# Patient Record
Sex: FEMALE | Race: WHITE | NOT HISPANIC OR LATINO | Employment: OTHER | URBAN - METROPOLITAN AREA
[De-identification: names, ages, dates, MRNs, and addresses within clinical notes are randomized per-mention and may not be internally consistent; named-entity substitution may affect disease eponyms.]

---

## 2017-03-06 ENCOUNTER — HOSPITAL ENCOUNTER (OUTPATIENT)
Dept: RADIOLOGY | Age: 82
Discharge: HOME | End: 2017-03-06
Payer: MEDICARE

## 2017-03-06 DIAGNOSIS — Z12.31 ENCOUNTER FOR SCREENING MAMMOGRAM FOR MALIGNANT NEOPLASM OF BREAST: ICD-10-CM

## 2017-03-06 PROCEDURE — G0202 SCR MAMMO BI INCL CAD: HCPCS

## 2017-03-27 ENCOUNTER — TRANSCRIBE ORDERS (OUTPATIENT)
Dept: ADMINISTRATIVE | Age: 82
End: 2017-03-27

## 2017-03-27 ENCOUNTER — APPOINTMENT (OUTPATIENT)
Dept: LAB | Age: 82
End: 2017-03-27
Payer: MEDICARE

## 2017-03-27 DIAGNOSIS — I10 ESSENTIAL (PRIMARY) HYPERTENSION: ICD-10-CM

## 2017-03-27 DIAGNOSIS — E78.5 HYPERLIPIDEMIA: ICD-10-CM

## 2017-03-27 DIAGNOSIS — R73.9 HYPERGLYCEMIA: ICD-10-CM

## 2017-03-27 LAB
ALBUMIN SERPL BCP-MCNC: 3.7 G/DL (ref 3.5–5)
ALP SERPL-CCNC: 55 U/L (ref 46–116)
ALT SERPL W P-5'-P-CCNC: 26 U/L (ref 12–78)
ANION GAP SERPL CALCULATED.3IONS-SCNC: 6 MMOL/L (ref 4–13)
AST SERPL W P-5'-P-CCNC: 15 U/L (ref 5–45)
BILIRUB SERPL-MCNC: 0.65 MG/DL (ref 0.2–1)
BUN SERPL-MCNC: 30 MG/DL (ref 5–25)
CALCIUM SERPL-MCNC: 9.2 MG/DL (ref 8.3–10.1)
CHLORIDE SERPL-SCNC: 102 MMOL/L (ref 100–108)
CHOLEST SERPL-MCNC: 189 MG/DL (ref 50–200)
CO2 SERPL-SCNC: 31 MMOL/L (ref 21–32)
CREAT SERPL-MCNC: 1.06 MG/DL (ref 0.6–1.3)
EST. AVERAGE GLUCOSE BLD GHB EST-MCNC: 105 MG/DL
GFR SERPL CREATININE-BSD FRML MDRD: 49.9 ML/MIN/1.73SQ M
GLUCOSE P FAST SERPL-MCNC: 94 MG/DL (ref 65–99)
HBA1C MFR BLD: 5.3 % (ref 4.2–6.3)
HDLC SERPL-MCNC: 57 MG/DL (ref 40–60)
LDLC SERPL DIRECT ASSAY-MCNC: 108 MG/DL (ref 0–100)
POTASSIUM SERPL-SCNC: 3.9 MMOL/L (ref 3.5–5.3)
PROT SERPL-MCNC: 6.8 G/DL (ref 6.4–8.2)
SODIUM SERPL-SCNC: 139 MMOL/L (ref 136–145)
TRIGL SERPL-MCNC: 130 MG/DL

## 2017-03-27 PROCEDURE — 80061 LIPID PANEL: CPT

## 2017-03-27 PROCEDURE — 36415 COLL VENOUS BLD VENIPUNCTURE: CPT

## 2017-03-27 PROCEDURE — 80053 COMPREHEN METABOLIC PANEL: CPT

## 2017-03-27 PROCEDURE — 83721 ASSAY OF BLOOD LIPOPROTEIN: CPT

## 2017-03-27 PROCEDURE — 83036 HEMOGLOBIN GLYCOSYLATED A1C: CPT

## 2017-04-03 ENCOUNTER — ALLSCRIPTS OFFICE VISIT (OUTPATIENT)
Dept: OTHER | Facility: OTHER | Age: 82
End: 2017-04-03

## 2017-04-04 ENCOUNTER — ALLSCRIPTS OFFICE VISIT (OUTPATIENT)
Dept: OTHER | Facility: OTHER | Age: 82
End: 2017-04-04

## 2017-04-04 ENCOUNTER — TRANSCRIBE ORDERS (OUTPATIENT)
Dept: ADMINISTRATIVE | Facility: HOSPITAL | Age: 82
End: 2017-04-04

## 2017-04-04 DIAGNOSIS — Z12.31 VISIT FOR SCREENING MAMMOGRAM: Primary | ICD-10-CM

## 2017-04-20 ENCOUNTER — ALLSCRIPTS OFFICE VISIT (OUTPATIENT)
Dept: OTHER | Facility: OTHER | Age: 82
End: 2017-04-20

## 2017-04-20 ENCOUNTER — HOSPITAL ENCOUNTER (OUTPATIENT)
Dept: RADIOLOGY | Facility: OTHER | Age: 82
Discharge: HOME | End: 2017-04-20
Payer: MEDICARE

## 2017-04-20 DIAGNOSIS — M75.41 IMPINGEMENT SYNDROME OF RIGHT SHOULDER: ICD-10-CM

## 2017-04-20 DIAGNOSIS — M25.511 PAIN IN RIGHT SHOULDER: ICD-10-CM

## 2017-04-20 PROCEDURE — 73030 X-RAY EXAM OF SHOULDER: CPT

## 2017-04-26 ENCOUNTER — APPOINTMENT (OUTPATIENT)
Dept: PHYSICAL THERAPY | Age: 82
End: 2017-04-26
Payer: MEDICARE

## 2017-04-26 ENCOUNTER — GENERIC CONVERSION - ENCOUNTER (OUTPATIENT)
Dept: OTHER | Facility: OTHER | Age: 82
End: 2017-04-26

## 2017-04-26 DIAGNOSIS — M75.41 IMPINGEMENT SYNDROME OF RIGHT SHOULDER: ICD-10-CM

## 2017-04-26 PROCEDURE — 97162 PT EVAL MOD COMPLEX 30 MIN: CPT

## 2017-04-26 PROCEDURE — G8985 CARRY GOAL STATUS: HCPCS

## 2017-04-26 PROCEDURE — G8984 CARRY CURRENT STATUS: HCPCS

## 2017-05-02 ENCOUNTER — APPOINTMENT (OUTPATIENT)
Dept: PHYSICAL THERAPY | Age: 82
End: 2017-05-02
Payer: MEDICARE

## 2017-05-02 PROCEDURE — 97110 THERAPEUTIC EXERCISES: CPT

## 2017-05-02 PROCEDURE — 97140 MANUAL THERAPY 1/> REGIONS: CPT

## 2017-05-04 ENCOUNTER — APPOINTMENT (OUTPATIENT)
Dept: PHYSICAL THERAPY | Age: 82
End: 2017-05-04
Payer: MEDICARE

## 2017-05-04 PROCEDURE — 97140 MANUAL THERAPY 1/> REGIONS: CPT

## 2017-05-04 PROCEDURE — 97110 THERAPEUTIC EXERCISES: CPT

## 2017-05-08 ENCOUNTER — APPOINTMENT (OUTPATIENT)
Dept: PHYSICAL THERAPY | Age: 82
End: 2017-05-08
Payer: MEDICARE

## 2017-05-08 PROCEDURE — 97110 THERAPEUTIC EXERCISES: CPT

## 2017-05-08 PROCEDURE — 97014 ELECTRIC STIMULATION THERAPY: CPT

## 2017-05-10 ENCOUNTER — APPOINTMENT (OUTPATIENT)
Dept: PHYSICAL THERAPY | Age: 82
End: 2017-05-10
Payer: MEDICARE

## 2017-05-10 PROCEDURE — 97110 THERAPEUTIC EXERCISES: CPT

## 2017-05-10 PROCEDURE — 97014 ELECTRIC STIMULATION THERAPY: CPT

## 2017-05-15 ENCOUNTER — APPOINTMENT (OUTPATIENT)
Dept: PHYSICAL THERAPY | Age: 82
End: 2017-05-15
Payer: MEDICARE

## 2017-05-15 PROCEDURE — 97110 THERAPEUTIC EXERCISES: CPT

## 2017-05-17 ENCOUNTER — APPOINTMENT (OUTPATIENT)
Dept: PHYSICAL THERAPY | Age: 82
End: 2017-05-17
Payer: MEDICARE

## 2017-05-17 PROCEDURE — 97110 THERAPEUTIC EXERCISES: CPT

## 2017-05-22 ENCOUNTER — APPOINTMENT (OUTPATIENT)
Dept: PHYSICAL THERAPY | Age: 82
End: 2017-05-22
Payer: MEDICARE

## 2017-05-22 ENCOUNTER — GENERIC CONVERSION - ENCOUNTER (OUTPATIENT)
Dept: OTHER | Facility: OTHER | Age: 82
End: 2017-05-22

## 2017-05-22 PROCEDURE — 97140 MANUAL THERAPY 1/> REGIONS: CPT

## 2017-05-22 PROCEDURE — 97110 THERAPEUTIC EXERCISES: CPT

## 2017-05-24 ENCOUNTER — APPOINTMENT (OUTPATIENT)
Dept: PHYSICAL THERAPY | Age: 82
End: 2017-05-24
Payer: MEDICARE

## 2017-05-24 PROCEDURE — G8991 OTHER PT/OT GOAL STATUS: HCPCS

## 2017-05-24 PROCEDURE — G8990 OTHER PT/OT CURRENT STATUS: HCPCS

## 2017-05-24 PROCEDURE — 97110 THERAPEUTIC EXERCISES: CPT

## 2017-05-24 PROCEDURE — 97140 MANUAL THERAPY 1/> REGIONS: CPT

## 2017-05-30 ENCOUNTER — GENERIC CONVERSION - ENCOUNTER (OUTPATIENT)
Dept: OTHER | Facility: OTHER | Age: 82
End: 2017-05-30

## 2017-05-30 ENCOUNTER — APPOINTMENT (OUTPATIENT)
Dept: PHYSICAL THERAPY | Age: 82
End: 2017-05-30
Payer: MEDICARE

## 2017-05-30 PROCEDURE — G8992 OTHER PT/OT  D/C STATUS: HCPCS | Performed by: PHYSICAL THERAPIST

## 2017-05-30 PROCEDURE — 97110 THERAPEUTIC EXERCISES: CPT

## 2017-05-30 PROCEDURE — G8991 OTHER PT/OT GOAL STATUS: HCPCS | Performed by: PHYSICAL THERAPIST

## 2017-05-31 DIAGNOSIS — M75.41 IMPINGEMENT SYNDROME OF RIGHT SHOULDER: ICD-10-CM

## 2017-05-31 DIAGNOSIS — I48.91 ATRIAL FIBRILLATION (HCC): ICD-10-CM

## 2017-05-31 DIAGNOSIS — M12.811 OTHER SPECIFIC ARTHROPATHIES, NOT ELSEWHERE CLASSIFIED, RIGHT SHOULDER: ICD-10-CM

## 2017-06-01 ENCOUNTER — APPOINTMENT (OUTPATIENT)
Dept: PHYSICAL THERAPY | Age: 82
End: 2017-06-01
Payer: MEDICARE

## 2017-06-02 ENCOUNTER — TRANSCRIBE ORDERS (OUTPATIENT)
Dept: ADMINISTRATIVE | Facility: HOSPITAL | Age: 82
End: 2017-06-02

## 2017-06-02 DIAGNOSIS — M75.41 IMPINGEMENT SYNDROME OF RIGHT SHOULDER: ICD-10-CM

## 2017-06-02 DIAGNOSIS — M75.101 ROTATOR CUFF TEAR ARTHROPATHY OF RIGHT SHOULDER: Primary | ICD-10-CM

## 2017-06-02 DIAGNOSIS — M12.811 ROTATOR CUFF TEAR ARTHROPATHY OF RIGHT SHOULDER: Primary | ICD-10-CM

## 2017-06-05 ENCOUNTER — HOSPITAL ENCOUNTER (OUTPATIENT)
Dept: RADIOLOGY | Age: 82
Discharge: HOME | End: 2017-06-05
Payer: MEDICARE

## 2017-06-05 DIAGNOSIS — M75.41 IMPINGEMENT SYNDROME OF RIGHT SHOULDER: ICD-10-CM

## 2017-06-05 PROCEDURE — 73221 MRI JOINT UPR EXTREM W/O DYE: CPT

## 2017-06-12 ENCOUNTER — ALLSCRIPTS OFFICE VISIT (OUTPATIENT)
Dept: OTHER | Facility: OTHER | Age: 82
End: 2017-06-12

## 2017-06-12 ENCOUNTER — LAB (OUTPATIENT)
Dept: LAB | Facility: HOSPITAL | Age: 82
End: 2017-06-12
Attending: ORTHOPAEDIC SURGERY
Payer: MEDICARE

## 2017-06-12 DIAGNOSIS — M12.811 ROTATOR CUFF TEAR ARTHROPATHY OF RIGHT SHOULDER: ICD-10-CM

## 2017-06-12 DIAGNOSIS — I48.91 ATRIAL FIBRILLATION (HCC): ICD-10-CM

## 2017-06-12 DIAGNOSIS — M75.101 ROTATOR CUFF TEAR ARTHROPATHY OF RIGHT SHOULDER: ICD-10-CM

## 2017-06-12 DIAGNOSIS — M12.811 OTHER SPECIFIC ARTHROPATHIES, NOT ELSEWHERE CLASSIFIED, RIGHT SHOULDER: ICD-10-CM

## 2017-06-12 LAB
ABO GROUP BLD: NORMAL
ALBUMIN SERPL BCP-MCNC: 3.9 G/DL (ref 3.5–5)
ALP SERPL-CCNC: 72 U/L (ref 46–116)
ALT SERPL W P-5'-P-CCNC: 29 U/L (ref 12–78)
ANION GAP SERPL CALCULATED.3IONS-SCNC: 8 MMOL/L (ref 4–13)
AST SERPL W P-5'-P-CCNC: 20 U/L (ref 5–45)
BACTERIA UR QL AUTO: ABNORMAL /HPF
BASOPHILS # BLD AUTO: 0.03 THOUSANDS/ΜL (ref 0–0.1)
BASOPHILS NFR BLD AUTO: 0 % (ref 0–1)
BILIRUB SERPL-MCNC: 0.57 MG/DL (ref 0.2–1)
BILIRUB UR QL STRIP: NEGATIVE
BLD GP AB SCN SERPL QL: NEGATIVE
BUN SERPL-MCNC: 26 MG/DL (ref 5–25)
CALCIUM SERPL-MCNC: 9.8 MG/DL (ref 8.3–10.1)
CHLORIDE SERPL-SCNC: 100 MMOL/L (ref 100–108)
CLARITY UR: CLEAR
CO2 SERPL-SCNC: 30 MMOL/L (ref 21–32)
COLOR UR: YELLOW
CREAT SERPL-MCNC: 0.94 MG/DL (ref 0.6–1.3)
EOSINOPHIL # BLD AUTO: 0.26 THOUSAND/ΜL (ref 0–0.61)
EOSINOPHIL NFR BLD AUTO: 2 % (ref 0–6)
ERYTHROCYTE [DISTWIDTH] IN BLOOD BY AUTOMATED COUNT: 13 % (ref 11.6–15.1)
EST. AVERAGE GLUCOSE BLD GHB EST-MCNC: 103 MG/DL
GFR SERPL CREATININE-BSD FRML MDRD: 57.3 ML/MIN/1.73SQ M
GLUCOSE SERPL-MCNC: 82 MG/DL (ref 65–140)
GLUCOSE UR STRIP-MCNC: NEGATIVE MG/DL
HBA1C MFR BLD: 5.2 % (ref 4.2–6.3)
HCT VFR BLD AUTO: 39 % (ref 34.8–46.1)
HGB BLD-MCNC: 13.1 G/DL (ref 11.5–15.4)
HGB UR QL STRIP.AUTO: NEGATIVE
HYALINE CASTS #/AREA URNS LPF: ABNORMAL /LPF
KETONES UR STRIP-MCNC: NEGATIVE MG/DL
LEUKOCYTE ESTERASE UR QL STRIP: ABNORMAL
LYMPHOCYTES # BLD AUTO: 2.33 THOUSANDS/ΜL (ref 0.6–4.47)
LYMPHOCYTES NFR BLD AUTO: 21 % (ref 14–44)
MCH RBC QN AUTO: 31.4 PG (ref 26.8–34.3)
MCHC RBC AUTO-ENTMCNC: 33.6 G/DL (ref 31.4–37.4)
MCV RBC AUTO: 94 FL (ref 82–98)
MONOCYTES # BLD AUTO: 1.26 THOUSAND/ΜL (ref 0.17–1.22)
MONOCYTES NFR BLD AUTO: 11 % (ref 4–12)
NEUTROPHILS # BLD AUTO: 7.21 THOUSANDS/ΜL (ref 1.85–7.62)
NEUTS SEG NFR BLD AUTO: 66 % (ref 43–75)
NITRITE UR QL STRIP: NEGATIVE
NON-SQ EPI CELLS URNS QL MICRO: ABNORMAL /HPF
NRBC BLD AUTO-RTO: 0 /100 WBCS
PH UR STRIP.AUTO: 7.5 [PH] (ref 4.5–8)
PLATELET # BLD AUTO: 258 THOUSANDS/UL (ref 149–390)
PMV BLD AUTO: 10 FL (ref 8.9–12.7)
POTASSIUM SERPL-SCNC: 3.8 MMOL/L (ref 3.5–5.3)
PROT SERPL-MCNC: 7.8 G/DL (ref 6.4–8.2)
PROT UR STRIP-MCNC: NEGATIVE MG/DL
RBC # BLD AUTO: 4.17 MILLION/UL (ref 3.81–5.12)
RBC #/AREA URNS AUTO: ABNORMAL /HPF
RH BLD: POSITIVE
SODIUM SERPL-SCNC: 138 MMOL/L (ref 136–145)
SP GR UR STRIP.AUTO: 1.02 (ref 1–1.03)
SPECIMEN EXPIRATION DATE: NORMAL
UROBILINOGEN UR QL STRIP.AUTO: 1 E.U./DL
WBC # BLD AUTO: 11.13 THOUSAND/UL (ref 4.31–10.16)
WBC #/AREA URNS AUTO: ABNORMAL /HPF

## 2017-06-12 PROCEDURE — 81001 URINALYSIS AUTO W/SCOPE: CPT | Performed by: ORTHOPAEDIC SURGERY

## 2017-06-12 PROCEDURE — 86901 BLOOD TYPING SEROLOGIC RH(D): CPT

## 2017-06-12 PROCEDURE — 85025 COMPLETE CBC W/AUTO DIFF WBC: CPT

## 2017-06-12 PROCEDURE — 36415 COLL VENOUS BLD VENIPUNCTURE: CPT

## 2017-06-12 PROCEDURE — 86900 BLOOD TYPING SEROLOGIC ABO: CPT

## 2017-06-12 PROCEDURE — 83036 HEMOGLOBIN GLYCOSYLATED A1C: CPT

## 2017-06-12 PROCEDURE — 80053 COMPREHEN METABOLIC PANEL: CPT

## 2017-06-12 PROCEDURE — 86850 RBC ANTIBODY SCREEN: CPT

## 2017-06-12 PROCEDURE — 93005 ELECTROCARDIOGRAM TRACING: CPT

## 2017-06-13 LAB
ATRIAL RATE: 71 BPM
P AXIS: 43 DEGREES
PR INTERVAL: 178 MS
QRS AXIS: 11 DEGREES
QRSD INTERVAL: 68 MS
QT INTERVAL: 374 MS
QTC INTERVAL: 406 MS
T WAVE AXIS: 25 DEGREES
VENTRICULAR RATE: 71 BPM

## 2017-06-20 ENCOUNTER — TRANSCRIBE ORDERS (OUTPATIENT)
Dept: ADMINISTRATIVE | Age: 82
End: 2017-06-20

## 2017-06-20 ENCOUNTER — APPOINTMENT (OUTPATIENT)
Dept: RADIOLOGY | Age: 82
End: 2017-06-20
Payer: MEDICARE

## 2017-06-20 DIAGNOSIS — M75.101 ROTATOR CUFF TEAR ARTHROPATHY OF BOTH SHOULDERS: Primary | ICD-10-CM

## 2017-06-20 DIAGNOSIS — M75.101 ROTATOR CUFF TEAR ARTHROPATHY OF BOTH SHOULDERS: ICD-10-CM

## 2017-06-20 DIAGNOSIS — M12.812 ROTATOR CUFF TEAR ARTHROPATHY OF BOTH SHOULDERS: ICD-10-CM

## 2017-06-20 DIAGNOSIS — M75.102 ROTATOR CUFF TEAR ARTHROPATHY OF BOTH SHOULDERS: Primary | ICD-10-CM

## 2017-06-20 DIAGNOSIS — M12.811 ROTATOR CUFF TEAR ARTHROPATHY OF BOTH SHOULDERS: ICD-10-CM

## 2017-06-20 DIAGNOSIS — M12.812 ROTATOR CUFF TEAR ARTHROPATHY OF BOTH SHOULDERS: Primary | ICD-10-CM

## 2017-06-20 DIAGNOSIS — M12.811 ROTATOR CUFF TEAR ARTHROPATHY OF BOTH SHOULDERS: Primary | ICD-10-CM

## 2017-06-20 DIAGNOSIS — M75.102 ROTATOR CUFF TEAR ARTHROPATHY OF BOTH SHOULDERS: ICD-10-CM

## 2017-06-20 PROCEDURE — 71020 HB CHEST X-RAY 2VW FRONTAL&LATL: CPT

## 2017-06-21 ENCOUNTER — GENERIC CONVERSION - ENCOUNTER (OUTPATIENT)
Dept: OTHER | Facility: OTHER | Age: 82
End: 2017-06-21

## 2017-06-23 ENCOUNTER — ALLSCRIPTS OFFICE VISIT (OUTPATIENT)
Dept: OTHER | Facility: OTHER | Age: 82
End: 2017-06-23

## 2017-07-07 ENCOUNTER — ANESTHESIA EVENT (OUTPATIENT)
Dept: PERIOP | Facility: HOSPITAL | Age: 82
DRG: 483 | End: 2017-07-07
Payer: MEDICARE

## 2017-07-18 ENCOUNTER — APPOINTMENT (INPATIENT)
Dept: RADIOLOGY | Facility: HOSPITAL | Age: 82
DRG: 483 | End: 2017-07-18
Attending: ORTHOPAEDIC SURGERY
Payer: MEDICARE

## 2017-07-18 ENCOUNTER — ANESTHESIA (OUTPATIENT)
Dept: PERIOP | Facility: HOSPITAL | Age: 82
DRG: 483 | End: 2017-07-18
Payer: MEDICARE

## 2017-07-18 ENCOUNTER — HOSPITAL ENCOUNTER (INPATIENT)
Facility: HOSPITAL | Age: 82
LOS: 1 days | Discharge: HOME HEALTH | DRG: 483 | End: 2017-07-19
Attending: ORTHOPAEDIC SURGERY | Admitting: ORTHOPAEDIC SURGERY
Payer: MEDICARE

## 2017-07-18 DIAGNOSIS — M75.101 ROTATOR CUFF TEAR ARTHROPATHY OF RIGHT SHOULDER: Primary | ICD-10-CM

## 2017-07-18 DIAGNOSIS — M12.811 ROTATOR CUFF TEAR ARTHROPATHY OF RIGHT SHOULDER: Primary | ICD-10-CM

## 2017-07-18 PROBLEM — M19.211: Status: ACTIVE | Noted: 2017-07-18

## 2017-07-18 PROBLEM — I10 HYPERTENSION: Status: ACTIVE | Noted: 2017-07-18

## 2017-07-18 PROBLEM — F41.9 ANXIETY: Status: ACTIVE | Noted: 2017-07-18

## 2017-07-18 PROBLEM — G62.9 PERIPHERAL NEUROPATHY: Status: ACTIVE | Noted: 2017-07-18

## 2017-07-18 PROBLEM — E78.5 HYPERLIPIDEMIA: Status: ACTIVE | Noted: 2017-07-18

## 2017-07-18 LAB
ABO GROUP BLD: NORMAL
BLD GP AB SCN SERPL QL: NEGATIVE
RH BLD: POSITIVE
SPECIMEN EXPIRATION DATE: NORMAL

## 2017-07-18 PROCEDURE — 73020 X-RAY EXAM OF SHOULDER: CPT

## 2017-07-18 PROCEDURE — C1776 JOINT DEVICE (IMPLANTABLE): HCPCS | Performed by: ORTHOPAEDIC SURGERY

## 2017-07-18 PROCEDURE — 0LQ10ZZ REPAIR RIGHT SHOULDER TENDON, OPEN APPROACH: ICD-10-PCS | Performed by: ORTHOPAEDIC SURGERY

## 2017-07-18 PROCEDURE — 86850 RBC ANTIBODY SCREEN: CPT | Performed by: ORTHOPAEDIC SURGERY

## 2017-07-18 PROCEDURE — 86901 BLOOD TYPING SEROLOGIC RH(D): CPT | Performed by: ORTHOPAEDIC SURGERY

## 2017-07-18 PROCEDURE — C1713 ANCHOR/SCREW BN/BN,TIS/BN: HCPCS | Performed by: ORTHOPAEDIC SURGERY

## 2017-07-18 PROCEDURE — 0LS30ZZ REPOSITION RIGHT UPPER ARM TENDON, OPEN APPROACH: ICD-10-PCS | Performed by: ORTHOPAEDIC SURGERY

## 2017-07-18 PROCEDURE — 86900 BLOOD TYPING SEROLOGIC ABO: CPT | Performed by: ORTHOPAEDIC SURGERY

## 2017-07-18 PROCEDURE — 0RRJ00Z REPLACEMENT OF RIGHT SHOULDER JOINT WITH REVERSE BALL AND SOCKET SYNTHETIC SUBSTITUTE, OPEN APPROACH: ICD-10-PCS | Performed by: ORTHOPAEDIC SURGERY

## 2017-07-18 DEVICE — SCREW CENTRAL 6.5 X 25MM: Type: IMPLANTABLE DEVICE | Site: SHOULDER | Status: FUNCTIONAL

## 2017-07-18 DEVICE — GLENOSPHERE STD 36MM: Type: IMPLANTABLE DEVICE | Site: SHOULDER | Status: FUNCTIONAL

## 2017-07-18 DEVICE — BASEPLATE STD 25MM: Type: IMPLANTABLE DEVICE | Site: SHOULDER | Status: FUNCTIONAL

## 2017-07-18 DEVICE — IMPLANTABLE DEVICE
Type: IMPLANTABLE DEVICE | Site: SHOULDER | Status: FUNCTIONAL
Brand: FLEX SHOULDER SYSTEM

## 2017-07-18 DEVICE — SCREW PERIPHERAL 5 X 14MM: Type: IMPLANTABLE DEVICE | Site: SHOULDER | Status: FUNCTIONAL

## 2017-07-18 DEVICE — IMPLANTABLE DEVICE
Type: IMPLANTABLE DEVICE | Site: SHOULDER | Status: FUNCTIONAL
Brand: AEQUALIS™ ASCEND™ FLEX

## 2017-07-18 DEVICE — SCREW PERIPHERAL 5 X 34MM: Type: IMPLANTABLE DEVICE | Site: SHOULDER | Status: FUNCTIONAL

## 2017-07-18 RX ORDER — PRAVASTATIN SODIUM 40 MG
40 TABLET ORAL
Status: DISCONTINUED | OUTPATIENT
Start: 2017-07-20 | End: 2017-07-19 | Stop reason: HOSPADM

## 2017-07-18 RX ORDER — OXYCODONE HYDROCHLORIDE 10 MG/1
10 TABLET ORAL EVERY 4 HOURS PRN
Status: DISCONTINUED | OUTPATIENT
Start: 2017-07-18 | End: 2017-07-19 | Stop reason: HOSPADM

## 2017-07-18 RX ORDER — SUCCINYLCHOLINE CHLORIDE 20 MG/ML
INJECTION INTRAMUSCULAR; INTRAVENOUS AS NEEDED
Status: DISCONTINUED | OUTPATIENT
Start: 2017-07-18 | End: 2017-07-18 | Stop reason: SURG

## 2017-07-18 RX ORDER — ONDANSETRON 2 MG/ML
4 INJECTION INTRAMUSCULAR; INTRAVENOUS EVERY 6 HOURS PRN
Status: DISCONTINUED | OUTPATIENT
Start: 2017-07-18 | End: 2017-07-19 | Stop reason: HOSPADM

## 2017-07-18 RX ORDER — SCOLOPAMINE TRANSDERMAL SYSTEM 1 MG/1
1 PATCH, EXTENDED RELEASE TRANSDERMAL
Status: DISCONTINUED | OUTPATIENT
Start: 2017-07-18 | End: 2017-07-19 | Stop reason: HOSPADM

## 2017-07-18 RX ORDER — CHLORAL HYDRATE 500 MG
1000 CAPSULE ORAL DAILY
Status: DISCONTINUED | OUTPATIENT
Start: 2017-07-18 | End: 2017-07-19 | Stop reason: HOSPADM

## 2017-07-18 RX ORDER — LISINOPRIL 10 MG/1
10 TABLET ORAL EVERY 12 HOURS SCHEDULED
Status: DISCONTINUED | OUTPATIENT
Start: 2017-07-18 | End: 2017-07-18

## 2017-07-18 RX ORDER — SODIUM CHLORIDE, SODIUM LACTATE, POTASSIUM CHLORIDE, CALCIUM CHLORIDE 600; 310; 30; 20 MG/100ML; MG/100ML; MG/100ML; MG/100ML
125 INJECTION, SOLUTION INTRAVENOUS CONTINUOUS
Status: DISCONTINUED | OUTPATIENT
Start: 2017-07-18 | End: 2017-07-19 | Stop reason: HOSPADM

## 2017-07-18 RX ORDER — PROPOFOL 10 MG/ML
INJECTION, EMULSION INTRAVENOUS CONTINUOUS PRN
Status: DISCONTINUED | OUTPATIENT
Start: 2017-07-18 | End: 2017-07-18 | Stop reason: SURG

## 2017-07-18 RX ORDER — CALCIUM CARBONATE 200(500)MG
1000 TABLET,CHEWABLE ORAL DAILY PRN
Status: DISCONTINUED | OUTPATIENT
Start: 2017-07-18 | End: 2017-07-19 | Stop reason: HOSPADM

## 2017-07-18 RX ORDER — PROPOFOL 10 MG/ML
INJECTION, EMULSION INTRAVENOUS AS NEEDED
Status: DISCONTINUED | OUTPATIENT
Start: 2017-07-18 | End: 2017-07-18 | Stop reason: SURG

## 2017-07-18 RX ORDER — ALBUTEROL SULFATE 2.5 MG/3ML
2.5 SOLUTION RESPIRATORY (INHALATION) ONCE AS NEEDED
Status: DISCONTINUED | OUTPATIENT
Start: 2017-07-18 | End: 2017-07-18 | Stop reason: HOSPADM

## 2017-07-18 RX ORDER — ONDANSETRON 2 MG/ML
INJECTION INTRAMUSCULAR; INTRAVENOUS AS NEEDED
Status: DISCONTINUED | OUTPATIENT
Start: 2017-07-18 | End: 2017-07-18 | Stop reason: SURG

## 2017-07-18 RX ORDER — MORPHINE SULFATE 2 MG/ML
2 INJECTION, SOLUTION INTRAMUSCULAR; INTRAVENOUS
Status: DISCONTINUED | OUTPATIENT
Start: 2017-07-18 | End: 2017-07-19 | Stop reason: HOSPADM

## 2017-07-18 RX ORDER — DIPHENOXYLATE HYDROCHLORIDE AND ATROPINE SULFATE 2.5; .025 MG/1; MG/1
1 TABLET ORAL
COMMUNITY
End: 2019-11-15

## 2017-07-18 RX ORDER — EPHEDRINE SULFATE 50 MG/ML
INJECTION, SOLUTION INTRAVENOUS AS NEEDED
Status: DISCONTINUED | OUTPATIENT
Start: 2017-07-18 | End: 2017-07-18 | Stop reason: SURG

## 2017-07-18 RX ORDER — FENTANYL CITRATE 50 UG/ML
INJECTION, SOLUTION INTRAMUSCULAR; INTRAVENOUS AS NEEDED
Status: DISCONTINUED | OUTPATIENT
Start: 2017-07-18 | End: 2017-07-18 | Stop reason: SURG

## 2017-07-18 RX ORDER — OXYCODONE HYDROCHLORIDE 5 MG/1
TABLET ORAL
Qty: 30 TABLET | Refills: 0 | Status: SHIPPED | OUTPATIENT
Start: 2017-07-18 | End: 2018-07-10

## 2017-07-18 RX ORDER — MEPERIDINE HYDROCHLORIDE 25 MG/ML
12.5 INJECTION INTRAMUSCULAR; INTRAVENOUS; SUBCUTANEOUS AS NEEDED
Status: DISCONTINUED | OUTPATIENT
Start: 2017-07-18 | End: 2017-07-18 | Stop reason: HOSPADM

## 2017-07-18 RX ORDER — FENTANYL CITRATE/PF 50 MCG/ML
25 SYRINGE (ML) INJECTION
Status: DISCONTINUED | OUTPATIENT
Start: 2017-07-18 | End: 2017-07-18 | Stop reason: HOSPADM

## 2017-07-18 RX ORDER — PROPRANOLOL HYDROCHLORIDE 10 MG/1
20 TABLET ORAL 4 TIMES DAILY
Status: DISCONTINUED | OUTPATIENT
Start: 2017-07-18 | End: 2017-07-19 | Stop reason: HOSPADM

## 2017-07-18 RX ORDER — GLYCOPYRROLATE 0.2 MG/ML
INJECTION INTRAMUSCULAR; INTRAVENOUS AS NEEDED
Status: DISCONTINUED | OUTPATIENT
Start: 2017-07-18 | End: 2017-07-18 | Stop reason: SURG

## 2017-07-18 RX ORDER — LIDOCAINE HYDROCHLORIDE 10 MG/ML
INJECTION, SOLUTION INFILTRATION; PERINEURAL AS NEEDED
Status: DISCONTINUED | OUTPATIENT
Start: 2017-07-18 | End: 2017-07-18 | Stop reason: SURG

## 2017-07-18 RX ORDER — ACETAMINOPHEN 325 MG/1
650 TABLET ORAL EVERY 6 HOURS PRN
Status: DISCONTINUED | OUTPATIENT
Start: 2017-07-18 | End: 2017-07-19 | Stop reason: HOSPADM

## 2017-07-18 RX ORDER — MAGNESIUM HYDROXIDE 1200 MG/15ML
LIQUID ORAL AS NEEDED
Status: DISCONTINUED | OUTPATIENT
Start: 2017-07-18 | End: 2017-07-18 | Stop reason: HOSPADM

## 2017-07-18 RX ORDER — DEXAMETHASONE SODIUM PHOSPHATE 4 MG/ML
INJECTION, SOLUTION INTRA-ARTICULAR; INTRALESIONAL; INTRAMUSCULAR; INTRAVENOUS; SOFT TISSUE AS NEEDED
Status: DISCONTINUED | OUTPATIENT
Start: 2017-07-18 | End: 2017-07-18 | Stop reason: SURG

## 2017-07-18 RX ORDER — TRIAMTERENE AND HYDROCHLOROTHIAZIDE 37.5; 25 MG/1; MG/1
1 TABLET ORAL DAILY
Status: DISCONTINUED | OUTPATIENT
Start: 2017-07-18 | End: 2017-07-18

## 2017-07-18 RX ORDER — DOCUSATE SODIUM 100 MG/1
100 CAPSULE, LIQUID FILLED ORAL 2 TIMES DAILY
Status: DISCONTINUED | OUTPATIENT
Start: 2017-07-18 | End: 2017-07-19 | Stop reason: HOSPADM

## 2017-07-18 RX ORDER — SODIUM CHLORIDE, SODIUM LACTATE, POTASSIUM CHLORIDE, CALCIUM CHLORIDE 600; 310; 30; 20 MG/100ML; MG/100ML; MG/100ML; MG/100ML
125 INJECTION, SOLUTION INTRAVENOUS CONTINUOUS
Status: DISPENSED | OUTPATIENT
Start: 2017-07-18 | End: 2017-07-19

## 2017-07-18 RX ORDER — LABETALOL HYDROCHLORIDE 5 MG/ML
5 INJECTION, SOLUTION INTRAVENOUS
Status: DISCONTINUED | OUTPATIENT
Start: 2017-07-18 | End: 2017-07-18 | Stop reason: HOSPADM

## 2017-07-18 RX ORDER — ONDANSETRON 2 MG/ML
4 INJECTION INTRAMUSCULAR; INTRAVENOUS ONCE AS NEEDED
Status: DISCONTINUED | OUTPATIENT
Start: 2017-07-18 | End: 2017-07-18 | Stop reason: HOSPADM

## 2017-07-18 RX ORDER — OXYCODONE HYDROCHLORIDE 5 MG/1
5 TABLET ORAL EVERY 4 HOURS PRN
Status: DISCONTINUED | OUTPATIENT
Start: 2017-07-18 | End: 2017-07-19 | Stop reason: HOSPADM

## 2017-07-18 RX ADMIN — CEFAZOLIN SODIUM 2000 MG: 2 SOLUTION INTRAVENOUS at 08:05

## 2017-07-18 RX ADMIN — FENTANYL CITRATE 25 MCG: 50 INJECTION INTRAMUSCULAR; INTRAVENOUS at 10:39

## 2017-07-18 RX ADMIN — CEFAZOLIN SODIUM 2000 MG: 2 SOLUTION INTRAVENOUS at 16:48

## 2017-07-18 RX ADMIN — GLYCOPYRROLATE 0.2 MG: 0.2 INJECTION, SOLUTION INTRAMUSCULAR; INTRAVENOUS at 08:51

## 2017-07-18 RX ADMIN — FENTANYL CITRATE 100 MCG: 50 INJECTION, SOLUTION INTRAMUSCULAR; INTRAVENOUS at 08:10

## 2017-07-18 RX ADMIN — SCOPALAMINE 1 PATCH: 1 PATCH, EXTENDED RELEASE TRANSDERMAL at 11:02

## 2017-07-18 RX ADMIN — EPHEDRINE SULFATE 10 MG: 50 INJECTION, SOLUTION INTRAMUSCULAR; INTRAVENOUS; SUBCUTANEOUS at 08:47

## 2017-07-18 RX ADMIN — ROPIVACAINE HYDROCHLORIDE: 2 INJECTION, SOLUTION EPIDURAL; INFILTRATION at 20:08

## 2017-07-18 RX ADMIN — SODIUM CHLORIDE, SODIUM LACTATE, POTASSIUM CHLORIDE, AND CALCIUM CHLORIDE: .6; .31; .03; .02 INJECTION, SOLUTION INTRAVENOUS at 07:50

## 2017-07-18 RX ADMIN — EPHEDRINE SULFATE 5 MG: 50 INJECTION, SOLUTION INTRAMUSCULAR; INTRAVENOUS; SUBCUTANEOUS at 09:03

## 2017-07-18 RX ADMIN — SODIUM CHLORIDE, SODIUM LACTATE, POTASSIUM CHLORIDE, AND CALCIUM CHLORIDE 125 ML/HR: .6; .31; .03; .02 INJECTION, SOLUTION INTRAVENOUS at 09:57

## 2017-07-18 RX ADMIN — EPHEDRINE SULFATE 10 MG: 50 INJECTION, SOLUTION INTRAMUSCULAR; INTRAVENOUS; SUBCUTANEOUS at 08:31

## 2017-07-18 RX ADMIN — ROPIVACAINE HYDROCHLORIDE: 2 INJECTION, SOLUTION EPIDURAL; INFILTRATION at 09:58

## 2017-07-18 RX ADMIN — EPHEDRINE SULFATE 5 MG: 50 INJECTION, SOLUTION INTRAMUSCULAR; INTRAVENOUS; SUBCUTANEOUS at 08:27

## 2017-07-18 RX ADMIN — PROPRANOLOL HYDROCHLORIDE 20 MG: 10 TABLET ORAL at 17:42

## 2017-07-18 RX ADMIN — PROPOFOL 80 MCG/KG/MIN: 10 INJECTION, EMULSION INTRAVENOUS at 08:02

## 2017-07-18 RX ADMIN — EPHEDRINE SULFATE 10 MG: 50 INJECTION, SOLUTION INTRAMUSCULAR; INTRAVENOUS; SUBCUTANEOUS at 08:29

## 2017-07-18 RX ADMIN — PROPOFOL 10 MG: 10 INJECTION, EMULSION INTRAVENOUS at 08:16

## 2017-07-18 RX ADMIN — EPHEDRINE SULFATE 10 MG: 50 INJECTION, SOLUTION INTRAMUSCULAR; INTRAVENOUS; SUBCUTANEOUS at 08:00

## 2017-07-18 RX ADMIN — DEXAMETHASONE SODIUM PHOSPHATE 8 MG: 4 INJECTION, SOLUTION INTRAMUSCULAR; INTRAVENOUS at 08:13

## 2017-07-18 RX ADMIN — ONDANSETRON 4 MG: 2 INJECTION INTRAMUSCULAR; INTRAVENOUS at 09:24

## 2017-07-18 RX ADMIN — PROPRANOLOL HYDROCHLORIDE 20 MG: 10 TABLET ORAL at 21:54

## 2017-07-18 RX ADMIN — EPHEDRINE SULFATE 5 MG: 50 INJECTION, SOLUTION INTRAMUSCULAR; INTRAVENOUS; SUBCUTANEOUS at 09:22

## 2017-07-18 RX ADMIN — LIDOCAINE HYDROCHLORIDE 50 MG: 10 INJECTION, SOLUTION INFILTRATION; PERINEURAL at 07:56

## 2017-07-18 RX ADMIN — DOCUSATE SODIUM 100 MG: 100 CAPSULE, LIQUID FILLED ORAL at 17:42

## 2017-07-18 RX ADMIN — FENTANYL CITRATE 25 MCG: 50 INJECTION INTRAMUSCULAR; INTRAVENOUS at 10:30

## 2017-07-18 RX ADMIN — SUCCINYLCHOLINE CHLORIDE 120 MG: 20 INJECTION, SOLUTION INTRAMUSCULAR; INTRAVENOUS at 07:56

## 2017-07-18 RX ADMIN — CEFAZOLIN SODIUM 2000 MG: 2 SOLUTION INTRAVENOUS at 23:17

## 2017-07-18 RX ADMIN — PROPOFOL 150 MG: 10 INJECTION, EMULSION INTRAVENOUS at 07:56

## 2017-07-19 VITALS
BODY MASS INDEX: 39.34 KG/M2 | DIASTOLIC BLOOD PRESSURE: 70 MMHG | HEIGHT: 55 IN | HEART RATE: 73 BPM | WEIGHT: 170 LBS | OXYGEN SATURATION: 95 % | RESPIRATION RATE: 18 BRPM | TEMPERATURE: 98 F | SYSTOLIC BLOOD PRESSURE: 138 MMHG

## 2017-07-19 LAB
ANION GAP SERPL CALCULATED.3IONS-SCNC: 6 MMOL/L (ref 4–13)
BASOPHILS # BLD AUTO: 0 THOUSANDS/ΜL (ref 0–0.1)
BASOPHILS NFR BLD AUTO: 0 % (ref 0–1)
BUN SERPL-MCNC: 17 MG/DL (ref 5–25)
CALCIUM SERPL-MCNC: 9 MG/DL (ref 8.3–10.1)
CHLORIDE SERPL-SCNC: 103 MMOL/L (ref 100–108)
CO2 SERPL-SCNC: 27 MMOL/L (ref 21–32)
CREAT SERPL-MCNC: 0.88 MG/DL (ref 0.6–1.3)
EOSINOPHIL # BLD AUTO: 0 THOUSAND/ΜL (ref 0–0.61)
EOSINOPHIL NFR BLD AUTO: 0 % (ref 0–6)
ERYTHROCYTE [DISTWIDTH] IN BLOOD BY AUTOMATED COUNT: 12.5 % (ref 11.6–15.1)
ERYTHROCYTE [DISTWIDTH] IN BLOOD BY AUTOMATED COUNT: 12.5 % (ref 11.6–15.1)
GFR SERPL CREATININE-BSD FRML MDRD: >60 ML/MIN/1.73SQ M
GLUCOSE SERPL-MCNC: 139 MG/DL (ref 65–140)
HCT VFR BLD AUTO: 34.2 % (ref 34.8–46.1)
HCT VFR BLD AUTO: 34.2 % (ref 34.8–46.1)
HGB BLD-MCNC: 11.6 G/DL (ref 11.5–15.4)
HGB BLD-MCNC: 11.6 G/DL (ref 11.5–15.4)
LYMPHOCYTES # BLD AUTO: 1.16 THOUSANDS/ΜL (ref 0.6–4.47)
LYMPHOCYTES NFR BLD AUTO: 8 % (ref 14–44)
MCH RBC QN AUTO: 31.2 PG (ref 26.8–34.3)
MCH RBC QN AUTO: 31.2 PG (ref 26.8–34.3)
MCHC RBC AUTO-ENTMCNC: 33.9 G/DL (ref 31.4–37.4)
MCHC RBC AUTO-ENTMCNC: 33.9 G/DL (ref 31.4–37.4)
MCV RBC AUTO: 92 FL (ref 82–98)
MCV RBC AUTO: 92 FL (ref 82–98)
MONOCYTES # BLD AUTO: 0.58 THOUSAND/ΜL (ref 0.17–1.22)
MONOCYTES NFR BLD AUTO: 4 % (ref 4–12)
NEUTROPHILS # BLD AUTO: 11.98 THOUSANDS/ΜL (ref 1.85–7.62)
NEUTS SEG NFR BLD AUTO: 88 % (ref 43–75)
NRBC BLD AUTO-RTO: 0 /100 WBCS
PLATELET # BLD AUTO: 216 THOUSANDS/UL (ref 149–390)
PLATELET # BLD AUTO: 216 THOUSANDS/UL (ref 149–390)
PMV BLD AUTO: 9.5 FL (ref 8.9–12.7)
PMV BLD AUTO: 9.5 FL (ref 8.9–12.7)
POTASSIUM SERPL-SCNC: 4 MMOL/L (ref 3.5–5.3)
RBC # BLD AUTO: 3.72 MILLION/UL (ref 3.81–5.12)
RBC # BLD AUTO: 3.72 MILLION/UL (ref 3.81–5.12)
SODIUM SERPL-SCNC: 136 MMOL/L (ref 136–145)
WBC # BLD AUTO: 13.76 THOUSAND/UL (ref 4.31–10.16)
WBC # BLD AUTO: 13.76 THOUSAND/UL (ref 4.31–10.16)

## 2017-07-19 PROCEDURE — 85027 COMPLETE CBC AUTOMATED: CPT | Performed by: PHYSICIAN ASSISTANT

## 2017-07-19 PROCEDURE — 97166 OT EVAL MOD COMPLEX 45 MIN: CPT

## 2017-07-19 PROCEDURE — 97163 PT EVAL HIGH COMPLEX 45 MIN: CPT

## 2017-07-19 PROCEDURE — G8988 SELF CARE GOAL STATUS: HCPCS

## 2017-07-19 PROCEDURE — G8987 SELF CARE CURRENT STATUS: HCPCS

## 2017-07-19 PROCEDURE — 85025 COMPLETE CBC W/AUTO DIFF WBC: CPT | Performed by: NURSE PRACTITIONER

## 2017-07-19 PROCEDURE — G8978 MOBILITY CURRENT STATUS: HCPCS

## 2017-07-19 PROCEDURE — G8979 MOBILITY GOAL STATUS: HCPCS

## 2017-07-19 PROCEDURE — 80048 BASIC METABOLIC PNL TOTAL CA: CPT | Performed by: PHYSICIAN ASSISTANT

## 2017-07-19 PROCEDURE — 97535 SELF CARE MNGMENT TRAINING: CPT

## 2017-07-19 RX ADMIN — PROPRANOLOL HYDROCHLORIDE 20 MG: 10 TABLET ORAL at 13:00

## 2017-07-19 RX ADMIN — ACETAMINOPHEN 650 MG: 325 TABLET, FILM COATED ORAL at 11:48

## 2017-07-19 RX ADMIN — Medication 1000 MG: at 09:34

## 2017-07-19 RX ADMIN — PROPRANOLOL HYDROCHLORIDE 20 MG: 10 TABLET ORAL at 09:34

## 2017-07-20 ENCOUNTER — GENERIC CONVERSION - ENCOUNTER (OUTPATIENT)
Dept: OTHER | Facility: OTHER | Age: 82
End: 2017-07-20

## 2017-07-25 ENCOUNTER — APPOINTMENT (OUTPATIENT)
Dept: PHYSICAL THERAPY | Age: 82
End: 2017-07-25
Payer: MEDICARE

## 2017-07-25 ENCOUNTER — GENERIC CONVERSION - ENCOUNTER (OUTPATIENT)
Dept: OTHER | Facility: OTHER | Age: 82
End: 2017-07-25

## 2017-07-25 DIAGNOSIS — M12.811 OTHER SPECIFIC ARTHROPATHIES, NOT ELSEWHERE CLASSIFIED, RIGHT SHOULDER: ICD-10-CM

## 2017-07-25 PROCEDURE — G8991 OTHER PT/OT GOAL STATUS: HCPCS

## 2017-07-25 PROCEDURE — G8990 OTHER PT/OT CURRENT STATUS: HCPCS

## 2017-07-25 PROCEDURE — 97162 PT EVAL MOD COMPLEX 30 MIN: CPT

## 2017-07-27 ENCOUNTER — APPOINTMENT (OUTPATIENT)
Dept: PHYSICAL THERAPY | Age: 82
End: 2017-07-27
Payer: MEDICARE

## 2017-07-31 ENCOUNTER — HOSPITAL ENCOUNTER (OUTPATIENT)
Dept: RADIOLOGY | Facility: HOSPITAL | Age: 82
Discharge: HOME | End: 2017-07-31
Attending: ORTHOPAEDIC SURGERY
Payer: MEDICARE

## 2017-07-31 ENCOUNTER — APPOINTMENT (OUTPATIENT)
Dept: PHYSICAL THERAPY | Age: 82
End: 2017-07-31
Payer: MEDICARE

## 2017-07-31 ENCOUNTER — ALLSCRIPTS OFFICE VISIT (OUTPATIENT)
Dept: OTHER | Facility: OTHER | Age: 82
End: 2017-07-31

## 2017-07-31 DIAGNOSIS — Z47.1 AFTERCARE FOLLOWING JOINT REPLACEMENT SURGERY: ICD-10-CM

## 2017-07-31 PROCEDURE — 97110 THERAPEUTIC EXERCISES: CPT

## 2017-07-31 PROCEDURE — 97140 MANUAL THERAPY 1/> REGIONS: CPT

## 2017-07-31 PROCEDURE — 73030 X-RAY EXAM OF SHOULDER: CPT

## 2017-08-01 DIAGNOSIS — E78.5 HYPERLIPIDEMIA: ICD-10-CM

## 2017-08-01 DIAGNOSIS — Z47.1 AFTERCARE FOLLOWING JOINT REPLACEMENT SURGERY: ICD-10-CM

## 2017-08-01 DIAGNOSIS — I10 ESSENTIAL (PRIMARY) HYPERTENSION: ICD-10-CM

## 2017-08-01 DIAGNOSIS — R53.83 OTHER FATIGUE: ICD-10-CM

## 2017-08-03 ENCOUNTER — APPOINTMENT (OUTPATIENT)
Dept: PHYSICAL THERAPY | Age: 82
End: 2017-08-03
Payer: MEDICARE

## 2017-08-03 PROCEDURE — 97140 MANUAL THERAPY 1/> REGIONS: CPT

## 2017-08-03 PROCEDURE — 97110 THERAPEUTIC EXERCISES: CPT

## 2017-08-07 ENCOUNTER — APPOINTMENT (OUTPATIENT)
Dept: PHYSICAL THERAPY | Age: 82
End: 2017-08-07
Payer: MEDICARE

## 2017-08-07 PROCEDURE — 97110 THERAPEUTIC EXERCISES: CPT

## 2017-08-07 PROCEDURE — 97140 MANUAL THERAPY 1/> REGIONS: CPT

## 2017-08-09 ENCOUNTER — APPOINTMENT (OUTPATIENT)
Dept: PHYSICAL THERAPY | Age: 82
End: 2017-08-09
Payer: MEDICARE

## 2017-08-09 PROCEDURE — 97110 THERAPEUTIC EXERCISES: CPT

## 2017-08-09 PROCEDURE — 97140 MANUAL THERAPY 1/> REGIONS: CPT

## 2017-08-14 ENCOUNTER — APPOINTMENT (OUTPATIENT)
Dept: PHYSICAL THERAPY | Age: 82
End: 2017-08-14
Payer: MEDICARE

## 2017-08-14 PROCEDURE — 97140 MANUAL THERAPY 1/> REGIONS: CPT

## 2017-08-14 PROCEDURE — 97110 THERAPEUTIC EXERCISES: CPT

## 2017-08-16 ENCOUNTER — APPOINTMENT (OUTPATIENT)
Dept: PHYSICAL THERAPY | Age: 82
End: 2017-08-16
Payer: MEDICARE

## 2017-08-16 PROCEDURE — 97140 MANUAL THERAPY 1/> REGIONS: CPT

## 2017-08-16 PROCEDURE — 97110 THERAPEUTIC EXERCISES: CPT

## 2017-08-21 ENCOUNTER — GENERIC CONVERSION - ENCOUNTER (OUTPATIENT)
Dept: OTHER | Facility: OTHER | Age: 82
End: 2017-08-21

## 2017-08-21 ENCOUNTER — APPOINTMENT (OUTPATIENT)
Dept: PHYSICAL THERAPY | Age: 82
End: 2017-08-21
Payer: MEDICARE

## 2017-08-21 PROCEDURE — G8990 OTHER PT/OT CURRENT STATUS: HCPCS

## 2017-08-21 PROCEDURE — G8991 OTHER PT/OT GOAL STATUS: HCPCS

## 2017-08-21 PROCEDURE — 97110 THERAPEUTIC EXERCISES: CPT

## 2017-08-21 PROCEDURE — 97140 MANUAL THERAPY 1/> REGIONS: CPT

## 2017-08-23 ENCOUNTER — APPOINTMENT (OUTPATIENT)
Dept: PHYSICAL THERAPY | Age: 82
End: 2017-08-23
Payer: MEDICARE

## 2017-08-23 PROCEDURE — 97110 THERAPEUTIC EXERCISES: CPT

## 2017-08-28 ENCOUNTER — APPOINTMENT (OUTPATIENT)
Dept: PHYSICAL THERAPY | Age: 82
End: 2017-08-28
Payer: MEDICARE

## 2017-08-28 PROCEDURE — 97110 THERAPEUTIC EXERCISES: CPT

## 2017-08-30 ENCOUNTER — APPOINTMENT (OUTPATIENT)
Dept: PHYSICAL THERAPY | Age: 82
End: 2017-08-30
Payer: MEDICARE

## 2017-08-30 PROCEDURE — 97110 THERAPEUTIC EXERCISES: CPT

## 2017-09-01 ENCOUNTER — TRANSCRIBE ORDERS (OUTPATIENT)
Dept: ADMINISTRATIVE | Age: 82
End: 2017-09-01

## 2017-09-01 ENCOUNTER — APPOINTMENT (OUTPATIENT)
Dept: LAB | Age: 82
End: 2017-09-01
Payer: MEDICARE

## 2017-09-01 DIAGNOSIS — I10 ESSENTIAL (PRIMARY) HYPERTENSION: ICD-10-CM

## 2017-09-01 DIAGNOSIS — R53.83 OTHER FATIGUE: ICD-10-CM

## 2017-09-01 DIAGNOSIS — E78.5 HYPERLIPIDEMIA: ICD-10-CM

## 2017-09-01 LAB
ALBUMIN SERPL BCP-MCNC: 3.5 G/DL (ref 3.5–5)
ALP SERPL-CCNC: 78 U/L (ref 46–116)
ALT SERPL W P-5'-P-CCNC: 27 U/L (ref 12–78)
ANION GAP SERPL CALCULATED.3IONS-SCNC: 9 MMOL/L (ref 4–13)
AST SERPL W P-5'-P-CCNC: 25 U/L (ref 5–45)
BILIRUB SERPL-MCNC: 0.46 MG/DL (ref 0.2–1)
BUN SERPL-MCNC: 26 MG/DL (ref 5–25)
CALCIUM SERPL-MCNC: 9.1 MG/DL (ref 8.3–10.1)
CHLORIDE SERPL-SCNC: 104 MMOL/L (ref 100–108)
CHOLEST SERPL-MCNC: 160 MG/DL (ref 50–200)
CO2 SERPL-SCNC: 26 MMOL/L (ref 21–32)
CREAT SERPL-MCNC: 1.05 MG/DL (ref 0.6–1.3)
GFR SERPL CREATININE-BSD FRML MDRD: 50 ML/MIN/1.73SQ M
GLUCOSE P FAST SERPL-MCNC: 102 MG/DL (ref 65–99)
HDLC SERPL-MCNC: 56 MG/DL (ref 40–60)
LDLC SERPL DIRECT ASSAY-MCNC: 94 MG/DL (ref 0–100)
POTASSIUM SERPL-SCNC: 4 MMOL/L (ref 3.5–5.3)
PROT SERPL-MCNC: 7 G/DL (ref 6.4–8.2)
SODIUM SERPL-SCNC: 139 MMOL/L (ref 136–145)
TRIGL SERPL-MCNC: 131 MG/DL
TSH SERPL DL<=0.05 MIU/L-ACNC: 1.74 UIU/ML (ref 0.36–3.74)

## 2017-09-01 PROCEDURE — 83721 ASSAY OF BLOOD LIPOPROTEIN: CPT

## 2017-09-01 PROCEDURE — 80061 LIPID PANEL: CPT

## 2017-09-01 PROCEDURE — 36415 COLL VENOUS BLD VENIPUNCTURE: CPT

## 2017-09-01 PROCEDURE — 80053 COMPREHEN METABOLIC PANEL: CPT

## 2017-09-01 PROCEDURE — 84443 ASSAY THYROID STIM HORMONE: CPT

## 2017-09-05 ENCOUNTER — ALLSCRIPTS OFFICE VISIT (OUTPATIENT)
Dept: OTHER | Facility: OTHER | Age: 82
End: 2017-09-05

## 2017-09-06 ENCOUNTER — APPOINTMENT (OUTPATIENT)
Dept: PHYSICAL THERAPY | Age: 82
End: 2017-09-06
Payer: MEDICARE

## 2017-09-06 PROCEDURE — 97110 THERAPEUTIC EXERCISES: CPT

## 2017-09-11 ENCOUNTER — APPOINTMENT (OUTPATIENT)
Dept: PHYSICAL THERAPY | Age: 82
End: 2017-09-11
Payer: MEDICARE

## 2017-09-11 PROCEDURE — 97110 THERAPEUTIC EXERCISES: CPT

## 2017-09-13 ENCOUNTER — APPOINTMENT (OUTPATIENT)
Dept: PHYSICAL THERAPY | Age: 82
End: 2017-09-13
Payer: MEDICARE

## 2017-09-13 PROCEDURE — 97110 THERAPEUTIC EXERCISES: CPT

## 2017-09-18 ENCOUNTER — GENERIC CONVERSION - ENCOUNTER (OUTPATIENT)
Dept: OTHER | Facility: OTHER | Age: 82
End: 2017-09-18

## 2017-09-18 ENCOUNTER — APPOINTMENT (OUTPATIENT)
Dept: PHYSICAL THERAPY | Age: 82
End: 2017-09-18
Payer: MEDICARE

## 2017-09-18 PROCEDURE — G8991 OTHER PT/OT GOAL STATUS: HCPCS

## 2017-09-18 PROCEDURE — G8990 OTHER PT/OT CURRENT STATUS: HCPCS

## 2017-09-18 PROCEDURE — 97110 THERAPEUTIC EXERCISES: CPT

## 2017-09-20 ENCOUNTER — APPOINTMENT (OUTPATIENT)
Dept: PHYSICAL THERAPY | Age: 82
End: 2017-09-20
Payer: MEDICARE

## 2017-09-20 ENCOUNTER — GENERIC CONVERSION - ENCOUNTER (OUTPATIENT)
Dept: INTERNAL MEDICINE CLINIC | Facility: CLINIC | Age: 82
End: 2017-09-20

## 2017-09-20 PROCEDURE — 97010 HOT OR COLD PACKS THERAPY: CPT

## 2017-09-20 PROCEDURE — 97110 THERAPEUTIC EXERCISES: CPT

## 2017-10-02 ENCOUNTER — GENERIC CONVERSION - ENCOUNTER (OUTPATIENT)
Dept: OTHER | Facility: OTHER | Age: 82
End: 2017-10-02

## 2017-10-02 ENCOUNTER — ALLSCRIPTS OFFICE VISIT (OUTPATIENT)
Dept: OTHER | Facility: OTHER | Age: 82
End: 2017-10-02

## 2017-10-03 NOTE — PROGRESS NOTES
Assessment  1  Aftercare following right shoulder joint replacement surgery (V54 81,V43 61)   (Z47 1,Z96 611)    Plan  Aftercare following right shoulder joint replacement surgery    · Follow-up PRN Evaluation and Treatment  Follow-up  Status: Complete  Done:  67RQI7144 09:55AM    Discussion/Summary    The patient looks great and is functioning near normally with her right shoulder at this time  Preoperative pain has resolved  We will see her back on an as-needed basis, she does mention that she has a hard time writing with her right hand at this time I do not feel that is something that I could conceivably blame upon the shoulder replacement and may be a neurologic issue with fine motor skills, if it is related to recovery from the shoulder replacement I expect will improve otherwise she will follow-up with her primary physician to evaluate for a neurologic source for that complaint  Post-Op  Post-Op Shoulder:   Emma Shown is status post reverse total shoulder of the right shoulder  The surgery was performed on 7/18/2017  HPI: The patient reports no excessive pain,-no swelling-and-no excessive bleeding  PE: The surgical incision site was healed  The shoulder demonstrates no warmth,-no induration,-no erythema-and-no ecchymosis  ROM as expected given post-op status  The patient is progressing well with ROM  Strength as expected given post-op status  The patient is progressing well with strength  Peripheral neurovascular exam reveals intact sensation to light touch-and-intact gross motor function  Assessment: Post-op, the patient is doing well,-has excellent pain control-and-is showing no signs of infection  Plan: Activity Restrictions: Advance as tolerated-and-per protocol  Active Problems  1  Aftercare following right shoulder joint replacement surgery (V54 81,V43 61)   (Z47 1,Z96 611)   2  Anxiety (300 00) (F41 9)   3  Arthritis (716 90) (M19 90)   4   Atrial fibrillation (427 31) (I48 91)   5  History of Breast Surgery Lumpectomy   6  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)   7  Difficulty breathing (786 09) (R06 89)   8  Diverticulosis (562 10) (K57 90)   9  Dizziness (780 4) (R42)   10  Fatigue (780 79) (R53 83)   11  Hyperglycemia (790 29) (R73 9)   12  Hyperlipidemia (272 4) (E78 5)   13  Hypertension (401 9) (I10)   14  Limb pain (729 5) (M79 609)   15  Osteopenia (733 90) (M85 80)   16  Pain of right hand (729 5) (M79 641)   17  Peripheral neuropathy (356 9) (G62 9)   18  Right shoulder pain (719 41) (M25 511)   19  Rotator cuff tear arthropathy of right shoulder (716 81) (M12 811)   20  Shortness of breath (786 05) (R06 02)   21  Shoulder joint pain, unspecified laterality   22  Shoulder pain, right (719 41) (M25 511)   23  Skin lesion (709 9) (L98 9)   24  Skin rash (782 1) (R21)   25  Status post reverse arthroplasty of right shoulder (V43 61) (Z96 611)   26  Subacromial impingement of right shoulder (726 19) (M75 41)   27  Supraventricular tachycardia (427 89) (I47 1)   28  Upper extremity pain (729 5) (M79 603)   29  Vitamin D deficiency (268 9) (E55 9)    Social History   · Being A Social Drinker   · Coffee   · Daily Coffee Consumption (1  Cups/Day)   · Daily Cola Consumption (___ Cans/Day)   · Denied: History of Drug Use   · Exercise: Walking   ·    · Never A Smoker   · Retired From Work    Current Meds   1  Betamethasone Dipropionate 0 05 % External Cream; APPLY SPARINGLY TO   AFFECTED AREA(S) ONCE DAILY; Therapy: 45RZX0351 to (Last Rosita Dk)  Requested for: 21Sep2017 Ordered   2  Clorazepate Dipotassium 3 75 MG Oral Tablet; Take 1 tablet daily; Therapy: 35Jdw7687 to (Last Rx:03Jan2017) Ordered   3  Crestor 10 MG Oral Tablet (Rosuvastatin Calcium); Therapy: (Abiola Rink) to Recorded   4  [employer] Fish Oil CAPS; Therapy: (Thomas Rink) to Recorded   5  Lisinopril 10 MG Oral Tablet; TAKE 1 TABLET TWICE DAILY;    Therapy: 33MJM9268 to (Evaluate:39Nng5534)  Requested for: 01Aug2017; Last   Rx:01Aug2017 Ordered   6  Multi-Vitamin Oral Tablet Recorded   7  Propranolol HCl - 20 MG Oral Tablet; Take 1 tablet by mouth 4  times daily; Therapy: 66UBR5070 to (Joan Fat)  Requested for: 01Aug2017; Last   Rx:01Aug2017 Ordered   8  Rosuvastatin Calcium 10 MG Oral Tablet (Crestor); 1/2 tablet twice weekly; Therapy: 28Iiv9440 to (Evaluate:62Zvw7812) Recorded   9  Triamterene-HCTZ 37 5-25 MG Oral Capsule (Dyazide); TAKE 1 CAPSULE Daily; Therapy: 89GJI3701 to (Evaluate:01Feb2018)  Requested for: 10IOU9065; Last   Rx:06Feb2017 Ordered    Allergies  1  Augmentin TABS   2  Zithromax CAPS   3  Anesthesia IV Set MISC   4  Cortisone Acetate TABS   5  Doxycycline Hyclate CAPS   6  Keflex CAPS   7  Penicillins    Vitals   Recorded: 16BWT0264 09:30AM   Heart Rate 69   Systolic 406, Sitting   Diastolic 81, Sitting   Weight 172 lb 2 oz   BMI Calculated 40 01   BSA Calculated 1 64     Future Appointments    Date/Time Provider Specialty Site   02/06/2018 11:30 AM GREGG García   Internal Medicine Violet Oviedo MD   04/11/2018 10:00 AM Hansa Gifford Surgical Oncology [facility]     Signatures   Electronically signed by : GREGG Snow ; Oct  2 2017  9:56AM EST                       (Author)

## 2017-10-24 ENCOUNTER — GENERIC CONVERSION - ENCOUNTER (OUTPATIENT)
Dept: OTHER | Facility: OTHER | Age: 82
End: 2017-10-24

## 2017-10-25 NOTE — PROGRESS NOTES
Assessment  1  Hypertension (401 9) (I10)   2  Hyperlipidemia (272 4) (E78 5)   3  Hyperglycemia (790 29) (R73 9)   4  Anxiety (300 00) (F41 9)   5  Shortness of breath (786 05) (R06 02)   6  Shoulder pain, right (719 41) (M25 511)   7  Supraventricular tachycardia (427 89) (I47 1)    #1 health maintenance-we will be obtaining influenza vaccine  #2 severe rotator cuff disease of the right shoulder-unresponsive to conservative therapy and eventually underwent right total shoulder replacement with excellent response to surgery  She is completing physical therapy  She is not driving  Further management as per orthopedic physician  #3 right fourth finger tendinitis-previously injected  #4 vitamin D deficiency-continue supplemental  #5 statin associated myalgias-improved a lower dose of statin  #6 hypertension-adequate dose of beta blocker and ACE inhibitor  #7 hyperlipidemia with mixed dyslipidemia-had myalgias with simvastatin  Some issues on higher dose of Crestor  Continue low dose of Crestor  #8 diffuse DJD-sedimentation rate normal   C-reactive protein mildly elevated  #9 grade 1 diastolic dysfunction-noted on echo-felt related to age and her hypertension  #10 proteinuria-noted previously-most recent urine for protein creatinine ratio normal  #11 status post total knee replacement-has some ongoing issues and at this point is just living with it  #12 peripheral neuropathy-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  Immunofixation negative    Serum for heavy metals, vitamin B 12, methylmalonic acid, Lyme titer, rheumatoid factor and antinuclear antibody all normal   No significant symptoms at present    All other problems as per note of July 2013    MEDICAL REGIMEN: Crestor 5 mg twice per week, propanolol 20 mg 3 times a day, lisinopril 10 mg twice a day, Dyazide 37 5/25 daily, clonazepam 3 75 mg daily using 3 75 mg tablet, multivitamin, fish oil, vitamin D 3/    Appointment in several months  prior chemistry profile, cholesterol profile,     Plan  Hyperlipidemia, Hypertension, Vitamin D deficiency    · (1) CBC/PLT/DIFF; Status:Active; Requested alf:[id number];    · (1) CHOLESTEROL, TOTAL; Status:Active; Requested MDU:05ACR1727;    · (1) COMPREHENSIVE METABOLIC PANEL; Status:Active; Requested VNK:14BHT7983;    · (1) HDL,DIRECT; Status:Active; Requested XVE:19MVV2650;    · (1) LDL,DIRECT; Status:Active; Requested IJI:13WHL6771;    · (1) TRIGLYCERIDE; Status:Active; Requested SIJ:81ONY3417;    · (1) VITAMIN D 25-HYDROXY; Status:Active; Requested IBT:30SCT3960;     Continue current medical regimen  Go for blood work prior to next visit  Obtain influenza vaccine in October  Call office if noting any chest pain or pressure with activity  Call office if noting any weakness of one arm or leg compared to the other  Call office if noting any numbness of one arm or leg compared to the other  Call office if noting any blurred or double vision     History of Present Illness  HPI: She had reversal I told her shoulder surgery had an excellent result  She is now driving 2 months after surgery  She has minimal pain  She completed physical therapy  She said she had blocked on physical history of intolerance of anesthesia  She is thrilled with how well she has done?she denied of any major anesthesia complications  has known hypertension  BP stable on current therapy  Avoiding solids decongestants  Denies simultaneous pounding of her heart sweats and headache  Did not have any major hypertension exacerbation postoperatively  testing done prior to this visit shows creatinine 1 05, bun of 26, sugar 102, cholesterol 160, triglycerides are 131, HDL 56, LDL 94  Her TSH is normal 1 74  has not had any recurrent supraventricular tachycardia and had none of this postoperatively  She remains on her beta blocker  She has known hyperlipidemia remains in her statin   She understands the difference between statin associated myalgias and arthralgias from degenerative arthritis  says her neuropathy symptoms are about the same  This has not been a major issue  She denied of any exacerbation of neuropathy postoperatively  She denies any weakness of one arm or leg compared to the other  Denies any new numbness of one arm or leg compared to the other  Denies blurred or double vision or difficulty with her speech      Active Problems  1  Aftercare following right shoulder joint replacement surgery (V54 81,V43 61) (Z47 1,Z96 611)   2  Anxiety (300 00) (F41 9)   3  Arthritis (716 90) (M19 90)   4  Atrial fibrillation (427 31) (I48 91)   5  History of Breast Surgery Lumpectomy   6  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)   7  Difficulty breathing (786 09) (R06 89)   8  Diverticulosis (562 10) (K57 90)   9  Dizziness (780 4) (R42)   10  Fatigue (780 79) (R53 83)   11  Hyperglycemia (790 29) (R73 9)   12  Hyperlipidemia (272 4) (E78 5)   13  Hypertension (401 9) (I10)   14  Limb pain (729 5) (M79 609)   15  Osteopenia (733 90) (M85 80)   16  Pain of right hand (729 5) (M79 641)   17  Peripheral neuropathy (356 9) (G62 9)   18  Right shoulder pain (719 41) (M25 511)   19  Rotator cuff tear arthropathy of right shoulder (716 81) (M12 811)   20  Shortness of breath (786 05) (R06 02)   21  Shoulder joint pain, unspecified laterality   22  Shoulder pain, right (719 41) (M25 511)   23  Skin lesion (709 9) (L98 9)   24  Status post reverse arthroplasty of right shoulder (V43 61) (Z96 611)   25  Subacromial impingement of right shoulder (726 19) (M75 41)   26  Supraventricular tachycardia (427 89) (I47 1)   27  Upper extremity pain (729 5) (M79 603)   28  Vitamin D deficiency (268 9) (E55 9)    Past Medical History  1  History of Adnexal mass (625 8) (N94 9)   2  History of Age At First Period 16 Years Old (Menarche)   3  History of Age At First Pregnancy 25 Years Old   4   History of Arthritis (V13 4)   5  History of Atherosclerosis (440 9) (I70 90)   6  History of Breast cancer screening, high risk patient (V76 11) (Z12 31)   7  History of Broken femur (821 00) (S72 90XA)   8  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)   9  History of Endometrial polyp (621 0) (N84 0)   10  History of arthritis (V13 4) (Z87 39)   11  History of Bell's palsy (V12 49) (Z86 69)   12  History of cardiac disorder (V12 50) (Z86 79)   13  History of fever (V13 89) (Z87 898)   14  History of hordeolum (V13 3) (Z87 2)   15  History of hyperlipidemia (V12 29) (Z86 39)   16  History of hyperlipidemia (V12 29) (Z86 39)   17  History of hypertension (V12 59) (Z86 79)   18  History of influenza (V12 09) (Z87 09)   19  History of urinary tract infection (V13 02) (Z87 440)   20  History of Hypercholesterolemia (272 0) (E78 00)   21  History of Joint pain, knee (719 46) (M25 569)   22  History of Long term use of drug (V58 69) (Z79 899)   23  History of Malignant neoplasm without specification of site (199 1) (C80 1)   24  History of Myalgia And Myositis (729 1)   25  History of Pap smear, as part of routine gynecological examination (V76 2) (Z01 419)   26  History of Previously Pregnant With 5  Term Deliveries   32  History of Uterine fibroid (218 9) (D25 9)   28  History of Visit for pre-operative examination (V72 84) (T64 059)  Active Problems And Past Medical History Reviewed: The active problems and past medical history were reviewed and updated today  Surgical History  1  History of Biopsy Breast Open   2  History of Biopsy Endometrial, Without Cervical Dilation   3  History of Breast Surgery Lumpectomy   4  History of Cataract Surgery   5  History of Dilation And Curettage   6  History of Hemorrhoidectomy   7  History of Knee Surgery   8  History of Neuroplasty Decompression Median Nerve At Carpal Tunnel   9  History of Ovarian Cystectomy Left   10  History of Tonsillectomy   11   History of Tubal Ligation  Surgical History Reviewed: The surgical history was reviewed and updated today  Family History  Mother    1  Family history of Atherosclerosis (V17 49)  Father    2  Family history of Atherosclerosis (V17 49)  Family History    3  Family history of Arthritis (V17 7)   4  Family history of Atherosclerosis (V17 49)   5  Family history of Breast Cancer (V16 3)   6  Family history of Heart disease   7  Family history of Osteoarthritis (V17 7)   8  Family history of Supraventricular Tachycardia    Social History   · Being A Social Drinker   · Coffee   · Daily Coffee Consumption (1  Cups/Day)   · Daily Cola Consumption (___ Cans/Day)   · Denied: History of Drug Use   · Exercise: Walking   ·    · Never A Smoker   · Retired From Work  Social History Reviewed: The social history was reviewed and updated today  The social history was reviewed and is unchanged  Current Meds   1  Clorazepate Dipotassium 3 75 MG Oral Tablet; Take 1 tablet daily; Therapy: 48Ner0108 to (Last Rx:03Jan2017) Ordered   2  Crestor 10 MG Oral Tablet (Rosuvastatin Calcium); Therapy: (Andreia Ordaz) to Recorded   3  [employer] Fish Oil CAPS; Therapy: (Andreia Ordaz) to Recorded   4  Lisinopril 10 MG Oral Tablet; TAKE 1 TABLET TWICE DAILY; Therapy: 69PKU1044 to ()  Requested for: 01Aug2017; Last Rx:01Aug2017   Ordered   5  Multi-Vitamin Oral Tablet Recorded   6  Propranolol HCl - 20 MG Oral Tablet; Take 1 tablet by mouth 4  times daily; Therapy: 34RFN5508 to (0660 303 88 06)  Requested for: 01Aug2017; Last Rx:01Aug2017   Ordered   7  Rosuvastatin Calcium 10 MG Oral Tablet (Crestor); 1/2 tablet twice weekly; Therapy: 59Adm2885 to (Evaluate:46Wmn9129) Recorded   8  Triamterene-HCTZ 37 5-25 MG Oral Capsule (Dyazide); TAKE 1 CAPSULE Daily; Therapy: 43JWK5030 to (Evaluate:01Feb2018)  Requested for: 21PDI4219; Last Rx:06Feb2017   Ordered  Medication List Reviewed:    The medication list was reviewed and updated today  Allergies  1  Augmentin TABS   2  Zithromax CAPS   3  Anesthesia IV Set MISC   4  Cortisone Acetate TABS   5  Doxycycline Hyclate CAPS   6  Keflex CAPS   7  Penicillins    Vitals  Vital Signs    Recorded: 05Sep2017 08:57PM Recorded: 05Sep2017 11:37AM   Heart Rate 72    Respiration 14    Systolic 413, RUE, Sitting    Diastolic 78, RUE, Sitting    Height  4 ft 7 in   Weight  170 lb 8 oz   BMI Calculated  39 63   BSA Calculated  1 64     Physical Exam    Constitutional   General appearance: No acute distress, well appearing and well nourished  Head and Face   Head and face: Abnormal  -- Diffuse alopecia  Palpation of the face and sinuses: No sinus tenderness  Eyes   Conjunctiva and lids: Abnormal  -- Purulent discharge from the left eye with evidence suggestive of portal limb  Pupils and irises: Equal, round, reactive to light  Ears, Nose, Mouth, and Throat   External inspection of ears and nose: Normal     Otoscopic examination: Tympanic membranes translucent with normal light reflex  Canals patent without erythema  Hearing: Normal     Nasal mucosa, septum, and turbinates: Normal without edema or erythema  Lips, teeth, and gums: Normal, good dentition  Oropharynx: Normal with no erythema, edema, exudate or lesions  Neck   Neck: Supple, symmetric, trachea midline, no masses  Thyroid: Normal, no thyromegaly  Pulmonary   Respiratory effort: No increased work of breathing or signs of respiratory distress  Percussion of chest: Normal     Palpation of chest: Normal     Auscultation of lungs: Abnormal     Cardiovascular   Palpation of heart: Normal PMI, no thrills  Auscultation of heart: Abnormal  -- S4 gallop  Carotid pulses: 2+ bilaterally  Abdominal aorta: Normal     Femoral pulses: 2+ bilaterally  Pedal pulses: 2+ bilaterally      Peripheral vascular exam: Normal     Examination of extremities for edema and/or varicosities: Normal     Chest Breasts: Abnormal     Chest: Normal     Abdomen   Abdomen: Non-tender, no masses  Liver and spleen: No hepatomegaly or splenomegaly  Examination for hernias: No hernia appreciated  Anus, perineum, and rectum: Normal sphincter tone, no masses, no prolapse  Lymphatic   Palpation of lymph nodes in neck: No lymphadenopathy  Palpation of lymph nodes in axillae: No lymphadenopathy  Palpation of lymph nodes in groin: No lymphadenopathy  Palpation of lymph nodes in other areas: No lymphadenopathy  Musculoskeletal   Gait and station: Normal     Digits and nails: Normal without clubbing or cyanosis  Joints, bones, and muscles: Abnormal  -- Scar from recent right shoulder surgery Pain on range of motion of right shoulder?tenderness of the fourth finger flexor tendon Crepitance on range of motion of both knees  Evidence of prior knee surgery bilaterally  Range of motion: Normal     Stability: Normal     Muscle strength/tone: Normal     Skin   Skin and subcutaneous tissue: Normal without rashes or lesions  Palpation of skin and subcutaneous tissue: Normal turgor  Neurologic   Cranial nerves: Cranial nerves II-XII intact  Cortical function: Normal mental status  Reflexes: 2+ and symmetric  Sensation: No sensory loss  Coordination: Normal finger to nose and heel to shin  Psychiatric   Judgment and insight: Normal     Orientation to person, place, and time: Normal     Recent and remote memory: Intact  Mood and affect: Normal        Health Management  Health Maintenance   Medicare Annual Wellness Visit; every 1 year; Last [id number]; Next Due: [id number]; Overdue    Future Appointments    Date/Time Provider Specialty Site   10/02/2017 09:20 GREGG Cedeno  Orthopedic Surgery [facility]   02/06/2018 11:30 AM GREGG Hendricks   Internal Medicine Shira Russ MD   04/11/2018 10:00 AM Lamar Giffodr CRNP Surgical Oncology [facility]     Signatures   Electronically signed by : GREGG Doss ; Sep  5 2017  9:04PM EST                       (Author)

## 2018-01-11 NOTE — MISCELLANEOUS
History of Present Illness  TCM Communication [facility]: The patient is being contacted for follow-up after hospitalization  Hospital records were reviewed  She was hospitalized at [facility]  The date of admission: 7/18/2017, date of discharge: 7/19/2017  Diagnosis: Rotator cuff tear arthropathy of right shoulder  She was discharged to home  She did not schedule a follow up appointment  Follow-up appointments with other specialists: No marcin needed at this time due to scheduled surgery  Communication performed and completed by Allen Mcneill      Active Problems    1  Anxiety (300 00) (F41 9)   2  Arthritis (716 90) (M19 90)   3  Atrial fibrillation (427 31) (I48 91)   4  History of Breast Surgery Lumpectomy   5  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)   6  Difficulty breathing (786 09) (R06 89)   7  Diverticulosis (562 10) (K57 90)   8  Dizziness (780 4) (R42)   9  Fatigue (780 79) (R53 83)   10  Hyperglycemia (790 29) (R73 9)   11  Hyperlipidemia (272 4) (E78 5)   12  Hypertension (401 9) (I10)   13  Limb pain (729 5) (M79 609)   14  Osteopenia (733 90) (M85 80)   15  Pain of right hand (729 5) (M79 641)   16  Peripheral neuropathy (356 9) (G62 9)   17  Right shoulder pain (719 41) (M25 511)   18  Rotator cuff tear arthropathy of right shoulder (716 81) (M12 811)   19  Shortness of breath (786 05) (R06 02)   20  Shoulder joint pain, unspecified laterality   21  Shoulder pain, right (719 41) (M25 511)   22  Skin lesion (709 9) (L98 9)   23  Subacromial impingement of right shoulder (726 19) (M75 41)   24  Supraventricular tachycardia (427 89) (I47 1)   25  Upper extremity pain (729 5) (M79 603)   26  Visit for pre-operative examination (V72 84) (Z01 818)   27  Visit for screening mammogram (V76 12) (Z12 31)   28  Vitamin D deficiency (268 9) (E55 9)    Past Medical History    1  History of Adnexal mass (625 8) (N94 9)   2   History of Age At First Period 16 Years Old (Menarche)   3  History of Age At First Pregnancy 25 Years Old   4  History of Arthritis (V13 4)   5  History of Atherosclerosis (440 9) (I70 90)   6  History of Breast cancer screening, high risk patient (V76 11) (Z12 39)   7  History of Broken femur (821 00) (S72 90XA)   8  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)   9  History of Endometrial polyp (621 0) (N84 0)   10  History of arthritis (V13 4) (Z87 39)   11  History of Bell's palsy (V12 49) (Z86 69)   12  History of cardiac disorder (V12 50) (Z86 79)   13  History of fever (V13 89) (Z87 898)   14  History of hordeolum (V13 3) (Z87 2)   15  History of hyperlipidemia (V12 29) (Z86 39)   16  History of hyperlipidemia (V12 29) (Z86 39)   17  History of hypertension (V12 59) (Z86 79)   18  History of influenza (V12 09) (Z87 09)   19  History of urinary tract infection (V13 02) (Z87 440)   20  History of Hypercholesterolemia (272 0) (E78 00)   21  History of Joint pain, knee (719 46) (M25 569)   22  History of Long term use of drug (V58 69) (Z79 899)   23  History of Malignant neoplasm without specification of site (199 1) (C80 1)   24  History of Myalgia And Myositis (729 1)   25  History of Pap smear, as part of routine gynecological examination (V76 2) (Z01 419)   26  History of Previously Pregnant With 5  Term Deliveries   32  History of Uterine fibroid (218 9) (D25 9)   28  History of Visit for pre-operative examination (V72 84) (G34 023)    Surgical History    1  History of Biopsy Breast Open   2  History of Biopsy Endometrial, Without Cervical Dilation   3  History of Breast Surgery Lumpectomy   4  History of Cataract Surgery   5  History of Dilation And Curettage   6  History of Hemorrhoidectomy   7  History of Knee Surgery   8  History of Neuroplasty Decompression Median Nerve At Carpal Tunnel   9  History of Ovarian Cystectomy Left   10  History of Tonsillectomy   11  History of Tubal Ligation    Family History  Mother    1  Family history of Atherosclerosis (V17 49)  Father    2  Family history of Atherosclerosis (V17 49)  Family History    3  Family history of Arthritis (V17 7)   4  Family history of Atherosclerosis (V17 49)   5  Family history of Breast Cancer (V16 3)   6  Family history of Heart disease   7  Family history of Osteoarthritis (V17 7)   8  Family history of Supraventricular Tachycardia    Social History    · Being A Social Drinker   · Coffee   · Daily Coffee Consumption (1  Cups/Day)   · Daily Cola Consumption (___ Cans/Day)   · Denied: History of Drug Use   · Exercise: Walking   ·    · Never A Smoker   · Retired From Work    Current Meds   1  Clorazepate Dipotassium 3 75 MG Oral Tablet; Take 1 tablet daily; Therapy: 76Rce7537 to (Last Rx:03Jan2017) Ordered   2  Crestor 10 MG Oral Tablet; Therapy: (Kuldeep Ribera) to Recorded   3  [employer] Fish Oil CAPS; Therapy: (Kuldeep Ribera) to Recorded   4  Lisinopril 10 MG Oral Tablet; TAKE 1 TABLET TWICE DAILY; Therapy: 68XQY1810 to (Jaelyn Decree)  Requested for: 87OQD5474; Last   Rx:14Nov2016 Ordered   5  Multi-Vitamin Oral Tablet Recorded   6  Nystatin 143167 UNIT/GM External Cream; APPLY  AND RUB  IN A THIN FILM TO   AFFECTED AREAS TWICE DAILY  (AM AND PM); Therapy: 54IJB8789 to (Last Rx:05Sep2014)  Requested for: 05Sep2014 Ordered   7  Propranolol HCl - 20 MG Oral Tablet; Take 1 tablet by mouth 4  times daily; Therapy: 39QEP6518 to (María Deter)  Requested for: 32OXL9216; Last   LK:54JKW3852 Ordered   8  Rosuvastatin Calcium 10 MG Oral Tablet; 1/2 tablet twice weekly; Therapy: 13Cgt2636 to (Evaluate:25Psc1864); Last Rx:38Gzx5382 Ordered   9  Triamterene-HCTZ 37 5-25 MG Oral Capsule; TAKE 1 CAPSULE Daily; Therapy: 75RAJ8398 to (Evaluate:07Xkn5635)  Requested for: 04ROF5409; Last   Rx:34Efi1559 Ordered   10  Voltaren 1 % Transdermal Gel; APPLY TO LOWER EXTREMITIES, 4 GM OF GEL TO    AFFECTED AREA 4 TIMES DAILY    DO NOT APPLY MORE THAN 16 GM DAILY TO ANY    ONE AFFECTED JOINT; Therapy: 74Akl2117 to (Last Rx:49Ydb7525)  Requested for: 85Ggz1678 Ordered    Allergies    1  Augmentin TABS   2  Zithromax CAPS   3  Anesthesia IV Set MISC   4  Cortisone Acetate TABS   5  Doxycycline Hyclate CAPS   6  Keflex CAPS   7  Penicillins    Health Management  Health Maintenance   Medicare Annual Wellness Visit; every 1 year; Last 86XPI9969; Next Due: [id number]; Overdue    Future Appointments    Date/Time Provider Specialty Site   07/31/2017 09:20 GREGG Adler  Orthopedic Surgery [facility] [place]   09/05/2017 11:30 AM GREGG Macedo   Internal Medicine Eliezer Jarvis MD   04/11/2018 10:00 AM Luna Gifford [facility]     Signatures   Electronically signed by : GREGG Kaur ; Jul 21 2017 11:01AM EST                       (Author)

## 2018-01-13 VITALS
WEIGHT: 171.5 LBS | RESPIRATION RATE: 18 BRPM | DIASTOLIC BLOOD PRESSURE: 68 MMHG | HEART RATE: 89 BPM | SYSTOLIC BLOOD PRESSURE: 118 MMHG | BODY MASS INDEX: 39.86 KG/M2

## 2018-01-13 VITALS
SYSTOLIC BLOOD PRESSURE: 144 MMHG | DIASTOLIC BLOOD PRESSURE: 81 MMHG | BODY MASS INDEX: 40.01 KG/M2 | HEART RATE: 69 BPM | WEIGHT: 172.13 LBS

## 2018-01-13 VITALS — HEART RATE: 72 BPM | SYSTOLIC BLOOD PRESSURE: 126 MMHG | DIASTOLIC BLOOD PRESSURE: 80 MMHG | RESPIRATION RATE: 14 BRPM

## 2018-01-14 VITALS
BODY MASS INDEX: 39.51 KG/M2 | HEART RATE: 72 BPM | DIASTOLIC BLOOD PRESSURE: 71 MMHG | WEIGHT: 170 LBS | SYSTOLIC BLOOD PRESSURE: 110 MMHG

## 2018-01-14 VITALS
HEIGHT: 55 IN | SYSTOLIC BLOOD PRESSURE: 116 MMHG | WEIGHT: 171 LBS | BODY MASS INDEX: 39.57 KG/M2 | RESPIRATION RATE: 14 BRPM | DIASTOLIC BLOOD PRESSURE: 66 MMHG | HEART RATE: 68 BPM

## 2018-01-14 VITALS
WEIGHT: 170.38 LBS | HEART RATE: 71 BPM | SYSTOLIC BLOOD PRESSURE: 160 MMHG | DIASTOLIC BLOOD PRESSURE: 85 MMHG | BODY MASS INDEX: 39.6 KG/M2

## 2018-01-15 VITALS
WEIGHT: 170.5 LBS | SYSTOLIC BLOOD PRESSURE: 120 MMHG | HEART RATE: 72 BPM | BODY MASS INDEX: 39.46 KG/M2 | DIASTOLIC BLOOD PRESSURE: 78 MMHG | HEIGHT: 55 IN | RESPIRATION RATE: 14 BRPM

## 2018-01-15 VITALS
BODY MASS INDEX: 40.27 KG/M2 | HEIGHT: 55 IN | DIASTOLIC BLOOD PRESSURE: 82 MMHG | WEIGHT: 174 LBS | TEMPERATURE: 97.3 F | OXYGEN SATURATION: 94 % | SYSTOLIC BLOOD PRESSURE: 140 MMHG | HEART RATE: 82 BPM | RESPIRATION RATE: 14 BRPM

## 2018-01-22 VITALS — SYSTOLIC BLOOD PRESSURE: 130 MMHG | RESPIRATION RATE: 14 BRPM | HEART RATE: 72 BPM | DIASTOLIC BLOOD PRESSURE: 80 MMHG

## 2018-01-29 DIAGNOSIS — I10 ESSENTIAL (PRIMARY) HYPERTENSION: ICD-10-CM

## 2018-01-29 DIAGNOSIS — E78.5 HYPERLIPIDEMIA: ICD-10-CM

## 2018-01-29 DIAGNOSIS — E55.9 VITAMIN D DEFICIENCY: ICD-10-CM

## 2018-01-31 ENCOUNTER — APPOINTMENT (OUTPATIENT)
Dept: LAB | Age: 83
End: 2018-01-31
Payer: MEDICARE

## 2018-01-31 DIAGNOSIS — E55.9 VITAMIN D DEFICIENCY: ICD-10-CM

## 2018-01-31 DIAGNOSIS — E78.5 HYPERLIPIDEMIA: ICD-10-CM

## 2018-01-31 DIAGNOSIS — I10 ESSENTIAL (PRIMARY) HYPERTENSION: ICD-10-CM

## 2018-01-31 LAB
25(OH)D3 SERPL-MCNC: 21.4 NG/ML (ref 30–100)
ALBUMIN SERPL BCP-MCNC: 3.9 G/DL (ref 3.5–5)
ALP SERPL-CCNC: 66 U/L (ref 46–116)
ALT SERPL W P-5'-P-CCNC: 24 U/L (ref 12–78)
ANION GAP SERPL CALCULATED.3IONS-SCNC: 6 MMOL/L (ref 4–13)
AST SERPL W P-5'-P-CCNC: 17 U/L (ref 5–45)
BASOPHILS # BLD AUTO: 0.04 THOUSANDS/ΜL (ref 0–0.1)
BASOPHILS NFR BLD AUTO: 1 % (ref 0–1)
BILIRUB SERPL-MCNC: 0.68 MG/DL (ref 0.2–1)
BUN SERPL-MCNC: 24 MG/DL (ref 5–25)
CALCIUM SERPL-MCNC: 9.5 MG/DL (ref 8.3–10.1)
CHLORIDE SERPL-SCNC: 105 MMOL/L (ref 100–108)
CHOLEST SERPL-MCNC: 190 MG/DL (ref 50–200)
CO2 SERPL-SCNC: 28 MMOL/L (ref 21–32)
CREAT SERPL-MCNC: 0.92 MG/DL (ref 0.6–1.3)
EOSINOPHIL # BLD AUTO: 0.29 THOUSAND/ΜL (ref 0–0.61)
EOSINOPHIL NFR BLD AUTO: 6 % (ref 0–6)
ERYTHROCYTE [DISTWIDTH] IN BLOOD BY AUTOMATED COUNT: 13.2 % (ref 11.6–15.1)
GFR SERPL CREATININE-BSD FRML MDRD: 59 ML/MIN/1.73SQ M
GLUCOSE P FAST SERPL-MCNC: 91 MG/DL (ref 65–99)
HCT VFR BLD AUTO: 41.2 % (ref 34.8–46.1)
HDLC SERPL-MCNC: 57 MG/DL (ref 40–60)
HGB BLD-MCNC: 14.2 G/DL (ref 11.5–15.4)
LDLC SERPL DIRECT ASSAY-MCNC: 107 MG/DL (ref 0–100)
LYMPHOCYTES # BLD AUTO: 1.59 THOUSANDS/ΜL (ref 0.6–4.47)
LYMPHOCYTES NFR BLD AUTO: 30 % (ref 14–44)
MCH RBC QN AUTO: 31.8 PG (ref 26.8–34.3)
MCHC RBC AUTO-ENTMCNC: 34.5 G/DL (ref 31.4–37.4)
MCV RBC AUTO: 92 FL (ref 82–98)
MONOCYTES # BLD AUTO: 0.47 THOUSAND/ΜL (ref 0.17–1.22)
MONOCYTES NFR BLD AUTO: 9 % (ref 4–12)
NEUTROPHILS # BLD AUTO: 2.88 THOUSANDS/ΜL (ref 1.85–7.62)
NEUTS SEG NFR BLD AUTO: 54 % (ref 43–75)
NRBC BLD AUTO-RTO: 0 /100 WBCS
PLATELET # BLD AUTO: 168 THOUSANDS/UL (ref 149–390)
PMV BLD AUTO: 10.7 FL (ref 8.9–12.7)
POTASSIUM SERPL-SCNC: 4 MMOL/L (ref 3.5–5.3)
PROT SERPL-MCNC: 7 G/DL (ref 6.4–8.2)
RBC # BLD AUTO: 4.47 MILLION/UL (ref 3.81–5.12)
SODIUM SERPL-SCNC: 139 MMOL/L (ref 136–145)
TRIGL SERPL-MCNC: 173 MG/DL
WBC # BLD AUTO: 5.28 THOUSAND/UL (ref 4.31–10.16)

## 2018-01-31 PROCEDURE — 80053 COMPREHEN METABOLIC PANEL: CPT

## 2018-01-31 PROCEDURE — 36415 COLL VENOUS BLD VENIPUNCTURE: CPT

## 2018-01-31 PROCEDURE — 83721 ASSAY OF BLOOD LIPOPROTEIN: CPT

## 2018-01-31 PROCEDURE — 80061 LIPID PANEL: CPT

## 2018-01-31 PROCEDURE — 82306 VITAMIN D 25 HYDROXY: CPT

## 2018-01-31 PROCEDURE — 85025 COMPLETE CBC W/AUTO DIFF WBC: CPT

## 2018-02-04 PROBLEM — G62.9 PERIPHERAL NEUROPATHY: Chronic | Status: ACTIVE | Noted: 2017-07-18

## 2018-02-04 PROBLEM — M79.641 PAIN OF RIGHT HAND: Status: ACTIVE | Noted: 2017-04-03

## 2018-02-04 PROBLEM — F41.9 ANXIETY: Chronic | Status: ACTIVE | Noted: 2017-07-18

## 2018-02-04 PROBLEM — M75.41 SUBACROMIAL IMPINGEMENT OF RIGHT SHOULDER: Status: ACTIVE | Noted: 2017-04-20

## 2018-02-04 PROBLEM — E78.5 HYPERLIPIDEMIA: Chronic | Status: ACTIVE | Noted: 2017-07-18

## 2018-02-04 PROBLEM — M25.511 SHOULDER PAIN, RIGHT: Status: ACTIVE | Noted: 2017-04-20

## 2018-02-04 PROBLEM — M25.511 RIGHT SHOULDER PAIN: Status: ACTIVE | Noted: 2017-04-03

## 2018-02-04 PROBLEM — I10 HYPERTENSION: Chronic | Status: ACTIVE | Noted: 2017-07-18

## 2018-02-05 RX ORDER — PREDNISOLONE ACETATE 10 MG/ML
1 SUSPENSION/ DROPS OPHTHALMIC 2 TIMES DAILY
Refills: 0 | COMMUNITY
Start: 2017-12-29

## 2018-02-05 RX ORDER — ROSUVASTATIN CALCIUM 5 MG/1
0.5 TABLET, COATED ORAL 3 TIMES WEEKLY
Refills: 0 | COMMUNITY
Start: 2017-11-27 | End: 2019-11-11 | Stop reason: SDUPTHER

## 2018-02-05 RX ORDER — TRIAMTERENE AND HYDROCHLOROTHIAZIDE 37.5; 25 MG/1; MG/1
1 CAPSULE ORAL DAILY
COMMUNITY
Start: 2017-02-06 | End: 2018-07-10

## 2018-02-05 RX ORDER — BETAMETHASONE DIPROPIONATE 0.5 MG/G
1 CREAM TOPICAL DAILY
COMMUNITY
Start: 2017-09-20 | End: 2019-03-06 | Stop reason: ALTCHOICE

## 2018-02-06 ENCOUNTER — OFFICE VISIT (OUTPATIENT)
Dept: INTERNAL MEDICINE CLINIC | Facility: CLINIC | Age: 83
End: 2018-02-06
Payer: MEDICARE

## 2018-02-06 VITALS
HEART RATE: 68 BPM | DIASTOLIC BLOOD PRESSURE: 78 MMHG | BODY MASS INDEX: 39.9 KG/M2 | RESPIRATION RATE: 14 BRPM | HEIGHT: 55 IN | SYSTOLIC BLOOD PRESSURE: 130 MMHG | WEIGHT: 172.4 LBS

## 2018-02-06 DIAGNOSIS — I47.1 SUPRAVENTRICULAR TACHYCARDIA (HCC): Chronic | ICD-10-CM

## 2018-02-06 DIAGNOSIS — E78.01 FAMILIAL HYPERCHOLESTEROLEMIA: Chronic | ICD-10-CM

## 2018-02-06 DIAGNOSIS — M79.642 PAIN OF LEFT HAND: ICD-10-CM

## 2018-02-06 DIAGNOSIS — G62.9 PERIPHERAL POLYNEUROPATHY: Chronic | ICD-10-CM

## 2018-02-06 DIAGNOSIS — R73.9 HYPERGLYCEMIA: Chronic | ICD-10-CM

## 2018-02-06 DIAGNOSIS — I48.91 ATRIAL FIBRILLATION, UNSPECIFIED TYPE (HCC): Chronic | ICD-10-CM

## 2018-02-06 DIAGNOSIS — I10 ESSENTIAL HYPERTENSION: Primary | Chronic | ICD-10-CM

## 2018-02-06 PROBLEM — M79.641 PAIN OF RIGHT HAND: Status: RESOLVED | Noted: 2017-04-03 | Resolved: 2018-02-06

## 2018-02-06 PROCEDURE — 20551 NJX 1 TENDON ORIGIN/INSJ: CPT | Performed by: INTERNAL MEDICINE

## 2018-02-06 PROCEDURE — 99214 OFFICE O/P EST MOD 30 MIN: CPT | Performed by: INTERNAL MEDICINE

## 2018-02-06 RX ORDER — METHYLPREDNISOLONE ACETATE 40 MG/ML
40 INJECTION, SUSPENSION INTRA-ARTICULAR; INTRALESIONAL; INTRAMUSCULAR; SOFT TISSUE ONCE
Status: CANCELLED | OUTPATIENT
Start: 2018-02-06 | End: 2018-02-06

## 2018-02-06 RX ORDER — METHYLPREDNISOLONE ACETATE 40 MG/ML
1 INJECTION, SUSPENSION INTRA-ARTICULAR; INTRALESIONAL; INTRAMUSCULAR; SOFT TISSUE
Status: COMPLETED | OUTPATIENT
Start: 2018-02-06 | End: 2018-02-06

## 2018-02-06 RX ORDER — LIDOCAINE HYDROCHLORIDE 10 MG/ML
1 INJECTION, SOLUTION INFILTRATION; PERINEURAL
Status: COMPLETED | OUTPATIENT
Start: 2018-02-06 | End: 2018-02-06

## 2018-02-06 RX ADMIN — METHYLPREDNISOLONE ACETATE 1 ML: 40 INJECTION, SUSPENSION INTRA-ARTICULAR; INTRALESIONAL; INTRAMUSCULAR; SOFT TISSUE at 11:56

## 2018-02-06 RX ADMIN — LIDOCAINE HYDROCHLORIDE 1 ML: 10 INJECTION, SOLUTION INFILTRATION; PERINEURAL at 11:56

## 2018-02-06 NOTE — PROGRESS NOTES
Hand/upper extremity injection  Date/Time: 2/6/2018 11:56 AM  Consent given by: patient  Site marked: site marked  Timeout: Immediately prior to procedure a time out was called to verify the correct patient, procedure, equipment, support staff and site/side marked as required   Supporting Documentation  Indications: pain   Procedure Details  Condition:tendonitis Site: L long finger   Preparation: Patient was prepped and draped in the usual sterile fashion  Needle size: 22 G  Ultrasound guidance: no  Approach: dorsal  Medications administered: 1 mL lidocaine 1 %; 1 mL methylPREDNISolone acetate 40 mg/mL

## 2018-02-06 NOTE — PROGRESS NOTES
Assessment/Plan:  1  Left hand pain-appears to be from a flexor tendinitis-as noted the area was injected as listed per procedure  If she still has significant pain she will need referral to hand surgeon  2  Low vitamin-D-she will start vitamin D3/2000 units a day  3  Severe rotator cuff disease of the right shoulder-unresponsive to conservative therapy  She eventually underwent right total shoulder replacement with an excellent response  4  Status associated myalgias-improved with switch to low-dose Crestor  5  Hypertension-adequate control on current dose of beta-blocker an ACE-inhibitor-stop Dyazide because of excess voiding but at this point stable on only the 2 agents  6  Hyperlipidemia with mixed dyslipidemia  Had myalgias with simvastatin  Stable on current dose of Crestor-had issues on higher dose of Crestor  7  Diffuse DJD  Sed rate normal   CRP mildly elevated  Again instructed on joint conservation  8  Grade 1 diastolic dysfunction-felt to be related agent or hypertension  9  Proteinuria noted previously-most recent urine for protein creatinine ratio normal  10  Status post total knee replacement-has ongoing pain but decided to just live with it  11  Peripheral neuropathy-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  Immunofixation negative    Serum for heavy metals, B12, methylmalonic acid, Lyme titer, rheumatoid factor and antinuclear antibody all normal   No significant symptoms at present    All other problems as per noted July 2013      MEDICAL REGIMEN:      Crestor 2 5 milligrams twice per week using 5 milligram dosing, propanolol 20 milligrams t i d , lisinopril 10 milligrams b i d , a intermittent use of clonazepam 3 75 milligrams daily using 3 75 milligram tablet, multivitamin, fish oil 2 per day, vitamin D3/2000 units a day    Appointment in several months with prior chemistry profile, cholesterol profile  No problem-specific Assessment & Plan notes found for this encounter  Diagnoses and all orders for this visit:    Essential hypertension    Atrial fibrillation, unspecified type (HCC)    Supraventricular tachycardia ([address] )    Peripheral polyneuropathy    Familial hypercholesterolemia    Hyperglycemia    Pain of left hand  -     Hand/upper extremity injection  -     methylPREDNISolone acetate (DEPO-MEDROL) injection 40 mg; Inject 1 mL (40 mg total) into the shoulder, thigh, or buttocks once           Subjective:      Patient ID: Gail Erwin is a 81 y o  female  Several issues discussed today  She is having pain associated with the use of her left 3rd finger  Tends to lock his consistent with a trigger finger  We reviewed this today  After lengthy discussion we elected to inject the finger and this was done as dictated per procedure note  She has known hypertension  She stop her diuretic because she felt she was voiding to often  At this point she is stable on beta-blocker an ACE-inhibitor  She is avoiding salt and decongestants  She denies hematemesis pounding of her heart sweats and headache  It appears her BP is adequately controlled on current regimen as blood pressure sitting and standing today was 130/80    She had a good result from her shoulder surgery and is happy about that  She feels her range of motion is significantly improved    She has known hyperlipidemia  She had myalgias with simvastatin  She is stable on low-dose Crestor  Labs done prior to this visit show vitamin-D level low at 21 chemistry profile normal CBC normal triglycerides 173  HDL 57 cholesterol 190  We talked about supplementing vitamin-D and she will begin over-the-counter vitamin D3/2000 units a day    She remains on low-dose Crestor  She understands the difference between status associated myalgias and arthralgias from degenerative arthritis    She has significant DJD of several joints including knees hips etc     She continues to recover from the death of her   She says her spirits relatively well  She denies any major issues in that regard    She has known peripheral neuropathy  Secondary workup was negative  She does not feel as though her symptoms are worse  This patient denies any systemic symptoms  Specifically there has been no evidence of fever, night sweats, significant weight loss or significant decrease in appetite  Health maintenance measures reviewed  She did receive influenza vaccine  She has had both types of pneumococcal vaccine herpes zoster vaccine as well as Adacel vaccine  She continues with periodic mammograms  The following portions of the patient's history were reviewed and updated as appropriate: current medications, past family history, past medical history, past social history, past surgical history and problem list     Review of Systems   Respiratory: Positive for shortness of breath  Musculoskeletal: Positive for arthralgias  Tenderness of the left 3rd finger at the MCP joint with locking         Objective:     Physical Exam   Constitutional: She is oriented to person, place, and time  She appears well-developed and well-nourished  No distress  HENT:   Head: Normocephalic and atraumatic  Right Ear: External ear normal    Left Ear: External ear normal    Nose: Nose normal    Mouth/Throat: Oropharynx is clear and moist  No oropharyngeal exudate  Eyes: Conjunctivae and EOM are normal  Pupils are equal, round, and reactive to light  Right eye exhibits no discharge  Left eye exhibits no discharge  No scleral icterus  Neck: Normal range of motion  Neck supple  No JVD present  No tracheal deviation present  No thyromegaly present  Cardiovascular: Normal rate, regular rhythm, normal heart sounds and intact distal pulses  Exam reveals no gallop and no friction rub  No murmur heard    Pulmonary/Chest: Effort normal and breath sounds normal  No stridor  No respiratory distress  She has no wheezes  She has no rales  She exhibits no tenderness  Abdominal: Soft  Bowel sounds are normal  She exhibits no distension and no mass  There is no tenderness  There is no rebound and no guarding  Musculoskeletal: Normal range of motion  She exhibits no edema or deformity  Tenderness of the left 3rd flexor tendon   Lymphadenopathy:     She has no cervical adenopathy  Neurological: She is alert and oriented to person, place, and time  She has normal reflexes  No cranial nerve deficit  She exhibits normal muscle tone  Coordination normal    Skin: Skin is warm and dry  No rash noted  No erythema  Psychiatric: She has a normal mood and affect  Her behavior is normal  Judgment and thought content normal    Vitals reviewed

## 2018-03-05 ENCOUNTER — TRANSCRIBE ORDERS (OUTPATIENT)
Dept: RADIOLOGY | Facility: CLINIC | Age: 83
End: 2018-03-05

## 2018-03-07 DIAGNOSIS — D05.10 INTRADUCTAL CARCINOMA IN SITU OF BREAST: ICD-10-CM

## 2018-03-09 ENCOUNTER — HOSPITAL ENCOUNTER (OUTPATIENT)
Dept: RADIOLOGY | Age: 83
Discharge: HOME | End: 2018-03-09
Payer: MEDICARE

## 2018-03-09 DIAGNOSIS — D05.10 INTRADUCTAL CARCINOMA IN SITU OF BREAST: ICD-10-CM

## 2018-03-09 PROCEDURE — 77063 BREAST TOMOSYNTHESIS BI: CPT

## 2018-03-09 PROCEDURE — 77067 SCR MAMMO BI INCL CAD: CPT

## 2018-03-14 ENCOUNTER — TELEPHONE (OUTPATIENT)
Dept: INTERNAL MEDICINE CLINIC | Facility: CLINIC | Age: 83
End: 2018-03-14

## 2018-03-14 NOTE — TELEPHONE ENCOUNTER
PHARMACIST CALLED NEEDING VERIFICATION ON A SCRIPT , PER YOUR WRITTEN SCRIPT HE STATED IT SAID CLONAZEPATE 3 75 BUT IN ANOTHER SECTION IT SAYS CLONAZEPAM 3 75, THERES NO SUCH THING AS CLONAZEPAM 3 75MG      Please verify med & dosing

## 2018-03-26 ENCOUNTER — HOSPITAL ENCOUNTER (OUTPATIENT)
Dept: RADIOLOGY | Facility: HOSPITAL | Age: 83
Discharge: HOME | End: 2018-03-26
Attending: ORTHOPAEDIC SURGERY
Payer: MEDICARE

## 2018-03-26 ENCOUNTER — OFFICE VISIT (OUTPATIENT)
Dept: OBGYN CLINIC | Facility: HOSPITAL | Age: 83
End: 2018-03-26
Payer: MEDICARE

## 2018-03-26 VITALS
DIASTOLIC BLOOD PRESSURE: 77 MMHG | SYSTOLIC BLOOD PRESSURE: 160 MMHG | HEART RATE: 80 BPM | BODY MASS INDEX: 40.27 KG/M2 | WEIGHT: 174 LBS | HEIGHT: 55 IN

## 2018-03-26 DIAGNOSIS — M25.511 ACUTE PAIN OF RIGHT SHOULDER: ICD-10-CM

## 2018-03-26 DIAGNOSIS — S42.124A CLOSED NONDISPLACED FRACTURE OF RIGHT ACROMIAL PROCESS, INITIAL ENCOUNTER: Primary | ICD-10-CM

## 2018-03-26 PROCEDURE — 99214 OFFICE O/P EST MOD 30 MIN: CPT | Performed by: ORTHOPAEDIC SURGERY

## 2018-03-26 PROCEDURE — 23570 CLTX SCAPULAR FX W/O MNPJ: CPT | Performed by: ORTHOPAEDIC SURGERY

## 2018-03-26 PROCEDURE — 73030 X-RAY EXAM OF SHOULDER: CPT

## 2018-03-26 NOTE — PATIENT INSTRUCTIONS
Sling for 4 weeks  Tylenol and Ice for pain  Okay to remove sling for shower, and for range of motion of right elbow, wrist and hand    Avoid activities that cause pain

## 2018-03-26 NOTE — PROGRESS NOTES
Assessment:       1  Acute pain of right shoulder     2  ACROMION FRACTURE - RIGHT SHOULDER    Plan:    1  Sling x 4 weeks  2  Follow up 4 weeks  3  Tylenol and Ice for pain  4  Ok to remove sling for elbow, wrist and hand ROM  Subjective:     Patient ID: Carlos Zhong is a 81 y o  female  Chief Complaint:    Sherry Wilkins is a 80 y/o female who presents today with her daughter for acute right shoulder pain after an injury on Friday at [facility]  Sherry Wilkins states she was reaching for something heavy when she felt a pop and had pain in the right shoulder  The following day she had difficulty getting into her car  She denies issues since we saw her in October  She had a reverse total shoulder arthroplasty July 2017  She points to the superior aspect of the right shoulder for source of her pain  She has pain with any attempted motion of the right shoulder  Pain improved with rest   No numbness or tingling  Social History     Occupational History    Not on file  Social History Main Topics    Smoking status: Never Smoker    Smokeless tobacco: Never Used    Alcohol use No    Drug use: No    Sexual activity: Not on file      Review of Systems   Constitutional: Negative for chills and fever  HENT: Negative for hearing loss  Eyes: Negative for visual disturbance  Respiratory: Negative for shortness of breath  Cardiovascular: Negative for chest pain  Gastrointestinal: Negative for abdominal pain  Musculoskeletal:        As reviewed in the HPI   Skin: Negative for rash  Neurological:        As reviewed in the HPI   Psychiatric/Behavioral: Negative for agitation  Objective:     Left Shoulder Exam     Tenderness   The patient is experiencing tenderness in the acromion      Range of Motion   Active Abduction: 20   Forward Flexion: 20     Tests   Sulcus: absent    Other   Scars: present (well healed anterior shoulder incision)  Sensation: normal  Pulse: present Comments:  Ecchymosis over superior aspect of the right shoulder and over the lateral deltoid    Pain with motion located over the acromion        Physical Exam   Constitutional: She is oriented to person, place, and time  She appears well-developed and well-nourished  HENT:   Head: Normocephalic  Eyes: EOM are normal    Neck: Normal range of motion  Pulmonary/Chest: Breath sounds normal  She has no wheezes  Neurological: She is alert and oriented to person, place, and time  Skin: Skin is warm and dry  Psychiatric: She has a normal mood and affect  Her behavior is normal  Judgment and thought content normal          I have personally reviewed pertinent films in PACS and my interpretation is Acromion Fracture; stable hardware

## 2018-03-29 ENCOUNTER — TELEPHONE (OUTPATIENT)
Dept: INTERNAL MEDICINE CLINIC | Facility: CLINIC | Age: 83
End: 2018-03-29

## 2018-03-29 NOTE — TELEPHONE ENCOUNTER
PT STATED THAT LATE Tuesday NIGHT HER LEFT LEG STARTED HURTING  SHE SAID THAT IT HURTS FROM ABOVE HER KNEE AND UP TO HER BUTTOCK  SHE SAID THAT IT'S SO BAD SHE HAS TO USE A CANE TO WALK  SHE SAID THAT SHE'S ALREADY TAKING TYLENOL 2 TABLETS EVERY 6 HOURS BECAUSE DR JANG TOLD HER TO TAKE THEM AND IT'S NOT HELPING  SHE STATED THAT SHE SAW HIM THIS PAST Monday BECAUSE OF SHOULDER PAIN, HE DID AN XRAY AND SHE HAS A BROKEN BONE AND BRUISING  PT SAID SHE JUST DOESN'T KNOW WHAT TO DO   PLEASE ADVISE    PT'S #[phone]

## 2018-03-31 PROBLEM — M79.605 LEFT LEG PAIN: Status: ACTIVE | Noted: 2018-03-31

## 2018-04-02 ENCOUNTER — APPOINTMENT (OUTPATIENT)
Dept: RADIOLOGY | Age: 83
End: 2018-04-02
Payer: MEDICARE

## 2018-04-02 DIAGNOSIS — M79.605 LEFT LEG PAIN: ICD-10-CM

## 2018-04-02 PROBLEM — C50.411 MALIGNANT NEOPLASM OF UPPER-OUTER QUADRANT OF RIGHT BREAST IN FEMALE, ESTROGEN RECEPTOR POSITIVE (HCC): Status: ACTIVE | Noted: 2018-04-02

## 2018-04-02 PROBLEM — Z17.0 MALIGNANT NEOPLASM OF UPPER-OUTER QUADRANT OF RIGHT BREAST IN FEMALE, ESTROGEN RECEPTOR POSITIVE (HCC): Status: ACTIVE | Noted: 2018-04-02

## 2018-04-02 PROCEDURE — 73502 X-RAY EXAM HIP UNI 2-3 VIEWS: CPT

## 2018-04-02 PROCEDURE — 73552 X-RAY EXAM OF FEMUR 2/>: CPT

## 2018-04-09 ENCOUNTER — TELEPHONE (OUTPATIENT)
Dept: INTERNAL MEDICINE CLINIC | Facility: CLINIC | Age: 83
End: 2018-04-09

## 2018-04-09 NOTE — TELEPHONE ENCOUNTER
----- Message from Ed Crockett MD sent at 4/8/2018  7:12 AM EDT -----  Please call the patient regarding her x-ray showed no evidence of fracture

## 2018-04-23 DIAGNOSIS — I10 ESSENTIAL HYPERTENSION: Primary | ICD-10-CM

## 2018-04-23 RX ORDER — PROPRANOLOL HYDROCHLORIDE 20 MG/1
TABLET ORAL
Qty: 360 TABLET | Refills: 3 | Status: SHIPPED | OUTPATIENT
Start: 2018-04-23 | End: 2018-08-09 | Stop reason: ALTCHOICE

## 2018-04-30 ENCOUNTER — OFFICE VISIT (OUTPATIENT)
Dept: OBGYN CLINIC | Facility: HOSPITAL | Age: 83
End: 2018-04-30

## 2018-04-30 VITALS
SYSTOLIC BLOOD PRESSURE: 191 MMHG | HEART RATE: 72 BPM | WEIGHT: 177.2 LBS | BODY MASS INDEX: 41.19 KG/M2 | DIASTOLIC BLOOD PRESSURE: 92 MMHG

## 2018-04-30 DIAGNOSIS — S42.124D CLOSED NONDISPLACED FRACTURE OF RIGHT ACROMIAL PROCESS WITH ROUTINE HEALING, SUBSEQUENT ENCOUNTER: Primary | ICD-10-CM

## 2018-04-30 PROBLEM — S42.124A CLOSED NONDISPLACED FRACTURE OF RIGHT ACROMIAL PROCESS: Status: RESOLVED | Noted: 2018-03-26 | Resolved: 2018-04-30

## 2018-04-30 PROCEDURE — 99024 POSTOP FOLLOW-UP VISIT: CPT | Performed by: ORTHOPAEDIC SURGERY

## 2018-04-30 NOTE — PROGRESS NOTES
Patient returns for follow-up of her right acromial stress fracture following reverse total shoulder arthroplasty  She was using her sling for about 3 weeks and now is progressed out of the sling, still has some discomfort but much improved and is using the shoulder for routine activities      Right shoulder active forward elevation to 160 with minimal pain, no tenderness over the acromial spine or the distal acromion  Healed right acromial stress fracture following reverse total shoulder arthroplasty    The patient is doing well and I do recommend she continue with functional use of the shoulder to tolerance to does not require any further imaging or further follow-up unless symptoms were to change significantly    She does understand the backing off on activities at times is appropriate to not over use the shoulder and she will discuss the possibility of osteoporosis treatment with her primary physician Dr Steven Peralta

## 2018-05-02 ENCOUNTER — OFFICE VISIT (OUTPATIENT)
Dept: SURGICAL ONCOLOGY | Facility: CLINIC | Age: 83
End: 2018-05-02
Payer: MEDICARE

## 2018-05-02 VITALS
HEART RATE: 76 BPM | TEMPERATURE: 97.7 F | RESPIRATION RATE: 16 BRPM | HEIGHT: 55 IN | DIASTOLIC BLOOD PRESSURE: 90 MMHG | WEIGHT: 177 LBS | SYSTOLIC BLOOD PRESSURE: 142 MMHG | BODY MASS INDEX: 40.96 KG/M2

## 2018-05-02 DIAGNOSIS — Z12.31 VISIT FOR SCREENING MAMMOGRAM: ICD-10-CM

## 2018-05-02 DIAGNOSIS — C50.411 MALIGNANT NEOPLASM OF UPPER-OUTER QUADRANT OF RIGHT BREAST IN FEMALE, ESTROGEN RECEPTOR POSITIVE (HCC): Primary | ICD-10-CM

## 2018-05-02 DIAGNOSIS — Z17.0 MALIGNANT NEOPLASM OF UPPER-OUTER QUADRANT OF RIGHT BREAST IN FEMALE, ESTROGEN RECEPTOR POSITIVE (HCC): Primary | ICD-10-CM

## 2018-05-02 PROCEDURE — 99213 OFFICE O/P EST LOW 20 MIN: CPT | Performed by: NURSE PRACTITIONER

## 2018-05-02 NOTE — PROGRESS NOTES
Surgical Oncology Follow Up       [address]  [facility] [place]  [address]  1935  [id number]  [address] [facility] [facility]  [facility] [place]  [address] [address]    Chief Complaint   Patient presents with    DCIS     Pt is here for a one year follow up       Assessment/Plan:  1  Malignant neoplasm of upper-outer quadrant of right breast in female, estrogen receptor positive ([facility] [address] )  - 1 year follow up visit    2  Visit for screening mammogram    - Mammo screening bilateral w cad; Future       Discussion/Summary: Patient is an 73-year-old female who presents today for a one-year follow-up for 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  At that time she underwent a right lumpectomy and partial breast radiation  She had bilateral 3D screening mammogram performed on March 9, 2018 which was BI-RADS 2  There are no worrisome findings on physical exam  She is recovering from a total shoulder replacement as well as a fracture  Otherwise, she denies other symptoms- headaches, back pain, bone pain, cough, SOB, abdominal pain  We will order a bilateral screening mammogram 4 March 2019 and we will see the patient back in 1 year or sooner if the need arises  She was instructed to call with any new concerns or symptoms  All of her questions were answered        History of Present Illness:        Malignant neoplasm of upper-outer quadrant of right breast in female, estrogen receptor positive ([address] )    12/21/2009 Initial Diagnosis     Malignant neoplasm of upper-outer quadrant of right breast in female, estrogen receptor positive ([facility] [address] )    Right breast biopsy  DCIS  ER 80-90%  NM 80-90%         1/18/2010 Surgery     Right lumpectomy, sentinel node biopsy (Dr Quintin Gonzalez)           2010 -  Radiation     Partial breast RT             -Interval History:  Patient presents today for a 1 year follow-up for right breast DCIS diagnosed in 2010  She had a bilateral screening mammogram performed on March 9, 2018 which was BI-RADS 2  She has no new complaints today  She had a right total shoulder replacement and did have a fracture of her right shoulder recently for which she sees an orthopedist     Review of Systems:  Review of Systems   Constitutional: Negative for activity change, appetite change, chills, fatigue, fever and unexpected weight change  HENT: Negative for trouble swallowing  Eyes: Negative for pain, redness and visual disturbance  Respiratory: Negative for cough, shortness of breath and wheezing  Cardiovascular: Negative for chest pain, palpitations and leg swelling  Gastrointestinal: Negative for abdominal pain, constipation, diarrhea, nausea and vomiting  Endocrine: Negative for cold intolerance and heat intolerance  Musculoskeletal: Positive for arthralgias  Negative for back pain, gait problem and myalgias  Skin: Negative for color change and rash  Neurological: Negative for dizziness, syncope, light-headedness, numbness and headaches  Hematological: Negative for adenopathy  Psychiatric/Behavioral: Negative for agitation and confusion  All other systems reviewed and are negative        Patient Active Problem List   Diagnosis    Rotator cuff tear arthropathy of right shoulder    Anxiety    Peripheral neuropathy    Hyperlipidemia    Hypertension    Arthritis    Atrial fibrillation (HCC)    Difficulty breathing    Diverticulosis    Dizziness    Fatigue    Hyperglycemia    Pain in extremity    Osteopenia    Right shoulder pain    Shortness of breath    Shoulder pain, right    Subacromial impingement of right shoulder    Supraventricular tachycardia (HCC)    Upper extremity pain    Vitamin D deficiency    Pain of left hand    Left leg pain    Malignant neoplasm of upper-outer quadrant of right breast in female, estrogen receptor positive ([facility] [address] ) Past Medical History:   Diagnosis Date    Anxiety     Arthritis     Bell's palsy     Breast carcinoma (HCC)     Depression     Hyperlipidemia     Hypertension     Peripheral neuropathy     PONV (postoperative nausea and vomiting)     Tachycardia      Past Surgical History:   Procedure Laterality Date    BREAST LUMPECTOMY      CARPAL TUNNEL RELEASE Bilateral     CATARACT EXTRACTION      HEMORROIDECTOMY      JOINT REPLACEMENT      KNEE SURGERY Bilateral     OVARIAN CYST REMOVAL      CO RECONSTR TOTAL SHOULDER IMPLANT Right 7/18/2017    Procedure: TOTAL SHOULDER REVERSE ARTHROPLASTY;  Surgeon: Gregory Conley MD;  [facility]: [facility];  Service: Orthopedics    SENTINEL LYMPH NODE BIOPSY      TONSILLECTOMY      TUBAL LIGATION       Family History   Problem Relation Age of Onset    Heart disease Mother     Heart disease Father      Social History     Social History    Marital status:      Spouse name: N/A    Number of children: N/A    Years of education: N/A     Occupational History    Not on file       Social History Main Topics    Smoking status: Never Smoker    Smokeless tobacco: Never Used    Alcohol use No    Drug use: No    Sexual activity: Not on file     Other Topics Concern    Not on file     Social History Narrative    No narrative on file       Current Outpatient Prescriptions:     betamethasone dipropionate (DIPROSONE) 0 05 % cream, Apply 1 application topically daily, Disp: , Rfl:     CRANBERRY PO, Take 1,700 mg by mouth daily, Disp: , Rfl:     lisinopril (ZESTRIL) 10 mg tablet, Take 10 mg by mouth 2 (two) times a day, Disp: , Rfl:     Multiple Vitamins-Minerals (PRESERVISION AREDS 2 PO), Take 2 tablets by mouth daily, Disp: , Rfl:     multivitamin (THERAGRAN) TABS, Take 1 tablet by mouth daily, Disp: , Rfl:     NON FORMULARY, , Disp: , Rfl:     Omega-3 Fatty Acids (FISH OIL PO), Take 1 capsule by mouth daily, Disp: , Rfl:     oxyCODONE (ROXICODONE) 5 mg immediate release tablet, 1-2 tablets every 4 hours as needed for pain , Disp: 30 tablet, Rfl: 0    prednisoLONE acetate (PRED FORTE) 1 % ophthalmic suspension, , Disp: , Rfl: 0    propranolol (INDERAL) 20 mg tablet, TAKE 1 TABLET BY MOUTH 4  TIMES DAILY, Disp: 360 tablet, Rfl: 3    rosuvastatin (CRESTOR) 10 MG tablet, Take 0 5 tablets by mouth 2 (two) times a week, Disp: , Rfl: 0    triamterene-hydrochlorothiazide (DYAZIDE) 37 5-25 mg per capsule, Take 1 capsule by mouth daily, Disp: , Rfl:   Allergies   Allergen Reactions    Amoxicillin-Pot Clavulanate GI Intolerance    Azithromycin GI Intolerance and Rash    Cephalexin GI Intolerance     Reaction Date: [id number];     Cortisone GI Intolerance     Reaction Date: [id number];     Doxycycline GI Intolerance    Penicillins GI Intolerance     Vitals:    05/02/18 0912   BP: 142/90   Pulse: 76   Resp: 16   Temp: 97 7 °F (36 5 °C)       Physical Exam   Constitutional: She is oriented to person, place, and time  Vital signs are normal  She appears well-developed and well-nourished  No distress  HENT:   Head: Normocephalic and atraumatic  Neck: Normal range of motion  Cardiovascular: Normal rate, regular rhythm and normal heart sounds  Pulmonary/Chest: Effort normal and breath sounds normal    Bilateral breasts were examined in the sitting and modified supine position  There are no masses, skin nodules, nipple changes or nipple discharge  Right breast surgical scar present with mild scar tissue s/p partial breast RT  There is no bilateral supraclavicular or axillary lymphadenopathy noted  Abdominal: Soft  Normal appearance  She exhibits no mass  There is no hepatosplenomegaly  There is no tenderness  Musculoskeletal:   Right shoulder limited ROM   Lymphadenopathy:     She has no axillary adenopathy  Right: No supraclavicular adenopathy present  Left: No supraclavicular adenopathy present     Neurological: She is alert and oriented to person, place, and time  Skin: Skin is warm, dry and intact  No rash noted  She is not diaphoretic  Psychiatric: She has a normal mood and affect  Her speech is normal    Vitals reviewed  Advance Care Planning/Advance Directives:  Discussed disease status, cancer treatment plans and/or cancer treatment goals with the patient

## 2018-05-17 DIAGNOSIS — E78.5 HYPERLIPIDEMIA, UNSPECIFIED HYPERLIPIDEMIA TYPE: Primary | ICD-10-CM

## 2018-05-17 NOTE — TELEPHONE ENCOUNTER
Pt called requesting refill for Crestor of 10mg- Half tab twice daily, however your last note indicates Crestor 5mg- 1/2 tab once twice per week  Please advise   Thanks     Pt wants med sent to [address] [place] [address] on [address]

## 2018-05-17 NOTE — TELEPHONE ENCOUNTER
Have her work her Crestor so that she is taking 5 milligrams every other day-if she has 10 milligram tablets she should therefore take half of a 10 milligram tablet every other day

## 2018-05-20 RX ORDER — ROSUVASTATIN CALCIUM 10 MG/1
5 TABLET, COATED ORAL 2 TIMES WEEKLY
Qty: 12 TABLET | Refills: 3 | Status: SHIPPED | OUTPATIENT
Start: 2018-05-21 | End: 2018-07-10 | Stop reason: SDUPTHER

## 2018-06-11 DIAGNOSIS — I10 ESSENTIAL HYPERTENSION: Primary | ICD-10-CM

## 2018-06-11 RX ORDER — LISINOPRIL 10 MG/1
TABLET ORAL
Qty: 180 TABLET | Refills: 3 | Status: SHIPPED | OUTPATIENT
Start: 2018-06-11 | End: 2018-07-31 | Stop reason: SDUPTHER

## 2018-07-06 ENCOUNTER — APPOINTMENT (OUTPATIENT)
Dept: LAB | Age: 83
End: 2018-07-06
Payer: MEDICARE

## 2018-07-06 ENCOUNTER — TRANSCRIBE ORDERS (OUTPATIENT)
Dept: ADMINISTRATIVE | Age: 83
End: 2018-07-06

## 2018-07-06 DIAGNOSIS — I10 ESSENTIAL HYPERTENSION: Chronic | ICD-10-CM

## 2018-07-06 DIAGNOSIS — E78.01 FAMILIAL HYPERCHOLESTEROLEMIA: Chronic | ICD-10-CM

## 2018-07-06 LAB
ALBUMIN SERPL BCP-MCNC: 4 G/DL (ref 3.5–5)
ALP SERPL-CCNC: 68 U/L (ref 46–116)
ALT SERPL W P-5'-P-CCNC: 30 U/L (ref 12–78)
ANION GAP SERPL CALCULATED.3IONS-SCNC: 8 MMOL/L (ref 4–13)
AST SERPL W P-5'-P-CCNC: 23 U/L (ref 5–45)
BILIRUB SERPL-MCNC: 0.71 MG/DL (ref 0.2–1)
BUN SERPL-MCNC: 21 MG/DL (ref 5–25)
CALCIUM SERPL-MCNC: 9.7 MG/DL (ref 8.3–10.1)
CHLORIDE SERPL-SCNC: 104 MMOL/L (ref 100–108)
CHOLEST SERPL-MCNC: 197 MG/DL (ref 50–200)
CO2 SERPL-SCNC: 28 MMOL/L (ref 21–32)
CREAT SERPL-MCNC: 0.98 MG/DL (ref 0.6–1.3)
GFR SERPL CREATININE-BSD FRML MDRD: 54 ML/MIN/1.73SQ M
GLUCOSE P FAST SERPL-MCNC: 96 MG/DL (ref 65–99)
HDLC SERPL-MCNC: 49 MG/DL (ref 40–60)
LDLC SERPL DIRECT ASSAY-MCNC: 115 MG/DL (ref 0–100)
POTASSIUM SERPL-SCNC: 4 MMOL/L (ref 3.5–5.3)
PROT SERPL-MCNC: 7.1 G/DL (ref 6.4–8.2)
SODIUM SERPL-SCNC: 140 MMOL/L (ref 136–145)
TRIGL SERPL-MCNC: 152 MG/DL

## 2018-07-06 PROCEDURE — 80053 COMPREHEN METABOLIC PANEL: CPT

## 2018-07-06 PROCEDURE — 36415 COLL VENOUS BLD VENIPUNCTURE: CPT

## 2018-07-06 PROCEDURE — 80061 LIPID PANEL: CPT

## 2018-07-06 PROCEDURE — 83721 ASSAY OF BLOOD LIPOPROTEIN: CPT

## 2018-07-10 ENCOUNTER — OFFICE VISIT (OUTPATIENT)
Dept: INTERNAL MEDICINE CLINIC | Facility: CLINIC | Age: 83
End: 2018-07-10
Payer: MEDICARE

## 2018-07-10 VITALS
OXYGEN SATURATION: 96 % | BODY MASS INDEX: 40.16 KG/M2 | SYSTOLIC BLOOD PRESSURE: 138 MMHG | HEART RATE: 74 BPM | DIASTOLIC BLOOD PRESSURE: 88 MMHG | WEIGHT: 172.8 LBS

## 2018-07-10 DIAGNOSIS — R19.8 BORBORYGMI: ICD-10-CM

## 2018-07-10 DIAGNOSIS — IMO0002 WOUND HEALING WELL ON EXAMINATION: ICD-10-CM

## 2018-07-10 DIAGNOSIS — E78.2 MIXED HYPERLIPIDEMIA: Primary | Chronic | ICD-10-CM

## 2018-07-10 DIAGNOSIS — M65.332 TRIGGER MIDDLE FINGER OF LEFT HAND: ICD-10-CM

## 2018-07-10 DIAGNOSIS — Z86.79 HISTORY OF SUPRAVENTRICULAR TACHYCARDIA: ICD-10-CM

## 2018-07-10 DIAGNOSIS — I45.9 SKIPPED HEART BEATS: ICD-10-CM

## 2018-07-10 DIAGNOSIS — I10 ESSENTIAL HYPERTENSION: Chronic | ICD-10-CM

## 2018-07-10 PROCEDURE — 99214 OFFICE O/P EST MOD 30 MIN: CPT | Performed by: INTERNAL MEDICINE

## 2018-07-10 PROCEDURE — 93000 ELECTROCARDIOGRAM COMPLETE: CPT | Performed by: INTERNAL MEDICINE

## 2018-07-10 RX ORDER — RANITIDINE 150 MG/1
150 TABLET ORAL DAILY
Qty: 30 TABLET | Refills: 0 | Status: SHIPPED | OUTPATIENT
Start: 2018-07-10 | End: 2018-07-31 | Stop reason: SDUPTHER

## 2018-07-10 RX ORDER — CLORAZEPATE DIPOTASSIUM 3.75 MG/1
TABLET ORAL
Refills: 0 | COMMUNITY
Start: 2018-06-13 | End: 2018-08-09

## 2018-07-10 NOTE — PATIENT INSTRUCTIONS
1  holter monitor  2  Increase crestor to 1/2 tablet, three days per week  3  Trial of zantac(ranitidine) once a day with TUMS daily as needed  4   If symptoms return or worsen, go to Emergency Room

## 2018-07-10 NOTE — PROGRESS NOTES
Assessment/Plan:     Diagnoses and all orders for this visit:    Mixed hyperlipidemia  Comments:  c/w statin and small dose increase  pt reports low chol diet adherence  Orders:  -     Ambulatory referral to Cardiology; Future    Essential hypertension  Comments:  BP doing well, c/w BB/ACEI  Orders:  -     Holter monitor - 48 hour; Future  -     Ambulatory referral to Cardiology; Future    Skipped heart beats  Comments:  noted on radial pulse at home, EKG NSR in office today  holter monitor ordered  Orders:  -     POCT ECG  -     Holter monitor - 48 hour; Future  -     Ambulatory referral to Cardiology; Future    History of supraventricular tachycardia  -     Holter monitor - 48 hour; Future  -     Ambulatory referral to Cardiology; Future    Wound healing well on examination  Comments:  on left LE, continue conservative care/topicals    Trigger middle finger of left hand  Comments:  ortho/hand referral for eval  Orders:  -     Ambulatory referral to Orthopedic Surgery; Future    Borborygmi  Comments:  trial of H2B and TUMS Prn, f/u 4 weeks with PCP  Orders:  -     ranitidine (ZANTAC) 150 mg tablet; Take 1 tablet (150 mg total) by mouth daily    Other orders  -     clorazepate (TRANXENE) 3 75 mg tablet;       precautions advised & pt worried about symptoms/hx of SVT, cardiology ref provided    Subjective:      Patient ID: Di Velasco is a 81 y o  female  HPI    New to me here for follow up  Reviewed meds with patient and most recent BW results  Had wound on LLE return(had similar in same location in past after spider bite) but has been using oTC ointment/aquaphor and it is healing  Also been having stomach gurgling, followed by pressure coming up in middle of chest after which pt checks her pulse on wrist and it occ skips a beat  No CP/SOB/HEARD/palpitations  Hx of SVT in past/per chart  No symptoms at other times of day, only after eating      No acid reflux but has tried TUMS with some relief of mid chest pressure that occurs after eating a meal and stomach gurgling  Has had stress test years ago which was WNL but cannot do treadmill 2nd to knee pain/surgery in past   Hx of left hand trigger finger s/p steroid injection in past but sx returned  No swelling or pain associated  No other complaints  Past Medical History:   Diagnosis Date    Anxiety     Arthritis     last assessed 3/24/15     Atherosclerosis     Bell's palsy     Breast carcinoma (HCC)     Broken femur (HCC)     Cardiac disorder     DCIS (ductal carcinoma in situ) of breast     last assessed 4/4/17     Depression     Endometrial polyp     Hypercholesterolemia     resolved 10/22/14     Hyperlipidemia     Hypertension     Long term use of drug     last assessed 5/23/14     Malignant neoplasm without specification of site [facility])     Peripheral neuropathy     PONV (postoperative nausea and vomiting)     Tachycardia     Uterine fibroid      Vitals:    07/10/18 1027 07/10/18 1031   BP:  138/88   Pulse:  74   SpO2:  96%   Weight: 78 4 kg (172 lb 12 8 oz)      Body mass index is 40 16 kg/m²      Current Outpatient Prescriptions:     CRANBERRY PO, Take 1,700 mg by mouth daily, Disp: , Rfl:     lisinopril (ZESTRIL) 10 mg tablet, TAKE 1 TABLET BY MOUTH  TWICE A DAY, Disp: 180 tablet, Rfl: 3    Multiple Vitamins-Minerals (PRESERVISION AREDS 2 PO), Take 2 tablets by mouth daily, Disp: , Rfl:     multivitamin (THERAGRAN) TABS, Take 1 tablet by mouth daily, Disp: , Rfl:     propranolol (INDERAL) 20 mg tablet, TAKE 1 TABLET BY MOUTH 4  TIMES DAILY (Patient taking differently: TID), Disp: 360 tablet, Rfl: 3    rosuvastatin (CRESTOR) 5 mg tablet, Take 0 5 tablets by mouth 2 (two) times a week, Disp: , Rfl: 0    betamethasone dipropionate (DIPROSONE) 0 05 % cream, Apply 1 application topically daily, Disp: , Rfl:     clorazepate (TRANXENE) 3 75 mg tablet, , Disp: , Rfl: 0    Omega-3 Fatty Acids (FISH OIL PO), Take 1 capsule by mouth daily, Disp: , Rfl:     prednisoLONE acetate (PRED FORTE) 1 % ophthalmic suspension, , Disp: , Rfl: 0    ranitidine (ZANTAC) 150 mg tablet, Take 1 tablet (150 mg total) by mouth daily, Disp: 30 tablet, Rfl: 0  Allergies   Allergen Reactions    Amoxicillin-Pot Clavulanate GI Intolerance    Azithromycin GI Intolerance and Rash    Cephalexin GI Intolerance     Reaction Date: [id number];     Cortisone GI Intolerance     Reaction Date: [id number];     Doxycycline GI Intolerance    Penicillins GI Intolerance         Review of Systems   Constitutional: Negative for unexpected weight change  HENT: Negative for congestion  Respiratory: Negative for shortness of breath  Cardiovascular: Negative for chest pain and palpitations  Skipped beat on radial pulse   Gastrointestinal: Negative for abdominal pain  Genitourinary: Negative for difficulty urinating  Musculoskeletal: Negative for arthralgias  Neurological: Negative for dizziness  Psychiatric/Behavioral: Negative for dysphoric mood  Objective:      /88   Pulse 74   Wt 78 4 kg (172 lb 12 8 oz)   SpO2 96%   BMI 40 16 kg/m²          Physical Exam   Constitutional: She appears well-developed and well-nourished  HENT:   Head: Normocephalic and atraumatic  Mouth/Throat: Oropharynx is clear and moist    Eyes: Conjunctivae are normal  Pupils are equal, round, and reactive to light  Cardiovascular: Normal rate, regular rhythm and normal heart sounds  No murmur heard  Pulmonary/Chest: Effort normal and breath sounds normal  She has no wheezes  She has no rales  Abdominal: Soft  Bowel sounds are normal  There is no tenderness  Musculoskeletal: She exhibits no edema  Left hand 3rd digit, nontender and no edema  Locks in place after making fist and trying to open hand   Neurological: She is alert  Psychiatric: Her behavior is normal  Her mood appears anxious (& concerned)  Vitals reviewed        EKG: normal sinus rhythm, unchanged from previous EKG dated 6/12/2017      Results for orders placed or performed in visit on 07/06/18   Comprehensive metabolic panel   Result Value Ref Range    Sodium 140 136 - 145 mmol/L    Potassium 4 0 3 5 - 5 3 mmol/L    Chloride 104 100 - 108 mmol/L    CO2 28 21 - 32 mmol/L    Anion Gap 8 4 - 13 mmol/L    BUN 21 5 - 25 mg/dL    Creatinine 0 98 0 60 - 1 30 mg/dL    Glucose, Fasting 96 65 - 99 mg/dL    Calcium 9 7 8 3 - 10 1 mg/dL    AST 23 5 - 45 U/L    ALT 30 12 - 78 U/L    Alkaline Phosphatase 68 46 - 116 U/L    Total Protein 7 1 6 4 - 8 2 g/dL    Albumin 4 0 3 5 - 5 0 g/dL    Total Bilirubin 0 71 0 20 - 1 00 mg/dL    eGFR 54 ml/min/1 73sq m   Cholesterol, total   Result Value Ref Range    Cholesterol 197 50 - 200 mg/dL   HDL cholesterol   Result Value Ref Range    HDL, Direct 49 40 - 60 mg/dL   LDL cholesterol, direct   Result Value Ref Range    LDL Direct 115 (H) 0 - 100 mg/dl   Triglycerides   Result Value Ref Range    Triglycerides 152 (H) <=150 mg/dL

## 2018-07-17 ENCOUNTER — HOSPITAL ENCOUNTER (OUTPATIENT)
Dept: NON INVASIVE DIAGNOSTICS | Facility: CLINIC | Age: 83
Discharge: HOME | End: 2018-07-17
Payer: MEDICARE

## 2018-07-17 DIAGNOSIS — Z86.79 HISTORY OF SUPRAVENTRICULAR TACHYCARDIA: ICD-10-CM

## 2018-07-17 DIAGNOSIS — I10 ESSENTIAL HYPERTENSION: Chronic | ICD-10-CM

## 2018-07-17 DIAGNOSIS — I45.9 SKIPPED HEART BEATS: ICD-10-CM

## 2018-07-17 PROCEDURE — 93225 XTRNL ECG REC<48 HRS REC: CPT

## 2018-07-17 PROCEDURE — 93226 XTRNL ECG REC<48 HR SCAN A/R: CPT

## 2018-07-23 PROCEDURE — 93227 XTRNL ECG REC<48 HR R&I: CPT | Performed by: INTERNAL MEDICINE

## 2018-07-24 ENCOUNTER — TELEPHONE (OUTPATIENT)
Dept: INTERNAL MEDICINE CLINIC | Facility: CLINIC | Age: 83
End: 2018-07-24

## 2018-07-24 NOTE — TELEPHONE ENCOUNTER
SPOKE WITH PT, GAVE RESULTS AND THEN TOLD HER THAT SHE NEEDS AN APPT WITH CARDIO, SHE STATED THAT SHE ALREADY HAS ONE FOR 8/9 WITH DR Dawson Head  PLEASE ADVISE IF THIS DR IS OK AND IF HER WAITING UNTIL THE 9TH IS OK

## 2018-07-24 NOTE — TELEPHONE ENCOUNTER
----- Message from Harlee Moritz, MD sent at 7/23/2018  8:29 PM EDT -----  Please call this patient  She had seen Dr Vasquez-Holter monitor was ordered and shows frequent extra beats with runs of rapid heart beat-she needs to see Cardiology for their opinion and let her know she may require additional medication  -please set her up with Cardiology with office of Dr Epifania Bumpers with diagnosis of SVT

## 2018-07-31 DIAGNOSIS — I10 ESSENTIAL HYPERTENSION: ICD-10-CM

## 2018-07-31 DIAGNOSIS — R19.8 BORBORYGMI: ICD-10-CM

## 2018-07-31 RX ORDER — LISINOPRIL 10 MG/1
10 TABLET ORAL 2 TIMES DAILY
Qty: 180 TABLET | Refills: 3 | Status: SHIPPED | OUTPATIENT
Start: 2018-07-31 | End: 2019-01-28 | Stop reason: SDUPTHER

## 2018-07-31 RX ORDER — RANITIDINE 150 MG/1
150 TABLET ORAL DAILY
Qty: 90 TABLET | Refills: 3 | Status: SHIPPED | OUTPATIENT
Start: 2018-07-31 | End: 2019-05-06

## 2018-08-09 ENCOUNTER — OFFICE VISIT (OUTPATIENT)
Dept: CARDIOLOGY CLINIC | Facility: CLINIC | Age: 83
End: 2018-08-09
Payer: MEDICARE

## 2018-08-09 VITALS
SYSTOLIC BLOOD PRESSURE: 118 MMHG | HEART RATE: 68 BPM | HEIGHT: 55 IN | DIASTOLIC BLOOD PRESSURE: 70 MMHG | BODY MASS INDEX: 40.73 KG/M2 | WEIGHT: 176 LBS | OXYGEN SATURATION: 97 %

## 2018-08-09 DIAGNOSIS — Z86.79 HISTORY OF SUPRAVENTRICULAR TACHYCARDIA: ICD-10-CM

## 2018-08-09 DIAGNOSIS — E78.2 MIXED HYPERLIPIDEMIA: Chronic | ICD-10-CM

## 2018-08-09 DIAGNOSIS — I45.9 SKIPPED HEART BEATS: ICD-10-CM

## 2018-08-09 DIAGNOSIS — I48.91 ATRIAL FIBRILLATION, UNSPECIFIED TYPE (HCC): Primary | ICD-10-CM

## 2018-08-09 DIAGNOSIS — I10 ESSENTIAL HYPERTENSION: Chronic | ICD-10-CM

## 2018-08-09 DIAGNOSIS — I47.1 SUPRAVENTRICULAR TACHYCARDIA (HCC): Chronic | ICD-10-CM

## 2018-08-09 PROCEDURE — 99214 OFFICE O/P EST MOD 30 MIN: CPT | Performed by: INTERNAL MEDICINE

## 2018-08-09 PROCEDURE — 93000 ELECTROCARDIOGRAM COMPLETE: CPT | Performed by: INTERNAL MEDICINE

## 2018-08-09 RX ORDER — METOPROLOL SUCCINATE 25 MG/1
25 TABLET, EXTENDED RELEASE ORAL DAILY
Qty: 30 TABLET | Refills: 3 | Status: SHIPPED | OUTPATIENT
Start: 2018-08-09 | End: 2018-08-16 | Stop reason: SDUPTHER

## 2018-08-09 NOTE — PATIENT INSTRUCTIONS
Supraventricular Tachycardia   AMBULATORY CARE:   Supraventricular tachycardia (SVT)  is a condition that causes your heart to beat much faster than it should  SVT is a type of abnormal heart rhythm, called an arrhythmia, that starts in the upper part of your heart  It may last from a few seconds or hours to several days  Common symptoms include the following:   · A pounding, racing, or fluttering heartbeat    · Fatigue, weakness, or shortness of breath    · Feeling lightheaded, dizzy, or faint  · Pain, pressure, or tightness in your chest, neck, jaw, arms, or upper back    · Nausea    · Feeling anxious, scared, or worried that something bad may happen  Call 911 for the following:   · You have any of the following signs of a heart attack:      ¨ Squeezing, pressure, or pain in your chest that lasts longer than 5 minutes or returns    ¨ Discomfort or pain in your back, neck, jaw, stomach, or arm     ¨ Trouble breathing    ¨ Nausea or vomiting    ¨ Lightheadedness or a sudden cold sweat, especially with chest pain or trouble breathing    Seek care immediately if:   · You have dizziness, lightheadedness, or feel faint  · You have sudden numbness or weakness in your arms or legs  Contact your healthcare provider if:   · Your symptoms get worse, or you have new symptoms  · You have swelling in your ankles or feet  · You have questions or concerns about your condition or care  Treatment  may depend on what is causing SVT and your symptoms  You may not need treatment for SVT  Instead you may need medicine or other procedures to control your heart rate  How to manage or prevent SVT:   · Perform vagal maneuvers as directed when you have symptoms of SVT  Lie down flat and bear down like you are having a bowel movement  Do this for 10 to 30 seconds  · Do not drink caffeine or alcohol  These can increase your risk for SVT  · Keep a record of your symptoms    Write down what you ate or what you were doing before an episode of SVT  Also write down anything you did to make the SVT stop  Bring your record to follow up visits with your healthcare provider  · Eat heart-healthy foods  These include fruits, vegetables, whole-grain breads, low-fat dairy products, beans, lean meats, and fish  Replace butter and margarine with heart-healthy oils such as olive oil and canola oil  · Exercise regularly and maintain a healthy weight  Ask about the best exercise plan for you  Ask your healthcare provider how much you should weigh  Ask him to help you create a weight loss plan if you are overweight  · Do not smoke  Nicotine and other chemicals in cigarettes and cigars can cause heart and lung damage  Ask your healthcare provider for information if you currently smoke and need help to quit  E-cigarettes or smokeless tobacco still contain nicotine  Talk to your healthcare provider before you use these products  Follow up with your healthcare provider as directed:  Write down your questions so you remember to ask them during your visits  © 2017 [facility] Information is for End User's use only and may not be sold, redistributed or otherwise used for commercial purposes  All illustrations and images included in CareNotes® are the copyrighted property of [employer] , [employer]  or Gerson Cardenas  The above information is an  only  It is not intended as medical advice for individual conditions or treatments  Talk to your doctor, nurse or pharmacist before following any medical regimen to see if it is safe and effective for you

## 2018-08-09 NOTE — PROGRESS NOTES
Cardiology Consultation     Rod Boggs  [id number]  1935  [facility] [facility] [place]  [place] [id number]    1  Atrial fibrillation, unspecified type ([facility] [address] )  POCT ECG   2  Mixed hyperlipidemia  Ambulatory referral to Cardiology    c/w statin and small dose increase  pt reports low chol diet adherence   3  Essential hypertension  Ambulatory referral to Cardiology    BP doing well, c/w BB/ACEI   4  Skipped heart beats  Ambulatory referral to Cardiology    noted on radial pulse at home, EKG NSR in office today  holter monitor ordered   5  History of supraventricular tachycardia  Ambulatory referral to Cardiology     Chief Complaint   Patient presents with    Advice Only     skipping heart beat     HPI: Patient is here for evaluation and management of frequent PACs and brief runs of SVT seen on Holter  She has a h/o SVT and is maintained on propranolol  She has been feeling some skipped beats, which she feels every day, sporadic throughout the day  No lightheadedness or syncope  She has been on propranolol for many years - and takes it every day  She has not varied her caffeine intake or water intake        Patient Active Problem List   Diagnosis    Rotator cuff tear arthropathy of right shoulder    Anxiety    Peripheral neuropathy    Hyperlipidemia    Essential hypertension    Arthritis    Atrial fibrillation (HCC)    Difficulty breathing    Diverticulosis    Dizziness    Fatigue    Hyperglycemia    Pain in extremity    Osteopenia    Right shoulder pain    Shortness of breath    Shoulder pain, right    Subacromial impingement of right shoulder    Supraventricular tachycardia (HCC)    Upper extremity pain    Vitamin D deficiency    Pain of left hand    Left leg pain    Malignant neoplasm of upper-outer quadrant of right breast in female, estrogen receptor positive [facility] Past Medical History:   Diagnosis Date    Anxiety     Arthritis     last assessed 3/24/15     Atherosclerosis     Bell's palsy     Breast carcinoma (HCC)     Broken femur (HCC)     Cardiac disorder     DCIS (ductal carcinoma in situ) of breast     last assessed 4/4/17     Depression     Endometrial polyp     Hypercholesterolemia     resolved 10/22/14     Hyperlipidemia     Hypertension     Long term use of drug     last assessed 5/23/14     Malignant neoplasm without specification of site [facility])     Peripheral neuropathy     PONV (postoperative nausea and vomiting)     Tachycardia     Uterine fibroid      Social History     Social History    Marital status:       Spouse name: N/A    Number of children: N/A    Years of education: N/A     Occupational History    retired from work       Social History Main Topics    Smoking status: Never Smoker    Smokeless tobacco: Never Used    Alcohol use No      Comment: social drinker per allscript     Drug use: No    Sexual activity: Not on file     Other Topics Concern    Not on file     Social History Narrative    Coffee     Daily coffee consumption 1 cup day     Daily cola consumption can day     Walking            Family History   Problem Relation Age of Onset    Heart disease Mother         artherosclerosis     Heart disease Father         artherosclerosis     Heart attack Father     Arthritis Family     Heart disease Family         artherosclerosis     Breast cancer Family     Osteoarthritis Family     Supraventricular tachycardia Family     Hypertension Daughter     Anemia Neg Hx     Arrhythmia Neg Hx     Asthma Neg Hx     Clotting disorder Neg Hx     Fainting Neg Hx     Hyperlipidemia Neg Hx     Aneurysm Neg Hx      Past Surgical History:   Procedure Laterality Date    BREAST BIOPSY Right     BREAST LUMPECTOMY      CARPAL TUNNEL RELEASE Bilateral     CATARACT EXTRACTION      DILATION AND CURETTAGE OF UTERUS  ENDOMETRIAL BIOPSY      without cervical dilation     HEMORROIDECTOMY      JOINT REPLACEMENT      KNEE SURGERY Bilateral     OVARIAN CYST REMOVAL Left     HI RECONSTR TOTAL SHOULDER IMPLANT Right 7/18/2017    Procedure: TOTAL SHOULDER REVERSE ARTHROPLASTY;  Surgeon: Shaina Caraballo MD;  [facility];  Service: Orthopedics    SENTINEL LYMPH NODE BIOPSY      TONSILLECTOMY      TUBAL LIGATION         Current Outpatient Prescriptions:     betamethasone dipropionate (DIPROSONE) 0 05 % cream, Apply 1 application topically daily, Disp: , Rfl:     CRANBERRY PO, Take 1,700 mg by mouth daily, Disp: , Rfl:     lisinopril (ZESTRIL) 10 mg tablet, Take 1 tablet (10 mg total) by mouth 2 (two) times a day, Disp: 180 tablet, Rfl: 3    Multiple Vitamins-Minerals (PRESERVISION AREDS 2 PO), Take 2 tablets by mouth daily, Disp: , Rfl:     multivitamin (THERAGRAN) TABS, Take 1 tablet by mouth daily, Disp: , Rfl:     Omega-3 Fatty Acids (FISH OIL PO), Take 1 capsule by mouth daily, Disp: , Rfl:     prednisoLONE acetate (PRED FORTE) 1 % ophthalmic suspension, , Disp: , Rfl: 0    propranolol (INDERAL) 20 mg tablet, TAKE 1 TABLET BY MOUTH 4  TIMES DAILY (Patient taking differently: TID), Disp: 360 tablet, Rfl: 3    ranitidine (ZANTAC) 150 mg tablet, Take 1 tablet (150 mg total) by mouth daily, Disp: 90 tablet, Rfl: 3    rosuvastatin (CRESTOR) 5 mg tablet, Take 0 5 tablets by mouth 2 (two) times a week, Disp: , Rfl: 0  Allergies   Allergen Reactions    Amoxicillin-Pot Clavulanate GI Intolerance    Azithromycin GI Intolerance and Rash    Cephalexin GI Intolerance     Reaction Date: 28Dec2011;     Cortisone GI Intolerance     Reaction Date: 28Dec2011;     Doxycycline GI Intolerance    Penicillins GI Intolerance     Vitals:    08/09/18 0904   BP: 118/70   BP Location: Left arm   Patient Position: Sitting   Cuff Size: Adult   Pulse: 68   SpO2: 97%   Weight: 79 8 kg (176 lb)   Height: 4' 7" (1 397 m) Labs:  Appointment on 07/06/2018   Component Date Value    Sodium 07/06/2018 140     Potassium 07/06/2018 4 0     Chloride 07/06/2018 104     CO2 07/06/2018 28     Anion Gap 07/06/2018 8     BUN 07/06/2018 21     Creatinine 07/06/2018 0 98     Glucose, Fasting 07/06/2018 96     Calcium 07/06/2018 9 7     AST 07/06/2018 23     ALT 07/06/2018 30     Alkaline Phosphatase 07/06/2018 68     Total Protein 07/06/2018 7 1     Albumin 07/06/2018 4 0     Total Bilirubin 07/06/2018 0 71     eGFR 07/06/2018 54     Cholesterol 07/06/2018 197     HDL, Direct 07/06/2018 49     LDL Direct 07/06/2018 115*    Triglycerides 07/06/2018 152*     Lab Results   Component Value Date    CHOL 197 07/06/2018    TRIG 152 (H) 07/06/2018    TRIG 125 12/22/2015    HDL 49 07/06/2018    HDL 57 12/22/2015    LDLDIRECT 115 (H) 07/06/2018    LDLDIRECT 98 12/22/2015     Imaging: No results found  Review of Systems:  Review of Systems   Constitutional: Negative for activity change, appetite change, chills, diaphoresis, fatigue and unexpected weight change  HENT: Negative for hearing loss, nosebleeds and sore throat  Eyes: Negative for photophobia and visual disturbance  Respiratory: Negative for cough, chest tightness, shortness of breath and wheezing  Cardiovascular: Negative for chest pain, palpitations and leg swelling  Gastrointestinal: Negative for abdominal pain, diarrhea, nausea and vomiting  Endocrine: Negative for polyuria  Genitourinary: Negative for dysuria, frequency and hematuria  Musculoskeletal: Negative for arthralgias, back pain, gait problem and neck pain  Skin: Negative for pallor and rash  Neurological: Negative for dizziness, syncope and headaches  Hematological: Does not bruise/bleed easily  Psychiatric/Behavioral: Negative for behavioral problems and confusion  Physical Exam:  Physical Exam   Constitutional: She is oriented to person, place, and time   She appears well-developed and well-nourished  HENT:   Head: Normocephalic and atraumatic  Nose: Nose normal    Eyes: EOM are normal  Pupils are equal, round, and reactive to light  No scleral icterus  Neck: Normal range of motion  Neck supple  No JVD present  Cardiovascular: Normal rate, regular rhythm and normal heart sounds  Exam reveals no gallop and no friction rub  No murmur heard  Pulmonary/Chest: Effort normal and breath sounds normal  No respiratory distress  She has no wheezes  She has no rales  Abdominal: Soft  Bowel sounds are normal  She exhibits no distension  There is no tenderness  Musculoskeletal: Normal range of motion  She exhibits no edema or deformity  Neurological: She is alert and oriented to person, place, and time  No cranial nerve deficit  Skin: Skin is warm and dry  No rash noted  She is not diaphoretic  Psychiatric: She has a normal mood and affect  Her behavior is normal    Vitals reviewed  EKG:  Normal sinus rhythm  Normal ECG    Discussion/Summary:  SVT: continued on propranolol, symptoms are skipped beats - she did have multiple short runs of SVT, longest lasting 7 beats  Will change propranolol to Toprol XL to help with symptom management and arrhythmia suppression  Will also check an echo to rule out any structural repercussions from having frequent brief runs of SVT  HTN: well controlled currently, will evaluate response to Toprol instead of propranolol    HLD: continued on statin

## 2018-08-09 NOTE — LETTER
August 9, 2018     Lizabeth Najjar, [address]    Patient: Margo Anaya   YOB: 1935   Date of Visit: 8/9/2018       Dear Dr Eliza David:    Thank you for referring Lorenalexi Gan to me for evaluation  Below are my notes for this consultation  If you have questions, please do not hesitate to call me  I look forward to following your patient along with you           Sincerely,        Emilia Fothergill, MD        CC: No Recipients

## 2018-08-10 ENCOUNTER — DOCUMENTATION (OUTPATIENT)
Dept: CARDIOLOGY CLINIC | Facility: CLINIC | Age: 83
End: 2018-08-10

## 2018-08-10 ENCOUNTER — HOSPITAL ENCOUNTER (OUTPATIENT)
Dept: NON INVASIVE DIAGNOSTICS | Facility: CLINIC | Age: 83
Discharge: HOME | End: 2018-08-10
Payer: MEDICARE

## 2018-08-10 ENCOUNTER — TELEPHONE (OUTPATIENT)
Dept: CARDIOLOGY CLINIC | Facility: CLINIC | Age: 83
End: 2018-08-10

## 2018-08-10 DIAGNOSIS — I47.1 SUPRAVENTRICULAR TACHYCARDIA (HCC): Chronic | ICD-10-CM

## 2018-08-10 PROCEDURE — 93306 TTE W/DOPPLER COMPLETE: CPT | Performed by: INTERNAL MEDICINE

## 2018-08-10 PROCEDURE — 93306 TTE W/DOPPLER COMPLETE: CPT

## 2018-08-10 RX ORDER — CLORAZEPATE DIPOTASSIUM 3.75 MG/1
3.75 TABLET ORAL
COMMUNITY
End: 2020-03-17 | Stop reason: SDUPTHER

## 2018-08-10 NOTE — TELEPHONE ENCOUNTER
Harjeet Meza called, Clorazepate was taken off medication yesterday and wants to know if she should d/c it? Medication is ordered by PCP, has been taken for years  Please advise     C/b # [phone]- ok to leave message

## 2018-08-16 ENCOUNTER — OFFICE VISIT (OUTPATIENT)
Dept: CARDIOLOGY CLINIC | Facility: CLINIC | Age: 83
End: 2018-08-16
Payer: MEDICARE

## 2018-08-16 VITALS
DIASTOLIC BLOOD PRESSURE: 70 MMHG | WEIGHT: 173.3 LBS | HEART RATE: 79 BPM | HEIGHT: 55 IN | SYSTOLIC BLOOD PRESSURE: 140 MMHG | BODY MASS INDEX: 40.11 KG/M2

## 2018-08-16 DIAGNOSIS — I10 ESSENTIAL HYPERTENSION: Chronic | ICD-10-CM

## 2018-08-16 DIAGNOSIS — I47.1 SUPRAVENTRICULAR TACHYCARDIA (HCC): Chronic | ICD-10-CM

## 2018-08-16 DIAGNOSIS — E78.2 MIXED HYPERLIPIDEMIA: Chronic | ICD-10-CM

## 2018-08-16 DIAGNOSIS — I48.0 PAROXYSMAL ATRIAL FIBRILLATION (HCC): Primary | ICD-10-CM

## 2018-08-16 PROCEDURE — 99214 OFFICE O/P EST MOD 30 MIN: CPT | Performed by: INTERNAL MEDICINE

## 2018-08-16 PROCEDURE — 93000 ELECTROCARDIOGRAM COMPLETE: CPT | Performed by: INTERNAL MEDICINE

## 2018-08-16 RX ORDER — METOPROLOL SUCCINATE 50 MG/1
50 TABLET, EXTENDED RELEASE ORAL DAILY
Qty: 30 TABLET | Refills: 3 | Status: SHIPPED | OUTPATIENT
Start: 2018-08-16 | End: 2019-02-21 | Stop reason: ALTCHOICE

## 2018-08-16 NOTE — PROGRESS NOTES
Cardiology Consultation     Anna Arteaga  [id number]  1935  [facility] [address] [facility] [place] [phone]    1  Paroxysmal atrial fibrillation (HCC)  POCT ECG   2  Supraventricular tachycardia ([address] )     3  Essential hypertension     4  Mixed hyperlipidemia       Chief Complaint   Patient presents with    Follow-up     patient at the office for follow up, patient estate to feel good energy levels are ok estated by her  no complaints  HPI: Patient is here for evaluation and management of frequent PACs and brief runs of SVT seen on Holter  She has a h/o SVT and is maintained on propranolol  She had been feeling some skipped beats, which she feels every day, sporadic throughout the day  No lightheadedness or syncope  She had been on propranolol for many years - and takes it every day  She has not varied her caffeine intake or water intake  She is now tolerating the change to Toprol XL  Feels well and with less palpitations      Patient Active Problem List   Diagnosis    Rotator cuff tear arthropathy of right shoulder    Anxiety    Peripheral neuropathy    Hyperlipidemia    Essential hypertension    Arthritis    Atrial fibrillation (HCC)    Difficulty breathing    Diverticulosis    Dizziness    Fatigue    Hyperglycemia    Pain in extremity    Osteopenia    Right shoulder pain    Shortness of breath    Shoulder pain, right    Subacromial impingement of right shoulder    Supraventricular tachycardia (HCC)    Upper extremity pain    Vitamin D deficiency    Pain of left hand    Left leg pain    Malignant neoplasm of upper-outer quadrant of right breast in female, estrogen receptor positive ([address] )     Past Medical History:   Diagnosis Date    Anxiety     Arthritis     last assessed 3/24/15     Atherosclerosis     Bell's palsy     Breast carcinoma (HCC)     Broken femur [facility])     Cardiac disorder     DCIS (ductal carcinoma in situ) of breast     last assessed 4/4/17     Depression     Endometrial polyp     Hypercholesterolemia     resolved 10/22/14     Hyperlipidemia     Hypertension     Long term use of drug     last assessed 5/23/14     Malignant neoplasm without specification of site [facility])     Peripheral neuropathy     PONV (postoperative nausea and vomiting)     Tachycardia     Uterine fibroid      Social History     Social History    Marital status:       Spouse name: N/A    Number of children: N/A    Years of education: N/A     Occupational History    retired from work       Social History Main Topics    Smoking status: Never Smoker    Smokeless tobacco: Never Used    Alcohol use No      Comment: social drinker per allscript     Drug use: No    Sexual activity: Not on file     Other Topics Concern    Not on file     Social History Narrative    Coffee     Daily coffee consumption 1 cup day     Daily cola consumption can day     Walking            Family History   Problem Relation Age of Onset    Heart disease Mother         artherosclerosis     Heart disease Father         artherosclerosis     Heart attack Father     Arthritis Family     Heart disease Family         artherosclerosis     Breast cancer Family     Osteoarthritis Family     Supraventricular tachycardia Family     Hypertension Daughter     Anemia Neg Hx     Arrhythmia Neg Hx     Asthma Neg Hx     Clotting disorder Neg Hx     Fainting Neg Hx     Hyperlipidemia Neg Hx     Aneurysm Neg Hx      Past Surgical History:   Procedure Laterality Date    BREAST BIOPSY Right     BREAST LUMPECTOMY      CARPAL TUNNEL RELEASE Bilateral     CATARACT EXTRACTION      DILATION AND CURETTAGE OF UTERUS      ENDOMETRIAL BIOPSY      without cervical dilation     HEMORROIDECTOMY      JOINT REPLACEMENT      KNEE SURGERY Bilateral     OVARIAN CYST REMOVAL Left     MO RECONSTR TOTAL SHOULDER IMPLANT Right 7/18/2017    Procedure: TOTAL SHOULDER REVERSE ARTHROPLASTY;  Surgeon: Roshan Goodman MD;  [facility];  Service: Orthopedics    SENTINEL LYMPH NODE BIOPSY      TONSILLECTOMY      TUBAL LIGATION         Current Outpatient Prescriptions:     betamethasone dipropionate (DIPROSONE) 0 05 % cream, Apply 1 application topically daily, Disp: , Rfl:     clorazepate (TRANXENE) 3 75 mg tablet, Take 3 75 mg by mouth daily  , Disp: , Rfl:     CRANBERRY PO, Take 1,700 mg by mouth daily, Disp: , Rfl:     lisinopril (ZESTRIL) 10 mg tablet, Take 1 tablet (10 mg total) by mouth 2 (two) times a day, Disp: 180 tablet, Rfl: 3    metoprolol succinate (TOPROL-XL) 25 mg 24 hr tablet, Take 1 tablet (25 mg total) by mouth daily, Disp: 30 tablet, Rfl: 3    Multiple Vitamins-Minerals (PRESERVISION AREDS 2 PO), Take 2 tablets by mouth daily, Disp: , Rfl:     multivitamin (THERAGRAN) TABS, Take 1 tablet by mouth daily, Disp: , Rfl:     Omega-3 Fatty Acids (FISH OIL PO), Take 1 capsule by mouth daily, Disp: , Rfl:     prednisoLONE acetate (PRED FORTE) 1 % ophthalmic suspension, , Disp: , Rfl: 0    ranitidine (ZANTAC) 150 mg tablet, Take 1 tablet (150 mg total) by mouth daily, Disp: 90 tablet, Rfl: 3    rosuvastatin (CRESTOR) 5 mg tablet, Take 0 5 tablets by mouth 2 (two) times a week, Disp: , Rfl: 0  Allergies   Allergen Reactions    Amoxicillin-Pot Clavulanate GI Intolerance    Azithromycin GI Intolerance and Rash    Cephalexin GI Intolerance     Reaction Date: 28Dec2011;     Cortisone GI Intolerance     Reaction Date: [id number];     Doxycycline GI Intolerance    Penicillins GI Intolerance     Vitals:    08/16/18 1330   BP: 140/70   BP Location: Left arm   Patient Position: Sitting   Cuff Size: Standard   Pulse: 79   Weight: 78 6 kg (173 lb 4 8 oz)   Height: 4' 7" (1 397 m)       Labs:  Appointment on 07/06/2018   Component Date Value    Sodium 07/06/2018 140     Potassium 07/06/2018 4 0     Chloride 07/06/2018 104     CO2 07/06/2018 28     Anion Gap 07/06/2018 8     BUN 07/06/2018 21     Creatinine 07/06/2018 0 98     Glucose, Fasting 07/06/2018 96     Calcium 07/06/2018 9 7     AST 07/06/2018 23     ALT 07/06/2018 30     Alkaline Phosphatase 07/06/2018 68     Total Protein 07/06/2018 7 1     Albumin 07/06/2018 4 0     Total Bilirubin 07/06/2018 0 71     eGFR 07/06/2018 54     Cholesterol 07/06/2018 197     HDL, Direct 07/06/2018 49     LDL Direct 07/06/2018 115*    Triglycerides 07/06/2018 152*     Lab Results   Component Value Date    TRIG 152 (H) 07/06/2018    TRIG 125 12/22/2015    HDL 49 07/06/2018    HDL 57 12/22/2015    LDLDIRECT 115 (H) 07/06/2018    LDLDIRECT 98 12/22/2015     Imaging: No results found  Review of Systems:  Review of Systems   Constitutional: Negative for activity change, appetite change, chills, diaphoresis, fatigue and unexpected weight change  HENT: Negative for hearing loss, nosebleeds and sore throat  Eyes: Negative for photophobia and visual disturbance  Respiratory: Negative for cough, chest tightness, shortness of breath and wheezing  Cardiovascular: Negative for chest pain, palpitations and leg swelling  Gastrointestinal: Negative for abdominal pain, diarrhea, nausea and vomiting  Endocrine: Negative for polyuria  Genitourinary: Negative for dysuria, frequency and hematuria  Musculoskeletal: Negative for arthralgias, back pain, gait problem and neck pain  Skin: Negative for pallor and rash  Neurological: Negative for dizziness, syncope and headaches  Hematological: Does not bruise/bleed easily  Psychiatric/Behavioral: Negative for behavioral problems and confusion  Physical Exam:  Physical Exam   Constitutional: She is oriented to person, place, and time  She appears well-developed and well-nourished  HENT:   Head: Normocephalic and atraumatic     Nose: Nose normal    Eyes: EOM are normal  Pupils are equal, round, and reactive to light  No scleral icterus  Neck: Normal range of motion  Neck supple  No JVD present  Cardiovascular: Normal rate, regular rhythm and normal heart sounds  Exam reveals no gallop and no friction rub  No murmur heard  Pulmonary/Chest: Effort normal and breath sounds normal  No respiratory distress  She has no wheezes  She has no rales  Abdominal: Soft  Bowel sounds are normal  She exhibits no distension  There is no tenderness  Musculoskeletal: Normal range of motion  She exhibits no edema or deformity  Neurological: She is alert and oriented to person, place, and time  No cranial nerve deficit  Skin: Skin is warm and dry  No rash noted  She is not diaphoretic  Psychiatric: She has a normal mood and affect  Her behavior is normal    Vitals reviewed  EKG:  Normal sinus rhythm  Normal ECG    Discussion/Summary:  SVT: changed propranolol to Toprol, symptoms are skipped beats - she did have multiple short runs of SVT, longest lasting 7 beats - that feel improved on Toprol  We also checked an echo that ruled out any structural repercussions from having frequent brief runs of SVT  HTN: elevated today compared to when she was on propranolol - will increase dose to 50mg to help with BP     HLD: continued on statin

## 2018-08-16 NOTE — PATIENT INSTRUCTIONS
Supraventricular Tachycardia   AMBULATORY CARE:   Supraventricular tachycardia (SVT)  is a condition that causes your heart to beat much faster than it should  SVT is a type of abnormal heart rhythm, called an arrhythmia, that starts in the upper part of your heart  It may last from a few seconds or hours to several days  Common symptoms include the following:   · A pounding, racing, or fluttering heartbeat    · Fatigue, weakness, or shortness of breath    · Feeling lightheaded, dizzy, or faint  · Pain, pressure, or tightness in your chest, neck, jaw, arms, or upper back    · Nausea    · Feeling anxious, scared, or worried that something bad may happen  Call 911 for the following:   · You have any of the following signs of a heart attack:      ¨ Squeezing, pressure, or pain in your chest that lasts longer than 5 minutes or returns    ¨ Discomfort or pain in your back, neck, jaw, stomach, or arm     ¨ Trouble breathing    ¨ Nausea or vomiting    ¨ Lightheadedness or a sudden cold sweat, especially with chest pain or trouble breathing    Seek care immediately if:   · You have dizziness, lightheadedness, or feel faint  · You have sudden numbness or weakness in your arms or legs  Contact your healthcare provider if:   · Your symptoms get worse, or you have new symptoms  · You have swelling in your ankles or feet  · You have questions or concerns about your condition or care  Treatment  may depend on what is causing SVT and your symptoms  You may not need treatment for SVT  Instead you may need medicine or other procedures to control your heart rate  How to manage or prevent SVT:   · Perform vagal maneuvers as directed when you have symptoms of SVT  Lie down flat and bear down like you are having a bowel movement  Do this for 10 to 30 seconds  · Do not drink caffeine or alcohol  These can increase your risk for SVT  · Keep a record of your symptoms    Write down what you ate or what you were doing before an episode of SVT  Also write down anything you did to make the SVT stop  Bring your record to follow up visits with your healthcare provider  · Eat heart-healthy foods  These include fruits, vegetables, whole-grain breads, low-fat dairy products, beans, lean meats, and fish  Replace butter and margarine with heart-healthy oils such as olive oil and canola oil  · Exercise regularly and maintain a healthy weight  Ask about the best exercise plan for you  Ask your healthcare provider how much you should weigh  Ask him to help you create a weight loss plan if you are overweight  · Do not smoke  Nicotine and other chemicals in cigarettes and cigars can cause heart and lung damage  Ask your healthcare provider for information if you currently smoke and need help to quit  E-cigarettes or smokeless tobacco still contain nicotine  Talk to your healthcare provider before you use these products  Follow up with your healthcare provider as directed:  Write down your questions so you remember to ask them during your visits  © 2017 [facility] Information is for End User's use only and may not be sold, redistributed or otherwise used for commercial purposes  All illustrations and images included in CareNotes® are the copyrighted property of [employer] , [employer]  or [employer] Carden[employer]  The above information is an  only  It is not intended as medical advice for individual conditions or treatments  Talk to your doctor, nurse or pharmacist before following any medical regimen to see if it is safe and effective for you

## 2018-08-17 ENCOUNTER — OFFICE VISIT (OUTPATIENT)
Dept: INTERNAL MEDICINE CLINIC | Facility: CLINIC | Age: 83
End: 2018-08-17
Payer: MEDICARE

## 2018-08-17 DIAGNOSIS — I10 HYPERTENSION, UNSPECIFIED TYPE: ICD-10-CM

## 2018-08-17 DIAGNOSIS — F41.9 ANXIETY: Chronic | ICD-10-CM

## 2018-08-17 DIAGNOSIS — E78.01 FAMILIAL HYPERCHOLESTEROLEMIA: Primary | ICD-10-CM

## 2018-08-17 DIAGNOSIS — G89.29 CHRONIC LEFT SHOULDER PAIN: ICD-10-CM

## 2018-08-17 DIAGNOSIS — M25.512 CHRONIC LEFT SHOULDER PAIN: ICD-10-CM

## 2018-08-17 DIAGNOSIS — L98.9 SKIN LESION OF LEFT LEG: ICD-10-CM

## 2018-08-17 DIAGNOSIS — E55.9 VITAMIN D DEFICIENCY: ICD-10-CM

## 2018-08-17 DIAGNOSIS — M19.90 ARTHRITIS: Chronic | ICD-10-CM

## 2018-08-17 DIAGNOSIS — I47.1 SUPRAVENTRICULAR TACHYCARDIA (HCC): Chronic | ICD-10-CM

## 2018-08-17 DIAGNOSIS — I10 ESSENTIAL HYPERTENSION: Chronic | ICD-10-CM

## 2018-08-17 PROCEDURE — 99214 OFFICE O/P EST MOD 30 MIN: CPT | Performed by: INTERNAL MEDICINE

## 2018-08-18 VITALS
DIASTOLIC BLOOD PRESSURE: 78 MMHG | BODY MASS INDEX: 39.34 KG/M2 | RESPIRATION RATE: 14 BRPM | SYSTOLIC BLOOD PRESSURE: 120 MMHG | HEIGHT: 55 IN | HEART RATE: 72 BPM | WEIGHT: 170 LBS

## 2018-08-18 NOTE — PROGRESS NOTES
Assessment/Plan:  1  Health maintenance-patient given prescription for and instructed on Shingrix vaccine  2  SVT-recently had increasing ectopy on exam   48 hour Holter showed 18,000 supraventricular ectopic beats with over 300 short runs of SVT the longest lasting 7 beats during which she was asymptomatic  No ventricular ectopic beats  Echocardiogram shows an EF is 60%, no regional wall motion abnormalities, wall thickness upper limits of normal, grade 1 diastolic dysfunction, left atrium mild-to-moderately dilated, mitral valve calcification, pulmonary artery systolic pressure normal   Saw Cardiology was switched her beta-blocker from propanolol to metoprolol 5 and recently increased her dose to 50 milligrams  She will change her metoprolol to b i d  She will be monitored closely  6 she will be re-evaluated over the next several months at her follow-up visit  3  Left leg lesion-recent may been a bite-nonhealing-rule out squamous cell carcinoma-referral made to Dermatology  4  Left shoulder pain-may represent impingement syndrome-referral made orthopedic physician  5  Previously noted left leg pain-felt to be muscle skeletal associated with trauma  X-ray femur benign-not an issue at present  6  Status post trauma with right acromial stress fracture complicating prior total shoulder arthroplasty on the right  She was told by the orthopedic physician is can happen 8% occasions  She has recovered from this-as noted originally underwent right shoulder surgery for severe rotator cuff disease  7  Hypertension-stable on current regimen  No adjustments made in that regard  Continue current dose of beta-blocker an ACE-inhibitor  8  Vitamin-D deficiency-continue supplement  9  Status associated myalgias-improved with switch to lower dose of Crestor  10  Hypertension-adequate control on current dose of beta-blocker an ACE-inhibitor    Was on diuretic therapy in the past as well but this is not needed at present  11  Diffuse DJD  Sed rate normal   CRP minimally elevated  Again instructed on joint conservation  12  Status post total knee replacement  Has ongoing pain but decided to just live with it  13  Proteinuria-noted previously  Follow-up urine for protein creatinine ratio normal  14  Peripheral neuropathy-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  Immunofixation negative  Serum for heavy metals, B12, methylmalonic acid, Lyme titer, rheumatoid factor and antinuclear antibody all normal   May be idiopathic  Minimal symptoms at present  15  Alopecia-all workup negative  16  Hyperlipidemia with mixed dyslipidemia-stable on current dose of statin and fish oil  17  Left ovarian cyst-was enlarging and underwent laparoscopic bilateral salpingo-oophorectomy-all pathology benign  18  Intermittent chronic left lower abdominal pain-CT scan of abdomen pelvis benign  Colonoscopy showed diverticular disease  Underwent surgery for the ovarian cyst and asymptomatic since  19  Breast carcinoma-infiltrating ductal carcinoma of the breast   Had supplemental radiation therapy  Declined hormonal therapy  ERPR positive  Radiation therapy completed March of 2010  20  Dyspepsia-currently on H2 blocker-REVIEW NEXT VISIT      MEDICAL REGIMEN:      Metoprolol succinate 25 milligrams b i d , lisinopril 10 milligrams b i d , Crestor 10 milligrams half tablet 3 times per week, multivitamin daily, fish oil 1000 milligrams daily, cranberry capsules, ophthalmological vitamins, Zantac 150 milligrams in the morning, prednisone eye drops per Ophthalmology    Scheduled for appointment with orthopedic physician regarding left shoulder, appointment with dermatology  Appointment over the next several months with prior chemistry profile cholesterol profile  No problem-specific Assessment & Plan notes found for this encounter         Diagnoses and all orders for this visit:    Familial hypercholesterolemia  -     Comprehensive metabolic panel; Future  -     Cholesterol, total; Future  -     Triglycerides; Future  -     LDL cholesterol, direct; Future  -     HDL cholesterol; Future    Hypertension, unspecified type  -     Comprehensive metabolic panel; Future  -     Cholesterol, total; Future  -     Triglycerides; Future  -     LDL cholesterol, direct; Future  -     HDL cholesterol; Future    Vitamin D deficiency  -     Vitamin D 25 hydroxy; Future    Skin lesion of left leg  -     Ambulatory referral to Dermatology; Future    Chronic left shoulder pain  -     Ambulatory referral to Orthopedic Surgery; Future    Supraventricular tachycardia (HCC)    Essential hypertension    Anxiety    Arthritis          Subjective:      Patient ID: Franklin Harper is a 81 y o  female  We discussed several issues today  We reviewed in detail the new herpes zoster vaccine  She was given a prescription for this  She understands this is more effective than prior vaccine  She understands that there is a higher incidence of side effects in all vaccine    She has had prior right shoulder surgery  She is not having some left shoulder pain  Referral made orthopedic physician    She has a skin lesion of her left lower leg  Originally felt there may been a bite  The area healed but recently had increasing erythema had I referred her to Dermatology to make sure we do not have a squamous cell carcinoma-felt unlikely but I still feel this warrants dermatologic review    She had previously seen the covering physician  There was concerned about increasing ectopy on exam   48 hour Holter shows 18,000 supraventricular ectopic beats with over 300 short runs of SVT the longest lasting 7 beats  She was asymptomatic  No ventricular ectopic beats  She saw Cardiology in August in the listed her as having SVT with some symptoms and multiple short runs of SVT  They switched her to metoprolol    On follow-up visit with Cardiology over the last week her metoprolol was increased from 25-50 milligrams  She is taking at on the evening  She says she does not have any fatigue from this  An echocardiogram was performed and showed EF is 60% no regional wall motion abnormalities wall thickness upper limits of normal grade 1 diastolic dysfunction left atrium mildly to moderately dilated  There was minimal calcification of the mitral valve  Pulmonary artery pressure was normal     She is not using any decongestants  She does not use any significant nonsteroidals  She tries to minimize her caffeine intake  This patient denies any systemic symptoms  Specifically there has been no evidence of fever, night sweats, significant weight loss or significant decrease in appetite  Is medical regimen was reviewed in detail  She has a history of hypertension  BP adequately controlled at present on current dose of ACE-inhibitor and beta-blocker  Preferred BP under 130/80  She is avoiding salt and decongestants  She denies simultaneous pounding of her heart sweats and headache        The following portions of the patient's history were reviewed and updated as appropriate: current medications, past family history, past medical history, past social history, past surgical history and problem list     Review of Systems   Constitutional: Negative  Respiratory: Negative  Cardiovascular: Positive for palpitations  Gastrointestinal: Negative  Endocrine: Negative  Genitourinary: Negative  Musculoskeletal: Negative  Left shoulder pain   Skin: Positive for wound  Neurological: Negative  Hematological: Negative  Psychiatric/Behavioral: Negative            Objective:      /78   Pulse 72   Resp 14   Ht 4' 7" (1 397 m)   Wt 77 1 kg (170 lb)   BMI 39 51 kg/m²          Physical Exam

## 2018-08-20 ENCOUNTER — TELEPHONE (OUTPATIENT)
Dept: INTERNAL MEDICINE CLINIC | Facility: CLINIC | Age: 83
End: 2018-08-20

## 2018-08-20 NOTE — TELEPHONE ENCOUNTER
----- Message from Madhu Barber sent at 8/17/2018  2:02 PM EDT -----  Patient needs appt with [facility]      erika is concerned because patient has hx of cancer  Refer to elsy abarca note for details

## 2018-08-29 ENCOUNTER — TELEPHONE (OUTPATIENT)
Dept: INTERNAL MEDICINE CLINIC | Facility: CLINIC | Age: 83
End: 2018-08-29

## 2018-08-29 NOTE — TELEPHONE ENCOUNTER
----- Message from Edwin Hernandez sent at 8/17/2018  2:02 PM EDT -----  Patient needs appt with sen[facility]      erika is concerned because patient has hx of cancer  Refer to elsy abarca note for details

## 2018-09-10 ENCOUNTER — HOSPITAL ENCOUNTER (OUTPATIENT)
Dept: RADIOLOGY | Facility: HOSPITAL | Age: 83
Discharge: HOME | End: 2018-09-10
Attending: ORTHOPAEDIC SURGERY
Payer: MEDICARE

## 2018-09-10 ENCOUNTER — OFFICE VISIT (OUTPATIENT)
Dept: OBGYN CLINIC | Facility: HOSPITAL | Age: 83
End: 2018-09-10
Payer: MEDICARE

## 2018-09-10 VITALS
HEART RATE: 70 BPM | BODY MASS INDEX: 40.04 KG/M2 | SYSTOLIC BLOOD PRESSURE: 143 MMHG | HEIGHT: 55 IN | WEIGHT: 173 LBS | DIASTOLIC BLOOD PRESSURE: 85 MMHG

## 2018-09-10 DIAGNOSIS — M25.512 ACUTE PAIN OF LEFT SHOULDER: ICD-10-CM

## 2018-09-10 DIAGNOSIS — M75.42 IMPINGEMENT SYNDROME OF LEFT SHOULDER: ICD-10-CM

## 2018-09-10 DIAGNOSIS — M25.512 ACUTE PAIN OF LEFT SHOULDER: Primary | ICD-10-CM

## 2018-09-10 PROBLEM — M75.41 SUBACROMIAL IMPINGEMENT OF RIGHT SHOULDER: Status: RESOLVED | Noted: 2017-04-20 | Resolved: 2018-09-10

## 2018-09-10 PROBLEM — M12.811 ROTATOR CUFF TEAR ARTHROPATHY OF RIGHT SHOULDER: Status: RESOLVED | Noted: 2017-07-18 | Resolved: 2018-09-10

## 2018-09-10 PROBLEM — M25.511 SHOULDER PAIN, RIGHT: Status: RESOLVED | Noted: 2017-04-20 | Resolved: 2018-09-10

## 2018-09-10 PROBLEM — M25.511 RIGHT SHOULDER PAIN: Status: RESOLVED | Noted: 2017-04-03 | Resolved: 2018-09-10

## 2018-09-10 PROBLEM — M75.101 ROTATOR CUFF TEAR ARTHROPATHY OF RIGHT SHOULDER: Status: RESOLVED | Noted: 2017-07-18 | Resolved: 2018-09-10

## 2018-09-10 PROCEDURE — 20610 DRAIN/INJ JOINT/BURSA W/O US: CPT | Performed by: ORTHOPAEDIC SURGERY

## 2018-09-10 PROCEDURE — 99214 OFFICE O/P EST MOD 30 MIN: CPT | Performed by: ORTHOPAEDIC SURGERY

## 2018-09-10 PROCEDURE — 73030 X-RAY EXAM OF SHOULDER: CPT

## 2018-09-10 RX ORDER — BUPIVACAINE HYDROCHLORIDE 2.5 MG/ML
2 INJECTION, SOLUTION INFILTRATION; PERINEURAL
Status: COMPLETED | OUTPATIENT
Start: 2018-09-10 | End: 2018-09-10

## 2018-09-10 RX ORDER — BETAMETHASONE SODIUM PHOSPHATE AND BETAMETHASONE ACETATE 3; 3 MG/ML; MG/ML
6 INJECTION, SUSPENSION INTRA-ARTICULAR; INTRALESIONAL; INTRAMUSCULAR; SOFT TISSUE
Status: COMPLETED | OUTPATIENT
Start: 2018-09-10 | End: 2018-09-10

## 2018-09-10 RX ADMIN — BETAMETHASONE SODIUM PHOSPHATE AND BETAMETHASONE ACETATE 6 MG: 3; 3 INJECTION, SUSPENSION INTRA-ARTICULAR; INTRALESIONAL; INTRAMUSCULAR; SOFT TISSUE at 14:41

## 2018-09-10 RX ADMIN — BUPIVACAINE HYDROCHLORIDE 2 ML: 2.5 INJECTION, SOLUTION INFILTRATION; PERINEURAL at 14:41

## 2018-09-10 NOTE — PATIENT INSTRUCTIONS

## 2018-09-10 NOTE — PROGRESS NOTES
Assessment  Diagnoses and all orders for this visit:    Acute pain of left shoulder  -     XR shoulder 2+ vw left; Future    Impingement syndrome of left shoulder  -     Ambulatory referral to Orthopedic Surgery        Discussion and Plan:    1  Steroid injection into subacromial space for pain relief  2  Home exercise program  3  Activities to tolerance  4  Follow up in 2 months to check progress    Subjective:   Patient ID: Mariam Matthew is a 81 y o  female      Sophie Mcbride presents with a chief complaint of left shoulder pain x 1 month  She denies injury or trauma  She has been using Blue Emu at night which helps  She admits to lateral based left shoulder pain  She denies radiculopathy or numbness/tingling  She has pain with certain positions  She has pain with overhead motion  She has occasional popping and clicking  She denies weakness or loss of motion  The following portions of the patient's history were reviewed and updated as appropriate: allergies, current medications, past family history, past medical history, past social history, past surgical history and problem list     Review of Systems   Constitutional: Negative for chills and fever  HENT: Negative for hearing loss  Eyes: Negative for visual disturbance  Respiratory: Negative for shortness of breath  Cardiovascular: Negative for chest pain  Gastrointestinal: Negative for abdominal pain  Musculoskeletal:        As reviewed in the HPI   Skin: Negative for rash  Neurological:        As reviewed in the HPI   Psychiatric/Behavioral: Negative for agitation  Objective:  Ortho Exam    Physical Exam   Constitutional: She is oriented to person, place, and time  She appears well-developed and well-nourished  HENT:   Head: Normocephalic  Eyes: EOM are normal    Neck: Normal range of motion  Pulmonary/Chest: Breath sounds normal  She has no wheezes     Neurological: She is alert and oriented to person, place, and time    Skin: Skin is warm and dry  Psychiatric: She has a normal mood and affect  Her behavior is normal  Judgment and thought content normal          I have personally reviewed pertinent films in PACS and my interpretation is as follows  3 views left shoulder: mild GH arthritis given the subchondral cysts seen on the glenoid, no calcific tendinitis, no fx or dislocation      I García Lechuga MD personally examined the patient and reviewed the history provided  I agree with the note and the assessment and plan by Mateus Strauss PA-C  Briefly the patient is a 81 y o  female with a chief complaint of left shoulder pain who presents to the office for evaluation and treatment  Please refer to the HPI documented by the PA in the body of the note for further details  Physical Exam: Blood pressure 143/85, pulse 70, height 4' 7" (1 397 m), weight 78 5 kg (173 lb)  Left shoulder mildly positive Barragan Neer impingement sign with good strength    Radiology: I have personally reviewed the following images and my interpretation follows  Mild degenerative changes glenohumeral joint    Assessment:    Left subacromial impingement syndrome    Plan:    The patient has an examination consistent with subacromial impingement syndrome of the left shoulder  I have discussed with the patient the pathophysiology of this diagnosis and reviewed how the examination correlates with this diagnosis  Treatment options were discussed at length and after discussing these treatment options, the patient elected for and received a subacromial injection of corticosteroid (as described in the procedure note) with a prescription for referral to physical therapy  We will reevaluate the patient in 6-8 weeks  If the symptoms fail to improve with this treatment the patient would be indicated for further imaging in the form of an MRI scan of the shoulder    Large joint arthrocentesis  Date/Time: 9/10/2018 2:41 PM  Consent given by: patient  Timeout: Immediately prior to procedure a time out was called to verify the correct patient, procedure, equipment, support staff and site/side marked as required   Supporting Documentation  Indications: pain   Procedure Details  Location: shoulder - L subacromial bursa  Preparation: Patient was prepped and draped in the usual sterile fashion  Needle size: 22 G  Approach: lateral  Medications administered: 2 mL bupivacaine 0 25 %; 6 mg betamethasone acetate-betamethasone sodium phosphate 6 (3-3) mg/mL    Patient tolerance: patient tolerated the procedure well with no immediate complications  Dressing:  Sterile dressing applied

## 2018-10-16 ENCOUNTER — TELEPHONE (OUTPATIENT)
Dept: INTERNAL MEDICINE CLINIC | Facility: CLINIC | Age: 83
End: 2018-10-16

## 2018-10-16 RX ORDER — ROSUVASTATIN CALCIUM 5 MG/1
5 TABLET, COATED ORAL 3 TIMES WEEKLY
Refills: 0 | Status: CANCELLED | OUTPATIENT
Start: 2018-10-17

## 2018-10-16 NOTE — TELEPHONE ENCOUNTER
Pt called needing a refill on her crestor 10 mg  She stated you had her taken it 1/2 twice a week , But when she saw Vanessa Galindo he had her change it to  1/2 three times a week    Which do you prefer her to do    Please advise  Pharm [facility]

## 2018-10-22 ENCOUNTER — APPOINTMENT (EMERGENCY)
Dept: RADIOLOGY | Facility: HOSPITAL | Age: 83
DRG: 555 | End: 2018-10-22
Payer: MEDICARE

## 2018-10-22 ENCOUNTER — HOSPITAL ENCOUNTER (INPATIENT)
Facility: HOSPITAL | Age: 83
LOS: 1 days | Discharge: HOME HEALTH | DRG: 555 | End: 2018-10-24
Attending: EMERGENCY MEDICINE | Admitting: INTERNAL MEDICINE
Payer: MEDICARE

## 2018-10-22 DIAGNOSIS — R26.2 AMBULATORY DYSFUNCTION: ICD-10-CM

## 2018-10-22 DIAGNOSIS — S80.02XA CONTUSION OF LEFT KNEE, INITIAL ENCOUNTER: ICD-10-CM

## 2018-10-22 DIAGNOSIS — R77.8 ELEVATED TROPONIN: Primary | ICD-10-CM

## 2018-10-22 DIAGNOSIS — M25.562 LEFT KNEE PAIN: ICD-10-CM

## 2018-10-22 DIAGNOSIS — I21.4 NSTEMI (NON-ST ELEVATED MYOCARDIAL INFARCTION) (HCC): ICD-10-CM

## 2018-10-22 DIAGNOSIS — R00.2 PALPITATIONS: ICD-10-CM

## 2018-10-22 DIAGNOSIS — M79.605 PAIN OF LEFT LOWER EXTREMITY: ICD-10-CM
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
## 2018-10-22 PROBLEM — I16.0 HYPERTENSIVE URGENCY: Status: ACTIVE | Noted: 2018-10-22

## 2018-10-22 LAB
ANION GAP SERPL CALCULATED.3IONS-SCNC: 6 MMOL/L (ref 4–13)
BACTERIA UR QL AUTO: ABNORMAL /HPF
BASOPHILS # BLD AUTO: 0.04 THOUSANDS/ΜL (ref 0–0.1)
BASOPHILS NFR BLD AUTO: 1 % (ref 0–1)
BILIRUB UR QL STRIP: NEGATIVE
BUN SERPL-MCNC: 22 MG/DL (ref 5–25)
CALCIUM SERPL-MCNC: 9.8 MG/DL (ref 8.3–10.1)
CHLORIDE SERPL-SCNC: 106 MMOL/L (ref 100–108)
CLARITY UR: ABNORMAL
CO2 SERPL-SCNC: 28 MMOL/L (ref 21–32)
COLOR UR: ABNORMAL
COLOR, POC: NORMAL
CREAT SERPL-MCNC: 0.92 MG/DL (ref 0.6–1.3)
EOSINOPHIL # BLD AUTO: 0.25 THOUSAND/ΜL (ref 0–0.61)
EOSINOPHIL NFR BLD AUTO: 3 % (ref 0–6)
ERYTHROCYTE [DISTWIDTH] IN BLOOD BY AUTOMATED COUNT: 12.8 % (ref 11.6–15.1)
GFR SERPL CREATININE-BSD FRML MDRD: 58 ML/MIN/1.73SQ M
GLUCOSE SERPL-MCNC: 89 MG/DL (ref 65–140)
GLUCOSE UR STRIP-MCNC: NEGATIVE MG/DL
HCT VFR BLD AUTO: 43.9 % (ref 34.8–46.1)
HGB BLD-MCNC: 14.6 G/DL (ref 11.5–15.4)
HGB UR QL STRIP.AUTO: NEGATIVE
HYALINE CASTS #/AREA URNS LPF: ABNORMAL /LPF
IMM GRANULOCYTES # BLD AUTO: 0.03 THOUSAND/UL (ref 0–0.2)
IMM GRANULOCYTES NFR BLD AUTO: 0 % (ref 0–2)
KETONES UR STRIP-MCNC: ABNORMAL MG/DL
LEUKOCYTE ESTERASE UR QL STRIP: NEGATIVE
LYMPHOCYTES # BLD AUTO: 1.31 THOUSANDS/ΜL (ref 0.6–4.47)
LYMPHOCYTES NFR BLD AUTO: 15 % (ref 14–44)
MCH RBC QN AUTO: 30.9 PG (ref 26.8–34.3)
MCHC RBC AUTO-ENTMCNC: 33.3 G/DL (ref 31.4–37.4)
MCV RBC AUTO: 93 FL (ref 82–98)
MONOCYTES # BLD AUTO: 0.58 THOUSAND/ΜL (ref 0.17–1.22)
MONOCYTES NFR BLD AUTO: 7 % (ref 4–12)
NEUTROPHILS # BLD AUTO: 6.32 THOUSANDS/ΜL (ref 1.85–7.62)
NEUTS SEG NFR BLD AUTO: 74 % (ref 43–75)
NITRITE UR QL STRIP: NEGATIVE
NON-SQ EPI CELLS URNS QL MICRO: ABNORMAL /HPF
NRBC BLD AUTO-RTO: 0 /100 WBCS
PH UR STRIP.AUTO: 6 [PH] (ref 4.5–8)
PLATELET # BLD AUTO: 174 THOUSANDS/UL (ref 149–390)
PMV BLD AUTO: 10 FL (ref 8.9–12.7)
POTASSIUM SERPL-SCNC: 3.7 MMOL/L (ref 3.5–5.3)
PROT UR STRIP-MCNC: ABNORMAL MG/DL
RBC # BLD AUTO: 4.73 MILLION/UL (ref 3.81–5.12)
RBC #/AREA URNS AUTO: ABNORMAL /HPF
SODIUM SERPL-SCNC: 140 MMOL/L (ref 136–145)
SP GR UR STRIP.AUTO: >=1.03 (ref 1–1.03)
TROPONIN I SERPL-MCNC: 0.33 NG/ML
UROBILINOGEN UR QL STRIP.AUTO: 0.2 E.U./DL
WBC # BLD AUTO: 8.53 THOUSAND/UL (ref 4.31–10.16)
WBC #/AREA URNS AUTO: ABNORMAL /HPF

## 2018-10-22 PROCEDURE — 36415 COLL VENOUS BLD VENIPUNCTURE: CPT | Performed by: EMERGENCY MEDICINE

## 2018-10-22 PROCEDURE — 84484 ASSAY OF TROPONIN QUANT: CPT | Performed by: EMERGENCY MEDICINE

## 2018-10-22 PROCEDURE — 71046 X-RAY EXAM CHEST 2 VIEWS: CPT

## 2018-10-22 PROCEDURE — 85025 COMPLETE CBC W/AUTO DIFF WBC: CPT | Performed by: EMERGENCY MEDICINE

## 2018-10-22 PROCEDURE — 73560 X-RAY EXAM OF KNEE 1 OR 2: CPT

## 2018-10-22 PROCEDURE — 93005 ELECTROCARDIOGRAM TRACING: CPT

## 2018-10-22 PROCEDURE — 99285 EMERGENCY DEPT VISIT HI MDM: CPT

## 2018-10-22 PROCEDURE — 81001 URINALYSIS AUTO W/SCOPE: CPT

## 2018-10-22 PROCEDURE — 80048 BASIC METABOLIC PNL TOTAL CA: CPT | Performed by: EMERGENCY MEDICINE

## 2018-10-22 RX ORDER — PRAVASTATIN SODIUM 40 MG
40 TABLET ORAL
Status: DISCONTINUED | OUTPATIENT
Start: 2018-10-23 | End: 2018-10-24 | Stop reason: HOSPADM

## 2018-10-22 RX ORDER — METOPROLOL SUCCINATE 50 MG/1
50 TABLET, EXTENDED RELEASE ORAL DAILY
Status: DISCONTINUED | OUTPATIENT
Start: 2018-10-23 | End: 2018-10-23

## 2018-10-22 RX ORDER — LISINOPRIL 10 MG/1
10 TABLET ORAL ONCE
Status: COMPLETED | OUTPATIENT
Start: 2018-10-22 | End: 2018-10-22

## 2018-10-22 RX ORDER — CLORAZEPATE DIPOTASSIUM 3.75 MG/1
3.75 TABLET ORAL 2 TIMES DAILY
Status: DISCONTINUED | OUTPATIENT
Start: 2018-10-23 | End: 2018-10-24 | Stop reason: HOSPADM

## 2018-10-22 RX ORDER — LISINOPRIL 10 MG/1
10 TABLET ORAL 2 TIMES DAILY
Status: DISCONTINUED | OUTPATIENT
Start: 2018-10-23 | End: 2018-10-23

## 2018-10-22 RX ORDER — ACETAMINOPHEN 325 MG/1
650 TABLET ORAL ONCE
Status: COMPLETED | OUTPATIENT
Start: 2018-10-22 | End: 2018-10-22

## 2018-10-22 RX ORDER — ASPIRIN 81 MG/1
324 TABLET, CHEWABLE ORAL ONCE
Status: COMPLETED | OUTPATIENT
Start: 2018-10-22 | End: 2018-10-22

## 2018-10-22 RX ADMIN — Medication 324 MG: at 20:34

## 2018-10-22 RX ADMIN — ACETAMINOPHEN 650 MG: 325 TABLET, FILM COATED ORAL at 19:23

## 2018-10-22 RX ADMIN — LISINOPRIL 10 MG: 10 TABLET ORAL at 19:24

## 2018-10-22 NOTE — ED ATTENDING ATTESTATION
I, Leana Sesay DO, saw and evaluated the patient  I have discussed the patient with the resident/non-physician practitioner and agree with the resident's/non-physician practitioner's findings, Plan of Care, and MDM as documented in the resident's/non-physician practitioner's note, except where noted  All available labs and Radiology studies were reviewed  At this point I agree with the current assessment done in the Emergency Department  I have conducted an independent evaluation of this patient a history and physical is as follows:      Critical Care Time  CritCare Time    Procedures     81 yr old fem to the ED after mechanical fall on left knee PTA  Unable to stand on it  Exm: contusion over the left knee wo significant effusion  Can move 30 deg wo significant pain  Pln: xray

## 2018-10-22 NOTE — ED PROVIDER NOTES
History  Chief Complaint   Patient presents with    Knee Pain     pt c/o L knee pain after slipping and falling on wet on patio  (-) LOC  (-) thinners  80 yo F presents to ED for evaluation after ground level mechanical fall that occurred 1 hour PTA  Pt states she was washing leaves off her patio when she slipped on a wet mat and landed on her left knee (abrasion from mat to anterior knee)  Pt states she did feel her heart racing after the fall but denied any prodromal symptoms prior to falling  No reports of head injury or LOC, no neck/back pain, denies any chest pain, dyspnea, abdominal pain, N/V/D, urinary symptoms, rash or focal neuro deficits  Pt states it took her about 30 minutes to get to her cell phone to call her daughter for help, pt was unable to get up without assistance after fall, states she has not ambulated since event  Pt has PMH of anxiety, HTN, HLD, DCIS of breast, depression, PSH of Joint replacement; Cataract extraction; pr reconstr total shoulder implant (Right, 7/18/2017); Breast lumpectomy; Pepin lymph node biopsy; Knee surgery (Bilateral); Tonsillectomy; Tubal ligation; Hemorroidectomy; Carpal tunnel release (Bilateral); Breast biopsy (Right); Endometrial biopsy; Dilation and curettage of uterus; and Ovarian cyst removal (Left)  Pt is a nonsmoker, denies any ETOH or recreational drug use  No A/C or AP use  Pt states she normally takes her nighttime HTN meds at this time  No interventions prior to arrival, no other complaints at this time  Prior to Admission Medications   Prescriptions Last Dose Informant Patient Reported? Taking?    CRANBERRY PO Not Taking Self Yes No   Sig: Take 1,700 mg by mouth daily   Multiple Vitamins-Minerals (PRESERVISION AREDS 2 PO) 10/22/2018 at 0900 Self Yes Yes   Sig: Take 2 tablets by mouth daily   Omega-3 Fatty Acids (FISH OIL PO) 10/22/2018 at 2000 Self Yes Yes   Sig: Take 1 capsule by mouth daily   betamethasone dipropionate (DIPROSONE) 0 05 % cream Unknown at Unknown time Self Yes No   Sig: Apply 1 application topically daily   clorazepate (TRANXENE) 3 75 mg tablet 10/22/2018 at 0900 Self Yes Yes   Sig: Take 3 75 mg by mouth 2 (two) times a day     lisinopril (ZESTRIL) 10 mg tablet 10/22/2018 at 0900 Self No Yes   Sig: Take 1 tablet (10 mg total) by mouth 2 (two) times a day   metoprolol succinate (TOPROL-XL) 50 mg 24 hr tablet 10/23/2018 at 2200  No Yes   Sig: Take 1 tablet (50 mg total) by mouth daily   multivitamin (THERAGRAN) TABS 10/22/2018 at 0900 Self Yes Yes   Sig: Take 1 tablet by mouth daily   prednisoLONE acetate (PRED FORTE) 1 % ophthalmic suspension 10/22/2018 at 0900 Self Yes Yes   ranitidine (ZANTAC) 150 mg tablet 10/22/2018 at 0900 Self No Yes   Sig: Take 1 tablet (150 mg total) by mouth daily   rosuvastatin (CRESTOR) 5 mg tablet 10/22/2018 at 1200 Self Yes Yes   Sig: Take 0 5 tablets by mouth 3 (three) times a week        Facility-Administered Medications: None       Past Medical History:   Diagnosis Date    Anxiety     Arthritis     last assessed 3/24/15     Atherosclerosis     Bell's palsy     [facility]     Broken femur [facility]     Cardiac disorder     DCIS (ductal carcinoma in situ) of breast     last assessed 4/4/17     Depression     Endometrial polyp     Hypercholesterolemia     resolved 10/22/14     Hyperlipidemia     Hypertension     Long term use of drug     last assessed 5/23/14     Malignant neoplasm without specification of site ([facility]     Peripheral neuropathy     PONV (postoperative nausea and vomiting)     Tachycardia     Uterine fibroid        Past Surgical History:   Procedure Laterality Date    BREAST BIOPSY Right     BREAST LUMPECTOMY      CARPAL TUNNEL RELEASE Bilateral     CATARACT EXTRACTION      DILATION AND CURETTAGE OF UTERUS      ENDOMETRIAL BIOPSY      without cervical dilation     HEMORROIDECTOMY      JOINT REPLACEMENT      KNEE SURGERY Bilateral     OVARIAN CYST REMOVAL Left     VT RECONSTR TOTAL SHOULDER IMPLANT Right 7/18/2017    Procedure: TOTAL SHOULDER REVERSE ARTHROPLASTY;  Surgeon: Stiven Catalan MD;  Location: [facility];  Service: Orthopedics    SENTINEL LYMPH NODE BIOPSY      TONSILLECTOMY      TUBAL LIGATION         Family History   Problem Relation Age of Onset    Heart disease Mother         artherosclerosis     Heart disease Father         artherosclerosis     Heart attack Father     Arthritis Family     Heart disease Family         artherosclerosis     Breast cancer Family     Osteoarthritis Family     Supraventricular tachycardia Family     Hypertension Daughter     Anemia Neg Hx     Arrhythmia Neg Hx     Asthma Neg Hx     Clotting disorder Neg Hx     Fainting Neg Hx     Hyperlipidemia Neg Hx     Aneurysm Neg Hx      I have reviewed and agree with the history as documented  Social History   Substance Use Topics    Smoking status: Never Smoker    Smokeless tobacco: Never Used    Alcohol use No      Comment: social drinker per allscript         Review of Systems   Constitutional: Negative for chills, fatigue and fever  HENT: Negative for congestion, rhinorrhea and sore throat  Eyes: Negative for pain, redness and visual disturbance  Respiratory: Negative for cough, chest tightness, shortness of breath, wheezing and stridor  Cardiovascular: Positive for palpitations (heart racing after fall)  Negative for chest pain and leg swelling  Gastrointestinal: Negative for abdominal pain, blood in stool, constipation, diarrhea, nausea and vomiting  Genitourinary: Negative for dysuria, frequency, hematuria and urgency  Musculoskeletal: Negative for back pain and neck pain  L knee pain after fall   Skin: Positive for wound (superficial abrasion to L anterior knee)  Negative for pallor and rash  Neurological: Negative for dizziness, seizures, syncope, weakness, light-headedness and headaches     Psychiatric/Behavioral: Negative for agitation and confusion  Physical Exam  ED Triage Vitals   Temperature Pulse Respirations Blood Pressure SpO2   10/22/18 1704 10/22/18 1704 10/22/18 1704 10/22/18 1704 10/22/18 1704   98 °F (36 7 °C) 97 19 (!) 144/114 96 %      Temp Source Heart Rate Source Patient Position - Orthostatic VS BP Location FiO2 (%)   10/22/18 1704 10/22/18 1704 10/22/18 1919 10/22/18 1919 --   Oral Monitor Lying Right arm       Pain Score       10/22/18 1704       5           Orthostatic Vital Signs  Vitals:    10/23/18 1004 10/23/18 1200 10/23/18 1413 10/23/18 1457   BP: 115/58 135/63 129/58 126/59   Pulse: 75 68 74 69   Patient Position - Orthostatic VS: Sitting Sitting  Lying       Physical Exam   Constitutional: She is oriented to person, place, and time  She appears well-developed and well-nourished  No distress  HENT:   Head: Normocephalic and atraumatic  Right Ear: External ear normal    Left Ear: External ear normal    Nose: Nose normal    Mouth/Throat: Oropharynx is clear and moist  No oropharyngeal exudate  No hemotympanum, no blood in nares/septal hematoma, no oral/dental trauma, no scalp contusions/sign of head injury   Eyes: Pupils are equal, round, and reactive to light  Conjunctivae and EOM are normal  Right eye exhibits no discharge  Left eye exhibits no discharge  No scleral icterus  Neck: Normal range of motion  Neck supple  No JVD present  No tracheal deviation present  No c-spine tenderness   Cardiovascular: Normal rate, regular rhythm, normal heart sounds and intact distal pulses  Exam reveals no gallop and no friction rub  No murmur heard  Pulmonary/Chest: Effort normal and breath sounds normal  No stridor  No respiratory distress  She has no wheezes  She has no rales  Abdominal: Soft  Bowel sounds are normal  She exhibits no distension  There is no tenderness  There is no rebound and no guarding  Musculoskeletal: Normal range of motion  She exhibits tenderness   She exhibits no edema or deformity  L anterior knee tenderness, pain with ROM of knee, mild swelling/surgical scar noted, abrasion to anterior knee, pelvis stable, no t-,l-spine tenderness/step-off, no pain with ROM of bilateral hips, no tenderness to L femur/tib-fib/ankle/foot, neurovascularly intact   Neurological: She is alert and oriented to person, place, and time  No cranial nerve deficit  Answering questions appropriately, no facial droop/dysarthria/aphasia, no focal weakness/numbness   Skin: Skin is warm and dry  Capillary refill takes less than 2 seconds  No rash noted  She is not diaphoretic  Abrasion to L anterior knee   Psychiatric: She has a normal mood and affect  Nursing note and vitals reviewed        ED Medications  Medications   clorazepate (TRANXENE) tablet 3 75 mg (0 mg Oral Hold 10/23/18 1050)   pravastatin (PRAVACHOL) tablet 40 mg (not administered)   aspirin chewable tablet 81 mg (not administered)   enoxaparin (LOVENOX) subcutaneous injection 40 mg (40 mg Subcutaneous Given 10/23/18 0821)   lisinopril (ZESTRIL) tablet 10 mg (10 mg Oral Given 10/23/18 0822)   metoprolol succinate (TOPROL-XL) 24 hr tablet 50 mg (50 mg Oral Given 10/23/18 0821)   acetaminophen (TYLENOL) tablet 650 mg (650 mg Oral Given 10/23/18 1128)   acetaminophen (TYLENOL) tablet 650 mg (650 mg Oral Given 10/22/18 1923)   lisinopril (ZESTRIL) tablet 10 mg (10 mg Oral Given 10/22/18 1924)   aspirin chewable tablet 324 mg (324 mg Oral Given 10/22/18 2034)       Diagnostic Studies  Results Reviewed     Procedure Component Value Units Date/Time    Basic metabolic panel [[id number]] Collected:  10/23/18 0639    Lab Status:  Final result Specimen:  Blood from Arm, Left Updated:  10/23/18 0714     Sodium 140 mmol/L      Potassium 3 5 mmol/L      Chloride 106 mmol/L      CO2 27 mmol/L      ANION GAP 7 mmol/L      BUN 23 mg/dL      Creatinine 0 94 mg/dL      Glucose 87 mg/dL      Glucose, Fasting 87 mg/dL      Calcium 9 2 mg/dL      eGFR 57 ml/min/1 73sq m     Narrative:         National Kidney Disease Education Program recommendations are as follows:  GFR calculation is accurate only with a steady state creatinine  Chronic Kidney disease less than 60 ml/min/1 73 sq  meters  Kidney failure less than 15 ml/min/1 73 sq  meters      Lipid panel [[id number]] Collected:  10/23/18 0639    Lab Status:  Final result Specimen:  Blood from Arm, Left Updated:  10/23/18 0714     Cholesterol 161 mg/dL      Triglycerides 122 mg/dL      HDL, Direct 48 mg/dL      LDL Calculated 89 mg/dL      Non-HDL-Chol (CHOL-HDL) 113 mg/dl     Troponin I [[id number]]  (Abnormal) Collected:  10/23/18 0639    Lab Status:  Final result Specimen:  Blood from Arm, Left Updated:  10/23/18 0714     Troponin I 0 51 (H) ng/mL     CBC and differential [[id number]] Collected:  10/23/18 0639    Lab Status:  Final result Specimen:  Blood from Arm, Left Updated:  10/23/18 0657     WBC 7 15 Thousand/uL      RBC 4 30 Million/uL      Hemoglobin 13 2 g/dL      Hematocrit 40 2 %      MCV 94 fL      MCH 30 7 pg      MCHC 32 8 g/dL      RDW 13 1 %      MPV 9 7 fL      Platelets 296 Thousands/uL      nRBC 0 /100 WBCs      Neutrophils Relative 56 %      Immat GRANS % 0 %      Lymphocytes Relative 28 %      Monocytes Relative 9 %      Eosinophils Relative 6 %      Basophils Relative 1 %      Neutrophils Absolute 4 02 Thousands/µL      Immature Grans Absolute 0 03 Thousand/uL      Lymphocytes Absolute 2 00 Thousands/µL      Monocytes Absolute 0 64 Thousand/µL      Eosinophils Absolute 0 41 Thousand/µL      Basophils Absolute 0 05 Thousands/µL     Troponin I [[id number]]  (Abnormal) Collected:  10/23/18 0134    Lab Status:  Final result Specimen:  Blood from Arm, Left Updated:  10/23/18 0218     Troponin I 0 74 (H) ng/mL     Troponin I [[id number]] Collected:  10/23/18 0135    Lab Status:  No result Specimen:  Blood from Arm, Left     Platelet count [[id number]]     Lab Status:  No result Specimen:  Blood     Urine Microscopic [[id number]]  (Abnormal) Collected:  10/22/18 1930    Lab Status:  Final result Specimen:  Urine from Urine, Clean Catch Updated:  10/22/18 2003     RBC, UA 2-4 (A) /hpf      WBC, UA 4-10 (A) /hpf      Epithelial Cells Moderate (A) /hpf      Bacteria, UA Occasional /hpf      Hyaline Casts, UA 0-3 (A) /lpf     Basic metabolic panel [[id number]] Collected:  10/22/18 1900    Lab Status:  Final result Specimen:  Blood from Arm, Left Updated:  10/1936     Sodium 140 mmol/L      Potassium 3 7 mmol/L      Chloride 106 mmol/L      CO2 28 mmol/L      ANION GAP 6 mmol/L      BUN 22 mg/dL      Creatinine 0 92 mg/dL      Glucose 89 mg/dL      Calcium 9 8 mg/dL      eGFR 58 ml/min/1 73sq m     Narrative:         National Kidney Disease Education Program recommendations are as follows:  GFR calculation is accurate only with a steady state creatinine  Chronic Kidney disease less than 60 ml/min/1 73 sq  meters  Kidney failure less than 15 ml/min/1 73 sq  meters      Troponin I [[id number]]  (Abnormal) Collected:  10/22/18 1900    Lab Status:  Final result Specimen:  Blood from Arm, Left Updated:  10/1936     Troponin I 0 33 (H) ng/mL     POCT urinalysis dipstick [[id number]]  (Normal) Resulted:  10/22/18 1929    Lab Status:  Final result Specimen:  Urine Updated:  10/22/18 1929     Color, UA completed    ED Urine Macroscopic [[id number]]  (Abnormal) Collected:  10/22/18 1930    Lab Status:  Final result Specimen:  Urine Updated:  10/22/18 1928     Color, UA Josee     Clarity, UA Cloudy     pH, UA 6 0     Leukocytes, UA Negative     Nitrite, UA Negative     Protein,  (2+) (A) mg/dl      Glucose, UA Negative mg/dl      Ketones, UA Trace (A) mg/dl      Urobilinogen, UA 0 2 E U /dl      Bilirubin, UA Negative     Blood, UA Negative     Specific Gravity, UA >=1 030    Narrative:       CLINITEK RESULT    CBC and differential [[id number]] Collected:  10/22/18 1900    Lab Status:  Final result Specimen:  Blood from Arm, Left Updated:  10/22/18 1914     WBC 8 53 Thousand/uL      RBC 4 73 Million/uL      Hemoglobin 14 6 g/dL      Hematocrit 43 9 %      MCV 93 fL      MCH 30 9 pg      MCHC 33 3 g/dL      RDW 12 8 %      MPV 10 0 fL      Platelets 056 Thousands/uL      nRBC 0 /100 WBCs      Neutrophils Relative 74 %      Immat GRANS % 0 %      Lymphocytes Relative 15 %      Monocytes Relative 7 %      Eosinophils Relative 3 %      Basophils Relative 1 %      Neutrophils Absolute 6 32 Thousands/µL      Immature Grans Absolute 0 03 Thousand/uL      Lymphocytes Absolute 1 31 Thousands/µL      Monocytes Absolute 0 58 Thousand/µL      Eosinophils Absolute 0 25 Thousand/µL      Basophils Absolute 0 04 Thousands/µL                  XR chest 2 views   Final Result by Anahy Simms MD (10/23 1123)      No acute cardiopulmonary disease  Workstation performed: [id number]         XR knee 1 or 2 views left   ED Interpretation by Jr Reyes DO (10/22 5780)   Hardware noted, no acute osseous abnormality       Final Result by Edmundo Martinez DO (10/23 0299)   Small cortical step off with periosteal elevation in the distal femur, just above the femoral component of the arthroplasty, new from the prior study  Small periprosthetic fracture not excluded  The study was marked in [facility] for immediate notification        Workstation performed: [id number]               Procedures  ECG 12 Lead Documentation  Date/Time: 10/22/2018 5:42 PM  Performed by: Ella Purdy  Authorized by: Brook Tate     ECG reviewed by me, the ED Provider: yes    Patient location:  ED  Previous ECG:     Previous ECG:  Compared to current    Similarity:  Changes noted  Interpretation:     Interpretation: abnormal    Rate:     ECG rate assessment: normal    Rhythm:     Rhythm: sinus rhythm    Ectopy:     Ectopy: none    QRS:     QRS axis:  Normal    QRS intervals:  Normal  ST segments:     ST segments:  Normal  T waves:     T waves: inverted Inverted:  AVR, V1, III and V3    ECG 12 Lead Documentation  Date/Time: 10/22/2018 8:09 PM  Performed by: Kim Taylor by: Brunetta Oyster     ECG reviewed by me, the ED Provider: yes    Patient location:  ED  Previous ECG:     Previous ECG:  Compared to current    Similarity:  No change  Interpretation:     Interpretation: non-specific    Rate:     ECG rate assessment: normal    Rhythm:     Rhythm: sinus rhythm    Ectopy:     Ectopy: none    QRS:     QRS axis:  Normal    QRS intervals:  Normal  ST segments:     ST segments:  Normal  T waves:     T waves: inverted      Inverted:  AVR, V1, III and V3          Phone Consults  ED Phone Contact    ED Course                Patient reevaluated with mild improvement in condition noted  Patient updated on results of tests and plan of care including admission to hospital for further evaluation of presenting knee pain/ambulatory dysfunction and elevated troponin after episode of palpitations/elevated blood pressure with understanding verbalized  Report to Dr Alisha LAMAR for continuation of patient care                     MDM  CritCare Time    Disposition  Final diagnoses:   Elevated troponin   Palpitations   Fall from ground level   Left knee pain   Ambulatory dysfunction     Time reflects when diagnosis was documented in both MDM as applicable and the Disposition within this note     Time User Action Codes Description Comment    10/22/2018  9:25 PM Clarisa Haley Add [R74 8] Elevated troponin     10/22/2018  9:25 PM Clarisa Haley Add [R00 2] Palpitations     10/22/2018  9:25 PM Clarisa Haley Add Benjamen Nenita Fall from ground level     10/22/2018  9:26 PM Clarisa Haley Add [M25 562] Left knee pain     10/22/2018  9:26 PM Clarisa Haley Add [R26 2] Ambulatory dysfunction     10/22/2018 10:43 PM Eduardo Dooley Modify [R26 2] Ambulatory dysfunction     10/22/2018 10:43 PM Gus Velasco Add [I21 4] NSTEMI (non-ST elevated myocardial infarction) ([facility] )     10/22/2018 10:43 PM Osvaldo ESCOBEDO Modify [R26 2] Ambulatory dysfunction     10/22/2018 10:43 PM Osvaldo ESCOBEDO Modify [I21 4] NSTEMI (non-ST elevated myocardial infarction) ([facility] )     10/22/2018 10:43 PM Osvaldo ESCOBEDO Modify [R26 2] Ambulatory dysfunction     10/22/2018 10:43 PM Baltazar Kay Modify [R26 2] Ambulatory dysfunction       ED Disposition     ED Disposition Condition Comment    Admit  Case was discussed with BRIANDA and the patient's admission status was agreed to be Admission Status: observation status to the service of Dr Janene Carmona  Follow-up Information    None         Current Discharge Medication List      CONTINUE these medications which have NOT CHANGED    Details   clorazepate (TRANXENE) 3 75 mg tablet Take 3 75 mg by mouth 2 (two) times a day        lisinopril (ZESTRIL) 10 mg tablet Take 1 tablet (10 mg total) by mouth 2 (two) times a day  Qty: 180 tablet, Refills: 3    Associated Diagnoses: Essential hypertension      metoprolol succinate (TOPROL-XL) 50 mg 24 hr tablet Take 1 tablet (50 mg total) by mouth daily  Qty: 30 tablet, Refills: 3    Associated Diagnoses: Supraventricular tachycardia (HCC)      Multiple Vitamins-Minerals (PRESERVISION AREDS 2 PO) Take 2 tablets by mouth daily      multivitamin (THERAGRAN) TABS Take 1 tablet by mouth daily      Omega-3 Fatty Acids (FISH OIL PO) Take 1 capsule by mouth daily      prednisoLONE acetate (PRED FORTE) 1 % ophthalmic suspension Refills: 0      ranitidine (ZANTAC) 150 mg tablet Take 1 tablet (150 mg total) by mouth daily  Qty: 90 tablet, Refills: 3    Associated Diagnoses: Borborygmi      rosuvastatin (CRESTOR) 5 mg tablet Take 0 5 tablets by mouth 3 (three) times a week    Refills: 0      betamethasone dipropionate (DIPROSONE) 0 05 % cream Apply 1 application topically daily      CRANBERRY PO Take 1,700 mg by mouth daily           No discharge procedures on file      ED Provider  Attending physically available and evaluated Horatio Boast  I managed the patient along with the ED Attending      Electronically Signed by         Amanda Mckeon DO  10/23/18 0879

## 2018-10-23 ENCOUNTER — APPOINTMENT (OUTPATIENT)
Dept: NON INVASIVE DIAGNOSTICS | Facility: HOSPITAL | Age: 83
DRG: 555 | End: 2018-10-23
Payer: MEDICARE

## 2018-10-23 LAB
ANION GAP SERPL CALCULATED.3IONS-SCNC: 7 MMOL/L (ref 4–13)
ATRIAL RATE: 74 BPM
ATRIAL RATE: 83 BPM
BASOPHILS # BLD AUTO: 0.05 THOUSANDS/ΜL (ref 0–0.1)
BASOPHILS NFR BLD AUTO: 1 % (ref 0–1)
BUN SERPL-MCNC: 23 MG/DL (ref 5–25)
CALCIUM SERPL-MCNC: 9.2 MG/DL (ref 8.3–10.1)
CHLORIDE SERPL-SCNC: 106 MMOL/L (ref 100–108)
CHOLEST SERPL-MCNC: 161 MG/DL (ref 50–200)
CO2 SERPL-SCNC: 27 MMOL/L (ref 21–32)
CREAT SERPL-MCNC: 0.94 MG/DL (ref 0.6–1.3)
EOSINOPHIL # BLD AUTO: 0.41 THOUSAND/ΜL (ref 0–0.61)
EOSINOPHIL NFR BLD AUTO: 6 % (ref 0–6)
ERYTHROCYTE [DISTWIDTH] IN BLOOD BY AUTOMATED COUNT: 13.1 % (ref 11.6–15.1)
GFR SERPL CREATININE-BSD FRML MDRD: 57 ML/MIN/1.73SQ M
GLUCOSE P FAST SERPL-MCNC: 87 MG/DL (ref 65–99)
GLUCOSE SERPL-MCNC: 87 MG/DL (ref 65–140)
HCT VFR BLD AUTO: 40.2 % (ref 34.8–46.1)
HDLC SERPL-MCNC: 48 MG/DL (ref 40–60)
HGB BLD-MCNC: 13.2 G/DL (ref 11.5–15.4)
IMM GRANULOCYTES # BLD AUTO: 0.03 THOUSAND/UL (ref 0–0.2)
IMM GRANULOCYTES NFR BLD AUTO: 0 % (ref 0–2)
LDLC SERPL CALC-MCNC: 89 MG/DL (ref 0–100)
LYMPHOCYTES # BLD AUTO: 2 THOUSANDS/ΜL (ref 0.6–4.47)
LYMPHOCYTES NFR BLD AUTO: 28 % (ref 14–44)
MCH RBC QN AUTO: 30.7 PG (ref 26.8–34.3)
MCHC RBC AUTO-ENTMCNC: 32.8 G/DL (ref 31.4–37.4)
MCV RBC AUTO: 94 FL (ref 82–98)
MONOCYTES # BLD AUTO: 0.64 THOUSAND/ΜL (ref 0.17–1.22)
MONOCYTES NFR BLD AUTO: 9 % (ref 4–12)
NEUTROPHILS # BLD AUTO: 4.02 THOUSANDS/ΜL (ref 1.85–7.62)
NEUTS SEG NFR BLD AUTO: 56 % (ref 43–75)
NONHDLC SERPL-MCNC: 113 MG/DL
NRBC BLD AUTO-RTO: 0 /100 WBCS
P AXIS: 32 DEGREES
P AXIS: 47 DEGREES
PLATELET # BLD AUTO: 163 THOUSANDS/UL (ref 149–390)
PMV BLD AUTO: 9.7 FL (ref 8.9–12.7)
POTASSIUM SERPL-SCNC: 3.5 MMOL/L (ref 3.5–5.3)
PR INTERVAL: 196 MS
PR INTERVAL: 198 MS
QRS AXIS: -1 DEGREES
QRS AXIS: 4 DEGREES
QRSD INTERVAL: 76 MS
QRSD INTERVAL: 78 MS
QT INTERVAL: 354 MS
QT INTERVAL: 370 MS
QTC INTERVAL: 410 MS
QTC INTERVAL: 415 MS
RBC # BLD AUTO: 4.3 MILLION/UL (ref 3.81–5.12)
SODIUM SERPL-SCNC: 140 MMOL/L (ref 136–145)
T WAVE AXIS: 20 DEGREES
T WAVE AXIS: 4 DEGREES
TRIGL SERPL-MCNC: 122 MG/DL
TROPONIN I SERPL-MCNC: 0.51 NG/ML
TROPONIN I SERPL-MCNC: 0.74 NG/ML
VENTRICULAR RATE: 74 BPM
VENTRICULAR RATE: 83 BPM
WBC # BLD AUTO: 7.15 THOUSAND/UL (ref 4.31–10.16)

## 2018-10-23 PROCEDURE — 93308 TTE F-UP OR LMTD: CPT

## 2018-10-23 PROCEDURE — 80061 LIPID PANEL: CPT | Performed by: HOSPITALIST

## 2018-10-23 PROCEDURE — 84484 ASSAY OF TROPONIN QUANT: CPT | Performed by: HOSPITALIST

## 2018-10-23 PROCEDURE — 93325 DOPPLER ECHO COLOR FLOW MAPG: CPT | Performed by: INTERNAL MEDICINE

## 2018-10-23 PROCEDURE — 85025 COMPLETE CBC W/AUTO DIFF WBC: CPT | Performed by: HOSPITALIST

## 2018-10-23 PROCEDURE — 99222 1ST HOSP IP/OBS MODERATE 55: CPT | Performed by: HOSPITALIST

## 2018-10-23 PROCEDURE — 99222 1ST HOSP IP/OBS MODERATE 55: CPT | Performed by: INTERNAL MEDICINE

## 2018-10-23 PROCEDURE — 93321 DOPPLER ECHO F-UP/LMTD STD: CPT | Performed by: INTERNAL MEDICINE

## 2018-10-23 PROCEDURE — 80048 BASIC METABOLIC PNL TOTAL CA: CPT | Performed by: HOSPITALIST

## 2018-10-23 PROCEDURE — 93010 ELECTROCARDIOGRAM REPORT: CPT | Performed by: INTERNAL MEDICINE

## 2018-10-23 PROCEDURE — 36415 COLL VENOUS BLD VENIPUNCTURE: CPT | Performed by: HOSPITALIST

## 2018-10-23 PROCEDURE — 93308 TTE F-UP OR LMTD: CPT | Performed by: INTERNAL MEDICINE

## 2018-10-23 RX ORDER — ACETAMINOPHEN 325 MG/1
650 TABLET ORAL EVERY 6 HOURS PRN
Status: DISCONTINUED | OUTPATIENT
Start: 2018-10-23 | End: 2018-10-24 | Stop reason: HOSPADM

## 2018-10-23 RX ORDER — LISINOPRIL 10 MG/1
10 TABLET ORAL 2 TIMES DAILY
Status: DISCONTINUED | OUTPATIENT
Start: 2018-10-23 | End: 2018-10-24 | Stop reason: HOSPADM

## 2018-10-23 RX ORDER — ASPIRIN 81 MG/1
81 TABLET, CHEWABLE ORAL DAILY
Status: DISCONTINUED | OUTPATIENT
Start: 2018-10-24 | End: 2018-10-24 | Stop reason: HOSPADM

## 2018-10-23 RX ORDER — METOPROLOL SUCCINATE 50 MG/1
50 TABLET, EXTENDED RELEASE ORAL DAILY
Status: DISCONTINUED | OUTPATIENT
Start: 2018-10-23 | End: 2018-10-24 | Stop reason: HOSPADM

## 2018-10-23 RX ADMIN — METOPROLOL SUCCINATE 50 MG: 50 TABLET, EXTENDED RELEASE ORAL at 02:11

## 2018-10-23 RX ADMIN — LISINOPRIL 10 MG: 10 TABLET ORAL at 08:22

## 2018-10-23 RX ADMIN — ACETAMINOPHEN 650 MG: 325 TABLET, FILM COATED ORAL at 11:28

## 2018-10-23 RX ADMIN — PRAVASTATIN SODIUM 40 MG: 40 TABLET ORAL at 17:29

## 2018-10-23 RX ADMIN — METOPROLOL SUCCINATE 50 MG: 50 TABLET, EXTENDED RELEASE ORAL at 08:21

## 2018-10-23 RX ADMIN — ENOXAPARIN SODIUM 40 MG: 40 INJECTION SUBCUTANEOUS at 08:21

## 2018-10-23 RX ADMIN — CLORAZEPATE DIPOTASSIUM 3.75 MG: 3.75 TABLET ORAL at 19:00

## 2018-10-23 RX ADMIN — ACETAMINOPHEN 650 MG: 325 TABLET, FILM COATED ORAL at 19:05

## 2018-10-23 RX ADMIN — LISINOPRIL 10 MG: 10 TABLET ORAL at 21:16

## 2018-10-23 RX ADMIN — LISINOPRIL 10 MG: 10 TABLET ORAL at 02:11

## 2018-10-23 NOTE — SOCIAL WORK
CM met with pt and pt aware cm role at discharge   Pt lives in a house alone , there is one step to get in and pt has a stair glide to get to second floor  Prior to admission pt was independent all ADL"s   Pt drives self and denies using any DME's  Pt has multiple DME"s at home , walker , cane and shower chair  Pt denies any history of vna or snf   Pt denies seeing psychiatrist for mental health issues and pt denies any [facility] rehab  Pt gets medication from "[facility]   Pt has 5 children 4 boys one daughter and has a lot of family support   When medically clear family will transport home   Cm awaiting PT and OT recommendation   CM reviewed d/c planning process including the following: identifying help at home, patient preference for d/c planning needs, Discharge Lounge, [facility] Meds to Bed program, availability of treatment team to discuss questions or concerns patient and/or family may have regarding understanding medications and recognizing signs and symptoms once discharged  CM also encouraged patient to follow up with all recommended appointments after discharge  Patient advised of importance for patient and family to participate in managing patients medical well being  Discharge checklist discussed with patient and family

## 2018-10-23 NOTE — ASSESSMENT & PLAN NOTE
· Appreciate Cardiology input    Continue metoprolol succinate 50 mg daily  · No complaints of palpitations  · On telemetry

## 2018-10-23 NOTE — ASSESSMENT & PLAN NOTE
Most likely secondary to pain and discomfort, will resume home antihypertensive medication with lisinopril and metoprolol and follow up as necessary

## 2018-10-23 NOTE — ASSESSMENT & PLAN NOTE
Secondary to mechanical fall while cleaning the patio, continue with pain management, consult PT/OT and follow up as necessary

## 2018-10-23 NOTE — ASSESSMENT & PLAN NOTE
Due to traumatic knee injury from fall, consult PT/OT for evaluation    Continue with pain management

## 2018-10-23 NOTE — ASSESSMENT & PLAN NOTE
· Appreciate Cardiology input, troponin elevation of unknown significance  · Peak at 0 74 now trending down  · EKG normal sinus rhythm with nonspecific T-wave abnormality, no cardiac symptoms  · Follow-up limited echocardiogram, last echo showed normal left ventricular function with grade 1 diastolic dysfunction without valvular disease  · Outpatient follow-up with cardiologist Dr Ramses Alexandraoter

## 2018-10-23 NOTE — UTILIZATION REVIEW
Initial Clinical Review    Admission: Date/Time/Statement: OBSERVATION 10/22/18 @ 2127 CHANGED TO INPATIENT ON 10/23/18 @ 4297 R/T NSTEMI     Orders Placed This Encounter   Procedures     10/23/18 1118  Inpatient Admission Once     Transfer Service: General Medicine       Question Answer Comment   Admitting Physician Garrison Harp    Level of Care Med Surg    Estimated length of stay More than 2 Midnights    Certification I certify that inpatient services are medically necessary for this patient for a duration of greater than two midnights  See H&P and MD Progress Notes for additional information about the patient's course of treatment  10/23/18 1118     ED: Date/Time/Mode of Arrival:   ED Arrival Information     Expected Arrival Acuity Means of Arrival Escorted By Service Admission Type    - 10/22/2018 16:52 [facility]    Arrival Complaint    -        Chief Complaint:   Chief Complaint   Patient presents with    Knee Pain     pt c/o L knee pain after slipping and falling on wet on patio  (-) LOC  (-) thinners  History of Illness: Neela Hooker is a 81 y o  female with past medical history of paroxysmal atrial fibrillation and presently in normal sinus rhythm, hypertension, dyslipidemia, and history of breast cancer  years ago  She presented emergency for evaluation of mechanical fall at home with a severe pain and discomfort to the left knee, she denies any head trauma, dizziness or loss of consciousness  However while she was lying on the floor on able to get up and trying to crawl her way to the telephone, she developed sudden onset of palpitation without chest pain, she also denies any dizziness or diaphoresis, denies nausea or vomiting, denies fever chills or cough  Evaluation in the emergency with imaging of the left knee did not show any acute fracture  However by EKG shows sinus rhythm with flipped T-waves and elevated troponin  She has been admitted for further evaluation and management  ED Vital Signs:   ED Triage Vitals   Temperature Pulse Respirations Blood Pressure SpO2   10/22/18 1704 10/22/18 1704 10/22/18 1704 10/22/18 1704 10/22/18 1704   98 °F (36 7 °C) 97 19 (!) 144/114 96 %      Temp Source Heart Rate Source Patient Position - Orthostatic VS BP Location FiO2 (%)   10/22/18 1704 10/22/18 1704 10/22/18 1919 10/22/18 1919 --   Oral Monitor Lying Right arm       Pain Score       10/22/18 1704       5        Wt Readings from Last 1 Encounters:   09/10/18 78 5 kg (173 lb)     Vital Signs (abnormal):     10/22/18 1704  98 °F (36 7 °C)  97  19   144/114  96 %  None (Room air)  --     Abnormal Labs:    Trop 0 33, 0 74, 0 51  Clarity, UA Cloudy   SL AMB SPECIFIC GRAVITY_URINE >=1 030   Glucose, UA Negat    Ketones, UA Trace   Blood, UA Negat    Nitrite, UA Negat    Leukocytes, UA Negat      pH, UA 6 0   Protein,  (  Bilirubin, UA Negat    Urobilinogen, UA 0 2   RBC, UA 2-4   WBC, UA 4-10   Bacteria, UA Occas    Hyaline Casts, UA 0-     Diagnostic Test Results:     10/22 Xray L Knee - Small cortical step off with periosteal elevation in the distal femur, just above the femoral component of the arthroplasty, new from the prior study  Small periprosthetic fracture not excluded      10/22 ECG - SR w/ inverted T waves in AVR, V1, III, V3    ED Treatment:   Medication Administration from 10/22/2018 1652 to 10/23/2018 0843    Date/Time Order Dose Route Action   10/22/2018 1923 acetaminophen (TYLENOL) tablet 650 mg 650 mg Oral Given   10/22/2018 1924 lisinopril (ZESTRIL) tablet 10 mg 10 mg Oral Given   10/22/2018 2034 aspirin chewable tablet 324 mg 324 mg Oral Given   10/23/2018 0821 enoxaparin (LOVENOX) subcutaneous injection 40 mg 40 mg Subcutaneous Given   10/23/2018 0822 lisinopril (ZESTRIL) tablet 10 mg 10 mg Oral Given   10/23/2018 0211 lisinopril (ZESTRIL) tablet 10 mg 10 mg Oral Given   10/23/2018 0816 metoprolol succinate (TOPROL-XL) 24 hr tablet 50 mg 50 mg Oral Given   10/23/2018 0211 metoprolol succinate (TOPROL-XL) 24 hr tablet 50 mg 50 mg Oral Given        Past Medical/Surgical History:    Active Ambulatory Problems     Diagnosis Date Noted    Anxiety 07/18/2017    Peripheral neuropathy 07/18/2017    Hyperlipidemia 07/18/2017    Essential hypertension 07/18/2017    Arthritis 07/24/2013    Atrial fibrillation ([facility] [facility] ) 06/04/2014    Difficulty breathing 07/24/2013    Diverticulosis 12/20/2013    Dizziness 09/10/2012    Fatigue 05/23/2014    Hyperglycemia 12/30/2015    Pain in extremity 08/07/2013    Osteopenia 05/26/2015    Shortness of breath 10/18/2012    Supraventricular tachycardia ([facility] ) 10/18/2012    Vitamin D deficiency 05/23/2014    Pain of left hand 02/06/2018    Left leg pain 03/31/2018    Malignant neoplasm of upper-outer quadrant of right breast in female, estrogen receptor positive ([facility] [address] [facility] ) 04/02/2018    Impingement syndrome of left shoulder 09/10/2018    Elevated troponin     Palpitations     Fall from ground level      Resolved Ambulatory Problems     Diagnosis Date Noted    Rotator cuff tear arthropathy of right shoulder 07/18/2017    Pain of right hand 04/03/2017    Right shoulder pain 04/03/2017    Shoulder pain, right 04/20/2017    Subacromial impingement of right shoulder 04/20/2017    Upper extremity pain 06/01/2016    Closed nondisplaced fracture of right acromial process 03/26/2018     Past Medical History:   Diagnosis Date    Anxiety     Arthritis     Atherosclerosis     Bell's palsy     Breast carcinoma (HCC)     Broken femur (HCC)     Cardiac disorder     DCIS (ductal carcinoma in situ) of breast     Depression     Endometrial polyp     Hypercholesterolemia     Hyperlipidemia     Hypertension     Long term use of drug     Malignant neoplasm without specification of site ([facility] [address] )     Peripheral neuropathy     PONV (postoperative nausea and vomiting)     Tachycardia     Uterine fibroid      Admitting Diagnosis: Multiple injuries [T07  XXXA]    Age/Sex: 81 y o  female    Assessment/Plan:        * NSTEMI (non-ST elevated myocardial infarction) ([address] )   Assessment & Plan     Without chest pain however with flipped T-waves on EKG, follow up on serial troponin, given aspirin 324 mg the emergency will continue with 81 mg daily, heparin 5000 subcutaneous q 8 hours, nitroglycerin sublingual p r n  Will follow up with Cardiology due to elevated troponin       Contusion of left knee   Assessment & Plan     Secondary to mechanical fall while cleaning the patio, continue with pain management, consult PT/OT and follow up as necessary         Hypertensive urgency   Assessment & Plan     Most likely secondary to pain and discomfort, will resume home antihypertensive medication with lisinopril and metoprolol and follow up as necessary       Ambulatory dysfunction   Assessment & Plan     Due to traumatic knee injury from fall, consult PT/OT for evaluation    Continue with pain management     Admission Orders:  Scheduled Meds:   Current Facility-Administered Medications:  [START ON 10/24/2018] aspirin 81 mg Oral Daily   clorazepate 3 75 mg Oral BID   enoxaparin 40 mg Subcutaneous Daily   lisinopril 10 mg Oral BID   metoprolol succinate 50 mg Oral Daily   pravastatin 40 mg Oral Daily With Dinner     Tele   SCDs  Vitals q 4 hr   Ice to affected area   Up w/ assistance   Diet cardiac   Cons Cardiology   PT eval/tx

## 2018-10-23 NOTE — PROGRESS NOTES
POST ADMISSION FOLLOW UP    Progress Note - Marguarite Rinne 1935, 81 y o  female MRN: [id number]  Unit/Bed#: ED 19 Encounter: [id number] DOS: 10/23/18  Primary Care Provider: Jeri Live MD   Date and time admitted to hospital: 10/22/2018  4:52 PM    * Pain in extremity   Assessment & Plan    · Patient reports mechanical fall prior to admission, now with acute left knee pain  Has history of multiple surgeries and hardware in left knee  Swelling appreciated on exam   Decreased range of motion  · X-ray left knee shows small cortical step-off w periosteal elevation in the distal femur above the femoral component of the arthroplasty, small periprosthetic fracture not excluded  · Ortho consulted   · PT/OT consulted  · Pain control      Elevated troponin   Assessment & Plan    · Appreciate Cardiology input, troponin elevation of unknown significance  · Peak at 0 74 now trending down  · EKG normal sinus rhythm with nonspecific T-wave abnormality, no cardiac symptoms  · Follow-up limited echocardiogram, last echo showed normal left ventricular function with grade 1 diastolic dysfunction without valvular disease  · Outpatient follow-up with cardiologist Dr Ranjit Jones     Ambulatory dysfunction   Assessment & Plan    · Patient with mechanical fall prior to admission  · PT OT consulted, patient agreeable to rehab     Supraventricular tachycardia [facility])   Assessment & Plan    · [address] Cardiology input  Continue metoprolol succinate 50 mg daily  · No complaints of palpitations  · On telemetry     Essential hypertension   Assessment & Plan    · Slightly elevated on admission now improved  · Continue beta-blocker and ACE-inhibitor       VTE Pharmacologic Prophylaxis:   Pharmacologic: Enoxaparin (Lovenox)  Mechanical VTE Prophylaxis in Place: Yes    Patient Centered Rounds: I have performed bedside rounds with nursing staff today      Discussions with Specialists or Other Care Team Provider: nursing     Education and Discussions with Family / Patient: patient, declined call to family stats "my son is here working"    Time Spent for Care: 30 minutes  More than 50% of total time spent on counseling and coordination of care as described above  Current Length of Stay: 0 day(s)    Current Patient Status: Inpatient   Certification Statement: The patient will continue to require additional inpatient hospital stay due to ambulatory dysfunction, inability to bear weight, possible new  femur fracture requirirng ortho input    Discharge Plan / Estimated Discharge Date: pending, not medically stable  Likely needs rehab as she is unable to bear weight and has possible fracture     Code Status: Level 1 - Full Code    Subjective:   Pt seen and examined at bedside  notes left knee is swollen more than normal and painful  States she had mechanical fall and slipped on a wet rubber mat yesterday after raking the leaves  Objective:     Vitals:   Temp (24hrs), Av °F (36 7 °C), Min:97 9 °F (36 6 °C), Max:98 °F (36 7 °C)    Temp:  [97 9 °F (36 6 °C)-98 °F (36 7 °C)] 97 9 °F (36 6 °C)  HR:  [60-97] 75  Resp:  [14-19] 16  BP: (115-152)/() 115/58  SpO2:  [94 %-97 %] 95 %  There is no height or weight on file to calculate BMI  Input and Output Summary (last 24 hours):     No intake or output data in the 24 hours ending 10/23/18 1124    Physical Exam:     Physical Exam   Constitutional: She is oriented to person, place, and time  She appears well-developed and well-nourished  HENT:   Head: Normocephalic and atraumatic  Mouth/Throat: Oropharynx is clear and moist    Eyes:   Right upper eyelid droop    Neck: Normal range of motion  Neck supple  Cardiovascular: Normal rate, regular rhythm and normal heart sounds  No murmur heard  Pulmonary/Chest: Effort normal and breath sounds normal    Abdominal: Soft  Bowel sounds are normal    Musculoskeletal: She exhibits tenderness and deformity     Neurological: She is alert and oriented to person, place, and time  Skin: There is erythema (left knee )  Psychiatric: She has a normal mood and affect  Nursing note and vitals reviewed  Additional Data:     Labs:      Results from last 7 days  Lab Units 10/23/18  0639   WBC Thousand/uL 7 15   HEMOGLOBIN g/dL 13 2   HEMATOCRIT % 40 2   PLATELETS Thousands/uL 163   NEUTROS PCT % 56   LYMPHS PCT % 28   MONOS PCT % 9   EOS PCT % 6       Results from last 7 days  Lab Units 10/23/18  0639   SODIUM mmol/L 140   POTASSIUM mmol/L 3 5   CHLORIDE mmol/L 106   CO2 mmol/L 27   BUN mg/dL 23   CREATININE mg/dL 0 94   CALCIUM mg/dL 9 2           * I Have Reviewed All Lab Data Listed Above  * Additional Pertinent Lab Tests Reviewed: [address] Admission Reviewed    Imaging:    Imaging Reports Reviewed Today Include: all  Imaging Personally Reviewed by Myself Includes:  none    Recent Cultures (last 7 days):           Last 24 Hours Medication List:     Current Facility-Administered Medications:  acetaminophen 650 mg Oral Q6H PRN Lauro Messer PA-C   [START ON 10/24/2018] aspirin 81 mg Oral Daily Rosalinda Balloon, MD   clorazepate 3 75 mg Oral BID Rosalinda Balloon, MD   enoxaparin 40 mg Subcutaneous Daily Rosalinda Balloon, MD   lisinopril 10 mg Oral BID Rosalinda Balloon, MD   metoprolol succinate 50 mg Oral Daily Rosalinda Balloon, MD   pravastatin 40 mg Oral Daily With Tammi Crews MD        Today, Patient Was Seen By: Cecilia Paulino PA-C    ** Please Note: This note has been constructed using a voice recognition system   **

## 2018-10-23 NOTE — CONSULTS
Consultation - Cardiology   Ed Johnson 81 y o  female MRN: [id number]  Unit/Bed#: ED 19 Encounter: [id number]    Assessment/Plan     Principal Problem:    NSTEMI (non-ST elevated myocardial infarction) [facility])  Active Problems:    Ambulatory dysfunction    Hypertensive urgency    Contusion of left knee      Assessment/Plan    1  Mechanical fall  This was clearly a mechanical fall  There was no loss of consciousness  There was no orthopedic injuries that we are aware of at this point  2  Elevated troponin  Troponin elevation of unclear at this time  Peak of 0 74 and trending down  EKG normal sinus rhythm with nonspecific T-wave abnormality  I do not believe this is a non STEMI  She had no cardiac symptoms  We can check a limited echocardiogram for LV function and wall motion abnormality as she is being admitted  She did have recent echocardiogram 2 months ago which showed normal LV function with grade 1 diastolic dysfunction and no significant valvular heart disease  Will review with attending which is her outpatient cardiologist Dr Deann Arevalo  We can determine at that time if there is any need for any further ischemic evaluation  3  HTN- slightly elevated on presentation, otherwise controlled  BB  Ace inhibitor  Continue home medications  4  H O SVT/frequent PAC's  On toprol 50  She was not particularly complain of any palpitations that would lead me to believe she was having any recent SVT  Can monitor on telemetry while in the hospital   Currently she sinus rhythm  History of Present Illness   Physician Requesting Consult: Elvira Natarajan DO  Reason for Consult / Principal Problem: elevated troponin / "NSTEMI"    HPI: Ed Johnson is a 81y o  year old female with HTN, SVT, PAC's who presents with a mechanical fall  A hard time getting herself up  She had been out working in the [place] and slipped    She did feel short of breath after the fall when she was trying to get herself up and into the house but she did not have shortness of breath prior to the fall  She was not having any particular exertional symptoms of shortness of breath chest pain or palpitations  She has otherwise been in her usual state of health  She has some complaints of left knee pain at this time  Troponin 0 33, 0 74, 0 51  Cardiology was consulted for elevated troponin and abnormal EK G  Her EKG shows sinus rhythm with nonspecific T-wave abnormality  She is followed by Dr Rik Connelly of [facility]  Most recent echocardiogram 8/2018- normal LVEF , grade 1 DD, mild TR    48 hour holter 7/2018- 300 short runs of SVT longest 7 beats  No symptoms recorded  18,127 supraventricular ectopic beats , majority of PAC's  Inpatient consult to Cardiology  Consult performed by: Shelly Mixon ordered by: Rosey Sharp          Review of Systems   Constitutional: Negative  HENT: Negative  Eyes: Negative  Respiratory: Positive for shortness of breath  Cardiovascular: Negative for chest pain, palpitations and leg swelling  Gastrointestinal: Negative  Genitourinary: Negative  Musculoskeletal: Positive for gait problem  Neurological: Negative for syncope  Hematological: Bruises/bleeds easily  Psychiatric/Behavioral: Negative          Historical Information   Past Medical History:   Diagnosis Date    Anxiety     Arthritis     last assessed 3/24/15     Atherosclerosis     Bell's palsy     Breast carcinoma (HCC)     Broken femur (HCC)     Cardiac disorder     DCIS (ductal carcinoma in situ) of breast     last assessed 4/4/17     Depression     Endometrial polyp     Hypercholesterolemia     resolved 10/22/14     Hyperlipidemia     Hypertension     Long term use of drug     last assessed 5/23/14     Malignant neoplasm without specification of site [facility])     Peripheral neuropathy     PONV (postoperative nausea and vomiting)     Tachycardia     Uterine fibroid      Past Surgical History:   Procedure Laterality Date    BREAST BIOPSY Right     BREAST LUMPECTOMY      CARPAL TUNNEL RELEASE Bilateral     CATARACT EXTRACTION      DILATION AND CURETTAGE OF UTERUS      ENDOMETRIAL BIOPSY      without cervical dilation     HEMORROIDECTOMY      JOINT REPLACEMENT      KNEE SURGERY Bilateral     OVARIAN CYST REMOVAL Left     AK RECONSTR TOTAL SHOULDER IMPLANT Right 7/18/2017    Procedure: TOTAL SHOULDER REVERSE ARTHROPLASTY;  Surgeon: Ronit Long MD;  [facility]: [facility];  Service: Orthopedics    SENTINEL LYMPH NODE BIOPSY      TONSILLECTOMY      TUBAL LIGATION       History   Alcohol Use No     Comment: social drinker per allscript      History   Drug Use No     History   Smoking Status    Never Smoker   Smokeless Tobacco    Never Used     Family History:   Family History   Problem Relation Age of Onset    Heart disease Mother         artherosclerosis     Heart disease Father         artherosclerosis     Heart attack Father     Arthritis Family     Heart disease Family         artherosclerosis     Breast cancer Family     Osteoarthritis Family     Supraventricular tachycardia Family     Hypertension Daughter     Anemia Neg Hx     Arrhythmia Neg Hx     Asthma Neg Hx     Clotting disorder Neg Hx     Fainting Neg Hx     Hyperlipidemia Neg Hx     Aneurysm Neg Hx        Meds/Allergies   current meds:   Current Facility-Administered Medications   Medication Dose Route Frequency    [START ON 10/24/2018] aspirin chewable tablet 81 mg  81 mg Oral Daily    clorazepate (TRANXENE) tablet 3 75 mg  3 75 mg Oral BID    enoxaparin (LOVENOX) subcutaneous injection 40 mg  40 mg Subcutaneous Daily    lisinopril (ZESTRIL) tablet 10 mg  10 mg Oral BID    metoprolol succinate (TOPROL-XL) 24 hr tablet 50 mg  50 mg Oral Daily    pravastatin (PRAVACHOL) tablet 40 mg  40 mg Oral Daily With Dinner    and PTA meds:    (Not in a hospital admission)  Allergies   Allergen Reactions    Amoxicillin-Pot Clavulanate GI Intolerance    Azithromycin GI Intolerance and Rash    Cephalexin GI Intolerance     Reaction Date: 28Dec2011;     Cortisone GI Intolerance     Reaction Date: [id number];     Doxycycline GI Intolerance    Penicillins GI Intolerance       Objective   Vitals: Blood pressure 138/68, pulse 71, temperature 97 9 °F (36 6 °C), resp  rate 16, SpO2 95 %  Orthostatic Blood Pressures      Most Recent Value   Blood Pressure  138/68 filed at 10/23/2018 0831   Patient Position - Orthostatic VS  Sitting filed at 10/23/2018 0831          No intake or output data in the 24 hours ending 10/23/18 0908    Invasive Devices     Peripheral Intravenous Line            Peripheral IV 10/22/18 Left Antecubital less than 1 day          Epidural Line            Nerve Block Catheter 07/18/17 462 days                Physical Exam: /68 (BP Location: Right arm)   Pulse 71   Temp 97 9 °F (36 6 °C)   Resp 16   SpO2 95%   General Appearance:    Alert, cooperative, no distress, appears stated age   Head:    Normocephalic, no scleral icterus   Eyes:    PERRL   Nose:   Nares normal, septum midline, mucosa normal, no drainage    Throat:   Lips, mucosa, and tongue normal   Neck:   Supple, symmetrical, trachea midline     no JVD       Lungs:     Clear to auscultation bilaterally, respirations unlabored   Chest Wall:    No tenderness or deformity    Heart:    Regular rate and rhythm, S1 and S2 normal, no murmur, rub   or gallop   Abdomen:     Soft, non-tender, bowel sounds active all four quadrants        Extremities:   Extremities normal, atraumatic, no cyanosis, trace edema   Pulses:   2+ and symmetric all extremities   Skin:   Skin warm   Neurologic:   Alert and oriented to person place and time   No focal deficits       Lab Results:   Recent Results (from the past 72 hour(s))   CBC and differential    Collection Time: 10/22/18  7:00 PM   Result Value Ref Range    WBC 8 53 4 31 - 10 16 Thousand/uL RBC 4 73 3 81 - 5 12 Million/uL    Hemoglobin 14 6 11 5 - 15 4 g/dL    Hematocrit 43 9 34 8 - 46 1 %    MCV 93 82 - 98 fL    MCH 30 9 26 8 - 34 3 pg    MCHC 33 3 31 4 - 37 4 g/dL    RDW 12 8 11 6 - 15 1 %    MPV 10 0 8 9 - 12 7 fL    Platelets 686 781 - 794 Thousands/uL    nRBC 0 /100 WBCs    Neutrophils Relative 74 43 - 75 %    Immat GRANS % 0 0 - 2 %    Lymphocytes Relative 15 14 - 44 %    Monocytes Relative 7 4 - 12 %    Eosinophils Relative 3 0 - 6 %    Basophils Relative 1 0 - 1 %    Neutrophils Absolute 6 32 1 85 - 7 62 Thousands/µL    Immature Grans Absolute 0 03 0 00 - 0 20 Thousand/uL    Lymphocytes Absolute 1 31 0 60 - 4 47 Thousands/µL    Monocytes Absolute 0 58 0 17 - 1 22 Thousand/µL    Eosinophils Absolute 0 25 0 00 - 0 61 Thousand/µL    Basophils Absolute 0 04 0 00 - 0 10 Thousands/µL   Basic metabolic panel    Collection Time: 10/22/18  7:00 PM   Result Value Ref Range    Sodium 140 136 - 145 mmol/L    Potassium 3 7 3 5 - 5 3 mmol/L    Chloride 106 100 - 108 mmol/L    CO2 28 21 - 32 mmol/L    ANION GAP 6 4 - 13 mmol/L    BUN 22 5 - 25 mg/dL    Creatinine 0 92 0 60 - 1 30 mg/dL    Glucose 89 65 - 140 mg/dL    Calcium 9 8 8 3 - 10 1 mg/dL    eGFR 58 ml/min/1 73sq m   Troponin I    Collection Time: 10/22/18  7:00 PM   Result Value Ref Range    Troponin I 0 33 (H) <=0 04 ng/mL   POCT urinalysis dipstick    Collection Time: 10/22/18  7:29 PM   Result Value Ref Range    Color, UA completed    ED Urine Macroscopic    Collection Time: 10/22/18  7:30 PM   Result Value Ref Range    Color, UA Josee     Clarity, UA Cloudy     pH, UA 6 0 4 5 - 8 0    Leukocytes, UA Negative Negative    Nitrite, UA Negative Negative    Protein,  (2+) (A) Negative mg/dl    Glucose, UA Negative Negative mg/dl    Ketones, UA Trace (A) Negative mg/dl    Urobilinogen, UA 0 2 0 2, 1 0 E U /dl E U /dl    Bilirubin, UA Negative Negative    Blood, UA Negative Negative    Specific Gravity, UA >=1 030 1 003 - 1 030   Urine Microscopic    Collection Time: 10/22/18  7:30 PM   Result Value Ref Range    RBC, UA 2-4 (A) None Seen, 0-5 /hpf    WBC, UA 4-10 (A) None Seen, 0-5, 5-55, 5-65 /hpf    Epithelial Cells Moderate (A) None Seen, Occasional /hpf    Bacteria, UA Occasional None Seen, Occasional /hpf    Hyaline Casts, UA 0-3 (A) None Seen /lpf   Troponin I    Collection Time: 10/23/18  1:34 AM   Result Value Ref Range    Troponin I 0 74 (H) <=0 04 ng/mL   Basic metabolic panel    Collection Time: 10/23/18  6:39 AM   Result Value Ref Range    Sodium 140 136 - 145 mmol/L    Potassium 3 5 3 5 - 5 3 mmol/L    Chloride 106 100 - 108 mmol/L    CO2 27 21 - 32 mmol/L    ANION GAP 7 4 - 13 mmol/L    BUN 23 5 - 25 mg/dL    Creatinine 0 94 0 60 - 1 30 mg/dL    Glucose 87 65 - 140 mg/dL    Glucose, Fasting 87 65 - 99 mg/dL    Calcium 9 2 8 3 - 10 1 mg/dL    eGFR 57 ml/min/1 73sq m   CBC and differential    Collection Time: 10/23/18  6:39 AM   Result Value Ref Range    WBC 7 15 4 31 - 10 16 Thousand/uL    RBC 4 30 3 81 - 5 12 Million/uL    Hemoglobin 13 2 11 5 - 15 4 g/dL    Hematocrit 40 2 34 8 - 46 1 %    MCV 94 82 - 98 fL    MCH 30 7 26 8 - 34 3 pg    MCHC 32 8 31 4 - 37 4 g/dL    RDW 13 1 11 6 - 15 1 %    MPV 9 7 8 9 - 12 7 fL    Platelets 743 662 - 363 Thousands/uL    nRBC 0 /100 WBCs    Neutrophils Relative 56 43 - 75 %    Immat GRANS % 0 0 - 2 %    Lymphocytes Relative 28 14 - 44 %    Monocytes Relative 9 4 - 12 %    Eosinophils Relative 6 0 - 6 %    Basophils Relative 1 0 - 1 %    Neutrophils Absolute 4 02 1 85 - 7 62 Thousands/µL    Immature Grans Absolute 0 03 0 00 - 0 20 Thousand/uL    Lymphocytes Absolute 2 00 0 60 - 4 47 Thousands/µL    Monocytes Absolute 0 64 0 17 - 1 22 Thousand/µL    Eosinophils Absolute 0 41 0 00 - 0 61 Thousand/µL    Basophils Absolute 0 05 0 00 - 0 10 Thousands/µL   Lipid panel    Collection Time: 10/23/18  6:39 AM   Result Value Ref Range    Cholesterol 161 50 - 200 mg/dL    Triglycerides 122 <=150 mg/dL    HDL, Direct 48 40 - 60 mg/dL    LDL Calculated 89 0 - 100 mg/dL    Non-HDL-Chol (CHOL-HDL) 113 mg/dl   Troponin I    Collection Time: 10/23/18  6:39 AM   Result Value Ref Range    Troponin I 0 51 (H) <=0 04 ng/mL     St  [address]  [address], [address]  [phone]     Transthoracic Echocardiogram  2D, M-mode, Doppler, and Color Doppler     Study date:  10-Aug-2018     Patient: Deja Lira  MR number: [id number]  Account number: [de-identified]  : [id number]  Age: 81 years  Gender: Female  Status: Outpatient  [facility]: [address] [place] [address] [facility]  Height: 55 in  Weight: 176 lb  BP: 118/ 70 mmHg     Indications: Supraventricular tachycardia     Diagnoses: I47 1 - Supraventricular tachycardia     Sonographer:  AMEENA Nick  Primary Physician:  Alison Felix MD  Referring Physician:  Michael Maravilla MD  [facility]  [facility]  Interpreting Physician:  Lorine Osler, MD     SUMMARY     COMPARISONS:  There has been no significant interval change      LEFT VENTRICLE:  The cavity was small  Systolic function was normal  Ejection fraction was estimated to be 60 %  There were no regional wall motion abnormalities  Wall thickness was at the upper limits of normal   Doppler parameters were consistent with abnormal left ventricular relaxation (grade 1 diastolic dysfunction)      LEFT ATRIUM:  The atrium was mildly to moderately dilated      MITRAL VALVE:  There was mild calcification of the anterior leaflet, limited to the leaflet margin, without chordal involvement  There was trace regurgitation      TRICUSPID VALVE:  There was mild regurgitation    Pulmonary artery systolic pressure was within the normal range      COMPARISONS:  There has been no significant interval change      HISTORY: PRIOR HISTORY: Hypertension, hyperlipidemia, supraventricular tachycardia, atrial fibrillation     PROCEDURE: The study was performed in the [address] [place] [address] [facility]  This was a routine study  The transthoracic approach was used  The study included complete 2D imaging, M-mode, complete spectral Doppler, and color Doppler  Image  quality was adequate      LEFT VENTRICLE: The cavity was small  Systolic function was normal  Ejection fraction was estimated to be 60 %  There were no regional wall motion abnormalities  Wall thickness was at the upper limits of normal  DOPPLER: Doppler parameters  were consistent with abnormal left ventricular relaxation (grade 1 diastolic dysfunction)      RIGHT VENTRICLE: The size was normal  Systolic function was normal  Wall thickness was normal      LEFT ATRIUM: The atrium was mildly to moderately dilated      RIGHT ATRIUM: Size was normal      MITRAL VALVE: Valve structure was normal  There was mild calcification of the anterior leaflet, limited to the leaflet margin, without chordal involvement  There was normal leaflet separation  DOPPLER: The transmitral velocity was within  the normal range  There was no evidence for stenosis  There was trace regurgitation      AORTIC VALVE: The valve was trileaflet  Leaflets exhibited normal thickness and normal cuspal separation  DOPPLER: Transaortic velocity was within the normal range  There was no evidence for stenosis  There was no significant  regurgitation      TRICUSPID VALVE: The valve structure was normal  There was normal leaflet separation  DOPPLER: The transtricuspid velocity was within the normal range  There was no evidence for stenosis  There was mild regurgitation  Pulmonary artery  systolic pressure was within the normal range      PULMONIC VALVE: Leaflets exhibited normal thickness, no calcification, and normal cuspal separation  DOPPLER: The transpulmonic velocity was within the normal range  There was no significant regurgitation      PERICARDIUM: There was no pericardial effusion   The pericardium was normal in appearance      AORTA: The root exhibited normal size      SYSTEMIC VEINS: IVC: The inferior vena cava was normal in size      FINDINGS:  1  A 48 hour holter monitor demonstrated normal sinus rhythm with an average rate of 74 BPM; a minimum rate of 56 BPM; and a maximum rate of 108 BPM   2  There were no ventricular ectopic beats  3  There were 17319 supraventricular ectopic beats, the majority of which were premature atrial contractions  There were over 300 short runs of supraventricular tachycardia, the longest lasting 7 beats  4  The longest R-R interval was 1 8 seconds  5  The patient kept a diary during holter monitor  It demonstrated no symptoms         IMPRESSION:  1  Abnormal 48 hour holter monitor  2  Patient in normal sinus rhythm throughout holter monitoring  3  No premature ventricular contractions  4  Frequent premature atrial contractions with frequent short runs of supraventricular tachycardia  5  No significant pauses  6  No symptoms noted on diary      Imaging: I have personally reviewed pertinent reports  EKG:  Normal sinus rhythm with nonspecific T-wave abnormality in the anterior leads  VTE Prophylaxis: Enoxaparin (Lovenox)    Code Status: Level 1 - Full Code  Advance Directive and Living Will:      Power of :    POLST:      Counseling / Coordination of Care  Total floor / unit time spent today 45 minutes  Greater than 50% of total time was spent with the patient and / or family counseling and / or coordination of care

## 2018-10-23 NOTE — ASSESSMENT & PLAN NOTE
· Patient reports mechanical fall prior to admission, now with acute left knee pain  Has history of multiple surgeries and hardware in left knee  Swelling appreciated on exam   Decreased range of motion    · X-ray left knee shows small cortical step-off w periosteal elevation in the distal femur above the femoral component of the arthroplasty, small periprosthetic fracture not excluded  · Ortho consulted   · PT/OT consulted  · Pain control

## 2018-10-23 NOTE — ED NOTES
Patient belongings: Watch and glasses on patient  Patient states daughter took the rest home        Gisela Selby  10/23/18 0800

## 2018-10-23 NOTE — H&P
H&P- Tyree Mcdowell 1935, 81 y o  female MRN: [id number]    Unit/Bed#: ED 19 Encounter: [id number]    Primary Care Provider: Mynor Davenport MD   Date and time admitted to hospital: 10/22/2018  4:52 PM        * NSTEMI (non-ST elevated myocardial infarction) [facility])   Assessment & Plan    Without chest pain however with flipped T-waves on EKG, follow up on serial troponin, given aspirin 324 mg the emergency will continue with 81 mg daily, heparin 5000 subcutaneous q 8 hours, nitroglycerin sublingual p r n  Will follow up with Cardiology due to elevated troponin  Contusion of left knee   Assessment & Plan    Secondary to mechanical fall while cleaning the patio, continue with pain management, consult PT/OT and follow up as necessary  Hypertensive urgency   Assessment & Plan    Most likely secondary to pain and discomfort, will resume home antihypertensive medication with lisinopril and metoprolol and follow up as necessary  Ambulatory dysfunction   Assessment & Plan    Due to traumatic knee injury from fall, consult PT/OT for evaluation  Continue with pain management             History and Physical - [facility] Internal Medicine    Patient Information: Tyree Mcdowell 81 y o  female MRN: [id number]  Unit/Bed#: ED 23 Encounter: [id number]  Admitting Physician: Lizzy Washington MD  PCP: Mynor Davenport MD  Date of Admission:  10/23/18    Assessment/Plan:    Hospital Problem List:     Principal Problem:    NSTEMI (non-ST elevated myocardial infarction) [facility])  Active Problems:    Ambulatory dysfunction    Hypertensive urgency    Contusion of left knee        VTE Prophylaxis: Enoxaparin (Lovenox)  / sequential compression device   Code Status:  Full code  POLST: There is no POLST form on file for this patient (pre-hospital)    Anticipated Length of Stay:  Patient will be admitted on an Observation basis with an anticipated length of stay of  less than 2 midnights     Justification for Hospital Stay:  NSTEMI and ambulatory dysfunction    Total Time for Visit, including Counseling / Coordination of Care: 45 minutes  Greater than 50% of this total time spent on direct patient counseling and coordination of care  Chief Complaint:   Mechanical fall and left knee pain and discomfort    History of Present Illness:    Horatio Boast is a 81 y o  female with past medical history of paroxysmal atrial fibrillation and presently in normal sinus rhythm, hypertension, dyslipidemia, and history of breast cancer  years ago  She presented emergency for evaluation of mechanical fall at home with a severe pain and discomfort to the left knee, she denies any head trauma, dizziness or loss of consciousness  However while she was lying on the floor on able to get up and trying to crawl her way to the telephone, she developed sudden onset of palpitation without chest pain, she also denies any dizziness or diaphoresis, denies nausea or vomiting, denies fever chills or cough  Evaluation in the emergency with imaging of the left knee did not show any acute fracture  However by EKG shows sinus rhythm with flipped T-waves and elevated troponin  She has been admitted for further evaluation and management  Review of Systems:    Review of Systems   Constitutional: Positive for activity change and fatigue  Negative for appetite change, chills, diaphoresis, fever and unexpected weight change  HENT: Negative for congestion, drooling, ear pain, facial swelling, hearing loss, nosebleeds, postnasal drip, rhinorrhea, sneezing, sore throat, tinnitus and trouble swallowing  Eyes: Negative for photophobia, pain, redness, itching and visual disturbance  Respiratory: Negative for cough, choking, chest tightness, shortness of breath and wheezing  Cardiovascular: Positive for palpitations  Negative for chest pain and leg swelling     Gastrointestinal: Negative for abdominal distention, abdominal pain, constipation, diarrhea, nausea and vomiting  Endocrine: Negative for polydipsia, polyphagia and polyuria  Genitourinary: Negative for difficulty urinating, dysuria, enuresis, flank pain, frequency and urgency  Musculoskeletal: Positive for arthralgias, gait problem and joint swelling  Negative for back pain, myalgias, neck pain and neck stiffness  Skin: Positive for color change  Negative for pallor, rash and wound  Of the left knee   Neurological: Negative for dizziness, seizures, syncope, facial asymmetry, speech difficulty, weakness, numbness and headaches  Psychiatric/Behavioral: Negative for agitation, behavioral problems and confusion         Past Medical and Surgical History:     Past Medical History:   Diagnosis Date    Anxiety     Arthritis     last assessed 3/24/15     Atherosclerosis     Bell's palsy     Breast carcinoma (HCC)     Broken femur (HCC)     Cardiac disorder     DCIS (ductal carcinoma in situ) of breast     last assessed 4/4/17     Depression     Endometrial polyp     Hypercholesterolemia     resolved 10/22/14     Hyperlipidemia     Hypertension     Long term use of drug     last assessed 5/23/14     Malignant neoplasm without specification of site ([facility] [address] )     Peripheral neuropathy     PONV (postoperative nausea and vomiting)     Tachycardia     Uterine fibroid        Past Surgical History:   Procedure Laterality Date    BREAST BIOPSY Right     BREAST LUMPECTOMY      CARPAL TUNNEL RELEASE Bilateral     CATARACT EXTRACTION      DILATION AND CURETTAGE OF UTERUS      ENDOMETRIAL BIOPSY      without cervical dilation     HEMORROIDECTOMY      JOINT REPLACEMENT      KNEE SURGERY Bilateral     OVARIAN CYST REMOVAL Left     AK RECONSTR TOTAL SHOULDER IMPLANT Right 7/18/2017    Procedure: TOTAL SHOULDER REVERSE ARTHROPLASTY;  Surgeon: Zaire Shaw MD;  [facility];  Service: Orthopedics    SENTINEL LYMPH NODE BIOPSY      TONSILLECTOMY      TUBAL LIGATION         Meds/Allergies:    Prior to Admission medications    Medication Sig Start Date End Date Taking? Authorizing Provider   clorazepate (TRANXENE) 3 75 mg tablet Take 3 75 mg by mouth 2 (two) times a day     Yes Historical Provider, MD   lisinopril (ZESTRIL) 10 mg tablet Take 1 tablet (10 mg total) by mouth 2 (two) times a day 7/31/18  Yes Lindy Ramírez MD   metoprolol succinate (TOPROL-XL) 50 mg 24 hr tablet Take 1 tablet (50 mg total) by mouth daily 8/16/18  Yes Edmundo Conklin MD   Multiple Vitamins-Minerals (PRESERVISION AREDS 2 PO) Take 2 tablets by mouth daily   Yes Historical Provider, MD   multivitamin (THERAGRAN) TABS Take 1 tablet by mouth daily   Yes Historical Provider, MD   Omega-3 Fatty Acids (FISH OIL PO) Take 1 capsule by mouth daily   Yes Historical Provider, MD   prednisoLONE acetate (PRED FORTE) 1 % ophthalmic suspension  12/29/17  Yes Historical Provider, MD   ranitidine (ZANTAC) 150 mg tablet Take 1 tablet (150 mg total) by mouth daily 7/31/18  Yes Lindy Ramírez MD   rosuvastatin (CRESTOR) 5 mg tablet Take 0 5 tablets by mouth 3 (three) times a week   11/27/17  Yes Historical Provider, MD   betamethasone dipropionate (DIPROSONE) 0 05 % cream Apply 1 application topically daily 9/20/17   Historical Provider, MD   CRANBERRY PO Take 1,700 mg by mouth daily    Historical Provider, MD     I have reviewed home medications with patient personally  Allergies: Allergies   Allergen Reactions    Amoxicillin-Pot Clavulanate GI Intolerance    Azithromycin GI Intolerance and Rash    Cephalexin GI Intolerance     Reaction Date: [id number];     Cortisone GI Intolerance     Reaction Date: [id number];     Doxycycline GI Intolerance    Penicillins GI Intolerance       Social History:     Marital Status:     Occupation: N  Patient Pre-hospital Living Situation:  Active  Patient Pre-hospital Level of Mobility:  Mobile  Patient Pre-hospital Diet Restrictions:  Heart healthy  Substance Use History:   History   Alcohol Use No     Comment: social drinker per allscript      History   Smoking Status    Never Smoker   Smokeless Tobacco    Never Used     History   Drug Use No       Family History:    Family History   Problem Relation Age of Onset    Heart disease Mother         artherosclerosis     Heart disease Father         artherosclerosis     Heart attack Father     Arthritis Family     Heart disease Family         artherosclerosis     Breast cancer Family     Osteoarthritis Family     Supraventricular tachycardia Family     Hypertension Daughter     Anemia Neg Hx     Arrhythmia Neg Hx     Asthma Neg Hx     Clotting disorder Neg Hx     Fainting Neg Hx     Hyperlipidemia Neg Hx     Aneurysm Neg Hx        Physical Exam:     Vitals:   Blood Pressure: 133/64 (10/23/18 0605)  Pulse: 60 (10/23/18 0605)  Temperature: 97 9 °F (36 6 °C) (10/23/18 0300)  Temp Source: Oral (10/22/18 1704)  Respirations: 16 (10/23/18 0605)  SpO2: 96 % (10/23/18 7363)    Physical Exam   Constitutional: She is oriented to person, place, and time  She appears well-developed and well-nourished  She appears distressed  HENT:   Head: Normocephalic and atraumatic  Right Ear: External ear normal    Left Ear: External ear normal    Nose: Nose normal    Mouth/Throat: No oropharyngeal exudate  Eyes: Pupils are equal, round, and reactive to light  Conjunctivae and EOM are normal  Right eye exhibits no discharge  Left eye exhibits no discharge  No scleral icterus  Neck: Normal range of motion  Neck supple  No thyromegaly present  Cardiovascular: Normal rate, regular rhythm and intact distal pulses  No murmur heard  Pulmonary/Chest: Effort normal and breath sounds normal  No respiratory distress  She has no wheezes  She has no rales  Abdominal: Soft  Bowel sounds are normal  She exhibits no distension  There is no tenderness  There is no rebound and no guarding     Musculoskeletal: She exhibits no edema or tenderness  Diminished range of motion of the left knee, erythematous skin changes of the kneecap, with some tenderness to palpation   Lymphadenopathy:     She has no cervical adenopathy  Neurological: She is alert and oriented to person, place, and time  She has normal reflexes  No cranial nerve deficit  Coordination normal    Skin: Skin is warm and dry  No rash noted  She is not diaphoretic  There is erythema  Psychiatric: She has a normal mood and affect  Additional Data:     Lab Results: I have personally reviewed pertinent reports  Results from last 7 days  Lab Units 10/22/18  1900   WBC Thousand/uL 8 53   HEMOGLOBIN g/dL 14 6   HEMATOCRIT % 43 9   PLATELETS Thousands/uL 174   NEUTROS PCT % 74   LYMPHS PCT % 15   MONOS PCT % 7   EOS PCT % 3       Results from last 7 days  Lab Units 10/22/18  1900   SODIUM mmol/L 140   POTASSIUM mmol/L 3 7   CHLORIDE mmol/L 106   CO2 mmol/L 28   BUN mg/dL 22   CREATININE mg/dL 0 92   CALCIUM mg/dL 9 8           Imaging: I have personally reviewed pertinent reports  No results found  EKG, Pathology, and Other Studies Reviewed on Admission:   · EKG:  Normal sinus rhythm with flipped T-waves    Allscripts / Epic Records Reviewed: Yes     ** Please Note: This note has been constructed using a voice recognition system   **

## 2018-10-23 NOTE — ASSESSMENT & PLAN NOTE
Without chest pain however with flipped T-waves on EKG, follow up on serial troponin, given aspirin 324 mg the emergency will continue with 81 mg daily, heparin 5000 subcutaneous q 8 hours, nitroglycerin sublingual p r n  Will follow up with Cardiology due to elevated troponin

## 2018-10-23 NOTE — PROGRESS NOTES
Orthopedics   Mecca Cornell 81 y o  female MRN: [id number]  Unit/Bed#: [facility] 824-01      Chief Complaint:   left knee pain    HPI:   81 y  o female complaining of left knee pain  Patient has a long surgical history with her left knee including multiple revision arthroplasties most recent of which occurred just over 5 years ago at the [facility]  She ambulates without the use of an assistive device and was doing some yard work yesterday when she tripped and fell striking her left knee on a door frame  She was unable to ambulate after this incident  Pain to the left knee was well-localized, was worse with palpation or attempted weight-bearing improved some with rest   Fortunately, the patient has been able to ambulate under her own power with the help of a walker to go to the bathroom  She feels that her pain has nearly completely subsided  She has no other complaints at this time      Review Of Systems:   · Skin:  Small abrasion to the anterior aspect of the left knee  · Neuro: See HPI  · Musculoskeletal: See HPI  · 14 point review of systems negative except as stated above     Past Medical History:   Past Medical History:   Diagnosis Date    Anxiety     Arthritis     last assessed 3/24/15     Atherosclerosis     Bell's palsy     Breast carcinoma ([facility] [address]     Broken femur ([facility] [address]     Cardiac disorder     DCIS (ductal carcinoma in situ) of breast     last assessed 4/4/17     Depression     Endometrial polyp     Hypercholesterolemia     resolved 10/22/14     Hyperlipidemia     Hypertension     Long term use of drug     last assessed 5/23/14     Malignant neoplasm without specification of site ([address] )     Peripheral neuropathy     PONV (postoperative nausea and vomiting)     Tachycardia     Uterine fibroid        Past Surgical History:   Past Surgical History:   Procedure Laterality Date    BREAST BIOPSY Right     BREAST LUMPECTOMY      CARPAL TUNNEL RELEASE Bilateral     CATARACT EXTRACTION      DILATION AND CURETTAGE OF UTERUS      ENDOMETRIAL BIOPSY      without cervical dilation     HEMORROIDECTOMY      JOINT REPLACEMENT      KNEE SURGERY Bilateral     OVARIAN CYST REMOVAL Left     MI RECONSTR TOTAL SHOULDER IMPLANT Right 7/18/2017    Procedure: TOTAL SHOULDER REVERSE ARTHROPLASTY;  Surgeon: Sanford Holly MD;  [facility];  Service: Orthopedics    SENTINEL LYMPH NODE BIOPSY      TONSILLECTOMY      TUBAL LIGATION         Family History:  Family history reviewed and non-contributory  Family History   Problem Relation Age of Onset    Heart disease Mother         artherosclerosis     Heart disease Father         artherosclerosis     Heart attack Father     Arthritis Family     Heart disease Family         artherosclerosis     Breast cancer Family     Osteoarthritis Family     Supraventricular tachycardia Family     Hypertension Daughter     Anemia Neg Hx     Arrhythmia Neg Hx     Asthma Neg Hx     Clotting disorder Neg Hx     Fainting Neg Hx     Hyperlipidemia Neg Hx     Aneurysm Neg Hx        Social History:  Social History     Social History    Marital status:      Spouse name: N/A    Number of children: N/A    Years of education: N/A     Occupational History    retired from work       Social History Main Topics    Smoking status: Never Smoker    Smokeless tobacco: Never Used    Alcohol use No      Comment: social drinker per allscript     Drug use: No    Sexual activity: Not Asked     Other Topics Concern    None     Social History Narrative    Coffee     Daily coffee consumption 1 cup day     Daily cola consumption can day     Walking             Allergies:    Allergies   Allergen Reactions    Amoxicillin-Pot Clavulanate GI Intolerance    Azithromycin GI Intolerance and Rash    Cephalexin GI Intolerance     Reaction Date: [id number];     Cortisone GI Intolerance     Reaction Date: [id number];     Doxycycline GI Intolerance  Penicillins GI Intolerance           Labs:    0  Lab Value Date/Time   HCT 40 2 10/23/2018 0639   HCT 43 9 10/22/2018 1900   HCT 41 2 01/31/2018 0955   HCT 40 1 08/19/2015 0951   HCT 41 1 05/20/2014 0855   HGB 13 2 10/23/2018 0639   HGB 14 6 10/22/2018 1900   HGB 14 2 01/31/2018 0955   HGB 13 6 08/19/2015 0951   HGB 13 7 05/20/2014 0855   WBC 7 15 10/23/2018 0639   WBC 8 53 10/22/2018 1900   WBC 5 28 01/31/2018 0955   WBC 5 43 08/19/2015 0951   WBC 5 47 05/20/2014 0855   ESR 18 10/26/2016 0928       Meds:    Current Facility-Administered Medications:     acetaminophen (TYLENOL) tablet 650 mg, 650 mg, Oral, Q6H PRN, Lauro Messer PA-C, 650 mg at 10/23/18 1128    [START ON 10/24/2018] aspirin chewable tablet 81 mg, 81 mg, Oral, Daily, Gus Martins MD    clorazepate (TRANXENE) tablet 3 75 mg, 3 75 mg, Oral, BID, Jarvis Shankar MD, Stopped at 10/23/18 1050    enoxaparin (LOVENOX) subcutaneous injection 40 mg, 40 mg, Subcutaneous, Daily, Jarvis Shankar MD, 40 mg at 10/23/18 0821    lisinopril (ZESTRIL) tablet 10 mg, 10 mg, Oral, BID, Jarvis Shankar MD, 10 mg at 10/23/18 0822    metoprolol succinate (TOPROL-XL) 24 hr tablet 50 mg, 50 mg, Oral, Daily, Gus Martins MD, 50 mg at 10/23/18 4239    pravastatin (PRAVACHOL) tablet 40 mg, 40 mg, Oral, Daily With Alysia Rodriguez MD    Blood Culture:   No results found for: BLOODCX    Wound Culture:   No results found for: WOUNDCULT    Ins and Outs:  I/O last 24 hours: In: -   Out: 200 [Urine:200]          Physical Exam:   /59   Pulse 69   Temp 98 6 °F (37 °C) (Oral)   Resp 16   Ht 4' 7" (1 397 m)   Wt 76 8 kg (169 lb 6 4 oz)   SpO2 98%   BMI 39 37 kg/m²   Gen: Alert and oriented to person, place, time  HEENT: EOMI, eyes clear, moist mucus membranes, hearing intact  Respiratory: Bilateral chest rise   No audible wheezing found  Cardiovascular: Regular Rate  Musculoskeletal: left lower extremity  · Skin:  Small abrasion over patella  · No tenderness to palpation over the knee, the distal aspect of the femur or the proximal aspect of the tibia  · Can perform striaght leg raise, but has baseline weakness of quadriceps secondary to multiple procedures  · Stable to varus/valgus stress  · Sensation intact DP/SP/tib/victorina  · Motor intact ankle dorsi/plantar flexion, EHL/FHL  Baseline quadriceps weakness as per above  · Distal pulses present    Radiology:   I personally reviewed the films  X-rays left knee shows a well aligned and stable left knee status post revision arthroplasty in appropriate alignment  No fractures appreciated     _*_*_*_*_*_*_*_*_*_*_*_*_*_*_*_*_*_*_*_*_*_*_*_*_*_*_*_*_*_*_*_*_*_*_*_*_*_*_*_*_*    Assessment:  81 y  o female with left knee contusion    Plan:   · Weight bearing as tolerated  left lower extremity  · PT  · Pain control  · Dispo: Karyna Wooten for discharge from ortho perspective      Sisi Marin MD

## 2018-10-24 VITALS
TEMPERATURE: 98 F | OXYGEN SATURATION: 96 % | WEIGHT: 169.4 LBS | HEART RATE: 67 BPM | HEIGHT: 55 IN | BODY MASS INDEX: 39.2 KG/M2 | RESPIRATION RATE: 18 BRPM | SYSTOLIC BLOOD PRESSURE: 150 MMHG | DIASTOLIC BLOOD PRESSURE: 67 MMHG

## 2018-10-24 PROBLEM — I16.0 HYPERTENSIVE URGENCY: Status: RESOLVED | Noted: 2018-10-22 | Resolved: 2018-10-24

## 2018-10-24 PROCEDURE — 99232 SBSQ HOSP IP/OBS MODERATE 35: CPT | Performed by: INTERNAL MEDICINE

## 2018-10-24 PROCEDURE — 97163 PT EVAL HIGH COMPLEX 45 MIN: CPT

## 2018-10-24 PROCEDURE — G8979 MOBILITY GOAL STATUS: HCPCS

## 2018-10-24 PROCEDURE — 99239 HOSP IP/OBS DSCHRG MGMT >30: CPT | Performed by: PHYSICIAN ASSISTANT

## 2018-10-24 PROCEDURE — G8978 MOBILITY CURRENT STATUS: HCPCS

## 2018-10-24 RX ORDER — ASPIRIN 81 MG/1
81 TABLET, CHEWABLE ORAL DAILY
Refills: 0 | COMMUNITY
Start: 2018-10-25 | End: 2019-02-21 | Stop reason: ALTCHOICE

## 2018-10-24 RX ORDER — PREDNISOLONE ACETATE 10 MG/ML
1 SUSPENSION/ DROPS OPHTHALMIC 2 TIMES DAILY
Status: DISCONTINUED | OUTPATIENT
Start: 2018-10-24 | End: 2018-10-24 | Stop reason: HOSPADM

## 2018-10-24 RX ADMIN — METOPROLOL SUCCINATE 50 MG: 50 TABLET, EXTENDED RELEASE ORAL at 09:11

## 2018-10-24 RX ADMIN — CLORAZEPATE DIPOTASSIUM 3.75 MG: 3.75 TABLET ORAL at 09:32

## 2018-10-24 RX ADMIN — Medication 81 MG: at 09:11

## 2018-10-24 RX ADMIN — PREDNISOLONE ACETATE 1 DROP: 10 SUSPENSION/ DROPS OPHTHALMIC at 11:51

## 2018-10-24 RX ADMIN — LISINOPRIL 10 MG: 10 TABLET ORAL at 09:11

## 2018-10-24 RX ADMIN — ENOXAPARIN SODIUM 40 MG: 40 INJECTION SUBCUTANEOUS at 09:11

## 2018-10-24 NOTE — ASSESSMENT & PLAN NOTE
· Appreciate Cardiology input    Continue metoprolol succinate 50 mg daily  · No complaints of palpitations  · Telemetry discontinued  · Continue to follow with cardiology outpatient

## 2018-10-24 NOTE — SOCIAL WORK
Cm met with pt and pt aware PT recommendation is for home PT and OT   Pt agreeable and requested referral to [facility] , referral in ecin as such   Pt family will transport home

## 2018-10-24 NOTE — ASSESSMENT & PLAN NOTE
· Secondary to mechanical fall while cleaning the patio  · See pain in extremity assessment and plan

## 2018-10-24 NOTE — ASSESSMENT & PLAN NOTE
· Without chest pain however with flipped T-waves on EKG, elevated troponins trended downward  · Given aspirin 324 mg the emergency will continue with 81 mg daily, heparin 5000 subcutaneous q 8 hours, nitroglycerin sublingual p r n  · Cardiology Consult - stable for discharge  · Troponin has trended downward  · A limited echo was obtained yesterday which demonstrates comparable LV wall motion systolic function to a recent echo  · Continue with aspirin 81 mg daily, continue with prior medication and encourage compliance  · Follow up with primary cardiologist Dr Josee Judd as an outpatient

## 2018-10-24 NOTE — ASSESSMENT & PLAN NOTE
· See NSTEMI Assessment and Plan  · Peak at 0 74, trended downward  · EKG normal sinus rhythm with nonspecific T-wave abnormality, no cardiac symptoms  · Follow-up limited echocardiogram, last echo showed normal left ventricular function with grade 1 diastolic dysfunction without valvular disease  · Outpatient follow-up with cardiologist Dr Josafat Ramos

## 2018-10-24 NOTE — DISCHARGE SUMMARY
[facility]    Discharge- Trinity Knowles 1935, 81 y o  female MRN: [id number]    Unit/Bed#: [facility] 824-01 Encounter: [id number]    Primary Care Provider: Vivi Navarro MD   Date and time admitted to hospital: 10/22/2018  4:52 PM        * Pain in extremity   Assessment & Plan    · Patient reports mechanical fall prior to admission, left knee pain  · History of multiple surgeries and hardware in left knee  · X-ray left knee shows small cortical step-off w periosteal elevation in the distal femur above the femoral component of the arthroplasty, small periprosthetic fracture not excluded  · Ortho consult - left knee contusion, history of total knee replacement  · Incision well healed with no evidence of infection, able to perform straight leg raise, mild generalized swelling but no palpable effusion, extremity warm/well perfused  · WBAT bl LE, Pain control, DVT ppx, may need rehab, dispo per primary team, ortho will sign off  · PT/OT consult  · Stable for discharge  · At home PT       Contusion of left knee   Assessment & Plan    · Secondary to mechanical fall while cleaning the patio  · See pain in extremity assessment and plan        Ambulatory dysfunction   Assessment & Plan    · Patient with mechanical fall prior to admission  · PT OT consulted, patient agreeable to rehab     NSTEMI (non-ST elevated myocardial infarction) ([facility] [address] ) Type II   Assessment & Plan    · Without chest pain however with flipped T-waves on EKG, elevated troponins trended downward  · Given aspirin 324 mg the emergency will continue with 81 mg daily, heparin 5000 subcutaneous q 8 hours, nitroglycerin sublingual p r n  · Cardiology Consult - stable for discharge  · Troponin has trended downward  · A limited echo was obtained yesterday which demonstrates comparable LV wall motion systolic function to a recent echo     · Continue with aspirin 81 mg daily, continue with prior medication and encourage compliance  · Follow up with primary cardiologist Dr Cyril Salazar as an outpatient  Elevated troponin   Assessment & Plan    · See NSTEMI Assessment and Plan  · Peak at 0 74, trended downward  · EKG normal sinus rhythm with nonspecific T-wave abnormality, no cardiac symptoms  · Follow-up limited echocardiogram, last echo showed normal left ventricular function with grade 1 diastolic dysfunction without valvular disease  · Outpatient follow-up with cardiologist Dr Lefty Moyer hypertension   Assessment & Plan    · Continue beta-blocker and ACE-inhibitor  · Follow with PCP      Supraventricular tachycardia [facility])   Assessment & Plan    · Appreciate Cardiology input  Continue metoprolol succinate 50 mg daily  · No complaints of palpitations  · Telemetry discontinued  · Continue to follow with cardiology outpatient     Hypertensive urgencyresolved as of 10/24/2018   Assessment & Plan    · Most likely secondary to pain and discomfort  · Resume home antihypertensive medication with lisinopril and metoprolol and follow up as necessary  Discharging Physician / Practitioner: David Poe PA-C  PCP: Jah Sampson MD  Admission Date:   Admission Orders     Ordered        10/23/18 1118  Inpatient Admission  Once         10/22/18 2127  Place in Observation (expected length of stay for this patient is less than two midnights)  Once             Discharge Date: 10/24/18    Resolved Problems  Date Reviewed: 10/23/2018          Resolved    Hypertensive urgency 10/24/2018     Resolved by  David Poe PA-C          Consultations During Hospital Stay:  · Cardiology  · Orthopedics  · PT    Procedures Performed:     XR Chest 2 Views 10/22/18  · The cardiac silhouette is without change from the prior study  · The lungs are clear  No pneumothorax or pleural effusion    · There is a right shoulder prosthesis  · No acute cardiopulmonary disease  XR Knee Left 10/22/18  · Osseous structures demineralized  Stable appearance of the total knee arthroplasty  · Cortical step off with area of periosteal elevation in the distal femur, just above the femoral component of the arthroplasty  This was not present on the prior studies  There is minimal overlying soft tissue swelling anteriorly  · There is no joint effusion  · No lytic or blastic lesions are seen  · Soft tissues are unremarkable  · Small cortical step off with periosteal elevation in the distal femur, just above the femoral component of the arthroplasty, new from the prior study  Small periprosthetic fracture not excluded  ECG 10/22/18  · Rate:     ECG rate assessment: normal    · Rhythm:     Rhythm: sinus rhythm    · Ectopy:     Ectopy: none    · QRS:     QRS axis:  Normal    QRS intervals:  Normal  · ST segments:     ST segments:  Normal  · T waves:     T waves: inverted      Inverted:  AVR, V1, III and V3    Echo 10/23/18  No evidence of stenosis of mitral, tricuspid, aortic valves  Normal cuspal separation of pulmonic valve  · LEFT VENTRICLE  · Systolic function was normal  Ejection fraction was estimated to be 65 %  · Although no diagnostic regional wall motion abnormality was identified, this possibility cannot be completely excluded on the basis of this study  · Wall thickness was at the upper limits of normal   · Doppler parameters were consistent with abnormal left ventricular relaxation (grade 1 diastolic dysfunction)  · LEFT ATRIUM  · The atrium was mildly to moderately dilated    · MITRAL VALVE:  · There was mild annular calcification  · There was moderate calcification of the anterior leaflet, limited to the leaflet margin  · There was trace regurgitation      Significant Findings / Test Results:     · Elevated Troponin  · 0 33 (10/22/18 at 1900),  0 74 (10/23/18 at 0124), 0 51 (10/23/18 at 0639)  · Inverted T waves on EKG 10/22/18 at 0729  · Left knee pain  · Small cortical step off with periosteal elevation in the distal femur, just above the femoral component of the arthroplasty, new from the prior study  Small periprosthetic fracture not excluded  Incidental Findings:   · None    Test Results Pending at Discharge (will require follow up): · None     Outpatient Tests Requested:  · Follow up with PCP  · Follow up with Cardiology  · At Home Physical Therapy  · Follow up with Orthopedics if needed    Complications:  None    Reason for Admission: left lower extremity pain    Hospital Course:     Mecca Cornell is a 81 y o  female patient with a past medical history significant for left total knee replacement who originally presented to the hospital on 10/22/2018 due to left lower extremity pain after a mechanical fall  Left knee xray revealed a small cortical step off with periosteal elevation in the distal femur, just above the femoral component of the arthroplasty, new from the prior study  Small periprosthetic fracture not excluded  Per ortho consult patient has a left knee contusion  No signs of infection  She is able to weight bear as tolerated and stable for discharge  Will follow up with ortho if needed  Patient has a walker and cane at home she plans to use, as well as an ADT watch  Patient reports mild stiffness and decreased range of motion in her left lower extremity that has improved throughout her stay  Per PT she is stable for discharge and will benefit from physical therapy  Additionally, patient was found to have elevated troponin levels 0 33 (10/22/18 at 1900),  0 74 (10/23/18 at 0124), 0 51 (10/23/18 at 0639) and inverted T waves on EKG 10/22/18 at 0729  Per cardiology consult she is stable for discharge and will follow up with her primary cardiologist Dr Cristal Palomo as an outpatient  Patient is to continue current medications and start taking aspirin 81 mg daily  Patient reports she had to pull herself across her house to get to a phone after her fall, which was very strenuous   Denies chest pain, shortness of breath, palpitations, syncope throughout stay  At this time patient does not have any questions or concerns  She feels comfortable to return home with home physical therapy and the help of her family who live locally  Please see above list of diagnoses and related plan for additional information  Condition at Discharge: stable     Discharge Day Visit / Exam:     Subjective:  Patient is pleasant and comfortable in her chair  She denies any chest pain, palpitations, shortness of breath  Patient reports mild discomfort of her left knee today, but notes it has improved since the initial fall  She is able to walk comfortably with a walker  Denies difficulty with bowel movements and denies pain with urination  All questions and concerns were addressed and patient is eager to get home  Vitals: Blood Pressure: 150/67 (10/24/18 0700)  Pulse: 67 (10/24/18 0700)  Temperature: 98 °F (36 7 °C) (10/24/18 0700)  Temp Source: Oral (10/24/18 0700)  Respirations: 18 (10/24/18 0700)  Height: 4' 7" (139 7 cm) (10/23/18 1457)  Weight - Scale: 76 8 kg (169 lb 6 4 oz) (10/23/18 1457)  SpO2: 96 % (10/24/18 0700)  Exam:   Physical Exam   Constitutional: She is oriented to person, place, and time  She appears well-developed and well-nourished  No distress  HENT:   Head: Normocephalic and atraumatic  Eyes: Conjunctivae are normal    Neck: Normal range of motion  Neck supple  Cardiovascular: Normal rate, regular rhythm, normal heart sounds and intact distal pulses  No murmur heard  Pulmonary/Chest: Effort normal and breath sounds normal  No respiratory distress  She has no wheezes  She has no rales  Abdominal: Soft  Bowel sounds are normal  She exhibits no distension  There is no tenderness  Musculoskeletal:        Left knee: She exhibits decreased range of motion, swelling and laceration  She exhibits no effusion, no ecchymosis, no deformity, no erythema and no bony tenderness  Tenderness found     Neurological: She is alert and oriented to person, place, and time  Skin: Skin is warm and dry  She is not diaphoretic  Psychiatric: She has a normal mood and affect  Her behavior is normal  Judgment and thought content normal    Nursing note and vitals reviewed  Discussion with Family: Attempted to call daughter at home and cell phone, unable to get in contact and no voicemail available  Discharge instructions/Information to patient and family:   See after visit summary for information provided to patient and family  Provisions for Follow-Up Care:  See after visit summary for information related to follow-up care and any pertinent home health orders  Disposition:     Home with VNA Services (Reminder: Complete face to face encounter)    For Discharges to [facility] SNF:   · Not Applicable to this Patient - Not Applicable to this Patient    Planned Readmission: none     Discharge Statement:  I spent 45 minutes discharging the patient  This time was spent on the day of discharge  I had direct contact with the patient on the day of discharge  Greater than 50% of the total time was spent examining patient, answering all patient questions, arranging and discussing plan of care with patient as well as directly providing post-discharge instructions  Additional time then spent on discharge activities  Discharge Medications:  See after visit summary for reconciled discharge medications provided to patient and family        ** Please Note: This note has been constructed using a voice recognition system **

## 2018-10-24 NOTE — PROGRESS NOTES
Cardiology Progress Note - Duc Rinaldi 81 y o  female MRN: [id number]    Unit/Bed#: [facility] 824-01 Encounter: [id number]      Assessment:  Principal Problem:    Pain in extremity  Active Problems:    Essential hypertension    Supraventricular tachycardia (HCC)    NSTEMI (non-ST elevated myocardial infarction) [facility])    Ambulatory dysfunction    Hypertensive urgency    Contusion of left knee    Elevated troponin      Plan:  Patient is comfortable this morning  She has no chest pain or significant dyspnea  She is in sinus rhythm on telemetry  Her troponins have trended downward  I do not think these represent a type 2 non STEMI  A limited echo was obtained yesterday which demonstrates comparable LV wall motion systolic function to a recent echo  From my point of view she is stable for discharge  Would continue her prior medication and having encouraged compliance with her medicines  The orthopedic note is appreciated  She will follow up with her primary cardiologist Dr Nuno Desai as an outpatient  Subjective:   Patient seen and examined  No significant events overnight   negative  Objective:     Vitals: Blood pressure 161/75, pulse 67, temperature 98 1 °F (36 7 °C), temperature source Oral, resp  rate 18, height 4' 7" (1 397 m), weight 76 8 kg (169 lb 6 4 oz), SpO2 97 %  , Body mass index is 39 37 kg/m² , Orthostatic Blood Pressures      Most Recent Value   Blood Pressure  161/75 filed at 10/23/2018 2247   Patient Position - Orthostatic VS  Lying filed at 10/23/2018 2247      ,      Intake/Output Summary (Last 24 hours) at 10/24/18 0740  Last data filed at 10/24/18 0520   Gross per 24 hour   Intake              220 ml   Output              800 ml   Net             -580 ml       No significant arrhythmias seen on telemetry review         Physical Exam:    GEN: Duc Cuff appears well, alert and oriented x 3, pleasant and cooperative   NECK: supple, no carotid bruits, no JVD or HJR  HEART: normal rate, regular rhythm, normal S1 and S2, no murmurs, clicks, gallops or rubs   LUNGS: clear to auscultation bilaterally; no wheezes, rales, or rhonchi   ABDOMEN: normal bowel sounds, soft, no tenderness, no distention  EXTREMITIES: peripheral pulses normal; no clubbing, cyanosis, or edema  SKIN: warm and well perfused, no suspicious lesions on exposed skin    Labs & Results:    Admission on 10/22/2018   Component Date Value    WBC 10/22/2018 8 53     RBC 10/22/2018 4 73     Hemoglobin 10/22/2018 14 6     Hematocrit 10/22/2018 43 9     MCV 10/22/2018 93     MCH 10/22/2018 30 9     MCHC 10/22/2018 33 3     RDW 10/22/2018 12 8     MPV 10/22/2018 10 0     Platelets 87/69/9287 174     nRBC 10/22/2018 0     Neutrophils Relative 10/22/2018 74     Immat GRANS % 10/22/2018 0     Lymphocytes Relative 10/22/2018 15     Monocytes Relative 10/22/2018 7     Eosinophils Relative 10/22/2018 3     Basophils Relative 10/22/2018 1     Neutrophils Absolute 10/22/2018 6 32     Immature Grans Absolute 10/22/2018 0 03     Lymphocytes Absolute 10/22/2018 1 31     Monocytes Absolute 10/22/2018 0 58     Eosinophils Absolute 10/22/2018 0 25     Basophils Absolute 10/22/2018 0 04     Sodium 10/22/2018 140     Potassium 10/22/2018 3 7     Chloride 10/22/2018 106     CO2 10/22/2018 28     ANION GAP 10/22/2018 6     BUN 10/22/2018 22     Creatinine 10/22/2018 0 92     Glucose 10/22/2018 89     Calcium 10/22/2018 9 8     eGFR 10/22/2018 58     Troponin I 10/22/2018 0 33*    Color, UA 10/22/2018 completed     Color, UA 10/22/2018 Josee     Clarity, UA 10/22/2018 Cloudy     pH, UA 10/22/2018 6 0     Leukocytes, UA 10/22/2018 Negative     Nitrite, UA 10/22/2018 Negative     Protein, UA 10/22/2018 100 (2+)*    Glucose, UA 10/22/2018 Negative     Ketones, UA 10/22/2018 Trace*    Urobilinogen, UA 10/22/2018 0 2     Bilirubin, UA 10/22/2018 Negative     Blood, UA 10/22/2018 Negative     Specific Gravity, UA 10/22/2018 >=1 030     RBC, UA 10/22/2018 2-4*    WBC, UA 10/22/2018 4-10*    Epithelial Cells 10/22/2018 Moderate*    Bacteria, UA 10/22/2018 Occasional     Hyaline Casts, UA 10/22/2018 0-3*    Troponin I 10/23/2018 0 74*    Sodium 10/23/2018 140     Potassium 10/23/2018 3 5     Chloride 10/23/2018 106     CO2 10/23/2018 27     ANION GAP 10/23/2018 7     BUN 10/23/2018 23     Creatinine 10/23/2018 0 94     Glucose 10/23/2018 87     Glucose, Fasting 10/23/2018 87     Calcium 10/23/2018 9 2     eGFR 10/23/2018 57     WBC 10/23/2018 7 15     RBC 10/23/2018 4 30     Hemoglobin 10/23/2018 13 2     Hematocrit 10/23/2018 40 2     MCV 10/23/2018 94     MCH 10/23/2018 30 7     MCHC 10/23/2018 32 8     RDW 10/23/2018 13 1     MPV 10/23/2018 9 7     Platelets 57/05/8946 163     nRBC 10/23/2018 0     Neutrophils Relative 10/23/2018 56     Immat GRANS % 10/23/2018 0     Lymphocytes Relative 10/23/2018 28     Monocytes Relative 10/23/2018 9     Eosinophils Relative 10/23/2018 6     Basophils Relative 10/23/2018 1     Neutrophils Absolute 10/23/2018 4 02     Immature Grans Absolute 10/23/2018 0 03     Lymphocytes Absolute 10/23/2018 2 00     Monocytes Absolute 10/23/2018 0 64     Eosinophils Absolute 10/23/2018 0 41     Basophils Absolute 10/23/2018 0 05     Cholesterol 10/23/2018 161     Triglycerides 10/23/2018 122     HDL, Direct 10/23/2018 48     LDL Calculated 10/23/2018 89     Non-HDL-Chol (CHOL-HDL) 10/23/2018 113     Troponin I 10/23/2018 0 51*    Ventricular Rate 10/22/2018 74     Atrial Rate 10/22/2018 74     VA Interval 10/22/2018 196     QRSD Interval 10/22/2018 76     QT Interval 10/22/2018 370     QTC Interval 10/22/2018 410     P Axis 10/22/2018 47     QRS Axis 10/22/2018 4     T Wave Axis 10/22/2018 20     Ventricular Rate 10/22/2018 83     Atrial Rate 10/22/2018 83     VA Interval 10/22/2018 198     QRSD Interval 10/22/2018 78     QT Interval 10/22/2018 354     QTC Interval 10/22/2018 415     P Axis 10/22/2018 32     QRS Axis 10/22/2018 -1     T Wave [place] 10/22/2018 4        Xr Chest 2 Views    Result Date: 10/23/2018  Narrative: CHEST INDICATION:   hx of palpitations, elevated troponin  Fall  COMPARISON:  6/20/2017 EXAM PERFORMED/VIEWS:  XR CHEST PA & LATERAL FINDINGS: The cardiac silhouette is without change from the prior study  The lungs are clear  No pneumothorax or pleural effusion  There is a right shoulder prosthesis     Impression: No acute cardiopulmonary disease  Workstation performed: [id number]     Xr Knee 1 Or 2 Views Left    Result Date: 10/23/2018  Narrative: LEFT KNEE INDICATION:   fall onto L knee, L knee pain/swelling with hx of previous ortho surgery  COMPARISON:  Plain radiographs April 2, 2018 VIEWS:  XR KNEE 1 OR 2 VW LEFT Images: 3 FINDINGS: Osseous structures demineralized  Stable appearance of the total knee arthroplasty  Cortical step off with area of periosteal elevation in the distal femur, just above the femoral component of the arthroplasty  This was not present on the prior studies  There is minimal overlying soft tissue swelling anteriorly  There is no joint effusion  No lytic or blastic lesions are seen  Soft tissues are unremarkable  Impression: Small cortical step off with periosteal elevation in the distal femur, just above the femoral component of the arthroplasty, new from the prior study  Small periprosthetic fracture not excluded  The study was marked in [facility] for immediate notification  Workstation performed: [id number]       EKG personally reviewed by Phylicia Louis MD      Counseling / Coordination of Care  Total floor / unit time spent today 30 minutes  Greater than 50% of total time was spent with the patient and / or family counseling and / or coordination of care

## 2018-10-24 NOTE — PROGRESS NOTES
Subjective: No acute events overnight  No acute distress  Knee pain improved from initial injury      Objective:  Lab Results   Component Value Date/Time    WBC 7 15 10/23/2018 06:39 AM    WBC 5 43 08/19/2015 09:51 AM    HGB 13 2 10/23/2018 06:39 AM    HGB 13 6 08/19/2015 09:51 AM       Vitals:    10/23/18 2247   BP: 161/75   Pulse: 67   Resp: 18   Temp: 98 1 °F (36 7 °C)   SpO2: 97%     Left lower extremity  Incision well healed with no evidence of infection  Able to perform straight leg raise  Mild generalized swelling but no palpable effusion  Extremity warm/well perfused    Assessment: 83F w L knee contusion, history of TKA    Plan:  WBAT bl LE  Pain control  DVT ppx per primary  PT to eval - pt may need course of rehab  Dispo per primary team, ortho will sign off    Amanda Kessler  10/24/18

## 2018-10-24 NOTE — PHYSICAL THERAPY NOTE
Physical Therapy Evaluation:    2 forms of pt ID verified:name,birthdate and pt ID henry    Patient's Name: Claire Sampson    Admitting Diagnosis  Palpitations [R00 2]  Multiple injuries [T07  XXXA]  Elevated troponin [R74 8]  Left knee pain [M25 562]  NSTEMI (non-ST elevated myocardial infarction) ([address] ) [I21 4]  Ambulatory dysfunction [R26 2]    Problem List  Patient Active Problem List   Diagnosis    Anxiety    Peripheral neuropathy    Hyperlipidemia    Essential hypertension    Arthritis    Atrial fibrillation (HCC)    Difficulty breathing    Diverticulosis    Dizziness    Fatigue    Hyperglycemia    Pain in extremity    Osteopenia    Shortness of breath    Supraventricular tachycardia ([address] )    Vitamin D deficiency    Pain of left hand    Left leg pain    Malignant neoplasm of upper-outer quadrant of right breast in female, estrogen receptor positive ([address] )    Impingement syndrome of left shoulder    NSTEMI (non-ST elevated myocardial infarction) ([address] )    Ambulatory dysfunction    Contusion of left knee    Elevated troponin    Palpitations    Fall from ground level       Past Medical History  Past Medical History:   Diagnosis Date    Anxiety     Arthritis     last assessed 3/24/15     Atherosclerosis     Bell's palsy     Breast carcinoma ([address] )     Broken femur ([address] )     Cardiac disorder     DCIS (ductal carcinoma in situ) of breast     last assessed 4/4/17     Depression     Endometrial polyp     Hypercholesterolemia     resolved 10/22/14     Hyperlipidemia     Hypertension     Long term use of drug     last assessed 5/23/14     Malignant neoplasm without specification of site ([address] )     Peripheral neuropathy     PONV (postoperative nausea and vomiting)     Tachycardia     Uterine fibroid        Past Surgical History  Past Surgical History:   Procedure Laterality Date    BREAST BIOPSY Right     BREAST LUMPECTOMY      CARPAL TUNNEL RELEASE Bilateral     CATARACT EXTRACTION      DILATION AND CURETTAGE OF UTERUS      ENDOMETRIAL BIOPSY      without cervical dilation     HEMORROIDECTOMY      JOINT REPLACEMENT      KNEE SURGERY Bilateral     OVARIAN CYST REMOVAL Left     NV RECONSTR TOTAL SHOULDER IMPLANT Right 7/18/2017    Procedure: TOTAL SHOULDER REVERSE ARTHROPLASTY;  Surgeon: Perla Dhillon MD;  [facility];  Service: Orthopedics    SENTINEL LYMPH NODE BIOPSY      TONSILLECTOMY      TUBAL LIGATION        10/24/18 1055   Note Type   Note type Eval only   Pain Assessment   Pain Assessment 0-10   Pain Score 4   Pain Type Acute pain;Chronic pain   Pain Location Leg;Knee   Pain Orientation Left   Hospital Pain Intervention(s) Repositioned; Ambulation/increased activity; Emotional support; Environmental changes; Elevated; Rest   Home Living   Type of [address] Two level;Stairs to enter without rails  (stair glide to second floor,1-2 MAE from garage)   Bathroom Shower/Tub Walk-in shower   Home Equipment Cane;Walker  (owns [facility] and [facility])   Additional Comments pt reports being completely I PTA,x1 recent fall needing A to get up from ground,lives alone,local family "extremely" involved and can A as needed per pt,use of [facility] for community distances,stair glide to second floor bed and BR   Prior Function   Level of [place] Independent with ADLs and functional mobility  (per pt PTA)   Lives With Alone   Receives Help From Family   ADL Assistance Independent   IADLs Independent   Falls in the last 6 months 1 to 4  (x1 recent fall per pt)   Restrictions/Precautions   Weight Bearing Precautions Per Order Yes   RLE Weight Bearing Per Order WBAT  (per ortho)   LLE Weight Bearing Per Order WBAT  (per ortho)   Other Precautions Multiple lines; Fall Risk;Pain;Hard of hearing   General   Additional Pertinent History s/p fall,reports L knee pain,L knee contusion   Family/Caregiver Present No   Cognition   Overall Cognitive Status [facility] Arousal/Participation Cooperative   Orientation Level Oriented X4   Following Commands Follows one step commands with increased time or repetition  (2* inc L knee pain,slow mobility)   RLE Assessment   RLE Assessment (4/5 grossly throughout)   LLE Assessment   LLE Assessment (3+/5 grossly throughout;limites resistance given 2* pain)   Coordination   Movements are Fluid and Coordinated 0   Coordination and Movement Description pain,dec BLE step length,slow mobility,ataxic and unsteady at times   Sensation [facility]   Light Touch   RLE Light Touch Grossly intact   LLE Light Touch Grossly intact   Bed Mobility   Supine to Sit Unable to assess  (pt sitting in chair pre and post mobility)   Transfers   Sit to Stand 4  Minimal assistance   Additional items Assist x 1; Armrests; Increased time required;Verbal cues   Stand to Sit 4  Minimal assistance   Additional items Assist x 1; Armrests; Increased time required;Verbal cues  (for safety,education and control descent)   Ambulation/Elevation   Gait pattern Antalgic;Narrow ALETA; Forward Flexion;Decreased L stance; Inconsistent jacquelyn; Foward flexed; Short stride; Ataxia   Gait Assistance 5  Supervision   Additional items Assist x 1;Verbal cues; Tactile cues   Assistive Device Rolling walker   Distance 120 feet x2 with use of RW on tile and carpet surface;no LOB noted and/or observed during upright mobility;minimal fatigue following activity   Balance   Static Sitting Good  (in chair postmobility)   Dynamic Sitting Poor +   Static Standing Poor +   Dynamic Standing Fair   Ambulatory Fair   Endurance Deficit   Endurance Deficit Yes   Endurance Deficit Description minimal reports of fatigue following activity,pain,weakness   Activity Tolerance   Activity Tolerance Patient limited by fatigue;Patient limited by pain  (good)   Nurse Made Aware yes   Assessment   Prognosis Good   Problem List Decreased strength;Decreased endurance; Impaired balance;Decreased mobility; Decreased skin integrity;Pain   Assessment pt is a 80 y/o female admitted to [facility]* s/p fall,L knee contusion,WBAT BLE per ortho  Pt lives alone in 2 story home,stair glide to 2nd floor,(+)1-2 MAE,reports x1 recent fall needing A to get up from ground,reports A from local family as needed,use of personal DME as needed PTA and reports being completely I PTA  Pt currently is not at functional mobility baseline,needs Ax1 for mobility,use of DME (RW) for mobility,reports L knee pain,multiple lines,IV medicine management and ongoing medical care  Pt demonstrates minimal deficits during functional mobility and gait including dec endurance,dec balance,dec BLE strength,ataxic and unsteady gait at times,inc pain L knee area and needs minAx1 for transfers and S for gait with use of RW  Pt would cont to benefit from skilled inpt PT services to maximize functional independence  Barriers to Discharge (lives alone with family support,1-2 MAE)   Barriers to Discharge Comments recommend LIfe Alert device upon D/C   Goals   Patient Goals to return home with family support and A   STG Expiration Date 11/03/18   Short Term Goal #1 in 7-10 days:pt will be able to ambulate >300 feet with appropriate DME on various surfaces without rest breaks and no LOB consistently needing S->completely I level of A to A pt to return to PLOF,activity tolerance:45mins/45mins,inc balance 1/2 grade to dec fall risk,inc BLE strength 1/2 grade to A to inc balance,strength,mobility,endurance and to dec pain,BM and transfers S->completely I consistently to and from various surfaces to A pt to return to PLOF,up and down 2 steps with use of appropriate DME S level of A   Treatment Day 0   Plan   Treatment/Interventions Functional transfer training;LE strengthening/ROM; Elevations; Therapeutic exercise; Endurance training;Patient/family training;Equipment eval/education; Bed mobility;Gait training;Spoke to nursing   PT Frequency (3-5x/wk;restorative therapy aide M-F for mobility)   Recommendation   Recommendation Home with family support;Home PT  (cont use of personal DME)   Equipment Recommended Walker  (RW for mobility at this time)   Barthel Index   Feeding 10   Bathing 0   Grooming Score 5   Dressing Score 5   Bladder Score 10   Bowels Score 10   Toilet Use Score 5   Transfers (Bed/Chair) Score 10   Mobility (Level Surface) Score 0   Stairs Score 0   Barthel Index Score 55           @Iram Francois, PT, DPT@

## 2018-10-24 NOTE — DISCHARGE INSTRUCTIONS
Discharge Instructions - Deniz Vasquez 81 y o  female MRN: [id number]  Unit/Bed#: [facility] 824-01    Weight Bearing Status:                                           Weight bearing as tolerated left leg      Pain:  Continue analgesics as directed  Continue Rest, Ice, Elevation, Compression for comfort    PT/OT:  Continue PT/OT on outpatient basis as directed

## 2018-10-24 NOTE — ASSESSMENT & PLAN NOTE
· Most likely secondary to pain and discomfort  · Resume home antihypertensive medication with lisinopril and metoprolol and follow up as necessary

## 2018-10-24 NOTE — ASSESSMENT & PLAN NOTE
· Patient reports mechanical fall prior to admission, left knee pain  · History of multiple surgeries and hardware in left knee      · X-ray left knee shows small cortical step-off w periosteal elevation in the distal femur above the femoral component of the arthroplasty, small periprosthetic fracture not excluded  · Ortho consult - left knee contusion, history of total knee replacement  · Incision well healed with no evidence of infection, able to perform straight leg raise, mild generalized swelling but no palpable effusion, extremity warm/well perfused  · WBAT bl LE, Pain control, DVT ppx, may need rehab, dispo per primary team, ortho will sign off  · PT/OT consult  · Stable for discharge  · At home PT

## 2018-10-25 ENCOUNTER — TRANSITIONAL CARE MANAGEMENT (OUTPATIENT)
Dept: INTERNAL MEDICINE CLINIC | Facility: CLINIC | Age: 83
End: 2018-10-25

## 2018-10-29 ENCOUNTER — OFFICE VISIT (OUTPATIENT)
Dept: INTERNAL MEDICINE CLINIC | Facility: CLINIC | Age: 83
End: 2018-10-29
Payer: MEDICARE

## 2018-10-29 VITALS
HEART RATE: 72 BPM | SYSTOLIC BLOOD PRESSURE: 128 MMHG | BODY MASS INDEX: 41.01 KG/M2 | DIASTOLIC BLOOD PRESSURE: 78 MMHG | RESPIRATION RATE: 14 BRPM | WEIGHT: 177.2 LBS | HEIGHT: 55 IN
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
## 2018-10-29 DIAGNOSIS — S80.02XA CONTUSION OF LEFT KNEE, INITIAL ENCOUNTER: Primary | ICD-10-CM

## 2018-10-29 DIAGNOSIS — I10 ESSENTIAL HYPERTENSION: Chronic | ICD-10-CM

## 2018-10-29 DIAGNOSIS — R26.2 AMBULATORY DYSFUNCTION: ICD-10-CM

## 2018-10-29 DIAGNOSIS — I47.1 SUPRAVENTRICULAR TACHYCARDIA (HCC): Chronic | ICD-10-CM
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
## 2018-10-29 PROCEDURE — 99496 TRANSJ CARE MGMT HIGH F2F 7D: CPT | Performed by: INTERNAL MEDICINE

## 2018-10-29 RX ORDER — A/SINGAPORE/GP1908/2015 IVR-180 (H1N1) (AN A/MICHIGAN/45/2015 (H1N1)PDM09-LIKE VIRUS), A/HONG KONG/4801/2014, NYMC X-263B (H3N2) (AN A/HONG KONG/4801/2014-LIKE VIRUS), AND B/BRISBANE/60/2008, WILD TYPE (A B/BRISBANE/60/2008-LIKE VIRUS) 15; 15; 15 UG/.5ML; UG/.5ML; UG/.5ML
INJECTION, SUSPENSION INTRAMUSCULAR
Refills: 0 | COMMUNITY
Start: 2018-10-12 | End: 2019-05-06

## 2018-10-29 NOTE — PROGRESS NOTES
Assessment/Plan: 1  Health maintenance-patient did received part 1 of Shingrix   2  Status post recent mechanical fall 3  Left knee pain-felt related to the fall  Has never had x-ray in the hospital showed small cortical step-off periosteal elevation of the distal femur just above the femoral component of her arthroplasty  Orthopedic physician felt she had a left knee contusion  She is scheduled for follow-up appoint with the orthopedic group  3  SVT-recently increased ectopy on exam   48 hour Holter showed 18,000 supraventricular ectopic beats with over 300 short runs of SVT longest lasting 7 beach during which she was asymptomatic  There was no ventricular ectopy  Echo shows EF is 60%, no regional wall motion abnormalities, wall thickness upper limits of normal, grade 1 diastolic dysfunction, left atrium mild to moderately dilated, mitral valve calcification of pulmonary artery systolic pressure normal   Cardiology switched her from beta-blocker use with propanolol to metoprolol  4   Hypertension-adequate control on current dose of beta-blocker and ACE-inhibitor  Was on diuretic therapy in the past as well but this is not needed at present  5  General care-she has been taking clorazepate as 3 75 milligrams b i d  rather than p r n     I recommended she taper this as dictated below we went over this in detail    All other problems as per note of August 2018     medical regimen currently on clorazepate 3 75 milligrams b i d -she will decrease to none in the a m  and 1 in the p m  for 2 weeks then decrease to none in the a m  and half in the p m  for 2 weeks then decrease to 1 tablet b i d  p r n , metoprolol ER 50 milligrams in the evening, lisinopril 10 milligrams b i d , Crestor 10 milligrams half tablet 3 times a week, multivitamin, fish oil 1 dose daily, cranberry daily, ophthalmologic vitamins daily, Zantac 150 milligrams in the morning, intermittent use of prednisone drops in the right eye, baby aspirin every other day      This patient will be seen at previously scheduled appointment with previously scheduled labs  No problem-specific Assessment & Plan notes found for this encounter  Diagnoses and all orders for this visit:    Contusion of left knee, initial encounter    Essential hypertension    Supraventricular tachycardia ([address] )    Ambulatory dysfunction    Fall, subsequent encounter    Other orders  -     FLUAD 0 5 ML ERIC; inject 0 5 milliliter intramuscularly  -     SHINGRIX 50 MCG SUSR; inject 0 5 milliliter intramuscularly         Subjective:     Patient ID: Carlos Zhong is a 81 y o  female  She is seen today after recent hospitalization  She was doing yard work and had a mechanical fall after slipping on a wet area  She was admitted to the hospital   Notes were reviewed  She is felt to have a contusion of her left knee which occurred while cleaning her patio  She basically called the house  Left knee x-ray showed small cortical step-off with periosteal elevation of the distal femur new from prior study  Orthopedic physician felt she had left knee contusion  She was sent home for physical therapy  She is found to have mild elevation of her troponin  They recommended she go on aspirin 81 milligrams a day  Notes reviewed from the hospital   Cardiology did not feel this was a non ST MI  Troponin was up 2 5 1  Cholesterol 161 triglycerides 122 HDL 48 LDL 89 CBC normal chemistry profile normal with a creatinine of 0 94 troponin had peaked as high as 0 74  CBC was normal   Area the chest shows no acute issues  She has a at this point  She has a physical therapy in her home  She denies any new weakness of 1 arm and leg compared to the other  She denies any new numbness of 1 arm and leg compared to the other    She denies blurred or double vision or difficulty with her speech    Note she did receive influenza vaccine and has had the 1st part of the new herpes zoster vaccine we went over this in detail  She has a known history of hypertension  She was fearful this had been exacerbated  She is avoiding salt and decongestants  Denies hematemesis pounding of her heart sweats and headache  She was told to take a baby aspirin daily and is reluctant to do that with her history of dyspepsia  We elected to use her aspirin as every other day  She is receiving large amount of support from her family  She has not yet driving  This patient denies any systemic symptoms  Specifically there has been no evidence of fever, night sweats, significant weight loss or significant decrease in appetite  She had minimal head trauma with her fall  She denies any new weakness of 1 arm and leg compared to the other  She denies any new numbness as noted  She does not feel as though she has had marked deterioration of her gait other than the pain associated with left knee injury  Review of Systems   Constitutional:        Recent fall   Musculoskeletal: Positive for arthralgias  Bilateral knee pain-left greater than right         Objective:     Physical Exam   Constitutional: She is oriented to person, place, and time  She appears well-developed and well-nourished  No distress  HENT:   Head: Normocephalic and atraumatic  Right Ear: External ear normal    Left Ear: External ear normal    Nose: Nose normal    Mouth/Throat: Oropharynx is clear and moist  No oropharyngeal exudate  Alopecia   Eyes: Pupils are equal, round, and reactive to light  Conjunctivae and EOM are normal  Right eye exhibits no discharge  Left eye exhibits no discharge  No scleral icterus  Neck: Normal range of motion  Neck supple  No JVD present  No tracheal deviation present  No thyromegaly present  Cardiovascular: Normal rate, regular rhythm, normal heart sounds and intact distal pulses  Exam reveals no gallop and no friction rub  No murmur heard    Pulmonary/Chest: Effort normal and breath sounds normal  No respiratory distress  She has no wheezes  She has no rales  She exhibits no tenderness  Abdominal: Soft  Bowel sounds are normal  She exhibits no distension and no mass  There is no tenderness  There is no rebound and no guarding  Musculoskeletal: Normal range of motion  She exhibits no edema or deformity  Scar from prior surgery  Evidence of recent contusion of both knees left worse than right   Lymphadenopathy:     She has no cervical adenopathy  Neurological: She is alert and oriented to person, place, and time  She has normal reflexes  No cranial nerve deficit  She exhibits normal muscle tone  Coordination normal    Skin: Skin is warm and dry  No rash noted  No erythema  Psychiatric: She has a normal mood and affect  Her behavior is normal  Judgment and thought content normal    Vitals reviewed  Vitals:    10/29/18 1418   BP: 128/78   Pulse: 72   Resp: 14   Weight: 80 4 kg (177 lb 3 2 oz)   Height: 4' 7" (1 397 m)       Transitional Care Management Review:  Marguarite Rinne is a 81 y o  female here for TCM follow up  During the TCM phone call patient stated:    Date and time hospital follow up call was made:  10/25/2018 12:48 PM  Hospital care reviewed:  Records reviewed  Patient was hopsitalized at:  [facility]  Date of admission:  10/22/18  Date of discharge:  10/24/18  Diagnosis:  PALPITATIONS  Disposition:  Home  Scheduled for follow up?:  Yes  Is transportation to your appointments needed:  Yes  Specify why:  PT CAN'T DRIVE  I have advised the patient to call PCP with any new or worsening symptoms (please type in name along with any credentials):   Jocelyn Norwood  Living Arrangements:  Family members  Counseling:  Patient             Jeri Live MD

## 2018-10-30 PROBLEM — I21.A1 MYOCARDIAL INFARCTION TYPE 2 (HCC): Status: ACTIVE | Noted: 2018-10-22

## 2018-12-21 PROBLEM — E66.01 MORBID OBESITY WITH BODY MASS INDEX (BMI) OF 40.0 TO 44.9 IN ADULT (HCC): Status: ACTIVE | Noted: 2018-12-21

## 2018-12-26 ENCOUNTER — APPOINTMENT (OUTPATIENT)
Dept: LAB | Age: 83
End: 2018-12-26
Payer: MEDICARE

## 2018-12-26 DIAGNOSIS — E55.9 VITAMIN D DEFICIENCY: ICD-10-CM

## 2018-12-26 DIAGNOSIS — E78.01 FAMILIAL HYPERCHOLESTEROLEMIA: ICD-10-CM

## 2018-12-26 DIAGNOSIS — I10 HYPERTENSION, UNSPECIFIED TYPE: ICD-10-CM

## 2018-12-26 LAB
25(OH)D3 SERPL-MCNC: 24 NG/ML (ref 30–100)
ALBUMIN SERPL BCP-MCNC: 3.9 G/DL (ref 3.5–5)
ALP SERPL-CCNC: 72 U/L (ref 46–116)
ALT SERPL W P-5'-P-CCNC: 29 U/L (ref 12–78)
ANION GAP SERPL CALCULATED.3IONS-SCNC: 7 MMOL/L (ref 4–13)
AST SERPL W P-5'-P-CCNC: 24 U/L (ref 5–45)
BILIRUB SERPL-MCNC: 0.48 MG/DL (ref 0.2–1)
BUN SERPL-MCNC: 22 MG/DL (ref 5–25)
CALCIUM SERPL-MCNC: 9.2 MG/DL (ref 8.3–10.1)
CHLORIDE SERPL-SCNC: 107 MMOL/L (ref 100–108)
CHOLEST SERPL-MCNC: 155 MG/DL (ref 50–200)
CO2 SERPL-SCNC: 27 MMOL/L (ref 21–32)
CREAT SERPL-MCNC: 0.87 MG/DL (ref 0.6–1.3)
GFR SERPL CREATININE-BSD FRML MDRD: 62 ML/MIN/1.73SQ M
GLUCOSE P FAST SERPL-MCNC: 89 MG/DL (ref 65–99)
HDLC SERPL-MCNC: 50 MG/DL (ref 40–60)
LDLC SERPL DIRECT ASSAY-MCNC: 76 MG/DL (ref 0–100)
POTASSIUM SERPL-SCNC: 3.8 MMOL/L (ref 3.5–5.3)
PROT SERPL-MCNC: 7.1 G/DL (ref 6.4–8.2)
SODIUM SERPL-SCNC: 141 MMOL/L (ref 136–145)
TRIGL SERPL-MCNC: 145 MG/DL

## 2018-12-26 PROCEDURE — 80061 LIPID PANEL: CPT

## 2018-12-26 PROCEDURE — 80053 COMPREHEN METABOLIC PANEL: CPT

## 2018-12-26 PROCEDURE — 83721 ASSAY OF BLOOD LIPOPROTEIN: CPT

## 2018-12-26 PROCEDURE — 36415 COLL VENOUS BLD VENIPUNCTURE: CPT

## 2018-12-26 PROCEDURE — 82306 VITAMIN D 25 HYDROXY: CPT

## 2019-01-02 ENCOUNTER — APPOINTMENT (OUTPATIENT)
Dept: LAB | Age: 84
End: 2019-01-02
Payer: MEDICARE

## 2019-01-02 ENCOUNTER — TRANSCRIBE ORDERS (OUTPATIENT)
Dept: ADMINISTRATIVE | Age: 84
End: 2019-01-02

## 2019-01-02 ENCOUNTER — OFFICE VISIT (OUTPATIENT)
Dept: INTERNAL MEDICINE CLINIC | Facility: CLINIC | Age: 84
End: 2019-01-02
Payer: MEDICARE

## 2019-01-02 VITALS
BODY MASS INDEX: 39.53 KG/M2 | HEART RATE: 72 BPM | DIASTOLIC BLOOD PRESSURE: 80 MMHG | WEIGHT: 170.8 LBS | HEIGHT: 55 IN | RESPIRATION RATE: 14 BRPM | SYSTOLIC BLOOD PRESSURE: 130 MMHG

## 2019-01-02 DIAGNOSIS — R00.2 PALPITATIONS: ICD-10-CM

## 2019-01-02 DIAGNOSIS — F41.9 ANXIETY: Primary | Chronic | ICD-10-CM

## 2019-01-02 LAB
T3 SERPL-MCNC: 1 NG/ML (ref 0.6–1.8)
T4 FREE SERPL-MCNC: 0.8 NG/DL (ref 0.76–1.46)
TSH SERPL DL<=0.05 MIU/L-ACNC: 1.27 UIU/ML (ref 0.36–3.74)

## 2019-01-02 PROCEDURE — G0439 PPPS, SUBSEQ VISIT: HCPCS | Performed by: INTERNAL MEDICINE

## 2019-01-02 PROCEDURE — 99213 OFFICE O/P EST LOW 20 MIN: CPT | Performed by: INTERNAL MEDICINE

## 2019-01-02 PROCEDURE — 36415 COLL VENOUS BLD VENIPUNCTURE: CPT

## 2019-01-02 PROCEDURE — 84480 ASSAY TRIIODOTHYRONINE (T3): CPT

## 2019-01-02 PROCEDURE — 84443 ASSAY THYROID STIM HORMONE: CPT

## 2019-01-02 PROCEDURE — 84439 ASSAY OF FREE THYROXINE: CPT

## 2019-01-02 NOTE — PROGRESS NOTES
Assessment/Plan:  1  Health maintenance-patient is completing Shingrix vaccine  2  Shoulder pain-orthopedic physician-felt to have impingement a wanted to injection  3  Skin lesion of the left leg-biopsy showed chronic inflammation and fibrosis-managed by dermatology-IF UNCHANGED NEXT VISIT MAY NEED REFERRAL BACK TO DERMATOLOGY FOR REPEAT BIOPSY  4  Anxiety-she attributes this to the beta-blocker  I recommended she continue current dose of benzodiazepine  She will repeat thyroid function  I will speak with Cardiology about switching beta-blockers  If unimproved with switch back to her prior dose of propanolol 2 and then need to start an SSRI  5  Prior mechanical fall with left knee pain  X-ray in the hospital showed small cortical step-off periosteal elevation of the distal femur above the femoral component of her arthroplasty  Orthopedic physician felt she had a left knee contusion  6  SVT-had increased ectopy on exam   48 hour Holter showed 18,000 supraventricular ectopic beats with over 300 short runs of SVT with the longus lasting 7 beach during which she was asymptomatic  No ventricular ectopy  Echo shows EF is 60%, no regional wall motion abnormalities, wall thickness upper limits of normal, grade 1 diastolic dysfunction, left atrium mild to moderately dilated, mitral valve calcification and pulmonary artery systolic pressure normal   Cardiology switched her to metoprolol but she feels poorly wants to go back to propanolol  Will speak with Cardiology  Currently she is on metoprolol ER 50 milligrams daily-equivalent dose of propanolol he is 80 milligrams daily  Patient is agreeable to taking 20 milligrams 4 times daily  Await opinion of Cardiology  7  Hypertension-adequate control on current dose of beta-blocker an ACE-inhibitor    Had been on diuretic in past but not needed at present    All other problems as per note of August 2018      MEDICAL REGIMEN:      Clorazepate 3 75 milligrams b i d , metoprolol ER 50 milligrams in the evening but hopefully switching to panel all 20 milligrams q i d , lisinopril 10 milligrams b i d , Crestor 10 milligrams-half tablet 3 times per week, multivitamin, fish oil 1 dose daily, cranberry daily, ophthalmological vitamins daily, Zantac 150 milligrams in the morning, baby aspirin every other day, intermittent use of prednisone eye drops per Ophthalmology    Appointment in 2 months without labs  Await results of thyroid function and opinion of Cardiology    Addendum-free T4, TSH, total T3 normal-await opinion of Cardiology    Addendum-cardiology agrees with switch to propanolol on discontinue metoprolol-she will begin propanolol 20 milligrams q i d  No problem-specific Assessment & Plan notes found for this encounter  Diagnoses and all orders for this visit:    Anxiety    Palpitations  -     T4, free; Future  -     TSH, 3rd generation; Future  -     T3; Future          Subjective:      Patient ID: Eloisa Merida is a 81 y o  female  She was here for her Medicare wellness wanted to discuss another issue  She is noting worsening anxiety  She says when she 1st gets up in the morning she has extreme anxiety  We had been trying to decrease her dose a benzodiazepine but she has not been able to do that completely  She is convinced that it is from her beta-blocker  She was previously on propanolol 20 milligrams t i d  and feels that she is on metoprolol that she has worsening anxiety symptoms  We reviewed that typically beta-blockers decrease anxiety a the wants to come off the metoprolol  I recommended at this point that she had repeat thyroid function  I recommended that I speak with her cardiologist about switching back from equivalent dose of propanolol-specifically he is currently on metoprolol 50 milligrams daily with equivalent propanolol dose being 80 milligrams    I told her she make the switch in she is still just is anxious we will need to start an SSRI  She has not had any weakness of 1 arm and leg compared to the other  She has not had any numbness of 1 arm and leg compared to the other    She had a skin lesion of her leg biopsied and was read as not a cancer but has chronic inflammation and fibrosis  Lesion is still present    She had an injection of her shoulder is overall much improved  [place] to have an impingement syndrome        The following portions of the patient's history were reviewed and updated as appropriate: allergies, current medications, past family history, past medical history, past social history, past surgical history and problem list     Review of Systems   Skin: Positive for wound  Psychiatric/Behavioral: The patient is nervous/anxious  Objective: There were no vitals taken for this visit  Physical Exam   Constitutional: She is oriented to person, place, and time  She appears well-developed and well-nourished  No distress  Patient wears a wig   HENT:   Head: Normocephalic and atraumatic  Right Ear: External ear normal    Left Ear: External ear normal    Nose: Nose normal    Mouth/Throat: Oropharynx is clear and moist  No oropharyngeal exudate  Eyes: Pupils are equal, round, and reactive to light  Conjunctivae and EOM are normal  Right eye exhibits no discharge  Left eye exhibits no discharge  No scleral icterus  Neck: Normal range of motion  Neck supple  No JVD present  No tracheal deviation present  No thyromegaly present  Cardiovascular: Normal rate, regular rhythm, normal heart sounds and intact distal pulses  Exam reveals no gallop and no friction rub  No murmur heard  Pulmonary/Chest: Effort normal and breath sounds normal  No respiratory distress  She has no wheezes  She has no rales  She exhibits no tenderness  Abdominal: Soft  Bowel sounds are normal  She exhibits no distension and no mass  There is no tenderness  There is no rebound and no guarding     Genitourinary: Genitourinary Comments: Evidence of prior breast surgery   Musculoskeletal: Normal range of motion  She exhibits no edema or deformity  Lymphadenopathy:     She has no cervical adenopathy  Neurological: She is alert and oriented to person, place, and time  She has normal reflexes  No cranial nerve deficit  She exhibits normal muscle tone  Coordination normal    Skin: Skin is warm and dry  No rash noted  No erythema  Psychiatric: She has a normal mood and affect  Her behavior is normal  Judgment and thought content normal    Vitals reviewed

## 2019-01-03 ENCOUNTER — TELEPHONE (OUTPATIENT)
Dept: INTERNAL MEDICINE CLINIC | Facility: CLINIC | Age: 84
End: 2019-01-03

## 2019-01-03 NOTE — TELEPHONE ENCOUNTER
----- Message from Raisa Ojeda MD sent at 1/3/2019  9:45 AM EST -----  Please call patient letter no thyroid blood work normal   Let her also know I was in touch with Dr Marcelle Garner and waiting to hear back from her about potential switch back to propanolol

## 2019-01-04 ENCOUNTER — TELEPHONE (OUTPATIENT)
Dept: INTERNAL MEDICINE CLINIC | Facility: CLINIC | Age: 84
End: 2019-01-04

## 2019-01-04 NOTE — TELEPHONE ENCOUNTER
----- Message from Memo Rendon MD sent at 1/4/2019  5:59 AM EST -----  Please call patient and let her know that Dr Josafat Ramos is in agreement with switching back to propanolol-she should discontinue metoprolol and begin propanolol-confirmed that she has propanolol 20 milligram dosing at home-she should take her propanolol as 1 tablet 4 times daily

## 2019-01-04 NOTE — TELEPHONE ENCOUNTER
SPOKE WITH PT, GAVE MESSAGE   SHE STATED THAT SHE THINKS SHE HAS A FULL BOTTLE AND WILL CALL BACK WHEN SHE NEEDS IT REFILLED

## 2019-01-28 DIAGNOSIS — I10 ESSENTIAL HYPERTENSION: ICD-10-CM

## 2019-01-28 RX ORDER — LISINOPRIL 10 MG/1
10 TABLET ORAL 2 TIMES DAILY
Qty: 180 TABLET | Refills: 3 | Status: SHIPPED | OUTPATIENT
Start: 2019-01-28 | End: 2019-07-03 | Stop reason: SDUPTHER

## 2019-01-31 RX ORDER — CLORAZEPATE DIPOTASSIUM 3.75 MG/1
3.75 TABLET ORAL 2 TIMES DAILY
Qty: 180 TABLET | Refills: 3 | Status: CANCELLED | OUTPATIENT
Start: 2019-01-31 | End: 2020-01-31

## 2019-02-21 ENCOUNTER — OFFICE VISIT (OUTPATIENT)
Dept: CARDIOLOGY CLINIC | Facility: CLINIC | Age: 84
End: 2019-02-21
Payer: MEDICARE

## 2019-02-21 VITALS
WEIGHT: 176 LBS | DIASTOLIC BLOOD PRESSURE: 62 MMHG | BODY MASS INDEX: 40.73 KG/M2 | SYSTOLIC BLOOD PRESSURE: 142 MMHG | HEIGHT: 55 IN | HEART RATE: 73 BPM

## 2019-02-21 DIAGNOSIS — I47.1 SUPRAVENTRICULAR TACHYCARDIA (HCC): Primary | Chronic | ICD-10-CM

## 2019-02-21 DIAGNOSIS — I10 ESSENTIAL HYPERTENSION: Chronic | ICD-10-CM

## 2019-02-21 DIAGNOSIS — E78.2 MIXED HYPERLIPIDEMIA: Chronic | ICD-10-CM

## 2019-02-21 PROCEDURE — 99214 OFFICE O/P EST MOD 30 MIN: CPT | Performed by: INTERNAL MEDICINE

## 2019-02-21 RX ORDER — ACETAMINOPHEN 325 MG/1
650 TABLET ORAL EVERY 6 HOURS PRN
Status: ON HOLD | COMMUNITY
End: 2019-11-19 | Stop reason: SDUPTHER

## 2019-02-21 RX ORDER — PROPRANOLOL HYDROCHLORIDE 20 MG/1
20 TABLET ORAL 4 TIMES DAILY
COMMUNITY
End: 2019-07-03 | Stop reason: SDUPTHER

## 2019-02-21 NOTE — PROGRESS NOTES
Cardiology Consultation     Christiano White  [id number]  1935  [facility] [address] [address]    1  Supraventricular tachycardia ([facility] [address] )     2  Essential hypertension     3  Mixed hyperlipidemia       Chief Complaint   Patient presents with    Follow-up     HPI: Patient is here for evaluation and management of frequent PACs and brief runs of SVT seen on Holter  She has a h/o SVT and is maintained on propranolol  She had been feeling some skipped beats, which she feels every day, sporadic throughout the day  No lightheadedness or syncope  She had been on propranolol for many years - and takes it every day  She has not varied her caffeine intake or water intake  She was tolerating the change to Toprol XL, at higher dose - but then had more jitteriness, so back to propranolol  Feels well and with less palpitations  No significant change since last visit  S/p fall in October while raking leaves - fell on her left leg - but was a trip and fall, not from [facility]  She continued with rehab to get stronger         Patient Active Problem List   Diagnosis    Anxiety    Peripheral neuropathy    Hyperlipidemia    Essential hypertension    Arthritis    Atrial fibrillation (HCC)    Difficulty breathing    Diverticulosis    Dizziness    Fatigue    Hyperglycemia    Pain in extremity    Osteopenia    Shortness of breath    Supraventricular tachycardia (HCC)    Vitamin D deficiency    Pain of left hand    Left leg pain    Malignant neoplasm of upper-outer quadrant of right breast in female, estrogen receptor positive ([address] )    Impingement syndrome of left shoulder    Myocardial infarction type 2 ([address] )    Ambulatory dysfunction    Contusion of left knee    Elevated troponin    Palpitations    Fall from ground level    Fall    Morbid obesity with body mass index (BMI) of 40 0 to 44 9 in adult [facility])     Past Medical History:   Diagnosis Date    Anxiety     Arthritis     last assessed 3/24/15     Atherosclerosis     Bell's palsy     Breast carcinoma (HCC)     Broken femur (HCC)     Cardiac disorder     DCIS (ductal carcinoma in situ) of breast     last assessed 4/4/17     Depression     Endometrial polyp     Hypercholesterolemia     resolved 10/22/14     Hyperlipidemia     Hypertension     Long term use of drug     last assessed 5/23/14     Malignant neoplasm without specification of site [facility])     Peripheral neuropathy     PONV (postoperative nausea and vomiting)     Tachycardia     Uterine fibroid      Social History     Socioeconomic History    Marital status:       Spouse name: Not on file    Number of children: Not on file    Years of education: Not on file    Highest education level: Not on file   Occupational History    Occupation: retired from work    Social Needs    Financial resource strain: Not on file    Food insecurity:     Worry: Not on file     Inability: Not on file   [employer] needs:     Medical: Not on file     Non-medical: Not on file   Tobacco Use    Smoking status: Never Smoker    Smokeless tobacco: Never Used   Substance and Sexual Activity    Alcohol use: No     Comment: social drinker per [employer]     Drug use: No    Sexual activity: Not on file   Lifestyle    Physical activity:     Days per week: Not on file     Minutes per session: Not on file    Stress: Not on file   Relationships    Social connections:     Talks on phone: Not on file     Gets together: Not on file     Attends Ca[employer] service: Not on file     Active member of club or organization: Not on file     Attends meetings of clubs or organizations: Not on file     Relationship status: Not on file    Intimate partner violence:     Fear of current or ex partner: Not on file     Emotionally abused: Not on file     Physically abused: Not on file     Forced sexual activity: Not on file   Other Topics Concern    Not on file   Social History Narrative    Coffee     Daily coffee consumption 1 cup day     Daily cola consumption can day     Walking            Family History   Problem Relation Age of Onset    Heart disease Mother         artherosclerosis     Heart disease Father         artherosclerosis     Heart attack Father     Arthritis Family     Heart disease Family         artherosclerosis     Breast cancer Family     Osteoarthritis Family     Supraventricular tachycardia Family     Hypertension Daughter     Anemia Neg Hx     Arrhythmia Neg Hx     Asthma Neg Hx     Clotting disorder Neg Hx     Fainting Neg Hx     Hyperlipidemia Neg Hx     Aneurysm Neg Hx      Past Surgical History:   Procedure Laterality Date    BREAST BIOPSY Right     BREAST LUMPECTOMY      CARPAL TUNNEL RELEASE Bilateral     CATARACT EXTRACTION      DILATION AND CURETTAGE OF UTERUS      ENDOMETRIAL BIOPSY      without cervical dilation     HEMORROIDECTOMY      JOINT REPLACEMENT      KNEE SURGERY Bilateral     OVARIAN CYST REMOVAL Left     MS RECONSTR TOTAL SHOULDER IMPLANT Right 7/18/2017    Procedure: TOTAL SHOULDER REVERSE ARTHROPLASTY;  Surgeon: Sandra Farias MD;  [facility];  Service: Orthopedics    SENTINEL LYMPH NODE BIOPSY      TONSILLECTOMY      TUBAL LIGATION         Current Outpatient Medications:     acetaminophen (TYLENOL) 325 mg tablet, Take 650 mg by mouth every 6 (six) hours as needed for mild pain, Disp: , Rfl:     clorazepate (TRANXENE) 3 75 mg tablet, Take 3 75 mg by mouth 2 (two) times a day  , Disp: , Rfl:     CRANBERRY PO, Take 1,700 mg by mouth daily, Disp: , Rfl:     FLUAD 0 5 ML ERIC, inject 0 5 milliliter intramuscularly, Disp: , Rfl: 0    lisinopril (ZESTRIL) 10 mg tablet, Take 1 tablet (10 mg total) by mouth 2 (two) times a day, Disp: 180 tablet, Rfl: 3    Multiple Vitamins-Minerals (PRESERVISION AREDS 2 PO), Take 2 tablets by mouth daily, Disp: , Rfl:     multivitamin (THERAGRAN) TABS, Take 1 tablet by mouth daily, Disp: , Rfl:     Omega-3 Fatty Acids (FISH OIL PO), Take 1 capsule by mouth daily, Disp: , Rfl:     prednisoLONE acetate (PRED FORTE) 1 % ophthalmic suspension, , Disp: , Rfl: 0    propranolol (INDERAL) 10 mg tablet, Take 20 mg by mouth 4 (four) times a day, Disp: , Rfl:     ranitidine (ZANTAC) 150 mg tablet, Take 1 tablet (150 mg total) by mouth daily, Disp: 90 tablet, Rfl: 3    rosuvastatin (CRESTOR) 5 mg tablet, Take 0 5 tablets by mouth 3 (three) times a week  , Disp: , Rfl: 0    SHINGRIX 50 MCG SUSR, inject 0 5 milliliter intramuscularly, Disp: , Rfl: 0    betamethasone dipropionate (DIPROSONE) 0 05 % cream, Apply 1 application topically daily, Disp: , Rfl:   Allergies   Allergen Reactions    Amoxicillin-Pot Clavulanate GI Intolerance    Azithromycin GI Intolerance and Rash    Cephalexin GI Intolerance     Reaction Date: 28Dec2011;     Cortisone GI Intolerance     Reaction Date: [id number];     Doxycycline GI Intolerance    Penicillins GI Intolerance     Vitals:    02/21/19 1258   BP: 142/62   BP Location: Left arm   Patient Position: Sitting   Cuff Size: Large   Pulse: 73   Weight: 79 8 kg (176 lb)   Height: 4' 7" (1 397 m)       Labs:  Appointment on 01/02/2019   Component Date Value    Free T4 01/02/2019 0 80     TSH 3RD GENERATON 01/02/2019 1 270     T3, Total 01/02/2019 1 00    Appointment on 12/26/2018   Component Date Value    Sodium 12/26/2018 141     Potassium 12/26/2018 3 8     Chloride 12/26/2018 107     CO2 12/26/2018 27     ANION GAP 12/26/2018 7     BUN 12/26/2018 22     Creatinine 12/26/2018 0 87     Glucose, Fasting 12/26/2018 89     Calcium 12/26/2018 9 2     AST 12/26/2018 24     ALT 12/26/2018 29     Alkaline Phosphatase 12/26/2018 72     Total Protein 12/26/2018 7 1     Albumin 12/26/2018 3 9     Total Bilirubin 12/26/2018 0 48     eGFR 12/26/2018 62     Cholesterol 12/26/2018 155     Triglycerides 12/26/2018 145     LDL Direct 12/26/2018 76     HDL, Direct 12/26/2018 50     Vit D, 25-Hydroxy 12/26/2018 24 0*   Admission on 10/22/2018, Discharged on 10/24/2018   Component Date Value    WBC 10/22/2018 8 53     RBC 10/22/2018 4 73     Hemoglobin 10/22/2018 14 6     Hematocrit 10/22/2018 43 9     MCV 10/22/2018 93     MCH 10/22/2018 30 9     MCHC 10/22/2018 33 3     RDW 10/22/2018 12 8     MPV 10/22/2018 10 0     Platelets 97/87/1842 174     nRBC 10/22/2018 0     Neutrophils Relative 10/22/2018 74     Immat GRANS % 10/22/2018 0     Lymphocytes Relative 10/22/2018 15     Monocytes Relative 10/22/2018 7     Eosinophils Relative 10/22/2018 3     Basophils Relative 10/22/2018 1     Neutrophils Absolute 10/22/2018 6 32     Immature Grans Absolute 10/22/2018 0 03     Lymphocytes Absolute 10/22/2018 1 31     Monocytes Absolute 10/22/2018 0 58     Eosinophils Absolute 10/22/2018 0 25     Basophils Absolute 10/22/2018 0 04     Sodium 10/22/2018 140     Potassium 10/22/2018 3 7     Chloride 10/22/2018 106     CO2 10/22/2018 28     ANION GAP 10/22/2018 6     BUN 10/22/2018 22     Creatinine 10/22/2018 0 92     Glucose 10/22/2018 89     Calcium 10/22/2018 9 8     eGFR 10/22/2018 58     Troponin I 10/22/2018 0 33*    Color, UA 10/22/2018 completed     Color, UA 10/22/2018 Josee     Clarity, UA 10/22/2018 Cloudy     pH, UA 10/22/2018 6 0     Leukocytes, UA 10/22/2018 Negative     Nitrite, UA 10/22/2018 Negative     Protein, UA 10/22/2018 100 (2+)*    Glucose, UA 10/22/2018 Negative     Ketones, UA 10/22/2018 Trace*    Urobilinogen, UA 10/22/2018 0 2     Bilirubin, UA 10/22/2018 Negative     Blood, UA 10/22/2018 Negative     Specific Gravity, UA 10/22/2018 >=1 030     RBC, UA 10/22/2018 2-4*    WBC, UA 10/22/2018 4-10*    Epithelial Cells 10/22/2018 Moderate*    Bacteria, UA 10/22/2018 Occasional     Hyaline Casts, UA 10/22/2018 0-3*    Troponin I 10/23/2018 0 74*    Sodium 10/23/2018 140     Potassium 10/23/2018 3 5     Chloride 10/23/2018 106     CO2 10/23/2018 27     ANION GAP 10/23/2018 7     BUN 10/23/2018 23     Creatinine 10/23/2018 0 94     Glucose 10/23/2018 87     Glucose, Fasting 10/23/2018 87     Calcium 10/23/2018 9 2     eGFR 10/23/2018 57     WBC 10/23/2018 7 15     RBC 10/23/2018 4 30     Hemoglobin 10/23/2018 13 2     Hematocrit 10/23/2018 40 2     MCV 10/23/2018 94     MCH 10/23/2018 30 7     MCHC 10/23/2018 32 8     RDW 10/23/2018 13 1     MPV 10/23/2018 9 7     Platelets 00/00/9571 163     nRBC 10/23/2018 0     Neutrophils Relative 10/23/2018 56     Immat GRANS % 10/23/2018 0     Lymphocytes Relative 10/23/2018 28     Monocytes Relative 10/23/2018 9     Eosinophils Relative 10/23/2018 6     Basophils Relative 10/23/2018 1     Neutrophils Absolute 10/23/2018 4 02     Immature Grans Absolute 10/23/2018 0 03     Lymphocytes Absolute 10/23/2018 2 00     Monocytes Absolute 10/23/2018 0 64     Eosinophils Absolute 10/23/2018 0 41     Basophils Absolute 10/23/2018 0 05     Cholesterol 10/23/2018 161     Triglycerides 10/23/2018 122     HDL, Direct 10/23/2018 48     LDL Calculated 10/23/2018 89     Non-HDL-Chol (CHOL-HDL) 10/23/2018 113     Troponin I 10/23/2018 0 51*    Ventricular Rate 10/22/2018 74     Atrial Rate 10/22/2018 74     NM Interval 10/22/2018 196     QRSD Interval 10/22/2018 76     QT Interval 10/22/2018 370     QTC Interval 10/22/2018 410     P Axis 10/22/2018 47     QRS Axis 10/22/2018 4     T Wave Axis 10/22/2018 20     Ventricular Rate 10/22/2018 83     Atrial Rate 10/22/2018 83     NM Interval 10/22/2018 198     QRSD Interval 10/22/2018 78     QT Interval 10/22/2018 354     QTC Interval 10/22/2018 415     P Axis 10/22/2018 32     QRS Axis 10/22/2018 -1     T Wave [place] 10/22/2018 4      Lab Results   Component Value Date    TRIG 145 12/26/2018    TRIG 125 12/22/2015    HDL 50 12/26/2018    HDL 57 12/22/2015    LDLDIRECT 76 12/26/2018    LDLDIRECT 98 12/22/2015     Imaging: No results found  Review of Systems:  Review of Systems   Constitutional: Negative for activity change, appetite change, chills, diaphoresis, fatigue and unexpected weight change  HENT: Negative for hearing loss, nosebleeds and sore throat  Eyes: Negative for photophobia and visual disturbance  Respiratory: Negative for cough, chest tightness, shortness of breath and wheezing  Cardiovascular: Negative for chest pain, palpitations and leg swelling  Gastrointestinal: Negative for abdominal pain, diarrhea, nausea and vomiting  Endocrine: Negative for polyuria  Genitourinary: Negative for dysuria, frequency and hematuria  Musculoskeletal: Negative for arthralgias, back pain, gait problem and neck pain  Skin: Negative for pallor and rash  Neurological: Negative for dizziness, syncope and headaches  Hematological: Does not bruise/bleed easily  Psychiatric/Behavioral: Negative for behavioral problems and confusion  Physical Exam:  Physical Exam   Constitutional: She is oriented to person, place, and time  She appears well-developed and well-nourished  HENT:   Head: Normocephalic and atraumatic  Nose: Nose normal    Eyes: Pupils are equal, round, and reactive to light  EOM are normal  No scleral icterus  Neck: Normal range of motion  Neck supple  No JVD present  Cardiovascular: Normal rate, regular rhythm and normal heart sounds  Exam reveals no gallop and no friction rub  No murmur heard  Pulmonary/Chest: Effort normal and breath sounds normal  No respiratory distress  She has no wheezes  She has no rales  Abdominal: Soft  Bowel sounds are normal  She exhibits no distension  There is no tenderness  Musculoskeletal: Normal range of motion  She exhibits no edema or deformity  Neurological: She is alert and oriented to person, place, and time   No cranial nerve deficit  Skin: Skin is warm and dry  No rash noted  She is not diaphoretic  Psychiatric: She has a normal mood and affect  Her behavior is normal    Vitals reviewed  Blood pressure 142/62, pulse 73, height 4' 7" (1 397 m), weight 79 8 kg (176 lb)  EKG:  Normal sinus rhythm  Normal ECG    Discussion/Summary:  SVT: changed propranolol to Toprol, symptoms are skipped beats - she did have multiple short runs of SVT, longest lasting 7 beats - that feel improved on Toprol  We also checked an echo that ruled out any structural repercussions from having frequent brief runs of SVT  Continue current regimen  Now back on propranolol due to shakiness with Toprol  HTN: improved today compared to prior visit  HLD: continued on statin

## 2019-02-21 NOTE — PATIENT INSTRUCTIONS
Supraventricular Tachycardia   AMBULATORY CARE:   Supraventricular tachycardia (SVT)  is a condition that causes your heart to beat much faster than it should  SVT is a type of abnormal heart rhythm, called an arrhythmia, that starts in the upper part of your heart  It may last from a few seconds or hours to several days  Common symptoms include the following:   · A pounding, racing, or fluttering heartbeat    · Fatigue, weakness, or shortness of breath    · Feeling lightheaded, dizzy, or faint  · Pain, pressure, or tightness in your chest, neck, jaw, arms, or upper back    · Nausea    · Feeling anxious, scared, or worried that something bad may happen  Call 911 for the following:   · You have any of the following signs of a heart attack:      ¨ Squeezing, pressure, or pain in your chest that lasts longer than 5 minutes or returns    ¨ Discomfort or pain in your back, neck, jaw, stomach, or arm     ¨ Trouble breathing    ¨ Nausea or vomiting    ¨ Lightheadedness or a sudden cold sweat, especially with chest pain or trouble breathing    Seek care immediately if:   · You have dizziness, lightheadedness, or feel faint  · You have sudden numbness or weakness in your arms or legs  Contact your healthcare provider if:   · Your symptoms get worse, or you have new symptoms  · You have swelling in your ankles or feet  · You have questions or concerns about your condition or care  Treatment  may depend on what is causing SVT and your symptoms  You may not need treatment for SVT  Instead you may need medicine or other procedures to control your heart rate  How to manage or prevent SVT:   · Perform vagal maneuvers as directed when you have symptoms of SVT  Lie down flat and bear down like you are having a bowel movement  Do this for 10 to 30 seconds  · Do not drink caffeine or alcohol  These can increase your risk for SVT  · Keep a record of your symptoms    Write down what you ate or what you were doing before an episode of SVT  Also write down anything you did to make the SVT stop  Bring your record to follow up visits with your healthcare provider  · Eat heart-healthy foods  These include fruits, vegetables, whole-grain breads, low-fat dairy products, beans, lean meats, and fish  Replace butter and margarine with heart-healthy oils such as olive oil and canola oil  · Exercise regularly and maintain a healthy weight  Ask about the best exercise plan for you  Ask your healthcare provider how much you should weigh  Ask him to help you create a weight loss plan if you are overweight  · Do not smoke  Nicotine and other chemicals in cigarettes and cigars can cause heart and lung damage  Ask your healthcare provider for information if you currently smoke and need help to quit  E-cigarettes or smokeless tobacco still contain nicotine  Talk to your healthcare provider before you use these products  Follow up with your healthcare provider as directed:  Write down your questions so you remember to ask them during your visits  © 2017 [facility] Information is for End User's use only and may not be sold, redistributed or otherwise used for commercial purposes  All illustrations and images included in CareNotes® are the copyrighted property of [employer]  or [employer] Cardenas  The above information is an  only  It is not intended as medical advice for individual conditions or treatments  Talk to your doctor, nurse or pharmacist before following any medical regimen to see if it is safe and effective for you

## 2019-02-27 ENCOUNTER — TRANSCRIBE ORDERS (OUTPATIENT)
Dept: ADMINISTRATIVE | Age: 84
End: 2019-02-27

## 2019-02-27 ENCOUNTER — APPOINTMENT (OUTPATIENT)
Dept: LAB | Age: 84
End: 2019-02-27
Payer: MEDICARE

## 2019-02-27 DIAGNOSIS — R00.2 PALPITATIONS: ICD-10-CM

## 2019-02-27 DIAGNOSIS — R00.2 PALPITATIONS: Primary | ICD-10-CM

## 2019-02-27 LAB
T3 SERPL-MCNC: 1 NG/ML (ref 0.6–1.8)
T4 FREE SERPL-MCNC: 0.81 NG/DL (ref 0.76–1.46)
TSH SERPL DL<=0.05 MIU/L-ACNC: 1.48 UIU/ML (ref 0.36–3.74)

## 2019-02-27 PROCEDURE — 84443 ASSAY THYROID STIM HORMONE: CPT

## 2019-02-27 PROCEDURE — 84480 ASSAY TRIIODOTHYRONINE (T3): CPT

## 2019-02-27 PROCEDURE — 36415 COLL VENOUS BLD VENIPUNCTURE: CPT

## 2019-02-27 PROCEDURE — 84439 ASSAY OF FREE THYROXINE: CPT

## 2019-03-06 ENCOUNTER — OFFICE VISIT (OUTPATIENT)
Dept: INTERNAL MEDICINE CLINIC | Facility: CLINIC | Age: 84
End: 2019-03-06
Payer: MEDICARE

## 2019-03-06 VITALS
RESPIRATION RATE: 14 BRPM | DIASTOLIC BLOOD PRESSURE: 80 MMHG | HEIGHT: 55 IN | SYSTOLIC BLOOD PRESSURE: 130 MMHG | HEART RATE: 72 BPM | WEIGHT: 175.2 LBS | BODY MASS INDEX: 40.55 KG/M2

## 2019-03-06 DIAGNOSIS — E78.5 HYPERLIPIDEMIA, UNSPECIFIED HYPERLIPIDEMIA TYPE: Primary | Chronic | ICD-10-CM

## 2019-03-06 DIAGNOSIS — I10 ESSENTIAL HYPERTENSION: ICD-10-CM

## 2019-03-06 PROCEDURE — 99214 OFFICE O/P EST MOD 30 MIN: CPT | Performed by: INTERNAL MEDICINE

## 2019-03-06 NOTE — PROGRESS NOTES
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 and responded to treatment by orthopedic physician  3  Skin lesion of the left leg-biopsy showed chronic inflammation and fibrosis-managed by dermatology- patient states now resolved  4  Anxiety- patient felt much worse on metoprolol feels this beta-blocker aggravated her anxiety  Now back in propanolol in feeling better and using her benzodiazepine much last   Thyroid function normal  5  Prior mechanical fall with left knee pain  X-ray in the hospital showed small cortical step-off periosteal elevation of the distal femur above the femoral component of her arthroplasty  Orthopedic physician felt she had a left knee contusion  6  SVT-had increased ectopy on exam   48 hour Holter showed 18,000 supraventricular ectopic beats with over 300 short runs of SVT with the longus lasting 7 beach during which she was asymptomatic  No ventricular ectopy  Echo shows EF is 60%, no regional wall motion abnormalities, wall thickness upper limits of normal, grade 1 diastolic dysfunction, left atrium mild to moderately dilated, mitral valve calcification and pulmonary artery systolic pressure normal   Cardiology switched her to metoprolol but she  Felt poorly  At this point now back on propanolol 20 milligrams q i d  In feeling much better with much less frequent symptoms  7  Hypertension-adequate control on current dose of beta-blocker an ACE-inhibitor  Had been on diuretic in past but not needed at present     All other problems as per note of August 2018        MEDICAL REGIMEN:                                                  Clorazepate 3 75 milligrams b i d p r n  Leander Albertinaer , propanolol 20 milligrams q i d , lisinopril 10 milligrams b i d , Crestor 5 milligrams-half tablet 3 times per week, multivitamin, fish oil 1 dose daily, cranberry daily, ophthalmological vitamins daily, Zantac 150 milligrams in the morning, intermittent use of prednisone eye drops per Ophthalmology, acetaminophen as needed  Appointment in several months with prior chemistry profile, cholesterol profile    Addendum- patient was found to have abnormal mammogram with diagnostic imaging of the right breast showing a mass with biopsy showing invasive ductal carcinoma -ERPR positive HER2 Zander pending -she saw Debbie Colunga  On April 5th -she feels this is a new breast cancer and 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 -and a completely different quadrant  They elected to proceed with lumpectomy and sentinel node valve  He felt she should not require chemo  He felt she should have hormonal therapy if she has lumpectomy alone  She is having lobectomy with sentinel node biopsy without radiation  Addendum -patient underwent lumpectomy, breast needle localization with simple lymph node biopsy  on May 8 of 2019    Addendum- seen by Oncology in May 2019 commented she has invasive ductal carcinoma ER 95% MN 95% HER2 Zander negative 1 simple lymph node negative  Pathology showed margins negative  She was placed on anastrozole 1 milligram daiLY FOR 5 YEARS REVIEW NEXT VISIT     No problem-specific Assessment & Plan notes found for this encounter  Diagnoses and all orders for this visit:    Hyperlipidemia, unspecified hyperlipidemia type  -     Comprehensive metabolic panel; Future  -     Cholesterol, total; Future  -     Triglycerides; Future  -     HDL cholesterol; Future  -     LDL cholesterol, direct; Future    Essential hypertension  -     Comprehensive metabolic panel; Future  -     Cholesterol, total; Future  -     Triglycerides; Future  -     HDL cholesterol; Future  -     LDL cholesterol, direct; Future          Subjective:      Patient ID: Renetta Thrasher is a 81 y o  female  She feels clearly better using propanolol rather than metoprolol  She says she is less anxious and has fewer palpitations    We talked about this in detail  She has not had any syncope or near syncope  As noted I had been in touch with Cardiology in [place] to switch from metoprolol to propranolol  She personally prefers this med  We reviewed about the different types of beta-blockers and she understands this  He has known hypertension  BP adequately controlled on current regimen  Avoiding salt and decongestants  She denies hematemesis pounding of heart sweats and headache  She is having difficulty losing weight  We reviewed the literature in reference to this  Much of this is because of her  Limited mobility with her knees  She is in the same home that she has been for over 40 years with a fair amount of steps  She is thinking about moving and downsizing  We talked about this in detail    She still has some pain at the site of her left knee  That is related to recent fall  She says it is better but still an issue  She also stated that the area of her left lower leg resolved  She remains on her statin  She understands the difference between status associated myalgias and arthralgias from degenerative arthritis  She had laboratory testing done recently showing total T3 TSH free T4 normal     This patient denies any systemic symptoms  Specifically there has been no evidence of fever, night sweats, significant weight loss or significant decrease in appetite  We had a long discussion today regarding the above under social situation  She is trying to become more active be involved in [employer] etc   This was a 30 minutes visit with more than 50% time spent counseling the patient formulating a treatment plan    Multiple questions answered      The following portions of the patient's history were reviewed and updated as appropriate: allergies, current medications, past family history, past medical history, past social history, past surgical history and problem list     Review of Systems   Constitutional: Negative  Respiratory: Negative  Cardiovascular: Positive for palpitations  Gastrointestinal: Negative  Endocrine: Negative  Genitourinary: Negative  Musculoskeletal: Negative  Bilateral knee pain   Neurological: Negative  Hematological: Negative  Psychiatric/Behavioral: Negative  Objective:      Ht 4' 7" (1 397 m)   Wt 79 5 kg (175 lb 3 2 oz)   BMI 40 72 kg/m²          Physical Exam   Constitutional: She is oriented to person, place, and time  She appears well-developed and well-nourished  No distress  HENT:   Head: Normocephalic and atraumatic  Right Ear: External ear normal    Left Ear: External ear normal    Nose: Nose normal    Mouth/Throat: Oropharynx is clear and moist  No oropharyngeal exudate  Eyes: Pupils are equal, round, and reactive to light  Conjunctivae and EOM are normal  Right eye exhibits no discharge  Left eye exhibits no discharge  No scleral icterus  Neck: Normal range of motion  Neck supple  No JVD present  No tracheal deviation present  No thyromegaly present  Cardiovascular: Normal rate, regular rhythm and intact distal pulses  Exam reveals no gallop and no friction rub  No murmur heard  Pulmonary/Chest: Effort normal and breath sounds normal  No respiratory distress  She has no wheezes  She has no rales  She exhibits no tenderness  Abdominal: Soft  Bowel sounds are normal  She exhibits no distension and no mass  There is no tenderness  There is no rebound and no guarding  Musculoskeletal: She exhibits no edema or deformity  Evidence of bilateral knee surgery with decreased range of motion of both knees   Lymphadenopathy:     She has no cervical adenopathy  Neurological: She is alert and oriented to person, place, and time  She has normal reflexes  She displays normal reflexes  No cranial nerve deficit  She exhibits normal muscle tone  Coordination normal    Skin: Skin is warm and dry  No rash noted  No erythema     Psychiatric: She has a normal mood and affect  Her behavior is normal  Judgment and thought content normal    Vitals reviewed

## 2019-03-11 ENCOUNTER — HOSPITAL ENCOUNTER (OUTPATIENT)
Dept: RADIOLOGY | Age: 84
Discharge: HOME | End: 2019-03-11
Payer: MEDICARE

## 2019-03-11 VITALS — WEIGHT: 176 LBS | BODY MASS INDEX: 40.73 KG/M2 | HEIGHT: 55 IN

## 2019-03-11 DIAGNOSIS — Z12.31 VISIT FOR SCREENING MAMMOGRAM: ICD-10-CM

## 2019-03-11 PROCEDURE — 77067 SCR MAMMO BI INCL CAD: CPT

## 2019-03-19 ENCOUNTER — HOSPITAL ENCOUNTER (OUTPATIENT)
Dept: ULTRASOUND IMAGING | Facility: CLINIC | Age: 84
Discharge: HOME | End: 2019-03-19
Payer: MEDICARE

## 2019-03-19 ENCOUNTER — HOSPITAL ENCOUNTER (OUTPATIENT)
Dept: MAMMOGRAPHY | Facility: CLINIC | Age: 84
Discharge: HOME | End: 2019-03-19
Payer: MEDICARE

## 2019-03-19 VITALS — HEIGHT: 55 IN | WEIGHT: 176 LBS | BODY MASS INDEX: 40.73 KG/M2

## 2019-03-19 DIAGNOSIS — R92.8 ABNORMAL MAMMOGRAM: ICD-10-CM

## 2019-03-19 PROCEDURE — G0279 TOMOSYNTHESIS, MAMMO: HCPCS

## 2019-03-19 PROCEDURE — 76642 ULTRASOUND BREAST LIMITED: CPT

## 2019-03-19 PROCEDURE — 77065 DX MAMMO INCL CAD UNI: CPT

## 2019-03-19 NOTE — PROGRESS NOTES
Met with patient and Dr Brianna Chilel regarding recommendation for;      __x___ RIGHT ______LEFT      __x___Ultrasound guided  ______Stereotactic  Breast biopsy  __x___Verbalized understanding        Blood thinners:  _____yes __x___no    Date stopped: ____n/a_______    Biopsy teaching sheet given:  ___x____yes ______no

## 2019-03-25 RX ORDER — CLORAZEPATE DIPOTASSIUM 3.75 MG/1
3.75 TABLET ORAL 2 TIMES DAILY
Qty: 90 TABLET | Refills: 0 | Status: CANCELLED | OUTPATIENT
Start: 2019-03-25 | End: 2019-05-09

## 2019-03-28 ENCOUNTER — HOSPITAL ENCOUNTER (OUTPATIENT)
Dept: ULTRASOUND IMAGING | Facility: CLINIC | Age: 84
Discharge: HOME | End: 2019-03-28
Payer: MEDICARE

## 2019-03-28 ENCOUNTER — HOSPITAL ENCOUNTER (OUTPATIENT)
Dept: MAMMOGRAPHY | Facility: CLINIC | Age: 84
Discharge: HOME | End: 2019-03-28
Payer: MEDICARE

## 2019-03-28 VITALS
HEART RATE: 80 BPM | SYSTOLIC BLOOD PRESSURE: 126 MMHG | BODY MASS INDEX: 40.27 KG/M2 | HEIGHT: 55 IN | DIASTOLIC BLOOD PRESSURE: 64 MMHG | WEIGHT: 174 LBS

## 2019-03-28 DIAGNOSIS — R92.8 ABNORMAL MAMMOGRAM: ICD-10-CM

## 2019-03-28 PROCEDURE — 88341 IMHCHEM/IMCYTCHM EA ADD ANTB: CPT | Performed by: PATHOLOGY

## 2019-03-28 PROCEDURE — 88342 IMHCHEM/IMCYTCHM 1ST ANTB: CPT | Performed by: PATHOLOGY

## 2019-03-28 PROCEDURE — 88305 TISSUE EXAM BY PATHOLOGIST: CPT | Performed by: PATHOLOGY

## 2019-03-28 PROCEDURE — 88361 TUMOR IMMUNOHISTOCHEM/COMPUT: CPT | Performed by: PATHOLOGY

## 2019-03-28 PROCEDURE — 19083 BX BREAST 1ST LESION US IMAG: CPT

## 2019-03-28 PROCEDURE — 88367 INSITU HYBRIDIZATION AUTO: CPT | Performed by: PATHOLOGY

## 2019-03-28 RX ORDER — LIDOCAINE HYDROCHLORIDE 10 MG/ML
4 INJECTION, SOLUTION INFILTRATION; PERINEURAL ONCE
Status: COMPLETED | OUTPATIENT
Start: 2019-03-28 | End: 2019-03-28

## 2019-03-28 RX ADMIN — LIDOCAINE HYDROCHLORIDE 4 ML: 10 INJECTION, SOLUTION INFILTRATION; PERINEURAL at 14:43

## 2019-03-28 NOTE — DISCHARGE INSTR - OTHER ORDERS
POST LARGE CORE BREAST BIOPSY PATIENT INFORMATION      1  Place an ice pack inside your bra over the top of the dressing every hour for 20 minutes (20 minutes on, 60 minutes off)  Do this until bedtime  2  Do not shower or bathe until the following morning  3  You may bathe your breast carefully with the steri-strips in place  Be careful    Not to loosen them  The steri-strips will fall off in 3-5 days  4  You may have mild discomfort, and you may have some bruising where the   Needle entered the skin  This should clear within 5-7 days  5  If you need medicine for discomfort, take acetaminophen products such as   Tylenol  You may also take Advil or Motrin products  6  Do not participate in strenuous activities such as-tennis, aerobics, skiing,  Weight lifting, etc  for 24 hours  Refrain from swimming/soaking for 72 hours  7  Wearing a bra for sleeping may be more comfortable for the first 24-48 hours  8  Watch for continued bleeding, pain or fever over 101; please call with any questions or concerns  For procedures done at the [facility]  [address] call:  Gloria Delgado RN at [phone]  Ryan Beltran RN at [phone]                    *After 4 PM call the Interventional Radiology Department                    [phone] and ask to speak with the nurse on call  For procedures done at the [address] [place] [address] call:         Elizabeth Epperson RN at   *After 4 PM call the Interventional Radiology Department   [phone] and ask to speak with the nurse on call  For procedures done at [address] [place] [address] call: The Radiology Nurse at [phone]  *After 4 PM call your physician, or go to the Emergency Department  9          The final results of your biopsy are usually available within one week

## 2019-03-28 NOTE — PROGRESS NOTES
Procedure type:    __x___ultrasound guided _____stereotactic    Breast:    _____Left __x___Right    Location: 9:00 3cm from nipple    Needle: 12g Carolyn    # of passes: 3    Clip: Hydromark-open coil    Performed by: Dr Payne held for 5 minutes by: Casey Butler Strips:    __x___yes _____no    Band aid:    __x___yes_____no    Tape and guaze:    _____yes ___x__no    Tolerated procedure:    __x___yes _____no

## 2019-03-29 NOTE — PROGRESS NOTES
Post procedure call completed on 3/29/19 @ 1313    Bleeding: _____yes __x___no    Pain: _____yes __x____no    Redness/Swelling: ______yes ____x__no    Band aid removed: __x___yes _____no    Steri-Strips intact: ___x___yes _____no

## 2019-04-02 ENCOUNTER — TELEPHONE (OUTPATIENT)
Dept: SURGICAL ONCOLOGY | Facility: CLINIC | Age: 84
End: 2019-04-02

## 2019-04-04 PROBLEM — Z17.0 CARCINOMA OF UPPER-OUTER QUADRANT OF RIGHT BREAST IN FEMALE, ESTROGEN RECEPTOR POSITIVE (HCC): Status: ACTIVE | Noted: 2019-04-04

## 2019-04-04 PROBLEM — C50.411 CARCINOMA OF UPPER-OUTER QUADRANT OF RIGHT BREAST IN FEMALE, ESTROGEN RECEPTOR POSITIVE (HCC): Status: ACTIVE | Noted: 2019-04-04

## 2019-04-04 PROBLEM — Z17.0 MALIGNANT NEOPLASM OF UPPER-OUTER QUADRANT OF RIGHT BREAST IN FEMALE, ESTROGEN RECEPTOR POSITIVE (HCC): Status: RESOLVED | Noted: 2018-04-02 | Resolved: 2019-04-04

## 2019-04-04 PROBLEM — C50.411 MALIGNANT NEOPLASM OF UPPER-OUTER QUADRANT OF RIGHT BREAST IN FEMALE, ESTROGEN RECEPTOR POSITIVE (HCC): Status: RESOLVED | Noted: 2018-04-02 | Resolved: 2019-04-04

## 2019-04-05 ENCOUNTER — OFFICE VISIT (OUTPATIENT)
Dept: SURGICAL ONCOLOGY | Facility: CLINIC | Age: 84
End: 2019-04-05
Payer: MEDICARE

## 2019-04-05 VITALS
BODY MASS INDEX: 41.19 KG/M2 | RESPIRATION RATE: 14 BRPM | WEIGHT: 178 LBS | HEIGHT: 55 IN | TEMPERATURE: 97.8 F | HEART RATE: 66 BPM | DIASTOLIC BLOOD PRESSURE: 80 MMHG | SYSTOLIC BLOOD PRESSURE: 140 MMHG

## 2019-04-05 DIAGNOSIS — Z17.0 CARCINOMA OF UPPER-OUTER QUADRANT OF RIGHT BREAST IN FEMALE, ESTROGEN RECEPTOR POSITIVE (HCC): Primary | ICD-10-CM

## 2019-04-05 DIAGNOSIS — C50.411 CARCINOMA OF UPPER-OUTER QUADRANT OF RIGHT BREAST IN FEMALE, ESTROGEN RECEPTOR POSITIVE (HCC): Primary | ICD-10-CM

## 2019-04-05 PROCEDURE — 99215 OFFICE O/P EST HI 40 MIN: CPT | Performed by: SURGERY

## 2019-04-05 RX ORDER — HYDROCODONE BITARTRATE AND ACETAMINOPHEN 5; 325 MG/1; MG/1
1 TABLET ORAL EVERY 6 HOURS PRN
Qty: 10 TABLET | Refills: 0 | Status: SHIPPED | OUTPATIENT
Start: 2019-04-05 | End: 2019-05-24 | Stop reason: HOSPADM

## 2019-04-05 RX ORDER — VANCOMYCIN HYDROCHLORIDE 1 G/200ML
1000 INJECTION, SOLUTION INTRAVENOUS ONCE
Status: CANCELLED | OUTPATIENT
Start: 2019-04-05 | End: 2019-04-05

## 2019-05-02 ENCOUNTER — TELEPHONE (OUTPATIENT)
Dept: SURGICAL ONCOLOGY | Facility: CLINIC | Age: 84
End: 2019-05-02

## 2019-05-02 ENCOUNTER — TRANSCRIBE ORDERS (OUTPATIENT)
Dept: ADMINISTRATIVE | Age: 84
End: 2019-05-02

## 2019-05-02 ENCOUNTER — APPOINTMENT (OUTPATIENT)
Dept: RADIOLOGY | Age: 84
End: 2019-05-02
Payer: MEDICARE

## 2019-05-02 ENCOUNTER — APPOINTMENT (OUTPATIENT)
Dept: LAB | Age: 84
End: 2019-05-02
Payer: MEDICARE

## 2019-05-02 ENCOUNTER — OFFICE VISIT (OUTPATIENT)
Dept: LAB | Age: 84
End: 2019-05-02
Payer: MEDICARE

## 2019-05-02 DIAGNOSIS — Z17.0 CARCINOMA OF UPPER-OUTER QUADRANT OF RIGHT BREAST IN FEMALE, ESTROGEN RECEPTOR POSITIVE (HCC): ICD-10-CM

## 2019-05-02 DIAGNOSIS — C50.411 CARCINOMA OF UPPER-OUTER QUADRANT OF RIGHT BREAST IN FEMALE, ESTROGEN RECEPTOR POSITIVE (HCC): ICD-10-CM

## 2019-05-02 LAB
ALBUMIN SERPL BCP-MCNC: 4.2 G/DL (ref 3.5–5)
ALP SERPL-CCNC: 76 U/L (ref 46–116)
ALT SERPL W P-5'-P-CCNC: 27 U/L (ref 12–78)
ANION GAP SERPL CALCULATED.3IONS-SCNC: 2 MMOL/L (ref 4–13)
AST SERPL W P-5'-P-CCNC: 22 U/L (ref 5–45)
ATRIAL RATE: 65 BPM
BASOPHILS # BLD AUTO: 0.06 THOUSANDS/ΜL (ref 0–0.1)
BASOPHILS NFR BLD AUTO: 1 % (ref 0–1)
BILIRUB SERPL-MCNC: 0.59 MG/DL (ref 0.2–1)
BUN SERPL-MCNC: 34 MG/DL (ref 5–25)
CALCIUM SERPL-MCNC: 8.9 MG/DL (ref 8.3–10.1)
CHLORIDE SERPL-SCNC: 108 MMOL/L (ref 100–108)
CO2 SERPL-SCNC: 29 MMOL/L (ref 21–32)
CREAT SERPL-MCNC: 0.99 MG/DL (ref 0.6–1.3)
EOSINOPHIL # BLD AUTO: 0.47 THOUSAND/ΜL (ref 0–0.61)
EOSINOPHIL NFR BLD AUTO: 7 % (ref 0–6)
ERYTHROCYTE [DISTWIDTH] IN BLOOD BY AUTOMATED COUNT: 12.9 % (ref 11.6–15.1)
GFR SERPL CREATININE-BSD FRML MDRD: 53 ML/MIN/1.73SQ M
GLUCOSE P FAST SERPL-MCNC: 114 MG/DL (ref 65–99)
HCT VFR BLD AUTO: 44.2 % (ref 34.8–46.1)
HGB BLD-MCNC: 14.8 G/DL (ref 11.5–15.4)
IMM GRANULOCYTES # BLD AUTO: 0.02 THOUSAND/UL (ref 0–0.2)
IMM GRANULOCYTES NFR BLD AUTO: 0 % (ref 0–2)
LYMPHOCYTES # BLD AUTO: 1.83 THOUSANDS/ΜL (ref 0.6–4.47)
LYMPHOCYTES NFR BLD AUTO: 25 % (ref 14–44)
MCH RBC QN AUTO: 31.7 PG (ref 26.8–34.3)
MCHC RBC AUTO-ENTMCNC: 33.5 G/DL (ref 31.4–37.4)
MCV RBC AUTO: 95 FL (ref 82–98)
MONOCYTES # BLD AUTO: 0.43 THOUSAND/ΜL (ref 0.17–1.22)
MONOCYTES NFR BLD AUTO: 6 % (ref 4–12)
NEUTROPHILS # BLD AUTO: 4.44 THOUSANDS/ΜL (ref 1.85–7.62)
NEUTS SEG NFR BLD AUTO: 61 % (ref 43–75)
NRBC BLD AUTO-RTO: 0 /100 WBCS
P AXIS: 40 DEGREES
PLATELET # BLD AUTO: 170 THOUSANDS/UL (ref 149–390)
PMV BLD AUTO: 10.4 FL (ref 8.9–12.7)
POTASSIUM SERPL-SCNC: 4 MMOL/L (ref 3.5–5.3)
PR INTERVAL: 186 MS
PROT SERPL-MCNC: 7.2 G/DL (ref 6.4–8.2)
QRS AXIS: 10 DEGREES
QRSD INTERVAL: 70 MS
QT INTERVAL: 400 MS
QTC INTERVAL: 416 MS
RBC # BLD AUTO: 4.67 MILLION/UL (ref 3.81–5.12)
SODIUM SERPL-SCNC: 139 MMOL/L (ref 136–145)
T WAVE AXIS: 31 DEGREES
VENTRICULAR RATE: 65 BPM
WBC # BLD AUTO: 7.25 THOUSAND/UL (ref 4.31–10.16)

## 2019-05-02 PROCEDURE — 85025 COMPLETE CBC W/AUTO DIFF WBC: CPT

## 2019-05-02 PROCEDURE — 71046 X-RAY EXAM CHEST 2 VIEWS: CPT

## 2019-05-02 PROCEDURE — 93005 ELECTROCARDIOGRAM TRACING: CPT

## 2019-05-02 PROCEDURE — 36415 COLL VENOUS BLD VENIPUNCTURE: CPT

## 2019-05-02 PROCEDURE — 93010 ELECTROCARDIOGRAM REPORT: CPT | Performed by: INTERNAL MEDICINE

## 2019-05-02 PROCEDURE — 80053 COMPREHEN METABOLIC PANEL: CPT

## 2019-05-07 ENCOUNTER — TELEPHONE (OUTPATIENT)
Dept: INTERNAL MEDICINE CLINIC | Facility: CLINIC | Age: 84
End: 2019-05-07

## 2019-05-08 ENCOUNTER — HOSPITAL ENCOUNTER (OUTPATIENT)
Facility: HOSPITAL | Age: 84
Setting detail: OUTPATIENT SURGERY
Discharge: HOME | End: 2019-05-08
Attending: SURGERY | Admitting: SURGERY
Payer: MEDICARE

## 2019-05-08 ENCOUNTER — ANESTHESIA EVENT (OUTPATIENT)
Dept: PERIOP | Facility: HOSPITAL | Age: 84
End: 2019-05-08
Payer: MEDICARE

## 2019-05-08 ENCOUNTER — ANESTHESIA (OUTPATIENT)
Dept: PERIOP | Facility: HOSPITAL | Age: 84
End: 2019-05-08
Payer: MEDICARE

## 2019-05-08 ENCOUNTER — HOSPITAL ENCOUNTER (OUTPATIENT)
Dept: MAMMOGRAPHY | Facility: HOSPITAL | Age: 84
Discharge: HOME | End: 2019-05-08
Attending: SURGERY
Payer: MEDICARE

## 2019-05-08 ENCOUNTER — HOSPITAL ENCOUNTER (OUTPATIENT)
Dept: NUCLEAR MEDICINE | Facility: HOSPITAL | Age: 84
Discharge: HOME | End: 2019-05-08
Attending: SURGERY
Payer: MEDICARE

## 2019-05-08 ENCOUNTER — APPOINTMENT (OUTPATIENT)
Dept: MAMMOGRAPHY | Facility: HOSPITAL | Age: 84
End: 2019-05-08
Payer: MEDICARE

## 2019-05-08 VITALS — WEIGHT: 178 LBS | BODY MASS INDEX: 41.19 KG/M2 | HEIGHT: 55 IN

## 2019-05-08 VITALS
SYSTOLIC BLOOD PRESSURE: 158 MMHG | RESPIRATION RATE: 16 BRPM | BODY MASS INDEX: 41.19 KG/M2 | WEIGHT: 178 LBS | HEIGHT: 55 IN | HEART RATE: 62 BPM | DIASTOLIC BLOOD PRESSURE: 78 MMHG | TEMPERATURE: 97.5 F | OXYGEN SATURATION: 96 %

## 2019-05-08 DIAGNOSIS — C50.411 CARCINOMA OF UPPER-OUTER QUADRANT OF RIGHT BREAST IN FEMALE, ESTROGEN RECEPTOR POSITIVE (HCC): ICD-10-CM

## 2019-05-08 DIAGNOSIS — Z17.0 CARCINOMA OF UPPER-OUTER QUADRANT OF RIGHT BREAST IN FEMALE, ESTROGEN RECEPTOR POSITIVE (HCC): ICD-10-CM

## 2019-05-08 PROCEDURE — A9541 TC99M SULFUR COLLOID: HCPCS

## 2019-05-08 PROCEDURE — 19301 PARTIAL MASTECTOMY: CPT | Performed by: SURGERY

## 2019-05-08 PROCEDURE — 88307 TISSUE EXAM BY PATHOLOGIST: CPT | Performed by: PATHOLOGY

## 2019-05-08 PROCEDURE — 88342 IMHCHEM/IMCYTCHM 1ST ANTB: CPT | Performed by: PATHOLOGY

## 2019-05-08 PROCEDURE — 88341 IMHCHEM/IMCYTCHM EA ADD ANTB: CPT | Performed by: PATHOLOGY

## 2019-05-08 PROCEDURE — 38525 BIOPSY/REMOVAL LYMPH NODES: CPT | Performed by: SURGERY

## 2019-05-08 PROCEDURE — 78195 LYMPH SYSTEM IMAGING: CPT

## 2019-05-08 PROCEDURE — 19281 PERQ DEVICE BREAST 1ST IMAG: CPT

## 2019-05-08 PROCEDURE — 38900 IO MAP OF SENT LYMPH NODE: CPT | Performed by: SURGERY

## 2019-05-08 RX ORDER — CEFAZOLIN SODIUM 1 G/50ML
SOLUTION INTRAVENOUS AS NEEDED
Status: DISCONTINUED | OUTPATIENT
Start: 2019-05-08 | End: 2019-05-08 | Stop reason: SURG

## 2019-05-08 RX ORDER — PROPOFOL 10 MG/ML
INJECTION, EMULSION INTRAVENOUS AS NEEDED
Status: DISCONTINUED | OUTPATIENT
Start: 2019-05-08 | End: 2019-05-08 | Stop reason: SURG

## 2019-05-08 RX ORDER — EPHEDRINE SULFATE 50 MG/ML
INJECTION INTRAVENOUS AS NEEDED
Status: DISCONTINUED | OUTPATIENT
Start: 2019-05-08 | End: 2019-05-08 | Stop reason: SURG

## 2019-05-08 RX ORDER — VANCOMYCIN HYDROCHLORIDE 1 G/200ML
1000 INJECTION, SOLUTION INTRAVENOUS ONCE
Status: COMPLETED | OUTPATIENT
Start: 2019-05-08 | End: 2019-05-08

## 2019-05-08 RX ORDER — ONDANSETRON 2 MG/ML
INJECTION INTRAMUSCULAR; INTRAVENOUS AS NEEDED
Status: DISCONTINUED | OUTPATIENT
Start: 2019-05-08 | End: 2019-05-08 | Stop reason: SURG

## 2019-05-08 RX ORDER — PROPOFOL 10 MG/ML
INJECTION, EMULSION INTRAVENOUS CONTINUOUS PRN
Status: DISCONTINUED | OUTPATIENT
Start: 2019-05-08 | End: 2019-05-08 | Stop reason: SURG

## 2019-05-08 RX ORDER — SODIUM CHLORIDE 9 MG/ML
INJECTION, SOLUTION INTRAVENOUS CONTINUOUS PRN
Status: DISCONTINUED | OUTPATIENT
Start: 2019-05-08 | End: 2019-05-08 | Stop reason: SURG

## 2019-05-08 RX ORDER — ACETAMINOPHEN 325 MG/1
650 TABLET ORAL EVERY 6 HOURS PRN
Status: DISCONTINUED | OUTPATIENT
Start: 2019-05-08 | End: 2019-05-08 | Stop reason: HOSPADM

## 2019-05-08 RX ORDER — BUPIVACAINE HYDROCHLORIDE AND EPINEPHRINE 2.5; 5 MG/ML; UG/ML
INJECTION, SOLUTION INFILTRATION; PERINEURAL AS NEEDED
Status: DISCONTINUED | OUTPATIENT
Start: 2019-05-08 | End: 2019-05-08 | Stop reason: HOSPADM

## 2019-05-08 RX ORDER — SODIUM CHLORIDE 9 MG/ML
INJECTION, SOLUTION INTRAVENOUS AS NEEDED
Status: DISCONTINUED | OUTPATIENT
Start: 2019-05-08 | End: 2019-05-08 | Stop reason: HOSPADM

## 2019-05-08 RX ORDER — LIDOCAINE HYDROCHLORIDE 10 MG/ML
5 INJECTION, SOLUTION INFILTRATION; PERINEURAL ONCE
Status: COMPLETED | OUTPATIENT
Start: 2019-05-08 | End: 2019-05-08

## 2019-05-08 RX ORDER — DEXAMETHASONE SODIUM PHOSPHATE 10 MG/ML
INJECTION, SOLUTION INTRAMUSCULAR; INTRAVENOUS AS NEEDED
Status: DISCONTINUED | OUTPATIENT
Start: 2019-05-08 | End: 2019-05-08 | Stop reason: SURG

## 2019-05-08 RX ORDER — OXYCODONE HYDROCHLORIDE AND ACETAMINOPHEN 5; 325 MG/1; MG/1
1 TABLET ORAL EVERY 4 HOURS PRN
Status: DISCONTINUED | OUTPATIENT
Start: 2019-05-08 | End: 2019-05-08 | Stop reason: HOSPADM

## 2019-05-08 RX ORDER — ISOSULFAN BLUE 50 MG/5ML
INJECTION, SOLUTION SUBCUTANEOUS AS NEEDED
Status: DISCONTINUED | OUTPATIENT
Start: 2019-05-08 | End: 2019-05-08 | Stop reason: HOSPADM

## 2019-05-08 RX ORDER — HYDROMORPHONE HCL/PF 1 MG/ML
0.2 SYRINGE (ML) INJECTION
Status: DISCONTINUED | OUTPATIENT
Start: 2019-05-08 | End: 2019-05-08 | Stop reason: HOSPADM

## 2019-05-08 RX ORDER — MAGNESIUM HYDROXIDE 1200 MG/15ML
LIQUID ORAL AS NEEDED
Status: DISCONTINUED | OUTPATIENT
Start: 2019-05-08 | End: 2019-05-08 | Stop reason: HOSPADM

## 2019-05-08 RX ADMIN — LIDOCAINE HYDROCHLORIDE ANHYDROUS 5 ML: 10 INJECTION, SOLUTION INFILTRATION at 08:15

## 2019-05-08 RX ADMIN — DEXAMETHASONE SODIUM PHOSPHATE 4 MG: 10 INJECTION, SOLUTION INTRAMUSCULAR; INTRAVENOUS at 10:28

## 2019-05-08 RX ADMIN — VANCOMYCIN HYDROCHLORIDE 1000 MG: 1 INJECTION, SOLUTION INTRAVENOUS at 07:42

## 2019-05-08 RX ADMIN — SODIUM CHLORIDE: 0.9 INJECTION, SOLUTION INTRAVENOUS at 10:20

## 2019-05-08 RX ADMIN — EPHEDRINE SULFATE 5 MG: 50 INJECTION, SOLUTION INTRAVENOUS at 10:37

## 2019-05-08 RX ADMIN — ONDANSETRON 4 MG: 2 INJECTION INTRAMUSCULAR; INTRAVENOUS at 10:28

## 2019-05-08 RX ADMIN — LIDOCAINE HYDROCHLORIDE 100 MG: 20 INJECTION, SOLUTION INTRAVENOUS at 10:23

## 2019-05-08 RX ADMIN — PROPOFOL 120 MCG/KG/MIN: 10 INJECTION, EMULSION INTRAVENOUS at 10:26

## 2019-05-08 RX ADMIN — CEFAZOLIN SODIUM 1000 MG: 1 SOLUTION INTRAVENOUS at 10:42

## 2019-05-08 RX ADMIN — PROPOFOL 150 MG: 10 INJECTION, EMULSION INTRAVENOUS at 10:23

## 2019-05-24 ENCOUNTER — CONSULT (OUTPATIENT)
Dept: HEMATOLOGY ONCOLOGY | Facility: CLINIC | Age: 84
End: 2019-05-24
Payer: MEDICARE

## 2019-05-24 VITALS
SYSTOLIC BLOOD PRESSURE: 118 MMHG | TEMPERATURE: 99.1 F | WEIGHT: 178 LBS | DIASTOLIC BLOOD PRESSURE: 82 MMHG | HEIGHT: 55 IN | HEART RATE: 78 BPM | BODY MASS INDEX: 41.19 KG/M2 | RESPIRATION RATE: 16 BRPM

## 2019-05-24 DIAGNOSIS — C50.411 MALIGNANT NEOPLASM OF UPPER-OUTER QUADRANT OF RIGHT BREAST IN FEMALE, ESTROGEN RECEPTOR POSITIVE (HCC): ICD-10-CM

## 2019-05-24 DIAGNOSIS — Z17.0 MALIGNANT NEOPLASM OF UPPER-OUTER QUADRANT OF RIGHT BREAST IN FEMALE, ESTROGEN RECEPTOR POSITIVE (HCC): ICD-10-CM

## 2019-05-24 DIAGNOSIS — C50.411 CARCINOMA OF UPPER-OUTER QUADRANT OF RIGHT BREAST IN FEMALE, ESTROGEN RECEPTOR POSITIVE (HCC): Primary | ICD-10-CM

## 2019-05-24 DIAGNOSIS — Z17.0 CARCINOMA OF UPPER-OUTER QUADRANT OF RIGHT BREAST IN FEMALE, ESTROGEN RECEPTOR POSITIVE (HCC): Primary | ICD-10-CM

## 2019-05-24 PROCEDURE — 99214 OFFICE O/P EST MOD 30 MIN: CPT | Performed by: INTERNAL MEDICINE

## 2019-05-24 RX ORDER — ANASTROZOLE 1 MG/1
1 TABLET ORAL DAILY
Qty: 90 TABLET | Refills: 3 | Status: SHIPPED | OUTPATIENT
Start: 2019-05-24 | End: 2020-05-11

## 2019-05-29 ENCOUNTER — OFFICE VISIT (OUTPATIENT)
Dept: SURGICAL ONCOLOGY | Facility: CLINIC | Age: 84
End: 2019-05-29

## 2019-05-29 VITALS
BODY MASS INDEX: 41.19 KG/M2 | TEMPERATURE: 97.2 F | RESPIRATION RATE: 16 BRPM | WEIGHT: 178 LBS | HEART RATE: 91 BPM | HEIGHT: 55 IN | DIASTOLIC BLOOD PRESSURE: 80 MMHG | SYSTOLIC BLOOD PRESSURE: 126 MMHG

## 2019-05-29 DIAGNOSIS — Z17.0 CARCINOMA OF UPPER-OUTER QUADRANT OF RIGHT BREAST IN FEMALE, ESTROGEN RECEPTOR POSITIVE (HCC): Primary | ICD-10-CM

## 2019-05-29 DIAGNOSIS — C50.411 CARCINOMA OF UPPER-OUTER QUADRANT OF RIGHT BREAST IN FEMALE, ESTROGEN RECEPTOR POSITIVE (HCC): Primary | ICD-10-CM

## 2019-05-29 PROCEDURE — 99024 POSTOP FOLLOW-UP VISIT: CPT | Performed by: SURGERY

## 2019-06-03 ENCOUNTER — DOCUMENTATION (OUTPATIENT)
Dept: INFUSION CENTER | Facility: HOSPITAL | Age: 84
End: 2019-06-03

## 2019-06-06 ENCOUNTER — TELEPHONE (OUTPATIENT)
Dept: SURGICAL ONCOLOGY | Facility: CLINIC | Age: 84
End: 2019-06-06

## 2019-07-03 DIAGNOSIS — I10 ESSENTIAL HYPERTENSION: ICD-10-CM

## 2019-07-03 RX ORDER — LISINOPRIL 10 MG/1
10 TABLET ORAL 2 TIMES DAILY
Qty: 180 TABLET | Refills: 3 | Status: SHIPPED | OUTPATIENT
Start: 2019-07-03 | End: 2020-07-03

## 2019-07-03 RX ORDER — PROPRANOLOL HYDROCHLORIDE 20 MG/1
20 TABLET ORAL 4 TIMES DAILY
Qty: 360 TABLET | Refills: 0 | Status: SHIPPED | OUTPATIENT
Start: 2019-07-03 | End: 2019-09-21 | Stop reason: SDUPTHER

## 2019-07-15 ENCOUNTER — TRANSCRIBE ORDERS (OUTPATIENT)
Dept: ADMINISTRATIVE | Age: 84
End: 2019-07-15

## 2019-07-15 ENCOUNTER — APPOINTMENT (OUTPATIENT)
Dept: LAB | Age: 84
End: 2019-07-15
Payer: MEDICARE

## 2019-07-15 DIAGNOSIS — I10 ESSENTIAL HYPERTENSION: ICD-10-CM

## 2019-07-15 DIAGNOSIS — E78.5 HYPERLIPIDEMIA, UNSPECIFIED HYPERLIPIDEMIA TYPE: Chronic | ICD-10-CM

## 2019-07-15 LAB
ALBUMIN SERPL BCP-MCNC: 4 G/DL (ref 3.5–5)
ALP SERPL-CCNC: 66 U/L (ref 46–116)
ALT SERPL W P-5'-P-CCNC: 26 U/L (ref 12–78)
ANION GAP SERPL CALCULATED.3IONS-SCNC: 9 MMOL/L (ref 4–13)
AST SERPL W P-5'-P-CCNC: 18 U/L (ref 5–45)
BILIRUB SERPL-MCNC: 0.61 MG/DL (ref 0.2–1)
BUN SERPL-MCNC: 18 MG/DL (ref 5–25)
CALCIUM SERPL-MCNC: 9.7 MG/DL (ref 8.3–10.1)
CHLORIDE SERPL-SCNC: 108 MMOL/L (ref 100–108)
CHOLEST SERPL-MCNC: 164 MG/DL (ref 50–200)
CO2 SERPL-SCNC: 28 MMOL/L (ref 21–32)
CREAT SERPL-MCNC: 0.92 MG/DL (ref 0.6–1.3)
GFR SERPL CREATININE-BSD FRML MDRD: 58 ML/MIN/1.73SQ M
GLUCOSE P FAST SERPL-MCNC: 94 MG/DL (ref 65–99)
HDLC SERPL-MCNC: 50 MG/DL (ref 40–60)
LDLC SERPL DIRECT ASSAY-MCNC: 89 MG/DL (ref 0–100)
POTASSIUM SERPL-SCNC: 4 MMOL/L (ref 3.5–5.3)
PROT SERPL-MCNC: 7.1 G/DL (ref 6.4–8.2)
SODIUM SERPL-SCNC: 145 MMOL/L (ref 136–145)
TRIGL SERPL-MCNC: 157 MG/DL

## 2019-07-15 PROCEDURE — 83721 ASSAY OF BLOOD LIPOPROTEIN: CPT

## 2019-07-15 PROCEDURE — 80061 LIPID PANEL: CPT

## 2019-07-15 PROCEDURE — 36415 COLL VENOUS BLD VENIPUNCTURE: CPT

## 2019-07-15 PROCEDURE — 80053 COMPREHEN METABOLIC PANEL: CPT

## 2019-07-17 ENCOUNTER — OFFICE VISIT (OUTPATIENT)
Dept: INTERNAL MEDICINE CLINIC | Facility: CLINIC | Age: 84
End: 2019-07-17
Payer: MEDICARE

## 2019-07-17 VITALS
WEIGHT: 180 LBS | HEART RATE: 72 BPM | HEIGHT: 55 IN | SYSTOLIC BLOOD PRESSURE: 130 MMHG | DIASTOLIC BLOOD PRESSURE: 80 MMHG | BODY MASS INDEX: 41.66 KG/M2

## 2019-07-17 DIAGNOSIS — R07.9 CHEST PAIN, UNSPECIFIED TYPE: Primary | ICD-10-CM

## 2019-07-17 DIAGNOSIS — M85.89 OSTEOPENIA OF MULTIPLE SITES: Chronic | ICD-10-CM

## 2019-07-17 DIAGNOSIS — E55.9 VITAMIN D DEFICIENCY: Chronic | ICD-10-CM

## 2019-07-17 DIAGNOSIS — E78.2 MIXED HYPERLIPIDEMIA: Chronic | ICD-10-CM

## 2019-07-17 DIAGNOSIS — M77.8 FLEXOR TENDINITIS OF HAND: ICD-10-CM

## 2019-07-17 DIAGNOSIS — I10 ESSENTIAL HYPERTENSION: Chronic | ICD-10-CM

## 2019-07-17 PROBLEM — R07.89 OTHER CHEST PAIN: Status: ACTIVE | Noted: 2019-07-17

## 2019-07-17 PROCEDURE — 99215 OFFICE O/P EST HI 40 MIN: CPT | Performed by: INTERNAL MEDICINE

## 2019-07-17 PROCEDURE — 93000 ELECTROCARDIOGRAM COMPLETE: CPT | Performed by: INTERNAL MEDICINE

## 2019-07-17 NOTE — PROGRESS NOTES
Assessment/Plan:   1  Chest pain - this may represent costochondritis -appears to be muscle skeletal reproduced with palpation  Possibly related to altered position with her recent right breast surgery  She will call if this remains an issue with the pattern changes  EKG unchanged   2  Breast carcinoma- had a history of infiltrating ductal carcinoma the right breast   Had supplemental RT  He was ER KS positive in radiation therapy was completed in March 2010  Now found to have a new area the right breast felt to be invasive ductal carcinoma different from her prior carcinoma  This is ER KS positive and HER2 Zander negative  Simple node negative  Pathology showed margins negative  Oncology placed on anastrozole 1 milligram daily which she started in the spring of 2019 will take to the spring of 2024  She has  In scheduled for right breast mammogram in December via surgery  3  General care -because of her use of anastrozole was scheduled for DEXA scan -no DEXA scan several years ago was normal  4shoulder pain-orthopedic physician-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 and responded to treatment by orthopedic physician  5  Skin lesion of the left leg-biopsy showed chronic inflammation and fibrosis-managed by dermatology- patient states now resolved  6   Anxiety- patient felt much worse on metoprolol feels this beta-blocker aggravated her anxiety  Now back in propanolol in feeling better and using her benzodiazepine much last   Thyroid function normal  7   Prior mechanical fall with left knee pain   X-ray in the hospital showed small cortical step-off periosteal elevation of the distal femur above the femoral component of her arthroplasty   Orthopedic physician felt she had a left knee contusion  8   SVT-had increased ectopy on exam   48 hour Holter showed 18,000 supraventricular ectopic beats with over 300 short runs of SVT with the longus lasting 7 beach during which she was asymptomatic   No ventricular ectopy   Echo shows EF is 60%, no regional wall motion abnormalities, wall thickness upper limits of normal, grade 1 diastolic dysfunction, left atrium mild to moderately dilated, mitral valve calcification and pulmonary artery systolic pressure normal   Cardiology switched her to metoprolol but she  Felt poorly  At this point now back on propanolol 20 milligrams q i d  In feeling much better with much less frequent symptoms  9  Hypertension-adequate control on current dose of beta-blocker an ACE-inhibitor   Had been on diuretic in past but not needed at present     All other problems as per note of August 2018        MEDICAL REGIMEN:                                                  Clorazepate 3 75 milligrams b i d p r n  Morales Cherri , propanolol 20 milligrams q i d , lisinopril 10 milligrams b i d , Crestor 5 milligrams-half tablet 3 times per week, multivitamin, fish oil 1 dose daily, cranberry daily, ophthalmological vitamins daily, Zantac 150 milligrams in the morning, intermittent use of prednisone eye drops per Ophthalmology, acetaminophen as needed, nasty was all 1 milligram daily started in the spring of 2019     Appointment in several months with prior chemistry profile cholesterol profile vitamin-D level    BMI Counseling: Body mass index is 41 84 kg/m²  Discussed the patient's BMI with her  The BMI is above average  BMI counseling and education was provided to the patient  Nutrition recommendations include reducing portion sizes and decreasing overall calorie intake  Exercise recommendations include moderate aerobic physical activity for 150 minutes/week        Addendum -DEXA scan done in August 2019 normal- [facility] CHART NEXT  VISIT    Addendum -laboratory testing done September the 17 shows normal chemistry profile other than a BUN 30 the creatinine 0 95, HDL 51 cholesterol 167, triglycerides 93 , vitamin-D sub therapeutic at 26 4-patient make sure she is on over-the-counter vitamin D3 / 2000 units a day  No problem-specific Assessment & Plan notes found for this encounter  Diagnoses and all orders for this visit:    Chest pain, unspecified type  -     POCT ECG    Essential hypertension    Mixed hyperlipidemia    Vitamin D deficiency          Subjective:      Patient ID: Princess Brothers is a 81 y o  female  Reviewed multiple issues today  As noted she was found to have a new breast carcinoma  Specifically mammogram was abnormal with diagnostic imaging of the right breast showing a mass with biopsy showed invasive ductal carcinoma -CRP are positive in her 2 Zander negative  It is felt to be a new breast cancer way from her previous DCIS  She had a lumpectomy and sentinel node which was negative  Oncology felt she should be on hormonal therapy for 5 years and she is now on anastrozole 1 milligram daily started in the spring of 2019  Pathology showed all margins were negative  Her surgery was on May 8th  She has recovered from this  She talked about left-sided breast pain  She said this has been on going since her surgery  She notes that couple times a day for several minutes  It is unrelated to eating  It is unrelated to activity  He does not necessarily worse with movement or inspiration -however we were able to reproduce it with palpation of the breast -costochondral junction today the   Surgery has her down for right breast mammogram in December  She has ongoing severe knee pain  We talked about additional intervention but she  Wants to hold off on that at present  She knows this is aggravated by her weight  Other labs done prior to this visit show an LDL of 89 HDL of 50 triglycerides 157 cholesterol 164 chemistry profile normal with a creatinine of 0 92 fasting sugar 94 liver enzymes normal       This patient denies any systemic symptoms  Specifically there has been no evidence of fever, night sweats, significant weight loss or significant decrease in appetite        She has a history of anxiety  She felt previously was she was on metoprolol this aggravated her anxiety  She feels better on propanolol with infrequent use of benzodiazepine  Thyroid function was normal   She did not have any exacerbation of her SVT perioperatively  She denies any palpitations  She denies any syncope or near syncope  The she denies any weakness of 1 arm and leg compared to the other  She denies any numbness of 1 arm and leg compared to the other  She denies blurred or double vision or difficulty with her speech       This patient wanted to know their preferred analgesic agent  Because of their various comorbidities I recommended that this be acetaminophen  This patient has no history of chronic liver disease that would put them at greater risk for use of acetaminophen  This patient may use up to 500-650 mg of acetaminophen at a time and no more than 3 g a day total  Nonsteroidal anti-inflammatory agents have the potential to exacerbate hypertension, hypercoagulability, chronic renal failure, congestive heart failure, and various allergic tendencies  Because of her comorbidities including history of SVT we wanted use acetaminophen rather than nonsteroidals and we reviewed that     this was a 40 minutes visit with more than 50% of the time spent counseling the patient formulating a treatment plan  Multiple questions answered      The following portions of the patient's history were reviewed and updated as appropriate: allergies, current medications, past family history, past medical history, past social history, past surgical history and problem list     Review of Systems   Constitutional: Negative  Respiratory: Negative  Cardiovascular: Positive for chest pain  Gastrointestinal: Negative  Endocrine: Negative  Genitourinary: Negative  Musculoskeletal: Positive for arthralgias  Bilateral knee pain   Neurological: Negative  Hematological: Negative      Psychiatric/Behavioral: Negative  Objective:      Ht 4' 7" (1 397 m)   Wt 81 6 kg (180 lb)   BMI 41 84 kg/m²          Physical Exam   Constitutional: She is oriented to person, place, and time  She appears well-developed and well-nourished  No distress  HENT:   Head: Normocephalic and atraumatic  Right Ear: External ear normal    Left Ear: External ear normal    Nose: Nose normal    Mouth/Throat: Oropharynx is clear and moist  No oropharyngeal exudate  Eyes: Pupils are equal, round, and reactive to light  Conjunctivae and EOM are normal  Right eye exhibits no discharge  Left eye exhibits no discharge  No scleral icterus  Neck: Normal range of motion  Neck supple  No JVD present  No tracheal deviation present  No thyromegaly present  Cardiovascular: Normal rate, regular rhythm and intact distal pulses  Exam reveals no gallop and no friction rub  No murmur heard  Pulmonary/Chest: Effort normal and breath sounds normal  No respiratory distress  She has no wheezes  She has no rales  She exhibits tenderness  Palpation of left costochondral junction appears to reproduce her chest pain   Abdominal: Soft  Bowel sounds are normal  She exhibits no distension and no mass  There is no tenderness  There is no rebound and no guarding  Musculoskeletal: She exhibits no edema or deformity  Evidence of prior knee surgery  Crepitance on range of motion of both knees   Lymphadenopathy:     She has no cervical adenopathy  Neurological: She is alert and oriented to person, place, and time  She has normal reflexes  She displays normal reflexes  No cranial nerve deficit  She exhibits normal muscle tone  Coordination normal    Skin: Skin is warm and dry  No rash noted  No erythema  Evidence of recent right breast surgery   Psychiatric: She has a normal mood and affect   Her behavior is normal  Judgment and thought content normal

## 2019-08-13 RX ORDER — CLORAZEPATE DIPOTASSIUM 3.75 MG/1
3.75 TABLET ORAL 2 TIMES DAILY
Qty: 180 TABLET | Refills: 0 | Status: CANCELLED | OUTPATIENT
Start: 2019-08-13 | End: 2019-11-11

## 2019-08-14 ENCOUNTER — OFFICE VISIT (OUTPATIENT)
Dept: OCCUPATIONAL THERAPY | Facility: HOSPITAL | Age: 84
End: 2019-08-14
Payer: MEDICARE

## 2019-08-14 ENCOUNTER — OFFICE VISIT (OUTPATIENT)
Dept: OBGYN CLINIC | Facility: HOSPITAL | Age: 84
End: 2019-08-14
Payer: MEDICARE

## 2019-08-14 VITALS
BODY MASS INDEX: 41.98 KG/M2 | DIASTOLIC BLOOD PRESSURE: 82 MMHG | SYSTOLIC BLOOD PRESSURE: 136 MMHG | HEIGHT: 55 IN | WEIGHT: 181.4 LBS | HEART RATE: 67 BPM

## 2019-08-14 DIAGNOSIS — M65.342 TRIGGER FINGER, LEFT RING FINGER: ICD-10-CM

## 2019-08-14 DIAGNOSIS — R20.2 LEFT HAND PARESTHESIA: Primary | ICD-10-CM

## 2019-08-14 DIAGNOSIS — M77.8 FLEXOR TENDINITIS OF HAND: ICD-10-CM

## 2019-08-14 PROCEDURE — L3933 FO W/O JOINTS CF: HCPCS

## 2019-08-14 PROCEDURE — 99214 OFFICE O/P EST MOD 30 MIN: CPT | Performed by: ORTHOPAEDIC SURGERY

## 2019-08-14 NOTE — PROGRESS NOTES
Orthosis    Diagnosis:   1  Flexor tendinitis of hand  Ambulatory referral to PT/OT hand therapy     Indication: Motion Blocking    Location: Left  ring finger  Supplies: Custom Fit Orthotic  Orthosis type: Digit Gutter  Wearing Schedule: Night only and As Needed  Describe Position: MP in relaxed extension    Precautions: Universal (skin contact/breakdown)    Patient or Caregiver expresses understanding of wearing Schedule and Precautions? Yes  Patient or Caregiver able to don/doff orthotic independently? Yes    Written orders provided to patient?  Yes  Orders Obtained: Verbal  Orders Obtained from: Randy Frias    Return for evaluation and treatment No

## 2019-08-14 NOTE — PROGRESS NOTES
ASSESSMENT/PLAN:    Assessment:   L ring TF  L long, ring and small intrinsic tightness  Suspicion for L cubital tunnel    Plan:   Patient elects L ring TFR under local anesthesia  We also suggest EMG to evaluate her cubital tunnel  Educated that if this is severe enough, we would recommend surgical release  If not severe, consider bracing and ulnar nerve glides  We will hold off on TFR release until we have EMG results    Follow Up: After Testing    To Do Next Visit:  Go over EMG results  Schedule surgery  Consider intrinsic muscle exercises after surgery if still having tightness on exam    General Discussions:  Cubital Tunnel Syndrome: The anatomy and physiology of cubital tunnel syndrome were discussed with the patient today in the office  Typically, increased elbow flexion activities decrease blood flow within the intraneural spaces, resulting in a feeling of numbness, tingling, weakness, or clumsiness within the hand and fingers  Occasionally, anatomic structures such as medial elbow osteophytes, the medial head of the triceps, were subluxing ulnar nerve may result in increased pressure or aggravation at the cubital tunnel  Typical signs and symptoms usually include numbness and tingling within the ring and small finger, weakness with , and weakness with pinch  Conservative treatment and includes nocturnal bracing to keep the elbow in a semi-extended position, activity modification, therapy, and avoiding excessive elbow flexion activities  A majority of patients typically respond to conservative treatment over a period of approximately 3-6 months  EMG/NCV testing of the ulnar nerve at the elbow is not as reliable as carpal tunnel syndrome  Surgical intervention in the form of in situ release of the ulnar nerve at the elbow or ulnar nerve transposition may be required in up to 20% of patients  Operative Discussions:  Trigger Finger Release:  The anatomy and physiology of trigger finger was discussed with the patient today in the office  Edema and increased contact pressure within the flexor tendons at the A1 pulley can cause pain, crepitation, and limitation of function  Treatment options include resting MP blocking splints to decrease edema, oral anti-inflammatory medications, home or formal therapy exercises, up to 2 steroid injections or surgical release  While majority of patients do respond to conservative treatment, up to 20% may require surgical release  The patient has elected release of the trigger finger  The patient has elected to undergo a release of the A1 pulley (trigger finger)  A small incision will be made over the palmar aspect of the hand, the tendon sheath holding the flexor tendons will be released  In the postoperative period, light activities are allowed immediately, driving is allowed when narcotic medication has stopped, and the incision may get wet after 2 days  Heavy activities (lifting more than approximately 10 pounds) will be allowed after the follow up appointment in 1-2 weeks  While the pain and discomfort within the wrist typically improves rapidly, some residual discomfort may be present for up to 6 weeks  The nodule that is typically palpable in the palmar aspect of the hand will not be removed, as this would necessitate removal of a portion of the flexor tendon, however the catching, clicking, and locking should resolve  Approximate success rate is 98%  The risks and benefits of the procedure were explained to the patient, which include, but are not limited to: Bleeding, infection, recurrence, pain, scar, damage to tendons, damage to nerves, and damage to blood vessels, need for future surgery and complications related to anesthesia  If bony work is done, risks also include malunion and nonunion  These risks, along with alternative conservative treatment options, and postoperative protocols were voiced back and understood by the patient    All questions were answered to the patient's satisfaction  The patient agrees to comply with a standard postoperative protocol, and is willing to proceed  Education was provided via written and auditory forms  There were no barriers to learning  Written handouts regarding wound care, incision and scar care, and general preoperative information, as well as risks and benefits were provided to the patient       _____________________________________________________  CHIEF COMPLAINT:  Chief Complaint   Patient presents with    Left Hand - Pain         SUBJECTIVE:  Connie Zamorano is a 81 y o  female who presents with complaints of L long, ring and small finger pain, stiffness and numbness  She states she has had catching and locking of the ring finger  She has had 2 previous steroid injections by her PCP into her ring finger which helped, but only temporarily  Patient also describes numbness just in the long, ring and small fingers  PAST MEDICAL HISTORY:  Past Medical History:   Diagnosis Date    Anxiety     Arthritis     last assessed 3/24/15     Atherosclerosis     Bell's palsy     Broken femur ([facility] [address] )     Cardiac disorder     DCIS (ductal carcinoma in situ) of breast     last assessed 4/4/17     Depression     Endometrial polyp     GERD (gastroesophageal reflux disease)     History of radiation therapy 2010    Hypercholesterolemia     resolved 10/22/14     Hyperlipidemia     Long term use of drug     last assessed 5/23/14     Malignant neoplasm without specification of site [facility])     Peripheral neuropathy     Pneumonia     As a Child    PONV (postoperative nausea and vomiting)     Tachycardia     Uterine fibroid        PAST SURGICAL HISTORY:  Past Surgical History:   Procedure Laterality Date    BREAST BIOPSY Right     BREAST LUMPECTOMY Right 2010    BREAST LUMPECTOMY Right 5/8/2019    Procedure: BREAST LUMPECTOMY; BREAST NEEDLE LOCALIZATION (NEEDLE LOC AT 0800);   Surgeon: Marian Grant MD; [facility];  Service: Surgical Oncology    CARPAL TUNNEL RELEASE Bilateral     CATARACT EXTRACTION Bilateral     DILATION AND CURETTAGE OF UTERUS      ENDOMETRIAL BIOPSY      without cervical dilation     HEMORROIDECTOMY      JOINT REPLACEMENT Right     Shoulder    JOINT REPLACEMENT Bilateral     Knees    LYMPH NODE BIOPSY Right 5/8/2019    Procedure: SENTINEL LYMPH NODE BIOPSY; LYMPHOSCINTIGRAPHY; INTRAOPERATIVE LYMPHATIC MAPPING (INJECT AT 0900);   Surgeon: Margarette Lopez MD;  [facility];  Service: Surgical Oncology    MAMMO NEEDLE LOCALIZATION RIGHT (ALL INC) Right 5/8/2019    OOPHORECTOMY      77    OVARIAN CYST REMOVAL Left     LA RECONSTR TOTAL SHOULDER IMPLANT Right 7/18/2017    Procedure: TOTAL SHOULDER REVERSE ARTHROPLASTY;  Surgeon: Gregory Conley MD;  [facility];  Service: Orthopedics    SENTINEL LYMPH NODE BIOPSY      TONSILLECTOMY      TUBAL LIGATION      US GUIDED BREAST BIOPSY RIGHT COMPLETE Right 3/28/2019       FAMILY HISTORY:  Family History   Problem Relation Age of Onset    Heart disease Mother         artherosclerosis     Heart disease Father         artherosclerosis     Heart attack Father     Arthritis Family     Heart disease Family         artherosclerosis     Breast cancer Family     Osteoarthritis Family     Supraventricular tachycardia Family     Hypertension Daughter     Ovarian cancer Sister 80    Anemia Neg Hx     Arrhythmia Neg Hx     Asthma Neg Hx     Clotting disorder Neg Hx     Fainting Neg Hx     Hyperlipidemia Neg Hx     Aneurysm Neg Hx        SOCIAL HISTORY:  Social History     Tobacco Use    Smoking status: Never Smoker    Smokeless tobacco: Never Used   Substance Use Topics    Alcohol use: No     Comment: social drinker per [employer]     Drug use: No       MEDICATIONS:    Current Outpatient Medications:     acetaminophen (TYLENOL) 325 mg tablet, Take 650 mg by mouth every 6 (six) hours as needed for mild pain, Disp: , Rfl:     anastrozole (ARIMIDEX) 1 mg tablet, Take 1 tablet (1 mg total) by mouth daily, Disp: 90 tablet, Rfl: 3    clorazepate (TRANXENE) 3 75 mg tablet, Take 3 75 mg by mouth as needed Takes with Dinner and before Bed, Disp: , Rfl:     CRANBERRY PO, Take 1,700 mg by mouth daily in the early morning , Disp: , Rfl:     lisinopril (ZESTRIL) 10 mg tablet, Take 1 tablet (10 mg total) by mouth 2 (two) times a day, Disp: 180 tablet, Rfl: 3    Multiple Vitamins-Minerals (PRESERVISION AREDS 2 PO), Take 2 tablets by mouth daily in the early morning , Disp: , Rfl:     multivitamin (THERAGRAN) TABS, Take 1 tablet by mouth daily in the early morning , Disp: , Rfl:     Omega-3 Fatty Acids (FISH OIL PO), Take 1 capsule by mouth daily in the early morning , Disp: , Rfl:     prednisoLONE acetate (PRED FORTE) 1 % ophthalmic suspension, Administer 1 drop to the right eye daily in the early morning , Disp: , Rfl: 0    propranolol (INDERAL) 20 mg tablet, Take 1 tablet (20 mg total) by mouth 4 (four) times a day for 90 days, Disp: 360 tablet, Rfl: 0    rosuvastatin (CRESTOR) 5 mg tablet, Take 0 5 tablets by mouth 3 (three) times a week Takes M-W-F, Disp: , Rfl: 0    ALLERGIES:  Allergies   Allergen Reactions    Amoxicillin-Pot Clavulanate GI Intolerance    Azithromycin GI Intolerance and Rash    Cephalexin GI Intolerance    Cortisone GI Intolerance    Doxycycline GI Intolerance    Penicillins GI Intolerance       REVIEW OF SYSTEMS:  Pertinent items are noted in HPI  A comprehensive review of systems was negative      LABS:  HgA1c:   Lab Results   Component Value Date    HGBA1C 5 2 06/12/2017     BMP:   Lab Results   Component Value Date    GLUCOSE 95 12/22/2015    CALCIUM 9 7 07/15/2019     12/22/2015    K 4 0 07/15/2019    CO2 28 07/15/2019     07/15/2019    BUN 18 07/15/2019    CREATININE 0 92 07/15/2019         _____________________________________________________  PHYSICAL EXAMINATION:  Vital signs: /82   Pulse 67   Ht 4' 7" (1 397 m)   Wt 82 3 kg (181 lb 6 4 oz)   BMI 42 16 kg/m²   General: well developed and well nourished, alert, oriented times 3 and appears comfortable  Psychiatric: Normal  HEENT: Trachea Midline, No torticollis  Cardiovascular: No discernable arrhythmia  Pulmonary: No wheezing or stridor  Skin: No masses, erthema, lacerations, fluctation, ulcerations  Neurovascular: Sensation intact to the Median Nerve, Sensation Intact to the Radial Nerve, Motor Intact to the Median Nerve, Motor Intact to the Radial Nerve and Pulses Intact    MUSCULOSKELETAL EXAMINATION:  LEFT SIDE: Atrophy to 1st dorsal interosseous and hypothenar muscles  Describes numbness to the small, ring and long fingers  Intrinsics 4-/5 (compared to 5/5 on the R)  + Froment's sign on the left  - tinel's cubital tunnel, - elbow flexion compression test  + ttp A1 pulley ring finger with + nodule and trigger  + small nodule with no triggering of the small finger    _____________________________________________________  STUDIES REVIEWED:  No Studies to review      PROCEDURES PERFORMED:  Procedures  No Procedures performed today   Scribe Attestation    I,:   Vielka Weber PA-C am acting as a scribe while in the presence of the attending physician :        I,:   Burak Matute MD personally performed the services described in this documentation    as scribed in my presence :

## 2019-08-16 NOTE — TELEPHONE ENCOUNTER
clorazepate (TRANXENE) 3 75 mg tablet [[id number]]     Order Details   Dose: 3 75 mg Route: Oral Frequency: 2 times daily   Dispense Quantity: 180 tablet Refills: 0 Fills remaining: --

## 2019-08-17 ENCOUNTER — HOSPITAL ENCOUNTER (OUTPATIENT)
Dept: RADIOLOGY | Age: 84
Discharge: HOME | End: 2019-08-17
Payer: MEDICARE

## 2019-08-17 DIAGNOSIS — M85.89 OSTEOPENIA OF MULTIPLE SITES: ICD-10-CM

## 2019-08-17 PROCEDURE — 77080 DXA BONE DENSITY AXIAL: CPT

## 2019-08-20 ENCOUNTER — TELEPHONE (OUTPATIENT)
Dept: INTERNAL MEDICINE CLINIC | Facility: CLINIC | Age: 84
End: 2019-08-20

## 2019-08-22 ENCOUNTER — HOSPITAL ENCOUNTER (OUTPATIENT)
Dept: NEUROLOGY | Facility: CLINIC | Age: 84
Discharge: HOME | End: 2019-08-22
Payer: MEDICARE

## 2019-08-22 DIAGNOSIS — R20.2 LEFT HAND PARESTHESIA: ICD-10-CM

## 2019-08-22 PROCEDURE — 95909 NRV CNDJ TST 5-6 STUDIES: CPT | Performed by: PSYCHIATRY & NEUROLOGY

## 2019-08-22 PROCEDURE — 95886 MUSC TEST DONE W/N TEST COMP: CPT | Performed by: PSYCHIATRY & NEUROLOGY

## 2019-08-27 ENCOUNTER — OFFICE VISIT (OUTPATIENT)
Dept: CARDIOLOGY CLINIC | Facility: CLINIC | Age: 84
End: 2019-08-27
Payer: MEDICARE

## 2019-08-27 VITALS
SYSTOLIC BLOOD PRESSURE: 144 MMHG | HEART RATE: 72 BPM | WEIGHT: 180.4 LBS | HEIGHT: 55 IN | DIASTOLIC BLOOD PRESSURE: 92 MMHG | BODY MASS INDEX: 41.75 KG/M2

## 2019-08-27 DIAGNOSIS — I47.1 SUPRAVENTRICULAR TACHYCARDIA (HCC): Primary | Chronic | ICD-10-CM

## 2019-08-27 DIAGNOSIS — I10 ESSENTIAL HYPERTENSION: Chronic | ICD-10-CM

## 2019-08-27 DIAGNOSIS — E78.2 MIXED HYPERLIPIDEMIA: Chronic | ICD-10-CM

## 2019-08-27 PROCEDURE — 99214 OFFICE O/P EST MOD 30 MIN: CPT | Performed by: INTERNAL MEDICINE

## 2019-08-27 PROCEDURE — 1124F ACP DISCUSS-NO DSCNMKR DOCD: CPT | Performed by: INTERNAL MEDICINE

## 2019-08-27 NOTE — PATIENT INSTRUCTIONS
Supraventricular Tachycardia   AMBULATORY CARE:   Supraventricular tachycardia (SVT)  is a condition that causes your heart to beat much faster than it should  SVT is a type of abnormal heart rhythm, called an arrhythmia, that starts in the upper part of your heart  It may last from a few seconds or hours to several days  Common symptoms include the following:   · A pounding, racing, or fluttering heartbeat    · Fatigue, weakness, or shortness of breath    · Feeling lightheaded, dizzy, or faint  · Pain, pressure, or tightness in your chest, neck, jaw, arms, or upper back    · Nausea    · Feeling anxious, scared, or worried that something bad may happen  Call 911 for the following:   · You have any of the following signs of a heart attack:      ¨ Squeezing, pressure, or pain in your chest that lasts longer than 5 minutes or returns    ¨ Discomfort or pain in your back, neck, jaw, stomach, or arm     ¨ Trouble breathing    ¨ Nausea or vomiting    ¨ Lightheadedness or a sudden cold sweat, especially with chest pain or trouble breathing    Seek care immediately if:   · You have dizziness, lightheadedness, or feel faint  · You have sudden numbness or weakness in your arms or legs  Contact your healthcare provider if:   · Your symptoms get worse, or you have new symptoms  · You have swelling in your ankles or feet  · You have questions or concerns about your condition or care  Treatment  may depend on what is causing SVT and your symptoms  You may not need treatment for SVT  Instead you may need medicine or other procedures to control your heart rate  How to manage or prevent SVT:   · Perform vagal maneuvers as directed when you have symptoms of SVT  Lie down flat and bear down like you are having a bowel movement  Do this for 10 to 30 seconds  · Do not drink caffeine or alcohol  These can increase your risk for SVT  · Keep a record of your symptoms    Write down what you ate or what you were doing before an episode of SVT  Also write down anything you did to make the SVT stop  Bring your record to follow up visits with your healthcare provider  · Eat heart-healthy foods  These include fruits, vegetables, whole-grain breads, low-fat dairy products, beans, lean meats, and fish  Replace butter and margarine with heart-healthy oils such as olive oil and canola oil  · Exercise regularly and maintain a healthy weight  Ask about the best exercise plan for you  Ask your healthcare provider how much you should weigh  Ask him to help you create a weight loss plan if you are overweight  · Do not smoke  Nicotine and other chemicals in cigarettes and cigars can cause heart and lung damage  Ask your healthcare provider for information if you currently smoke and need help to quit  E-cigarettes or smokeless tobacco still contain nicotine  Talk to your healthcare provider before you use these products  Follow up with your healthcare provider as directed:  Write down your questions so you remember to ask them during your visits  © 2017 [facility] [facility] Information is for End User's use only and may not be sold, redistributed or otherwise used for commercial purposes  All illustrations and images included in CareNotes® are the copyrighted property of [employer] , [employer]  or Gerson Cardenas  The above information is an  only  It is not intended as medical advice for individual conditions or treatments  Talk to your doctor, nurse or pharmacist before following any medical regimen to see if it is safe and effective for you

## 2019-08-27 NOTE — PROGRESS NOTES
Cardiology Consultation     Anca Weller  [id number]  1935  [facility] [address] [id number]    1  Supraventricular tachycardia ([address] )     2  Essential hypertension     3  Mixed hyperlipidemia       Chief Complaint   Patient presents with    Follow-up    Shortness of Breath     With exertion  HPI: Patient is here for evaluation and management of frequent PACs and brief runs of SVT seen on Holter  She has a h/o SVT and is maintained on propranolol  She had been feeling some skipped beats, which she feels every day, sporadic throughout the day  No lightheadedness or syncope  She had been on propranolol for many years - and takes it every day  She has not varied her caffeine intake or water intake  She was tolerating the change to Toprol XL, at higher dose - but then had more jitteriness, so back to propranolol  Feels well and with less palpitations  Diagnosed with breast cancer since last visit, worsening bilateral knee pain (R>L)  Now on Arimidex for breast cancer management            Patient Active Problem List   Diagnosis    Anxiety    Peripheral neuropathy    Hyperlipidemia    Essential hypertension    Arthritis    Atrial fibrillation (HCC)    Difficulty breathing    Diverticulosis    Dizziness    Fatigue    Hyperglycemia    Pain in extremity    Osteopenia    Shortness of breath    Supraventricular tachycardia ([address] )    Vitamin D deficiency    Pain of left hand    Left leg pain    Impingement syndrome of left shoulder    Myocardial infarction type 2 ([address] )    Ambulatory dysfunction    Contusion of left knee    Elevated troponin    Palpitations    Fall from ground level    Fall    Morbid obesity with body mass index (BMI) of 40 0 to 44 9 in [facility])    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, estrogen receptor positive ([address] )  Other chest pain    Trigger finger, left ring finger    Carpal tunnel syndrome of left wrist    Cubital tunnel syndrome on left     Past Medical History:   Diagnosis Date    Anxiety     Arthritis     last assessed 3/24/15     Atherosclerosis     Bell's palsy     Broken femur ([address] )     Cardiac disorder     DCIS (ductal carcinoma in situ) of breast     last assessed 4/4/17     Depression     Endometrial polyp     GERD (gastroesophageal reflux disease)     History of radiation therapy 2010    Hypercholesterolemia     resolved 10/22/14     Hyperlipidemia     Long term use of drug     last assessed 5/23/14     Malignant neoplasm without specification of site [facility])     Peripheral neuropathy     Pneumonia     As a Child    PONV (postoperative nausea and vomiting)     Tachycardia     Uterine fibroid      Social History     Socioeconomic History    Marital status:       Spouse name: Not on file    Number of children: Not on file    Years of education: Not on file    Highest education level: Not on file   Occupational History    Occupation: retired from work    Social Needs    Financial resource strain: Not on file    Food insecurity:     Worry: Not on file     Inability: Not on file   Purpose Global needs:     Medical: Not on file     Non-medical: Not on file   Tobacco Use    Smoking status: Never Smoker    Smokeless tobacco: Never Used   Substance and Sexual Activity    Alcohol use: No     Comment: social drinker per [employer]script     Drug use: No    Sexual activity: Not on file   Lifestyle    Physical activity:     Days per week: Not on file     Minutes per session: Not on file    Stress: Not on file   Relationships    Social connections:     Talks on phone: Not on file     Gets together: Not on file     Attends Denominational service: Not on file     Active member of club or organization: Not on file     Attends meetings of clubs or organizations: Not on file     Relationship status: Not on file    Intimate partner violence:     Fear of current or ex partner: Not on file     Emotionally abused: Not on file     Physically abused: Not on file     Forced sexual activity: Not on file   Other Topics Concern    Not on file   Social History Narrative    Coffee     Daily coffee consumption 1 cup day     Daily cola consumption can day     Walking            Family History   Problem Relation Age of Onset    Heart disease Mother         artherosclerosis     Heart disease Father         artherosclerosis     Heart attack Father     Arthritis Family     Heart disease Family         artherosclerosis     Breast cancer Family     Osteoarthritis Family     Supraventricular tachycardia Family     Hypertension Daughter     Ovarian cancer Sister 80    Anemia Neg Hx     Arrhythmia Neg Hx     Asthma Neg Hx     Clotting disorder Neg Hx     Fainting Neg Hx     Hyperlipidemia Neg Hx     Aneurysm Neg Hx      Past Surgical History:   Procedure Laterality Date    BREAST BIOPSY Right     BREAST LUMPECTOMY Right 2010    BREAST LUMPECTOMY Right 5/8/2019    Procedure: BREAST LUMPECTOMY; BREAST NEEDLE LOCALIZATION (NEEDLE LOC AT 0800); Surgeon: Rosendo Chapman MD;  [facility];  Service: Surgical Oncology    CARPAL TUNNEL RELEASE Bilateral     CATARACT EXTRACTION Bilateral     DILATION AND CURETTAGE OF UTERUS      ENDOMETRIAL BIOPSY      without cervical dilation     HEMORROIDECTOMY      JOINT REPLACEMENT Right     Shoulder    JOINT REPLACEMENT Bilateral     Knees    LYMPH NODE BIOPSY Right 5/8/2019    Procedure: SENTINEL LYMPH NODE BIOPSY; LYMPHOSCINTIGRAPHY; INTRAOPERATIVE LYMPHATIC MAPPING (INJECT AT 0900);   Surgeon: Rosendo Chapman MD;  [facility];  Service: Surgical Oncology    MAMMO NEEDLE LOCALIZATION RIGHT (ALL INC) Right 5/8/2019    OOPHORECTOMY      77    OVARIAN CYST REMOVAL Left     SC RECONSTR TOTAL SHOULDER IMPLANT Right 7/18/2017    Procedure: TOTAL SHOULDER REVERSE ARTHROPLASTY;  Surgeon: Pratibha Ferguson MD;  [facility];  Service: Orthopedics    SENTINEL LYMPH NODE BIOPSY      TONSILLECTOMY      TUBAL LIGATION      US GUIDED BREAST BIOPSY RIGHT COMPLETE Right 3/28/2019       Current Outpatient Medications:     anastrozole (ARIMIDEX) 1 mg tablet, Take 1 tablet (1 mg total) by mouth daily, Disp: 90 tablet, Rfl: 3    clorazepate (TRANXENE) 3 75 mg tablet, Take 3 75 mg by mouth as needed Takes with Dinner and before Bed, Disp: , Rfl:     CRANBERRY PO, Take 1,700 mg by mouth daily in the early morning , Disp: , Rfl:     lisinopril (ZESTRIL) 10 mg tablet, Take 1 tablet (10 mg total) by mouth 2 (two) times a day, Disp: 180 tablet, Rfl: 3    Multiple Vitamins-Minerals (PRESERVISION AREDS 2 PO), Take 2 tablets by mouth daily in the early morning , Disp: , Rfl:     multivitamin (THERAGRAN) TABS, Take 1 tablet by mouth daily in the early morning , Disp: , Rfl:     Omega-3 Fatty Acids (FISH OIL PO), Take 1 capsule by mouth daily in the early morning , Disp: , Rfl:     prednisoLONE acetate (PRED FORTE) 1 % ophthalmic suspension, Administer 1 drop to the right eye daily in the early morning , Disp: , Rfl: 0    propranolol (INDERAL) 20 mg tablet, Take 1 tablet (20 mg total) by mouth 4 (four) times a day for 90 days, Disp: 360 tablet, Rfl: 0    rosuvastatin (CRESTOR) 5 mg tablet, Take 0 5 tablets by mouth 3 (three) times a week Takes M-W-F, Disp: , Rfl: 0    acetaminophen (TYLENOL) 325 mg tablet, Take 650 mg by mouth every 6 (six) hours as needed for mild pain, Disp: , Rfl:   Allergies   Allergen Reactions    Amoxicillin-Pot Clavulanate GI Intolerance    Azithromycin GI Intolerance and Rash    Cephalexin GI Intolerance    Cortisone GI Intolerance    Doxycycline GI Intolerance    Penicillins GI Intolerance     Vitals:    08/27/19 1257   BP: 144/92   BP Location: Left arm   Patient Position: Sitting   Cuff Size: Adult   Pulse: 72   Weight: 81 8 kg (180 lb 6 4 oz)   Height: 4' 7" (1 397 m)       Labs:  Appointment on 07/15/2019   Component Date Value    Sodium 07/15/2019 145     Potassium 07/15/2019 4 0     Chloride 07/15/2019 108     CO2 07/15/2019 28     ANION GAP 07/15/2019 9     BUN 07/15/2019 18     Creatinine 07/15/2019 0 92     Glucose, Fasting 07/15/2019 94     Calcium 07/15/2019 9 7     AST 07/15/2019 18     ALT 07/15/2019 26     Alkaline Phosphatase 07/15/2019 66     Total Protein 07/15/2019 7 1     Albumin 07/15/2019 4 0     Total Bilirubin 07/15/2019 0 61     eGFR 07/15/2019 58     Cholesterol 07/15/2019 164     Triglycerides 07/15/2019 157*    HDL, Direct 07/15/2019 50     LDL Direct 07/15/2019 89    Admission on 05/08/2019, Discharged on 05/08/2019   Component Date Value    Case Report 05/08/2019                      Value:Surgical Pathology Report                         Case: [id number]                                   Authorizing Provider:  Juaquin Pelayo MD           Collected:           05/08/2019 1102              Ordering Location:     [facility]        Received:            05/08/2019 [facility] Operating Room                                                      Pathologist:           Hoda Hong DO                                                            Specimens:   A) - Breast, Right, right breast lumpectomy                                                         B) - Lymph Node, [place], sentinel lymph node #1, right breast                                     C) - Breast, Right, final superior margin, orange                                                   D) - Breast, Right, final anterior margin, green                                                    E) - Breast, Right, final inferior margin, blue                                                                               F) - Breast, Right, final lateral margin, red G) - Breast, Right, final medial margin, yellow                                                     H) - Breast, Right, final posterior margin, black                                          Addendum 05/08/2019                      Value: This result contains rich text formatting which cannot be displayed here   Final Diagnosis 05/08/2019                      Value: This result contains rich text formatting which cannot be displayed here   Microscopic Description 05/08/2019                      Value: This result contains rich text formatting which cannot be displayed here   Note 05/08/2019                      Value: This result contains rich text formatting which cannot be displayed here   Additional Information 05/08/2019                      Value: This result contains rich text formatting which cannot be displayed here   Synoptic Checklist 05/08/2019                      Value:INVASIVE CARCINOMA OF THE BREAST  (Breast Invasive - All Specimens)                                                                                                                SPECIMEN                               Procedure:    Excision (less than total mastectomy)                                Specimen Laterality:    Right                                                         TUMOR                               Tumor Site: Invasive Carcinoma:    Upper outer quadrant                                Histologic Type:     Invasive carcinoma of no special type (ductal, not otherwise specified)                                Glandular (Acinar) / Tubular Differentiation:    Score 3                                Nuclear Pleomorphism:    Score 2                                Mitotic Rate:    Score 1 (<=3 mitoses per mm2)                                Overall Grade:    Grade 2 (scores of 6 or 7)                                Tumor Size:    Greatest dimension of largest invasive focus in Millimeters (mm): 9 Millimeters (mm)                                 Additional Dimension in Millimeters (mm):    6 Millimeters (mm)                               Tumor Focality:    Single focus of invasive carcinoma                                Ductal Carcinoma In Situ (DCIS):    Present                                  :    Negative for extensive intraductal component (EIC)                                  Size (Extent) of DCIS:    Estimated size of DCIS greatest dimension in Millimeters (mm) is at least: 10 Millimeters (mm)                                   Number of Blocks with DCIS:    4                                    Number of Blocks Examined:    15                                  Architectural Patterns:    Cribriform                                  Architectural Patterns:    Solid                                  Nuclear Grade:    Grade II (intermediate)                                  Necrosis:    Not identified                                Lobular Carcinoma In Situ (LCIS):    No LCIS in specimen                                Tumor Extent:                                   Accessory Findings:                                     Lymphovascular Invasion:    Not identified                                  Dermal Lymphovascular Invasion:    No skin present                                  Microcalcifications:    Present in invasive carcinoma                                  Microcalcifications:    Present in non-neoplastic tissue                                  Treatment Effect:    No known presurgical therapy                                                         MARGINS                               Invasive Carcinoma Margins:    Uninvolved by invasive carcinoma                                  Distance from Closest Margin in Millimeters (mm):    2 Millimeters (mm)                                 Closest Margin:    Posterior                                DCIS Margins:    Uninvolved by DCIS Distance of DCIS from [place] in [address] (mm):    2 5 Millimeters (mm)                                 Closest Margin:    Posterior                                                         LYMPH NODES                               Regional Lymph Nodes:    Uninvolved by tumor cells                                  Number of Lymph Nodes Examined:    1                                  Number of [place] Nodes Examined:    1                                                         PATHOLOGIC STAGE CLASSIFICATION (pTNM, AJCC 8th Edition)                               TNM Descriptors:    Not applicable                                Primary Tumor (Invasive Carcinoma) (pT):    pT1b                                Regional Lymph Nodes (pN):                                     Modifier:    (sn): Only sentinel node(s) evaluated  Category (pN):    pN0                                                         ADDITIONAL FINDINGS                               Additional Pathologic Findings:    Intraductal papilloma with DCIS, Biosy site changed       Gross Description 05/08/2019                      Value: This result contains rich text formatting which cannot be displayed here     Appointment on 05/02/2019   Component Date Value    Sodium 05/02/2019 139     Potassium 05/02/2019 4 0     Chloride 05/02/2019 108     CO2 05/02/2019 29     ANION GAP 05/02/2019 2*    BUN 05/02/2019 34*    Creatinine 05/02/2019 0 99     Glucose, Fasting 05/02/2019 114*    Calcium 05/02/2019 8 9     AST 05/02/2019 22     ALT 05/02/2019 27     Alkaline Phosphatase 05/02/2019 76     Total Protein 05/02/2019 7 2     Albumin 05/02/2019 4 2     Total Bilirubin 05/02/2019 0 59     eGFR 05/02/2019 53     WBC 05/02/2019 7 25     RBC 05/02/2019 4 67     Hemoglobin 05/02/2019 14 8     Hematocrit 05/02/2019 44 2     MCV 05/02/2019 95     MCH 05/02/2019 31 7     MCHC 05/02/2019 33 5     RDW 05/02/2019 12 9     MPV 05/02/2019 10 4     Platelets 38/76/4998 170     nRBC 05/02/2019 0     Neutrophils Relative 05/02/2019 61     Immat GRANS % 05/02/2019 0     Lymphocytes Relative 05/02/2019 25     Monocytes Relative 05/02/2019 6     Eosinophils Relative 05/02/2019 7*    Basophils Relative 05/02/2019 1     Neutrophils Absolute 05/02/2019 4 44     Immature Grans Absolute 05/02/2019 0 02     Lymphocytes Absolute 05/02/2019 1 83     Monocytes Absolute 05/02/2019 0 43     Eosinophils Absolute 05/02/2019 0 47     Basophils Absolute 05/02/2019 0 06    Office Visit on 05/02/2019   Component Date Value    Ventricular Rate 05/02/2019 65     Atrial Rate 05/02/2019 65     MI Interval 05/02/2019 186     QRSD Interval 05/02/2019 70     QT Interval 05/02/2019 400     QTC Interval 05/02/2019 416     P Axis 05/02/2019 40     QRS Axis 05/02/2019 10     T Wave Axis 05/02/2019 [address] Outpatient Visit on 03/28/2019   Component Date Value    Case Report 03/28/2019                      Value:Surgical Pathology Report                         Case: [id number]                                   Authorizing Provider:  IRLANDA Hooks    Collected:           03/28/2019 1446              Ordering Location:     [address] [place] [address] Received:            03/28/2019 2142                                     Center                                                                       Pathologist:           Olinda Montgomery MD                                                   Specimen:    Breast, Right, US  [id number] 12G 3 passes                                                Addendum 2 03/28/2019                      Value: This result contains rich text formatting which cannot be displayed here   Addendum 03/28/2019                      Value: This result contains rich text formatting which cannot be displayed here   Final Diagnosis 03/28/2019                      Value: This result contains rich text formatting which cannot be displayed here   Additional Information 03/28/2019                      Value: This result contains rich text formatting which cannot be displayed here  Victor Manuel Koenig Gross Description 03/28/2019                      Value: This result contains rich text formatting which cannot be displayed here   Clinical Information 03/28/2019                      Value:Fixed in Formalin     Lab Results   Component Value Date    TRIG 157 (H) 07/15/2019    TRIG 125 12/22/2015    HDL 50 07/15/2019    HDL 57 12/22/2015    LDLDIRECT 89 07/15/2019    LDLDIRECT 98 12/22/2015     Imaging: No results found  Review of Systems:  Review of Systems   Constitutional: Negative for activity change, appetite change, chills, diaphoresis, fatigue and unexpected weight change  HENT: Negative for hearing loss, nosebleeds and sore throat  Eyes: Negative for photophobia and visual disturbance  Respiratory: Negative for cough, chest tightness, shortness of breath and wheezing  Cardiovascular: Negative for chest pain, palpitations and leg swelling  Gastrointestinal: Negative for abdominal pain, diarrhea, nausea and vomiting  Endocrine: Negative for polyuria  Genitourinary: Negative for dysuria, frequency and hematuria  Musculoskeletal: Positive for arthralgias  Negative for back pain, gait problem and neck pain  Skin: Negative for pallor and rash  Neurological: Negative for dizziness, syncope and headaches  Hematological: Does not bruise/bleed easily  Psychiatric/Behavioral: Negative for behavioral problems and confusion  Physical Exam:  Physical Exam   Constitutional: She is oriented to person, place, and time  She appears well-developed and well-nourished  HENT:   Head: Normocephalic and atraumatic  Nose: Nose normal    Eyes: Pupils are equal, round, and reactive to light  EOM are normal  No scleral icterus  Neck: Normal range of motion  Neck supple  No JVD present  Cardiovascular: Normal rate, regular rhythm and normal heart sounds  Exam reveals no gallop and no friction rub  No murmur heard  Pulmonary/Chest: Effort normal and breath sounds normal  No respiratory distress  She has no wheezes  She has no rales  Abdominal: Soft  Bowel sounds are normal  She exhibits no distension  There is no tenderness  Musculoskeletal: Normal range of motion  She exhibits edema  She exhibits no deformity  Right worse than left - related to knee arthritis   Neurological: She is alert and oriented to person, place, and time  No cranial nerve deficit  Skin: Skin is warm and dry  No rash noted  She is not diaphoretic  Psychiatric: She has a normal mood and affect  Her behavior is normal    Vitals reviewed  Blood pressure 144/92, pulse 72, height 4' 7" (1 397 m), weight 81 8 kg (180 lb 6 4 oz)  EKG:  Normal sinus rhythm  Normal ECG    Discussion/Summary:  SVT: changed propranolol to Toprol, symptoms are skipped beats - she did have multiple short runs of SVT, longest lasting 7 beats - that feel improved on Toprol  We also checked an echo that ruled out any structural repercussions from having frequent brief runs of SVT  Continue current regimen  Now back on propranolol due to shakiness with Toprol, and tolerating well with reduced symptoms  HTN: continue current regimen, borderline today  But with knee pain, likely related to that  HLD: continued on statin  LDL 89 in July 2019

## 2019-09-17 ENCOUNTER — APPOINTMENT (OUTPATIENT)
Dept: LAB | Age: 84
End: 2019-09-17
Payer: MEDICARE

## 2019-09-17 DIAGNOSIS — E55.9 VITAMIN D DEFICIENCY: Chronic | ICD-10-CM

## 2019-09-17 DIAGNOSIS — Z17.0 CARCINOMA OF UPPER-OUTER QUADRANT OF RIGHT BREAST IN FEMALE, ESTROGEN RECEPTOR POSITIVE (HCC): ICD-10-CM

## 2019-09-17 DIAGNOSIS — E78.2 MIXED HYPERLIPIDEMIA: Chronic | ICD-10-CM

## 2019-09-17 DIAGNOSIS — I10 ESSENTIAL HYPERTENSION: Chronic | ICD-10-CM

## 2019-09-17 DIAGNOSIS — C50.411 CARCINOMA OF UPPER-OUTER QUADRANT OF RIGHT BREAST IN FEMALE, ESTROGEN RECEPTOR POSITIVE (HCC): ICD-10-CM

## 2019-09-17 DIAGNOSIS — Z17.0 MALIGNANT NEOPLASM OF UPPER-OUTER QUADRANT OF RIGHT BREAST IN FEMALE, ESTROGEN RECEPTOR POSITIVE (HCC): ICD-10-CM

## 2019-09-17 DIAGNOSIS — C50.411 MALIGNANT NEOPLASM OF UPPER-OUTER QUADRANT OF RIGHT BREAST IN FEMALE, ESTROGEN RECEPTOR POSITIVE (HCC): ICD-10-CM

## 2019-09-17 LAB
25(OH)D3 SERPL-MCNC: 26.4 NG/ML (ref 30–100)
ALBUMIN SERPL BCP-MCNC: 4.3 G/DL (ref 3.5–5)
ALP SERPL-CCNC: 70 U/L (ref 46–116)
ALT SERPL W P-5'-P-CCNC: 26 U/L (ref 12–78)
ANION GAP SERPL CALCULATED.3IONS-SCNC: 6 MMOL/L (ref 4–13)
AST SERPL W P-5'-P-CCNC: 18 U/L (ref 5–45)
BASOPHILS # BLD AUTO: 0.07 THOUSANDS/ΜL (ref 0–0.1)
BASOPHILS NFR BLD AUTO: 1 % (ref 0–1)
BILIRUB SERPL-MCNC: 0.68 MG/DL (ref 0.2–1)
BUN SERPL-MCNC: 30 MG/DL (ref 5–25)
CALCIUM SERPL-MCNC: 9.6 MG/DL (ref 8.3–10.1)
CHLORIDE SERPL-SCNC: 110 MMOL/L (ref 100–108)
CHOLEST SERPL-MCNC: 167 MG/DL (ref 50–200)
CO2 SERPL-SCNC: 27 MMOL/L (ref 21–32)
CREAT SERPL-MCNC: 0.95 MG/DL (ref 0.6–1.3)
EOSINOPHIL # BLD AUTO: 0.56 THOUSAND/ΜL (ref 0–0.61)
EOSINOPHIL NFR BLD AUTO: 9 % (ref 0–6)
ERYTHROCYTE [DISTWIDTH] IN BLOOD BY AUTOMATED COUNT: 12.9 % (ref 11.6–15.1)
GFR SERPL CREATININE-BSD FRML MDRD: 56 ML/MIN/1.73SQ M
GLUCOSE P FAST SERPL-MCNC: 101 MG/DL (ref 65–99)
HCT VFR BLD AUTO: 42.1 % (ref 34.8–46.1)
HDLC SERPL-MCNC: 51 MG/DL (ref 40–60)
HGB BLD-MCNC: 13.8 G/DL (ref 11.5–15.4)
IMM GRANULOCYTES # BLD AUTO: 0.02 THOUSAND/UL (ref 0–0.2)
IMM GRANULOCYTES NFR BLD AUTO: 0 % (ref 0–2)
LDLC SERPL DIRECT ASSAY-MCNC: 100 MG/DL (ref 0–100)
LYMPHOCYTES # BLD AUTO: 1.63 THOUSANDS/ΜL (ref 0.6–4.47)
LYMPHOCYTES NFR BLD AUTO: 26 % (ref 14–44)
MCH RBC QN AUTO: 30.5 PG (ref 26.8–34.3)
MCHC RBC AUTO-ENTMCNC: 32.8 G/DL (ref 31.4–37.4)
MCV RBC AUTO: 93 FL (ref 82–98)
MONOCYTES # BLD AUTO: 0.53 THOUSAND/ΜL (ref 0.17–1.22)
MONOCYTES NFR BLD AUTO: 8 % (ref 4–12)
NEUTROPHILS # BLD AUTO: 3.48 THOUSANDS/ΜL (ref 1.85–7.62)
NEUTS SEG NFR BLD AUTO: 56 % (ref 43–75)
NRBC BLD AUTO-RTO: 0 /100 WBCS
PLATELET # BLD AUTO: 172 THOUSANDS/UL (ref 149–390)
PMV BLD AUTO: 10.6 FL (ref 8.9–12.7)
POTASSIUM SERPL-SCNC: 4.1 MMOL/L (ref 3.5–5.3)
PROT SERPL-MCNC: 6.9 G/DL (ref 6.4–8.2)
RBC # BLD AUTO: 4.52 MILLION/UL (ref 3.81–5.12)
SODIUM SERPL-SCNC: 143 MMOL/L (ref 136–145)
TRIGL SERPL-MCNC: 93 MG/DL
WBC # BLD AUTO: 6.29 THOUSAND/UL (ref 4.31–10.16)

## 2019-09-17 PROCEDURE — 36415 COLL VENOUS BLD VENIPUNCTURE: CPT

## 2019-09-17 PROCEDURE — 83721 ASSAY OF BLOOD LIPOPROTEIN: CPT

## 2019-09-17 PROCEDURE — 80053 COMPREHEN METABOLIC PANEL: CPT

## 2019-09-17 PROCEDURE — 85025 COMPLETE CBC W/AUTO DIFF WBC: CPT

## 2019-09-17 PROCEDURE — 82306 VITAMIN D 25 HYDROXY: CPT

## 2019-09-17 PROCEDURE — 80061 LIPID PANEL: CPT

## 2019-09-18 ENCOUNTER — OFFICE VISIT (OUTPATIENT)
Dept: OBGYN CLINIC | Facility: HOSPITAL | Age: 84
End: 2019-09-18
Payer: MEDICARE

## 2019-09-18 VITALS
WEIGHT: 181 LBS | DIASTOLIC BLOOD PRESSURE: 78 MMHG | SYSTOLIC BLOOD PRESSURE: 144 MMHG | HEIGHT: 55 IN | HEART RATE: 77 BPM | BODY MASS INDEX: 41.89 KG/M2

## 2019-09-18 DIAGNOSIS — M65.332 TRIGGER FINGER, LEFT MIDDLE FINGER: ICD-10-CM

## 2019-09-18 DIAGNOSIS — G56.22 CUBITAL TUNNEL SYNDROME ON LEFT: Primary | ICD-10-CM

## 2019-09-18 DIAGNOSIS — G56.02 CARPAL TUNNEL SYNDROME ON LEFT: ICD-10-CM

## 2019-09-18 PROCEDURE — 99214 OFFICE O/P EST MOD 30 MIN: CPT | Performed by: ORTHOPAEDIC SURGERY

## 2019-09-18 RX ORDER — IBUPROFEN 200 MG
200 TABLET ORAL EVERY 6 HOURS PRN
COMMUNITY

## 2019-09-18 RX ORDER — CEFAZOLIN SODIUM 2 G/50ML
2000 SOLUTION INTRAVENOUS ONCE
Status: CANCELLED | OUTPATIENT
Start: 2019-09-18 | End: 2019-09-18

## 2019-09-18 NOTE — H&P
ASSESSMENT/PLAN:    Assessment:   Left elbow cubital tunnel syndrome  Left carpal tunnel syndrome  Left ring finger trigger finger  Left long finger trigger finger    Plan:   Surgical scheduling for left endoscopic carpal tunnel release, left cubital tunnel release, release left long and ring finger trigger finger under regional block and IV sedation    To Do Next Visit:  Take out sutures    Operative Discussions:     Cubital Tunnel Release: The anatomy and physiology of cubital tunnel syndrome were discussed with the patient today in the office  Typically, increased elbow flexion activities decrease blood flow within the intraneural spaces, resulting in a feeling of numbness, tingling, weakness, or clumsiness within the hand and fingers  Occasionally, anatomic structures such as medial elbow osteophytes, the medial head of the triceps, were subluxing ulnar nerve may result in increased pressure or aggravation at the cubital tunnel  Typical signs and symptoms usually include numbness and tingling within the ring and small finger, weakness with , and weakness with pinch  Conservative treatment and includes nocturnal bracing to keep the elbow in a semi-extended position, activity modification, therapy, and avoiding excessive elbow flexion activities  A majority of patients typically respond to conservative treatment over a period of approximately 3-6 months  EMG/NCV testing of the ulnar nerve at the elbow is not as reliable as carpal tunnel syndrome  Surgical intervention in the form of in situ release of the ulnar nerve at the elbow or ulnar nerve transposition may be required in up to 20% of patients  The patient has elected to undergo an cubital tunnel release  The possibility of converting to an open procedure, or a subcutaneous or submuscular ulnar nerve transposition depending on the nerve stability was discussed with the patient    Typically, in the postoperative period, light activities are allowed immediately, driving is allowed when narcotic medications have stopped, and the incision may get wet after 5 days  Heavy activities will be allowed after follow up appointment in 1-2 weeks  Anti-inflammatory medications should be held for approximately 5 days postoperative  While the pain within the ring and small finger of the hands generally improves rapidly, the numbness and tingling, as well as the strength, will slowly improve over a period of weeks to months  Total recovery can take up to 18 months from the time of surgery  Numbness and tingling near the incision, or near the medial aspect of the forearm was discussed with the patient  The patient has an understanding of the above mentioned discussion  The risks and benefits of the procedure were explained to the patient, which include, but are not limited to: Bleeding, infection, recurrence, pain, scar, damage to tendons, damage to nerves, and damage to blood vessels, failure to give desired results and complications related to anesthesia   These risks, along with alternative conservative treatment options, and postoperative protocols were voiced back and understood by the patient   All questions were answered to the patient's satisfaction   The patient agrees to comply with a standard postoperative protocol, and is willing to proceed   Education was provided via written and auditory forms   There were no barriers to learning  Written handouts regarding wound care, incision and scar care, and general preoperative information was provided to the patient   Prior to surgery, the patient may be requested to stop all anti-inflammatory medications   Prophylactic aspirin, Plavix, and Coumadin may be allowed to be continued   Medications including vitamin E , ginkgo, and fish oil are requested to be stopped approximately one week prior to surgery   Hypertensive medications and beta blockers, if taken, should be continued    Endoscopic Carpal Tunnel Release: The anatomy and physiology of carpal tunnel syndrome was discussed with the patient today  Increase pressure localized under the transverse carpal ligament can cause pain, numbness, tingling, or dysesthesias within the median nerve distribution as well as feelings of fatigue, clumsiness, or awkwardness  These symptoms typically occur at night and worse in the morning upon waking  Eventually, untreated carpal tunnel syndrome can result in weakness and permanent loss of muscle within the thenar compartment of the hand  Treatment options were discussed with the patient  Conservative treatment includes nocturnal resting splints to keep the nerve in a neutral position, ergonomic changes within the work or home environment, activity modification, and tendon gliding exercises  Steroid injections within the carpal canal can help a majority of patients, however this is often self-limited in a majority of patients  Surgical intervention to divide the transverse carpal ligament typically results in a long-lasting relief of the patient's complaints, with the recurrence rate of less than 1%  The patient has elected to undergo an endoscopic carpal tunnel release  The 2 incision technique was discussed with the patient, which results in approximately a two-week less recovery time, and less wound complications  In the postoperative period, light activities are allowed immediately, driving is allowed when narcotic medication has stopped, and the bandages may be removed and incision may get wet after 2 days  Heavy activities (lifting more than approximately 10 pounds) will be allowed after follow up appointment in 1-2 weeks  While the pain and discomfort in the hands generally improves rapidly, the numbness and tingling as well as the strength will slowly improve over weeks to months depending on the severity of the carpal tunnel syndrome    Pillar pain was discussed with the patient, which is typically a common but self-limiting condition  The risks of bleeding and infection from the surgery are less than 1%  Risk of recurrence is approximately 0 5%  The risks of nerve injury or nerve damage or damage to the blood vessels is approximately 1 in 1200  The patient has an understanding of the above mentioned discussion  The risks and benefits of the procedure were explained to the patient, which include, but are not limited to: Bleeding, infection, recurrence, pain, scar, damage to tendons, damage to nerves, and damage to blood vessels, failure to give desired results and complications related to anesthesia   These risks, along with alternative conservative treatment options, and postoperative protocols were voiced back and understood by the patient   All questions were answered to the patient's satisfaction   The patient agrees to comply with a standard postoperative protocol, and is willing to proceed   Education was provided via written and auditory forms   There were no barriers to learning  Written handouts regarding wound care, incision and scar care, and general preoperative information was provided to the patient   Prior to surgery, the patient may be requested to stop all anti-inflammatory medications   Prophylactic aspirin, Plavix, and Coumadin may be allowed to be continued   Medications including vitamin E , ginkgo, and fish oil are requested to be stopped approximately one week prior to surgery   Hypertensive medications and beta blockers, if taken, should be continued  Trigger Finger Release: The anatomy and physiology of trigger finger was discussed with the patient today in the office  Edema and increased contact pressure within the flexor tendons at the A1 pulley can cause pain, crepitation, and limitation of function    Treatment options include resting MP blocking splints to decrease edema, oral anti-inflammatory medications, home or formal therapy exercises, up to 2 steroid injections or surgical release  While majority of patients do respond to conservative treatment, up to 20% may require surgical release  The patient has elected release of the trigger finger  The patient has elected to undergo a release of the A1 pulley (trigger finger)  A small incision will be made over the palmar aspect of the hand, the tendon sheath holding the flexor tendons will be released  In the postoperative period, light activities are allowed immediately, driving is allowed when narcotic medication has stopped, and the incision may get wet after 2 days  Heavy activities (lifting more than approximately 10 pounds) will be allowed after the follow up appointment in 1-2 weeks  While the pain and discomfort within the wrist typically improves rapidly, some residual discomfort may be present for up to 6 weeks  The nodule that is typically palpable in the palmar aspect of the hand will not be removed, as this would necessitate removal of a portion of the flexor tendon, however the catching, clicking, and locking should resolve  Approximate success rate is 98%  The risks and benefits of the procedure were explained to the patient, which include, but are not limited to: Bleeding, infection, recurrence, pain, scar, damage to tendons, damage to nerves, and damage to blood vessels, need for future surgery and complications related to anesthesia  If bony work is done, risks also include malunion and nonunion  These risks, along with alternative conservative treatment options, and postoperative protocols were voiced back and understood by the patient  All questions were answered to the patient's satisfaction  The patient agrees to comply with a standard postoperative protocol, and is willing to proceed  Education was provided via written and auditory forms  There were no barriers to learning   Written handouts regarding wound care, incision and scar care, and general preoperative information, as well as risks and benefits were provided to the patient       _____________________________________________________  CHIEF COMPLAINT:  Chief Complaint   Patient presents with    Left Hand - Follow-up         SUBJECTIVE:  Hernando Sequeira is a 81 y o  female who presents for follow up of left hand small finger numbness and left ring finger locking and catching of left ring finger and long finger  She got an EMG that shows cubital tunnel and carpal tunnel syndrome    PAST MEDICAL HISTORY:  Past Medical History:   Diagnosis Date    Anxiety     Arthritis     last assessed 3/24/15     Atherosclerosis     Bell's palsy     Broken femur ([address] )     Cardiac disorder     DCIS (ductal carcinoma in situ) of breast     last assessed 4/4/17     Depression     Endometrial polyp     GERD (gastroesophageal reflux disease)     History of radiation therapy 2010    Hypercholesterolemia     resolved 10/22/14     Hyperlipidemia     Long term use of drug     last assessed 5/23/14     Malignant neoplasm without specification of site [facility])     Peripheral neuropathy     Pneumonia     As a Child    PONV (postoperative nausea and vomiting)     Tachycardia     Uterine fibroid        PAST SURGICAL HISTORY:  Past Surgical History:   Procedure Laterality Date    BREAST BIOPSY Right     BREAST LUMPECTOMY Right 2010    BREAST LUMPECTOMY Right 5/8/2019    Procedure: BREAST LUMPECTOMY; BREAST NEEDLE LOCALIZATION (NEEDLE LOC AT 0800); Surgeon: Alfreda Jones MD;  [facility];  Service: Surgical Oncology    CARPAL TUNNEL RELEASE Bilateral     CATARACT EXTRACTION Bilateral     DILATION AND CURETTAGE OF UTERUS      ENDOMETRIAL BIOPSY      without cervical dilation     HEMORROIDECTOMY      JOINT REPLACEMENT Right     Shoulder    JOINT REPLACEMENT Bilateral     Knees    LYMPH NODE BIOPSY Right 5/8/2019    Procedure: SENTINEL LYMPH NODE BIOPSY; LYMPHOSCINTIGRAPHY; INTRAOPERATIVE LYMPHATIC MAPPING (INJECT AT 0900);   Surgeon: Alfreda Jones MD; [facility];  Service: Surgical Oncology    MAMMO NEEDLE LOCALIZATION RIGHT (ALL INC) Right 5/8/2019    OOPHORECTOMY      77    OVARIAN CYST REMOVAL Left     WI RECONSTR TOTAL SHOULDER IMPLANT Right 7/18/2017    Procedure: TOTAL SHOULDER REVERSE ARTHROPLASTY;  Surgeon: Bill Boland MD;  [facility];  Service: Orthopedics    SENTINEL LYMPH NODE BIOPSY      TONSILLECTOMY      TUBAL LIGATION      US GUIDED BREAST BIOPSY RIGHT COMPLETE Right 3/28/2019       FAMILY HISTORY:  Family History   Problem Relation Age of Onset    Heart disease Mother         artherosclerosis     Heart disease Father         artherosclerosis     Heart attack Father     Arthritis Family     Heart disease Family         artherosclerosis     Breast cancer Family     Osteoarthritis Family     Supraventricular tachycardia Family     Hypertension Daughter     Ovarian cancer Sister 80    Anemia Neg Hx     Arrhythmia Neg Hx     Asthma Neg Hx     Clotting disorder Neg Hx     Fainting Neg Hx     Hyperlipidemia Neg Hx     Aneurysm Neg Hx        SOCIAL HISTORY:  Social History     Tobacco Use    Smoking status: Never Smoker    Smokeless tobacco: Never Used   Substance Use Topics    Alcohol use: No     Comment: social drinker per [employer]     Drug use: No       MEDICATIONS:    Current Outpatient Medications:     anastrozole (ARIMIDEX) 1 mg tablet, Take 1 tablet (1 mg total) by mouth daily, Disp: 90 tablet, Rfl: 3    clorazepate (TRANXENE) 3 75 mg tablet, Take 3 75 mg by mouth as needed Takes with Dinner and before Bed, Disp: , Rfl:     CRANBERRY PO, Take 1,700 mg by mouth daily in the early morning , Disp: , Rfl:     ibuprofen (MOTRIN) 200 mg tablet, Take by mouth every 6 (six) hours as needed for mild pain, Disp: , Rfl:     lisinopril (ZESTRIL) 10 mg tablet, Take 1 tablet (10 mg total) by mouth 2 (two) times a day, Disp: 180 tablet, Rfl: 3    Multiple Vitamins-Minerals (PRESERVISION AREDS 2 PO), Take 2 tablets by mouth daily in the early morning , Disp: , Rfl:     multivitamin (THERAGRAN) TABS, Take 1 tablet by mouth daily in the early morning , Disp: , Rfl:     Omega-3 Fatty Acids (FISH OIL PO), Take 1 capsule by mouth daily in the early morning , Disp: , Rfl:     prednisoLONE acetate (PRED FORTE) 1 % ophthalmic suspension, Administer 1 drop to the right eye daily in the early morning , Disp: , Rfl: 0    propranolol (INDERAL) 20 mg tablet, Take 1 tablet (20 mg total) by mouth 4 (four) times a day for 90 days, Disp: 360 tablet, Rfl: 0    rosuvastatin (CRESTOR) 5 mg tablet, Take 0 5 tablets by mouth 3 (three) times a week Takes M-W-F, Disp: , Rfl: 0    acetaminophen (TYLENOL) 325 mg tablet, Take 650 mg by mouth every 6 (six) hours as needed for mild pain, Disp: , Rfl:     ALLERGIES:  Allergies   Allergen Reactions    Amoxicillin-Pot Clavulanate GI Intolerance    Azithromycin GI Intolerance and Rash    Cephalexin GI Intolerance    Cortisone GI Intolerance    Doxycycline GI Intolerance    Penicillins GI Intolerance       REVIEW OF SYSTEMS:  Pertinent items are noted in HPI  A comprehensive review of systems was negative      LABS:  HgA1c:   Lab Results   Component Value Date    HGBA1C 5 2 06/12/2017     BMP:   Lab Results   Component Value Date    GLUCOSE 95 12/22/2015    CALCIUM 9 6 09/17/2019     12/22/2015    K 4 1 09/17/2019    CO2 27 09/17/2019     (H) 09/17/2019    BUN 30 (H) 09/17/2019    CREATININE 0 95 09/17/2019       _____________________________________________________  PHYSICAL EXAMINATION:  Vital signs: /78   Pulse 77   Ht 4' 7" (1 397 m)   Wt 82 1 kg (181 lb)   BMI 42 07 kg/m²    General: well developed and well nourished, alert, oriented times 3 and appears comfortable  Psychiatric: Normal  HEENT: Trachea Midline, No torticollis  Cardiovascular: No discernable arrhythmia  Pulmonary: No wheezing or stridor  Skin: No masses, erthema, lacerations, fluctation, ulcerations  Neurovascular: Sensation Intact to the Median, Ulnar, Radial Nerve, Motor Intact to the Median, Ulnar, Radial Nerve and Pulses Intact    MUSCULOSKELETAL EXAMINATION:  Extremities:   Left upper extremity:  Atrophy to 1st dorsal interosseous and hypothenar muscles  numbness to the small, ring and long fingers  2/5 finger abduction  4/5 APB, Froment's sign on the left  - tinel's cubital tunnel, - elbow flexion compression test  + ttp A1 pulley ring and long finger with + nodule and trigger    _____________________________________________________  STUDIES REVIEWED:  EMG: left cubital and carpal tunnel syndrome      PROCEDURES PERFORMED:  Procedures  No Procedures performed today     I interviewed, took the history and examined the patient  I discuss the case with the resident and reviewed the resident's note  I supervised the resident and I agree with the resident management plan as it was presented to me  I was present in the clinic and examined the patient

## 2019-09-18 NOTE — PROGRESS NOTES
ASSESSMENT/PLAN:    Assessment:   Left elbow cubital tunnel syndrome  Left carpal tunnel syndrome  Left ring finger trigger finger  Left long finger trigger finger    Plan:   Surgical scheduling for left endoscopic carpal tunnel release, left cubital tunnel release, release left long and ring finger trigger finger under regional block and IV sedation    To Do Next Visit:  Take out sutures    Operative Discussions:     Cubital Tunnel Release: The anatomy and physiology of cubital tunnel syndrome were discussed with the patient today in the office  Typically, increased elbow flexion activities decrease blood flow within the intraneural spaces, resulting in a feeling of numbness, tingling, weakness, or clumsiness within the hand and fingers  Occasionally, anatomic structures such as medial elbow osteophytes, the medial head of the triceps, were subluxing ulnar nerve may result in increased pressure or aggravation at the cubital tunnel  Typical signs and symptoms usually include numbness and tingling within the ring and small finger, weakness with , and weakness with pinch  Conservative treatment and includes nocturnal bracing to keep the elbow in a semi-extended position, activity modification, therapy, and avoiding excessive elbow flexion activities  A majority of patients typically respond to conservative treatment over a period of approximately 3-6 months  EMG/NCV testing of the ulnar nerve at the elbow is not as reliable as carpal tunnel syndrome  Surgical intervention in the form of in situ release of the ulnar nerve at the elbow or ulnar nerve transposition may be required in up to 20% of patients  The patient has elected to undergo an cubital tunnel release  The possibility of converting to an open procedure, or a subcutaneous or submuscular ulnar nerve transposition depending on the nerve stability was discussed with the patient    Typically, in the postoperative period, light activities are allowed immediately, driving is allowed when narcotic medications have stopped, and the incision may get wet after 5 days  Heavy activities will be allowed after follow up appointment in 1-2 weeks  Anti-inflammatory medications should be held for approximately 5 days postoperative  While the pain within the ring and small finger of the hands generally improves rapidly, the numbness and tingling, as well as the strength, will slowly improve over a period of weeks to months  Total recovery can take up to 18 months from the time of surgery  Numbness and tingling near the incision, or near the medial aspect of the forearm was discussed with the patient  The patient has an understanding of the above mentioned discussion  The risks and benefits of the procedure were explained to the patient, which include, but are not limited to: Bleeding, infection, recurrence, pain, scar, damage to tendons, damage to nerves, and damage to blood vessels, failure to give desired results and complications related to anesthesia   These risks, along with alternative conservative treatment options, and postoperative protocols were voiced back and understood by the patient   All questions were answered to the patient's satisfaction   The patient agrees to comply with a standard postoperative protocol, and is willing to proceed   Education was provided via written and auditory forms   There were no barriers to learning  Written handouts regarding wound care, incision and scar care, and general preoperative information was provided to the patient   Prior to surgery, the patient may be requested to stop all anti-inflammatory medications   Prophylactic aspirin, Plavix, and Coumadin may be allowed to be continued   Medications including vitamin E , ginkgo, and fish oil are requested to be stopped approximately one week prior to surgery   Hypertensive medications and beta blockers, if taken, should be continued    Endoscopic Carpal Tunnel Release: The anatomy and physiology of carpal tunnel syndrome was discussed with the patient today  Increase pressure localized under the transverse carpal ligament can cause pain, numbness, tingling, or dysesthesias within the median nerve distribution as well as feelings of fatigue, clumsiness, or awkwardness  These symptoms typically occur at night and worse in the morning upon waking  Eventually, untreated carpal tunnel syndrome can result in weakness and permanent loss of muscle within the thenar compartment of the hand  Treatment options were discussed with the patient  Conservative treatment includes nocturnal resting splints to keep the nerve in a neutral position, ergonomic changes within the work or home environment, activity modification, and tendon gliding exercises  Steroid injections within the carpal canal can help a majority of patients, however this is often self-limited in a majority of patients  Surgical intervention to divide the transverse carpal ligament typically results in a long-lasting relief of the patient's complaints, with the recurrence rate of less than 1%  The patient has elected to undergo an endoscopic carpal tunnel release  The 2 incision technique was discussed with the patient, which results in approximately a two-week less recovery time, and less wound complications  In the postoperative period, light activities are allowed immediately, driving is allowed when narcotic medication has stopped, and the bandages may be removed and incision may get wet after 2 days  Heavy activities (lifting more than approximately 10 pounds) will be allowed after follow up appointment in 1-2 weeks  While the pain and discomfort in the hands generally improves rapidly, the numbness and tingling as well as the strength will slowly improve over weeks to months depending on the severity of the carpal tunnel syndrome    Pillar pain was discussed with the patient, which is typically a common but self-limiting condition  The risks of bleeding and infection from the surgery are less than 1%  Risk of recurrence is approximately 0 5%  The risks of nerve injury or nerve damage or damage to the blood vessels is approximately 1 in 1200  The patient has an understanding of the above mentioned discussion  The risks and benefits of the procedure were explained to the patient, which include, but are not limited to: Bleeding, infection, recurrence, pain, scar, damage to tendons, damage to nerves, and damage to blood vessels, failure to give desired results and complications related to anesthesia   These risks, along with alternative conservative treatment options, and postoperative protocols were voiced back and understood by the patient   All questions were answered to the patient's satisfaction   The patient agrees to comply with a standard postoperative protocol, and is willing to proceed   Education was provided via written and auditory forms   There were no barriers to learning  Written handouts regarding wound care, incision and scar care, and general preoperative information was provided to the patient   Prior to surgery, the patient may be requested to stop all anti-inflammatory medications   Prophylactic aspirin, Plavix, and Coumadin may be allowed to be continued   Medications including vitamin E , ginkgo, and fish oil are requested to be stopped approximately one week prior to surgery   Hypertensive medications and beta blockers, if taken, should be continued  Trigger Finger Release: The anatomy and physiology of trigger finger was discussed with the patient today in the office  Edema and increased contact pressure within the flexor tendons at the A1 pulley can cause pain, crepitation, and limitation of function    Treatment options include resting MP blocking splints to decrease edema, oral anti-inflammatory medications, home or formal therapy exercises, up to 2 steroid injections or surgical release  While majority of patients do respond to conservative treatment, up to 20% may require surgical release  The patient has elected release of the trigger finger  The patient has elected to undergo a release of the A1 pulley (trigger finger)  A small incision will be made over the palmar aspect of the hand, the tendon sheath holding the flexor tendons will be released  In the postoperative period, light activities are allowed immediately, driving is allowed when narcotic medication has stopped, and the incision may get wet after 2 days  Heavy activities (lifting more than approximately 10 pounds) will be allowed after the follow up appointment in 1-2 weeks  While the pain and discomfort within the wrist typically improves rapidly, some residual discomfort may be present for up to 6 weeks  The nodule that is typically palpable in the palmar aspect of the hand will not be removed, as this would necessitate removal of a portion of the flexor tendon, however the catching, clicking, and locking should resolve  Approximate success rate is 98%  The risks and benefits of the procedure were explained to the patient, which include, but are not limited to: Bleeding, infection, recurrence, pain, scar, damage to tendons, damage to nerves, and damage to blood vessels, need for future surgery and complications related to anesthesia  If bony work is done, risks also include malunion and nonunion  These risks, along with alternative conservative treatment options, and postoperative protocols were voiced back and understood by the patient  All questions were answered to the patient's satisfaction  The patient agrees to comply with a standard postoperative protocol, and is willing to proceed  Education was provided via written and auditory forms  There were no barriers to learning   Written handouts regarding wound care, incision and scar care, and general preoperative information, as well as risks and benefits were provided to the patient       _____________________________________________________  CHIEF COMPLAINT:  Chief Complaint   Patient presents with    Left Hand - Follow-up         SUBJECTIVE:  Yvrose Logan is a 81 y o  female who presents for follow up of left hand small finger numbness and left ring finger locking and catching of left ring finger and long finger  She got an EMG that shows cubital tunnel and carpal tunnel syndrome    PAST MEDICAL HISTORY:  Past Medical History:   Diagnosis Date    Anxiety     Arthritis     last assessed 3/24/15     Atherosclerosis     Bell's palsy     Broken femur ([address] )     Cardiac disorder     DCIS (ductal carcinoma in situ) of breast     last assessed 4/4/17     Depression     Endometrial polyp     GERD (gastroesophageal reflux disease)     History of radiation therapy 2010    Hypercholesterolemia     resolved 10/22/14     Hyperlipidemia     Long term use of drug     last assessed 5/23/14     Malignant neoplasm without specification of site [facility])     Peripheral neuropathy     Pneumonia     As a Child    PONV (postoperative nausea and vomiting)     Tachycardia     Uterine fibroid        PAST SURGICAL HISTORY:  Past Surgical History:   Procedure Laterality Date    BREAST BIOPSY Right     BREAST LUMPECTOMY Right 2010    BREAST LUMPECTOMY Right 5/8/2019    Procedure: BREAST LUMPECTOMY; BREAST NEEDLE LOCALIZATION (NEEDLE LOC AT 0800); Surgeon: Krystian Fried MD;  [facility];  Service: Surgical Oncology    CARPAL TUNNEL RELEASE Bilateral     CATARACT EXTRACTION Bilateral     DILATION AND CURETTAGE OF UTERUS      ENDOMETRIAL BIOPSY      without cervical dilation     HEMORROIDECTOMY      JOINT REPLACEMENT Right     Shoulder    JOINT REPLACEMENT Bilateral     Knees    LYMPH NODE BIOPSY Right 5/8/2019    Procedure: SENTINEL LYMPH NODE BIOPSY; LYMPHOSCINTIGRAPHY; INTRAOPERATIVE LYMPHATIC MAPPING (INJECT AT 0900);   Surgeon: Krystian Fried MD; [facility];  Service: Surgical Oncology    MAMMO NEEDLE LOCALIZATION RIGHT (ALL INC) Right 5/8/2019    OOPHORECTOMY      77    OVARIAN CYST REMOVAL Left     HI RECONSTR TOTAL SHOULDER IMPLANT Right 7/18/2017    Procedure: TOTAL SHOULDER REVERSE ARTHROPLASTY;  Surgeon: Monica Weaver MD;  [facility];  Service: Orthopedics    SENTINEL LYMPH NODE BIOPSY      TONSILLECTOMY      TUBAL LIGATION      US GUIDED BREAST BIOPSY RIGHT COMPLETE Right 3/28/2019       FAMILY HISTORY:  Family History   Problem Relation Age of Onset    Heart disease Mother         artherosclerosis     Heart disease Father         artherosclerosis     Heart attack Father     Arthritis Family     Heart disease Family         artherosclerosis     Breast cancer Family     Osteoarthritis Family     Supraventricular tachycardia Family     Hypertension Daughter     Ovarian cancer Sister 80    Anemia Neg Hx     Arrhythmia Neg Hx     Asthma Neg Hx     Clotting disorder Neg Hx     Fainting Neg Hx     Hyperlipidemia Neg Hx     Aneurysm Neg Hx        SOCIAL HISTORY:  Social History     Tobacco Use    Smoking status: Never Smoker    Smokeless tobacco: Never Used   Substance Use Topics    Alcohol use: No     Comment: social drinker per [employer]     Drug use: No       MEDICATIONS:    Current Outpatient Medications:     anastrozole (ARIMIDEX) 1 mg tablet, Take 1 tablet (1 mg total) by mouth daily, Disp: 90 tablet, Rfl: 3    clorazepate (TRANXENE) 3 75 mg tablet, Take 3 75 mg by mouth as needed Takes with Dinner and before Bed, Disp: , Rfl:     CRANBERRY PO, Take 1,700 mg by mouth daily in the early morning , Disp: , Rfl:     ibuprofen (MOTRIN) 200 mg tablet, Take by mouth every 6 (six) hours as needed for mild pain, Disp: , Rfl:     lisinopril (ZESTRIL) 10 mg tablet, Take 1 tablet (10 mg total) by mouth 2 (two) times a day, Disp: 180 tablet, Rfl: 3    Multiple Vitamins-Minerals (PRESERVISION AREDS 2 PO), Take 2 tablets by mouth daily in the early morning , Disp: , Rfl:     multivitamin (THERAGRAN) TABS, Take 1 tablet by mouth daily in the early morning , Disp: , Rfl:     Omega-3 Fatty Acids (FISH OIL PO), Take 1 capsule by mouth daily in the early morning , Disp: , Rfl:     prednisoLONE acetate (PRED FORTE) 1 % ophthalmic suspension, Administer 1 drop to the right eye daily in the early morning , Disp: , Rfl: 0    propranolol (INDERAL) 20 mg tablet, Take 1 tablet (20 mg total) by mouth 4 (four) times a day for 90 days, Disp: 360 tablet, Rfl: 0    rosuvastatin (CRESTOR) 5 mg tablet, Take 0 5 tablets by mouth 3 (three) times a week Takes M-W-F, Disp: , Rfl: 0    acetaminophen (TYLENOL) 325 mg tablet, Take 650 mg by mouth every 6 (six) hours as needed for mild pain, Disp: , Rfl:     ALLERGIES:  Allergies   Allergen Reactions    Amoxicillin-Pot Clavulanate GI Intolerance    Azithromycin GI Intolerance and Rash    Cephalexin GI Intolerance    Cortisone GI Intolerance    Doxycycline GI Intolerance    Penicillins GI Intolerance       REVIEW OF SYSTEMS:  Pertinent items are noted in HPI  A comprehensive review of systems was negative      LABS:  HgA1c:   Lab Results   Component Value Date    HGBA1C 5 2 06/12/2017     BMP:   Lab Results   Component Value Date    GLUCOSE 95 12/22/2015    CALCIUM 9 6 09/17/2019     12/22/2015    K 4 1 09/17/2019    CO2 27 09/17/2019     (H) 09/17/2019    BUN 30 (H) 09/17/2019    CREATININE 0 95 09/17/2019       _____________________________________________________  PHYSICAL EXAMINATION:  Vital signs: /78   Pulse 77   Ht 4' 7" (1 397 m)   Wt 82 1 kg (181 lb)   BMI 42 07 kg/m²   General: well developed and well nourished, alert, oriented times 3 and appears comfortable  Psychiatric: Normal  HEENT: Trachea Midline, No torticollis  Cardiovascular: No discernable arrhythmia  Pulmonary: No wheezing or stridor  Skin: No masses, erthema, lacerations, fluctation, ulcerations  Neurovascular: Sensation Intact to the Median, Ulnar, Radial Nerve, Motor Intact to the Median, Ulnar, Radial Nerve and Pulses Intact    MUSCULOSKELETAL EXAMINATION:  Extremities:   Left upper extremity:  Atrophy to 1st dorsal interosseous and hypothenar muscles  numbness to the small, ring and long fingers  2/5 finger abduction  4/5 APB, Froment's sign on the left  - tinel's cubital tunnel, - elbow flexion compression test  + ttp A1 pulley ring and long finger with + nodule and trigger    _____________________________________________________  STUDIES REVIEWED:  EMG: left cubital and carpal tunnel syndrome      PROCEDURES PERFORMED:  Procedures  No Procedures performed today     I interviewed, took the history and examined the patient  I discuss the case with the resident and reviewed the resident's note  I supervised the resident and I agree with the resident management plan as it was presented to me  I was present in the clinic and examined the patient

## 2019-09-19 ENCOUNTER — TELEPHONE (OUTPATIENT)
Dept: INTERNAL MEDICINE CLINIC | Facility: CLINIC | Age: 84
End: 2019-09-19

## 2019-09-19 NOTE — TELEPHONE ENCOUNTER
----- Message from Susanna Mauro MD sent at 9/19/2019 12:12 PM EDT -----   Please call patient-all blood work normal with overall cholesterol good at 165 other than a low vitamin-D level -make sure she is using over-the-counter vitamin D3/ 2000 units daily

## 2019-09-21 DIAGNOSIS — I10 ESSENTIAL HYPERTENSION: ICD-10-CM

## 2019-09-22 RX ORDER — PROPRANOLOL HYDROCHLORIDE 20 MG/1
TABLET ORAL
Qty: 360 TABLET | Refills: 0 | Status: SHIPPED | OUTPATIENT
Start: 2019-09-22 | End: 2019-11-10 | Stop reason: SDUPTHER

## 2019-09-24 NOTE — PROGRESS NOTES
Hematology/Oncology Outpatient Follow- up Note  Marika Lemos 81 y o  female MRN: @ Encounter: [id number]        Date:  9/25/2019      Assessment / Plan:    1  History of DCIS of the right breast in 2010 status post surgical resection and radiation therapy, she did not receive hormonal therapy     2  History of stage I (T1b, N0, M0) right-sided invasive ductal carcinoma status post lumpectomy 5/8/2019, ER 95%, PA 95%, HER2 negative by FISH, with 1 sentinel lymph node negative for involvement, pathology report showed negative margins there is DCIS as well  Anastrozole 1 mg p o  Daily for 5 years initiated end of May 2019        4  Follow-up in 6 months with CBC and CMP           HPI:  27-year-old  female seen initially 5/24/2019 regarding invasive ductal carcinoma    He has a with history of degenerative arthritis, ORIF of the left femur, depression, dyslipidemia, Bell's palsy, had a history of DCIS of the right breast in 2010 status post excision as well as radiation therapy with subsequent alopecia     Most recent mammogram in March 2019 showed asymmetry in the outer region of the right breast, subsequent ultrasound showed a mass measuring 7 x 5 x 5 mm around 9 o'clock 3 cm from the nipple     Biopsy showed invasive ductal carcinoma, ER 95%, PA 95%, her 2+ 2 however negative by [facility]     Status post lumpectomy showing invasive ductal carcinoma, 9 mm, negative margin, no evidence of lymphovascular invasion, in the background of DCIS, invasive carcinoma and DCIS are present in the posterior final margin of the excision representing residual carcinoma in the margin, this is not a multifocal tumor, the final into posterior margin is negative for DCIS and invasive carcinoma with the tumor being 2 mm from the new final margin, 1-sentinel lymph node     Denies any headache blurred vision she reported base pulse C of the right side denies any odynophagia dysphagia chest pain abdominal pain dysuria hematuria melena hematochezia she reported pain in the left femur area     No subjective lymphadenopathy or skin rash      Interval History: Tolerating anastrazole well  Chronic bilateral knee pain  Test Results:        Labs:   Lab Results   Component Value Date    HGB 13 8 09/17/2019    HCT 42 1 09/17/2019    MCV 93 09/17/2019     09/17/2019    WBC 6 29 09/17/2019    NRBC 0 09/17/2019     Lab Results   Component Value Date     12/22/2015    K 4 1 09/17/2019     (H) 09/17/2019    CO2 27 09/17/2019    ANIONGAP 7 12/22/2015    BUN 30 (H) 09/17/2019    CREATININE 0 95 09/17/2019    GLUCOSE 95 12/22/2015    GLUF 101 (H) 09/17/2019    CALCIUM 9 6 09/17/2019    AST 18 09/17/2019    ALT 26 09/17/2019    ALKPHOS 70 09/17/2019    PROT 7 1 12/22/2015    BILITOT 0 71 12/22/2015    EGFR 56 09/17/2019       Imaging: No results found  ROS:  As mentioned in HPI & Interval History otherwise 14 point ROS negative  Allergies: Allergies   Allergen Reactions    Amoxicillin-Pot Clavulanate GI Intolerance    Azithromycin GI Intolerance and Rash    Cephalexin GI Intolerance    Cortisone GI Intolerance    Doxycycline GI Intolerance    Penicillins GI Intolerance     Current Medications: Reviewed  PMH/FH/SH:  Reviewed      Physical Exam:    There is no height or weight on file to calculate BSA  Ht Readings from Last 3 Encounters:   09/18/19 4' 7" (1 397 m)   08/27/19 4' 7" (1 397 m)   08/14/19 4' 7" (1 397 m)        Wt Readings from Last 3 Encounters:   09/18/19 82 1 kg (181 lb)   08/27/19 81 8 kg (180 lb 6 4 oz)   08/14/19 82 3 kg (181 lb 6 4 oz)        Temp Readings from Last 3 Encounters:   05/29/19 (!) 97 2 °F (36 2 °C)   05/24/19 99 1 °F (37 3 °C) (Tympanic)   05/08/19 97 5 °F (36 4 °C) (Temporal)        BP Readings from Last 3 Encounters:   09/18/19 144/78   08/27/19 144/92   08/14/19 136/82           Physical Exam   Constitutional: She is oriented to person, place, and time   She appears well-developed and well-nourished  No distress  HENT:   Head: Normocephalic and atraumatic  Eyes: Conjunctivae are normal    Neck: Normal range of motion  Neck supple  No tracheal deviation present  Cardiovascular: Normal rate and regular rhythm  Exam reveals no gallop and no friction rub  No murmur heard  Pulmonary/Chest: Effort normal and breath sounds normal  No respiratory distress  She has no wheezes  She has no rales  She exhibits no tenderness  Abdominal: Soft  She exhibits no distension  There is no tenderness  Musculoskeletal:   cane   Lymphadenopathy:     She has no cervical adenopathy  Neurological: She is alert and oriented to person, place, and time  Skin: Skin is warm and dry  She is not diaphoretic  No erythema  No pallor  Psychiatric: She has a normal mood and affect  Her behavior is normal  Judgment and thought content normal    Vitals reviewed  ECO      Emergency Contacts:     Shannon Ocasio, [phone],

## 2019-09-25 ENCOUNTER — OFFICE VISIT (OUTPATIENT)
Dept: HEMATOLOGY ONCOLOGY | Facility: CLINIC | Age: 84
End: 2019-09-25
Payer: MEDICARE

## 2019-09-25 VITALS
SYSTOLIC BLOOD PRESSURE: 140 MMHG | TEMPERATURE: 98.3 F | HEART RATE: 62 BPM | RESPIRATION RATE: 16 BRPM | BODY MASS INDEX: 41.89 KG/M2 | WEIGHT: 181 LBS | HEIGHT: 55 IN | DIASTOLIC BLOOD PRESSURE: 76 MMHG | OXYGEN SATURATION: 96 %

## 2019-09-25 DIAGNOSIS — C50.411 MALIGNANT NEOPLASM OF UPPER-OUTER QUADRANT OF RIGHT BREAST IN FEMALE, ESTROGEN RECEPTOR POSITIVE (HCC): Primary | ICD-10-CM

## 2019-09-25 DIAGNOSIS — Z17.0 MALIGNANT NEOPLASM OF UPPER-OUTER QUADRANT OF RIGHT BREAST IN FEMALE, ESTROGEN RECEPTOR POSITIVE (HCC): Primary | ICD-10-CM

## 2019-09-25 PROCEDURE — 99213 OFFICE O/P EST LOW 20 MIN: CPT | Performed by: PHYSICIAN ASSISTANT

## 2019-10-22 ENCOUNTER — TELEPHONE (OUTPATIENT)
Dept: INTERNAL MEDICINE CLINIC | Facility: CLINIC | Age: 84
End: 2019-10-22

## 2019-10-22 ENCOUNTER — TELEPHONE (OUTPATIENT)
Dept: PREADMISSION TESTING | Facility: HOSPITAL | Age: 84
End: 2019-10-22

## 2019-10-29 ENCOUNTER — TELEPHONE (OUTPATIENT)
Dept: OBGYN CLINIC | Facility: HOSPITAL | Age: 84
End: 2019-10-29

## 2019-10-29 NOTE — TELEPHONE ENCOUNTER
Patient called said that she isn't having any problems with her trigger finger anymore, but she is having numbness in her pinky finger instead, she wants to know if she should still have surgery

## 2019-10-29 NOTE — TELEPHONE ENCOUNTER
Spoke with Dr Kd Antonio and he said we can just adjust things the morning of surgery to see if she still needs her trigger fingers fixed  She should make sure to mention if she is still not having issues at that time and we will examine her and change paperwork if necessary morning of  N/t is related to a separate issue (carpal/cubital tunnel) which is part of what she is scheduled for

## 2019-10-29 NOTE — TELEPHONE ENCOUNTER
Pt scheduled for ECTR, CuTR and 2 TFs on 11/19  Would you like to see her prior to her surgery to potentially redo consents or just look at her morning of to determine if TFs still need to be fixed?

## 2019-10-30 ENCOUNTER — TELEPHONE (OUTPATIENT)
Dept: OBGYN CLINIC | Facility: CLINIC | Age: 84
End: 2019-10-30

## 2019-11-01 ENCOUNTER — TELEPHONE (OUTPATIENT)
Dept: OBGYN CLINIC | Facility: HOSPITAL | Age: 84
End: 2019-11-01

## 2019-11-01 ENCOUNTER — APPOINTMENT (OUTPATIENT)
Dept: LAB | Age: 84
End: 2019-11-01
Payer: MEDICARE

## 2019-11-01 DIAGNOSIS — Z01.812 PRE-OPERATIVE LABORATORY EXAMINATION: Primary | ICD-10-CM

## 2019-11-01 DIAGNOSIS — Z01.812 PRE-OPERATIVE LABORATORY EXAMINATION: ICD-10-CM

## 2019-11-01 LAB
ANION GAP SERPL CALCULATED.3IONS-SCNC: 7 MMOL/L (ref 4–13)
BUN SERPL-MCNC: 20 MG/DL (ref 5–25)
CALCIUM SERPL-MCNC: 10.1 MG/DL (ref 8.3–10.1)
CHLORIDE SERPL-SCNC: 108 MMOL/L (ref 100–108)
CO2 SERPL-SCNC: 27 MMOL/L (ref 21–32)
CREAT SERPL-MCNC: 0.96 MG/DL (ref 0.6–1.3)
ERYTHROCYTE [DISTWIDTH] IN BLOOD BY AUTOMATED COUNT: 12.9 % (ref 11.6–15.1)
GFR SERPL CREATININE-BSD FRML MDRD: 55 ML/MIN/1.73SQ M
GLUCOSE SERPL-MCNC: 108 MG/DL (ref 65–140)
HCT VFR BLD AUTO: 43.1 % (ref 34.8–46.1)
HGB BLD-MCNC: 14.3 G/DL (ref 11.5–15.4)
MCH RBC QN AUTO: 30.8 PG (ref 26.8–34.3)
MCHC RBC AUTO-ENTMCNC: 33.2 G/DL (ref 31.4–37.4)
MCV RBC AUTO: 93 FL (ref 82–98)
PLATELET # BLD AUTO: 182 THOUSANDS/UL (ref 149–390)
PMV BLD AUTO: 10.3 FL (ref 8.9–12.7)
POTASSIUM SERPL-SCNC: 3.7 MMOL/L (ref 3.5–5.3)
RBC # BLD AUTO: 4.64 MILLION/UL (ref 3.81–5.12)
SODIUM SERPL-SCNC: 142 MMOL/L (ref 136–145)
WBC # BLD AUTO: 7.45 THOUSAND/UL (ref 4.31–10.16)

## 2019-11-01 PROCEDURE — 36415 COLL VENOUS BLD VENIPUNCTURE: CPT

## 2019-11-01 PROCEDURE — 80048 BASIC METABOLIC PNL TOTAL CA: CPT

## 2019-11-01 PROCEDURE — 85027 COMPLETE CBC AUTOMATED: CPT

## 2019-11-01 NOTE — TELEPHONE ENCOUNTER
1401 Dmitriy Day from [facility] called because the patient was there to complete pre op labwork, but there are no orders in the patient's chart  Our office was able to contact someone in Dr Jasmin Medina office and we were advised that the orders were being added to Epic now  I advised Jenni

## 2019-11-07 ENCOUNTER — TELEPHONE (OUTPATIENT)
Dept: INTERNAL MEDICINE CLINIC | Facility: CLINIC | Age: 84
End: 2019-11-07

## 2019-11-07 ENCOUNTER — OFFICE VISIT (OUTPATIENT)
Dept: INTERNAL MEDICINE CLINIC | Facility: CLINIC | Age: 84
End: 2019-11-07
Payer: MEDICARE

## 2019-11-07 ENCOUNTER — TELEPHONE (OUTPATIENT)
Dept: OBGYN CLINIC | Facility: HOSPITAL | Age: 84
End: 2019-11-07

## 2019-11-07 VITALS
WEIGHT: 177.6 LBS | HEIGHT: 55 IN | RESPIRATION RATE: 14 BRPM | DIASTOLIC BLOOD PRESSURE: 78 MMHG | SYSTOLIC BLOOD PRESSURE: 128 MMHG | HEART RATE: 72 BPM | BODY MASS INDEX: 41.1 KG/M2

## 2019-11-07 DIAGNOSIS — I47.1 SUPRAVENTRICULAR TACHYCARDIA (HCC): Chronic | ICD-10-CM

## 2019-11-07 DIAGNOSIS — I10 ESSENTIAL HYPERTENSION: Primary | Chronic | ICD-10-CM

## 2019-11-07 DIAGNOSIS — G56.02 CARPAL TUNNEL SYNDROME ON LEFT: ICD-10-CM

## 2019-11-07 DIAGNOSIS — Z01.818 PREOPERATIVE EXAMINATION: ICD-10-CM

## 2019-11-07 DIAGNOSIS — R06.02 SHORTNESS OF BREATH: ICD-10-CM

## 2019-11-07 DIAGNOSIS — R73.9 HYPERGLYCEMIA: Chronic | ICD-10-CM

## 2019-11-07 DIAGNOSIS — G56.22 CUBITAL TUNNEL SYNDROME ON LEFT: ICD-10-CM

## 2019-11-07 PROCEDURE — 99215 OFFICE O/P EST HI 40 MIN: CPT | Performed by: INTERNAL MEDICINE

## 2019-11-07 NOTE — TELEPHONE ENCOUNTER
Spoke with pt  She wanted to know if she still needs surgery since her trigger fingers are no longer bothering her  She is still having n/t in ring and small  I did explain this is cuts and not her tfs  I explained we could take tfr off her surgery but keep the cutr to take pressure off her nerve  I also went over her emg and that it showed pressure on her carpal tunnel as well and typically if this is the case well release that as well so it doesn't cause issues in the future  Pt states understanding  Pt asked what type of anesthesia and did inform her we use general for this  She wanted you to know she gets very sick with general anesthesia  I did explain there are some techniques anesthesia team can use to help decrease this  Told her id forward the info for you to see on Monday

## 2019-11-07 NOTE — TELEPHONE ENCOUNTER
Genaro from Dr Geovanny Macario office calling because the patient has some concerns about her surgery scheduled for 11/19/19 for a Cubital tunnel release  Genaro would like for someone to reach out the the patient to go over what is to be expected and answer any questions she might have

## 2019-11-07 NOTE — PROGRESS NOTES
Assessment/Plan:   1  General care -medically clear for surgery - she will take her usual dose of beta-blocker morning of surgery with sips of water  She will skip her lisinopril morning of surgery  2  Tentative schedule for endoscopic carpal tunnel release, left cubital tunnel release and release of left long and ring finger trigger fingers- as noted nerve conduction study shows moderate left median neuropathy, left ulnar neuropathy across the elbow  Patient has multiple questions and wonders if she still needs the trigger finger surgery as her fingers are no longer locking - she wants to speak with orthopedic physician prior to surgery again we tried to make arrangements for that  3  Hypertension-adequate control on current dose of beta-blocker and ACE-inhibitor  Had been on a diuretic in the past but not needed at present  4  Chest pain  Noted previously-felt torepresent costochondritis -appears to be muscle skeletal reproduced with palpation  Possibly related to altered position with her recent right breast surgery  She will call if this remains an issue with the pattern changes  EKG unchanged   5  Breast carcinoma- had a history of infiltrating ductal carcinoma the right breast   Had supplemental RT  He was ER WA positive in radiation therapy was completed in March 2010  Now found to have a new area the right breast felt to be invasive ductal carcinoma different from her prior carcinoma  This is ER WA positive and HER2 Zander negative  Simple node negative  Pathology showed margins negative  Oncology placed on anastrozole 1 milligram daily which she started in the spring of 2019 will take to the spring of 2024    She has  In scheduled for right breast mammogram in December via surgery  6shoulder pain-orthopedic physician-felt to have impingement syndrome and responded to treatment by orthopedic physician  7  Skin lesion of the left leg-biopsy showed chronic inflammation and fibrosis-managed by dermatology- patient states now resolved  8   Anxiety- patient felt much worse on metoprolol feels this beta-blocker aggravated her anxiety   Now back in propanolol in feeling better and using her benzodiazepine much last   Thyroid function normal  9   Prior mechanical fall with left knee pain   X-ray in the hospital showed small cortical step-off periosteal elevation of the distal femur above the femoral component of her arthroplasty   Orthopedic physician felt she had a left knee contusion  10 a   SVT-had increased ectopy on exam   48 hour Holter showed 18,000 supraventricular ectopic beats with over 300 short runs of SVT with the longus lasting 7 beach during which she was asymptomatic   No ventricular ectopy   Echo shows EF is 60%, no regional wall motion abnormalities, wall thickness upper limits of normal, grade 1 diastolic dysfunction, left atrium mild to moderately dilated, mitral valve calcification and pulmonary artery systolic pressure normal   Cardiology switched her to metoprolol but she  Felt poorly   At this point now back on propanolol 20 milligrams q i d  In feeling much better with much less frequent symptoms  11  Hypertension-adequate control on current dose of beta-blocker an ACE-inhibitor   Had been on diuretic in past but not needed at present     All other problems as per note of August 2018        MEDICAL REGIMEN:                                                  Clorazepate 3 75 milligrams b i d p r n  Elena Kar , propanolol 20 milligrams q i d , lisinopril 10 milligrams b i d , Crestor 10 milligrams-half tablet 3 times per week, multivitamin, , cranberry daily, ophthalmological vitamins daily, Zantac 150 milligrams in the morning, intermittent use of prednisone eye drops per Ophthalmology, acetaminophen as needed, nasty was all 1 milligram daily started in the spring of 2019     Prior appointment canceled and rescheduled in several months with prior chemistry profile cholesterol profile    Addendum -patient admitted and underwent a left cubital tunnel release and left carpal tunnels release all on November 02OR without complications for diagnosis of left carpal tunnel syndrome and left cubital  Tunnel syndrome            No problem-specific Assessment & Plan notes found for this encounter  Diagnoses and all orders for this visit:    Essential hypertension  -     Comprehensive metabolic panel; Future  -     HEMOGLOBIN A1C W/ EAG ESTIMATION; Future  -     Cholesterol, total; Future  -     Triglycerides; Future  -     HDL cholesterol; Future  -     LDL cholesterol, direct; Future    Hyperglycemia  -     HEMOGLOBIN A1C W/ EAG ESTIMATION; Future    Preoperative examination    Supraventricular tachycardia (HCC)    Cubital tunnel syndrome on left    Carpal tunnel syndrome on left    Shortness of breath          Subjective:      Patient ID: Donnie Jean-Baptiste is a 81 y o  female  A we reviewed multiple issues today  She needs clearance for surgery  She is scheduled for left endoscopic carpal tunnel release, left cubital tunnel release, release of left long and ring finger trigger finger under regional block and IV sedation  She had multiple questions in reference to her surgery  She said the previous snapping of the 4th and 5th finger is no longer evident she does not have any further symptoms at this point of significant trigger finger  She still has numbness predominantly over the 4th and 5th finger  She had nerve conduction study we reviewed this in detail  This showed moderate left median neuropathy, left ulnar neuropathy across the elbow with some secondary axonal loss -no evidence of cervical radiculopathy, ulnar neuropathy at the wrist her lower brachial plexopathy  We reviewed the difference between wrist and elbow ulnar neuropathy  Her surgery is tentatively scheduled on November 19th  She is requesting to speak with the surgeon 1st     She wanted to know about use of fish oil    On her own she had discontinue the fish oil  When she had recent labs her triglycerides were normal   She was off fish oil at that point  I therefore told her she can discontinue her fish oil  She had a follow-up bone density study done in August that was normal   Laboratory testing done in September showed chemistry profile normal other than a BUN of 30 with a creatinine of 0 95, HDL 51, cholesterol 167, triglycerides 93,   Vitamin-D level was low at 26 4 and the patient is now taking vitamin D3/ 2000 units a day    She had additional labs done prior to the surgery  Results for orders placed or performed in visit on 11/01/19  -CBC and Platelet       Result                      Value             Ref Range           WBC                         7 45              4 31 - 10 16*       RBC                         4 64              3 81 - 5 12 *       Hemoglobin                  14 3              11 5 - 15 4 *       Hematocrit                  43 1              34 8 - 46 1 %       MCV                         93                82 - 98 fL          MCH                         30 8              26 8 - 34 3 *       MCHC                        33 2              31 4 - 37 4 *       RDW                         12 9              11 6 - 15 1 %       Platelets                   182               149 - 390 Th*       MPV                         10 3              8 9 - 12 7 fL  -Basic metabolic panel       Result                      Value             Ref Range           Sodium                      142               136 - 145 mm*       Potassium                   3 7               3 5 - 5 3 mm*       Chloride                    108               100 - 108 mm*       CO2                         27                21 - 32 mmol*       ANION GAP                   7                 4 - 13 mmol/L       BUN                         20                5 - 25 mg/dL        Creatinine                  0 96              0 60 - 1 30 * Glucose                     108               65 - 140 mg/*       Calcium                     10 1              8 3 - 10 1 m*       eGFR                        55                Ml/min/1 73s*    Note that she did receive influenza vaccine  She has had both doses of pneumococcal vaccine  She has completed the new zoster vaccine and had prior Adacel vaccine  This patient denies any systemic symptoms  Specifically there has been no evidence of fever, night sweats, significant weight loss or significant decrease in appetite  She has not had any the previously noted palpitations  She denies a a a a  She denies any new focal neurologic symptoms  Previously noted chest pain has resolved  This was felt consistent with costochondritis  She denies chest pain or pressure with activity  This patient wanted to know their preferred analgesic agent  Because of their various comorbidities I recommended that this be acetaminophen  This patient has no history of chronic liver disease that would put them at greater risk for use of acetaminophen  This patient may use up to 500-650 mg of acetaminophen at a time and no more than 3 g a day total  Nonsteroidal anti-inflammatory agents have the potential to exacerbate hypertension, hypercoagulability, chronic renal failure, congestive heart failure, and various allergic tendencies  We talked about that previously reviewed that again today     This was a 40 minutes visit with more than 50% of the time spent counseling the patient formulating a treatment plan  Multiple questions were answered      The following portions of the patient's history were reviewed and updated as appropriate: allergies, current medications, past family history, past medical history, past social history, past surgical history and problem list     Review of Systems   Constitutional: Positive for fatigue  Respiratory: Positive for shortness of breath  Cardiovascular: Negative  Gastrointestinal: Negative  Endocrine: Negative  Genitourinary: Negative  Musculoskeletal: Negative  Neurological: Positive for numbness  Hematological: Negative  Psychiatric/Behavioral: Negative  Objective:      Ht 4' 7" (1 397 m)   Wt 80 6 kg (177 lb 9 6 oz)   BMI 41 28 kg/m²          Physical Exam   Constitutional: She is oriented to person, place, and time  She appears well-developed and well-nourished  No distress  HENT:   Head: Normocephalic and atraumatic  Right Ear: External ear normal    Left Ear: External ear normal    Nose: Nose normal    Mouth/Throat: Oropharynx is clear and moist  No oropharyngeal exudate  Eyes: Pupils are equal, round, and reactive to light  Conjunctivae and EOM are normal  Right eye exhibits no discharge  Left eye exhibits no discharge  No scleral icterus  Neck: Normal range of motion  Neck supple  No JVD present  No tracheal deviation present  No thyromegaly present  Cardiovascular: Normal rate, regular rhythm, normal heart sounds and intact distal pulses  Exam reveals no gallop and no friction rub  No murmur heard  Pulmonary/Chest: Effort normal and breath sounds normal  No respiratory distress  She has no wheezes  She has no rales  She exhibits no tenderness  Abdominal: Soft  Bowel sounds are normal  She exhibits no distension and no mass  There is no tenderness  There is no rebound and no guarding  Musculoskeletal: Normal range of motion  She exhibits no edema or deformity  Lymphadenopathy:     She has no cervical adenopathy  Neurological: She is alert and oriented to person, place, and time  She has normal reflexes  She displays normal reflexes  No cranial nerve deficit  She exhibits normal muscle tone  Coordination normal     Negative Tinel sign  Negative Phalen sign   Skin: Skin is warm and dry  No rash noted  No erythema  Evidence of prior breast surgery   Psychiatric: She has a normal mood and affect   Her behavior is normal  Judgment and thought content normal

## 2019-11-07 NOTE — TELEPHONE ENCOUNTER
Spoke with ortho , patient is feeling numbness due to the cubital tunnel release, so patient will still be needing surgery  They will try and get Dr rai to call patient so they can discuss any concerns she has

## 2019-11-10 DIAGNOSIS — I10 ESSENTIAL HYPERTENSION: ICD-10-CM

## 2019-11-11 DIAGNOSIS — E78.2 MIXED HYPERLIPIDEMIA: Primary | ICD-10-CM

## 2019-11-11 RX ORDER — PROPRANOLOL HYDROCHLORIDE 20 MG/1
TABLET ORAL
Qty: 360 TABLET | Refills: 0 | Status: SHIPPED | OUTPATIENT
Start: 2019-11-11 | End: 2019-12-31 | Stop reason: SDUPTHER

## 2019-11-11 RX ORDER — ROSUVASTATIN CALCIUM 10 MG/1
5 TABLET, COATED ORAL 3 TIMES WEEKLY
Qty: 18 TABLET | Refills: 3 | Status: SHIPPED | OUTPATIENT
Start: 2019-11-11 | End: 2020-05-07 | Stop reason: SDUPTHER

## 2019-11-11 NOTE — TELEPHONE ENCOUNTER
Pt called for a refill of her rosuvastatin but she said that she takes a half of a 10 mg tablet, not a half of a 5 mg tablet which is what your last note dictates  Please advise which is correct

## 2019-11-13 ENCOUNTER — TELEPHONE (OUTPATIENT)
Dept: INTERNAL MEDICINE CLINIC | Facility: CLINIC | Age: 84
End: 2019-11-13

## 2019-11-13 NOTE — TELEPHONE ENCOUNTER
Is there any way to make some sort of note for the anesthesia team that the pt gets n/v with general anesthesia and to request for them to use some antiemetic for her? Also, please note that her trigger fingers apparently are not bothering her  Last I spoke to her and explained how the CTS and CuTS are different and she stated at that time she would still be interested in proceeding with these

## 2019-11-13 NOTE — TELEPHONE ENCOUNTER
Patient called our office today, she is more agreeing to the surgery, she doesn't necessarily want to have surgery but she wants the issue to be handled  Patient is also very concerned about the anesthesia, and I re read the anesthesia info you provided  Patient states when she got her last surgery with Dr Spears went well with the anesthesia

## 2019-11-15 ENCOUNTER — ANESTHESIA EVENT (OUTPATIENT)
Dept: PERIOP | Facility: HOSPITAL | Age: 84
End: 2019-11-15
Payer: MEDICARE

## 2019-11-15 NOTE — PRE-PROCEDURE INSTRUCTIONS
Pre-Surgery Instructions:   Medication Instructions    acetaminophen (TYLENOL) 325 mg tablet Instructed patient per Anesthesia Guidelines   anastrozole (ARIMIDEX) 1 mg tablet Instructed patient per Anesthesia Guidelines   clorazepate (TRANXENE) 3 75 mg tablet Instructed patient per Anesthesia Guidelines   CRANBERRY PO Instructed patient per Anesthesia Guidelines   ibuprofen (MOTRIN) 200 mg tablet Instructed patient per Anesthesia Guidelines   lisinopril (ZESTRIL) 10 mg tablet Instructed patient per Anesthesia Guidelines   Multiple Vitamins-Minerals (PRESERVISION AREDS 2 PO) Instructed patient per Anesthesia Guidelines   prednisoLONE acetate (PRED FORTE) 1 % ophthalmic suspension Instructed patient per Anesthesia Guidelines   propranolol (INDERAL) 20 mg tablet Instructed patient per Anesthesia Guidelines   rosuvastatin (CRESTOR) 10 MG tablet Instructed patient per Anesthesia Guidelines  Spoke to pt  Medication list reviewed & instructed  As of 11/15 pt to stop preservision, cranberry, motrin  Instructed on tylenol only  Am DOS pt to take inderal with 1-2 sips of water  Showering instructions given by surgeon office, reviewed @ time of call  Advised pt will need ride home @ time of discharge   All instructions verbally understood by patient  All questions answered   Callback number provided     ACE/ARB Med Class     Continue this medication up to the evening before surgery/procedure, but do not take the morning of the day of surgery  Acetaminophen Med Class     Continue to take this medication on your normal schedule  If this is an oral medication and you take it in the morning, then you may take this medicine with a sip of water  Benzodiazepine antagonist Med Class     If this medication is needed please continue to take on your normal schedule  If you take it in the morning, then you may take this medicine with a sip of water    Beta blocker Med Class     Continue to take this heart medication on your normal schedule  If this is an oral medication and you take it in the morning, then you may take this medicine with a sip of water  Herbal Med Class     Stop taking this herbal medications at least one week prior to surgery/procedure  NSAID Med Class     Stop taking this medication at least 3 days prior to surgery/procedure  Statin Med Class     Continue to take this medication on your normal schedule  If this is an oral medication and you take it in the morning, then you may take this medicine with a sip of water  Vitamin Med Class     You may continue to take any vitamin that your surgeon has prescribed to you up to the day before surgery  If your surgeon has not specifically prescribed this vitamin or instructed you to continue then stop taking 7 days prior to surgery

## 2019-11-19 ENCOUNTER — ANESTHESIA (OUTPATIENT)
Dept: PERIOP | Facility: HOSPITAL | Age: 84
End: 2019-11-19
Payer: MEDICARE

## 2019-11-19 ENCOUNTER — HOSPITAL ENCOUNTER (OUTPATIENT)
Facility: HOSPITAL | Age: 84
Setting detail: OUTPATIENT SURGERY
Discharge: HOME | End: 2019-11-19
Attending: ORTHOPAEDIC SURGERY | Admitting: ORTHOPAEDIC SURGERY
Payer: MEDICARE

## 2019-11-19 VITALS
HEART RATE: 73 BPM | TEMPERATURE: 98.2 F | OXYGEN SATURATION: 97 % | BODY MASS INDEX: 40.96 KG/M2 | HEIGHT: 55 IN | SYSTOLIC BLOOD PRESSURE: 140 MMHG | DIASTOLIC BLOOD PRESSURE: 69 MMHG | WEIGHT: 177 LBS | RESPIRATION RATE: 20 BRPM

## 2019-11-19 DIAGNOSIS — G56.22 CUBITAL TUNNEL SYNDROME ON LEFT: Primary | ICD-10-CM

## 2019-11-19 DIAGNOSIS — G56.02 CARPAL TUNNEL SYNDROME ON LEFT: ICD-10-CM

## 2019-11-19 DIAGNOSIS — Z91.89 AT RISK FOR OBSTRUCTIVE SLEEP APNEA: ICD-10-CM

## 2019-11-19 PROCEDURE — 64718 REVISE ULNAR NERVE AT ELBOW: CPT | Performed by: ORTHOPAEDIC SURGERY

## 2019-11-19 PROCEDURE — 29848 WRIST ENDOSCOPY/SURGERY: CPT | Performed by: ORTHOPAEDIC SURGERY

## 2019-11-19 PROCEDURE — 99024 POSTOP FOLLOW-UP VISIT: CPT | Performed by: ORTHOPAEDIC SURGERY

## 2019-11-19 RX ORDER — ACETAMINOPHEN 325 MG/1
650 TABLET ORAL EVERY 8 HOURS
Qty: 30 TABLET | Refills: 0 | Status: SHIPPED | OUTPATIENT
Start: 2019-11-19 | End: 2019-11-24

## 2019-11-19 RX ORDER — NAPROXEN SODIUM 220 MG
220 TABLET ORAL 2 TIMES DAILY WITH MEALS
Qty: 10 TABLET | Refills: 0 | Status: SHIPPED | OUTPATIENT
Start: 2019-11-19 | End: 2021-05-17

## 2019-11-19 RX ORDER — SODIUM CHLORIDE, SODIUM LACTATE, POTASSIUM CHLORIDE, CALCIUM CHLORIDE 600; 310; 30; 20 MG/100ML; MG/100ML; MG/100ML; MG/100ML
125 INJECTION, SOLUTION INTRAVENOUS CONTINUOUS
Status: DISCONTINUED | OUTPATIENT
Start: 2019-11-19 | End: 2019-11-19 | Stop reason: HOSPADM

## 2019-11-19 RX ORDER — FENTANYL CITRATE/PF 50 MCG/ML
25 SYRINGE (ML) INJECTION
Status: DISCONTINUED | OUTPATIENT
Start: 2019-11-19 | End: 2019-11-19 | Stop reason: HOSPADM

## 2019-11-19 RX ORDER — PROPOFOL 10 MG/ML
INJECTION, EMULSION INTRAVENOUS CONTINUOUS PRN
Status: DISCONTINUED | OUTPATIENT
Start: 2019-11-19 | End: 2019-11-19

## 2019-11-19 RX ORDER — LIDOCAINE HYDROCHLORIDE 10 MG/ML
INJECTION, SOLUTION INFILTRATION; PERINEURAL AS NEEDED
Status: DISCONTINUED | OUTPATIENT
Start: 2019-11-19 | End: 2019-11-19 | Stop reason: SURG

## 2019-11-19 RX ORDER — CEFAZOLIN SODIUM 2 G/50ML
2000 SOLUTION INTRAVENOUS ONCE
Status: COMPLETED | OUTPATIENT
Start: 2019-11-19 | End: 2019-11-19

## 2019-11-19 RX ORDER — ONDANSETRON 2 MG/ML
INJECTION INTRAMUSCULAR; INTRAVENOUS AS NEEDED
Status: DISCONTINUED | OUTPATIENT
Start: 2019-11-19 | End: 2019-11-19 | Stop reason: SURG

## 2019-11-19 RX ORDER — FENTANYL CITRATE 50 UG/ML
INJECTION, SOLUTION INTRAMUSCULAR; INTRAVENOUS AS NEEDED
Status: DISCONTINUED | OUTPATIENT
Start: 2019-11-19 | End: 2019-11-19 | Stop reason: SURG

## 2019-11-19 RX ORDER — PROPOFOL 10 MG/ML
INJECTION, EMULSION INTRAVENOUS AS NEEDED
Status: DISCONTINUED | OUTPATIENT
Start: 2019-11-19 | End: 2019-11-19 | Stop reason: SURG

## 2019-11-19 RX ORDER — HYDROCODONE BITARTRATE AND ACETAMINOPHEN 5; 325 MG/1; MG/1
1 TABLET ORAL ONCE AS NEEDED
Status: DISCONTINUED | OUTPATIENT
Start: 2019-11-19 | End: 2019-11-19 | Stop reason: HOSPADM

## 2019-11-19 RX ORDER — DEXAMETHASONE SODIUM PHOSPHATE 10 MG/ML
INJECTION, SOLUTION INTRAMUSCULAR; INTRAVENOUS AS NEEDED
Status: DISCONTINUED | OUTPATIENT
Start: 2019-11-19 | End: 2019-11-19 | Stop reason: SURG

## 2019-11-19 RX ORDER — HYDROCODONE BITARTRATE AND ACETAMINOPHEN 5; 325 MG/1; MG/1
1 TABLET ORAL EVERY 6 HOURS PRN
Qty: 10 TABLET | Refills: 0 | Status: SHIPPED | OUTPATIENT
Start: 2019-11-19 | End: 2021-05-17

## 2019-11-19 RX ORDER — LIDOCAINE HYDROCHLORIDE 10 MG/ML
0.5 INJECTION, SOLUTION EPIDURAL; INFILTRATION; INTRACAUDAL; PERINEURAL ONCE AS NEEDED
Status: COMPLETED | OUTPATIENT
Start: 2019-11-19 | End: 2019-11-19

## 2019-11-19 RX ORDER — MAGNESIUM HYDROXIDE 1200 MG/15ML
LIQUID ORAL AS NEEDED
Status: DISCONTINUED | OUTPATIENT
Start: 2019-11-19 | End: 2019-11-19 | Stop reason: HOSPADM

## 2019-11-19 RX ORDER — LIDOCAINE HYDROCHLORIDE AND EPINEPHRINE 10; 10 MG/ML; UG/ML
INJECTION, SOLUTION INFILTRATION; PERINEURAL AS NEEDED
Status: DISCONTINUED | OUTPATIENT
Start: 2019-11-19 | End: 2019-11-19 | Stop reason: HOSPADM

## 2019-11-19 RX ORDER — ONDANSETRON 2 MG/ML
4 INJECTION INTRAMUSCULAR; INTRAVENOUS ONCE AS NEEDED
Status: DISCONTINUED | OUTPATIENT
Start: 2019-11-19 | End: 2019-11-19 | Stop reason: HOSPADM

## 2019-11-19 RX ADMIN — PHENYLEPHRINE HYDROCHLORIDE 100 MCG: 10 INJECTION INTRAVENOUS at 08:58

## 2019-11-19 RX ADMIN — SODIUM CHLORIDE, SODIUM LACTATE, POTASSIUM CHLORIDE, AND CALCIUM CHLORIDE 125 ML/HR: .6; .31; .03; .02 INJECTION, SOLUTION INTRAVENOUS at 07:39

## 2019-11-19 RX ADMIN — PROPOFOL 100 MCG/KG/MIN: 10 INJECTION, EMULSION INTRAVENOUS at 08:50

## 2019-11-19 RX ADMIN — CEFAZOLIN SODIUM 2000 MG: 2 SOLUTION INTRAVENOUS at 08:49

## 2019-11-19 RX ADMIN — FENTANYL CITRATE 25 MCG: 50 INJECTION, SOLUTION INTRAMUSCULAR; INTRAVENOUS at 08:55

## 2019-11-19 RX ADMIN — PROPOFOL 150 MG: 10 INJECTION, EMULSION INTRAVENOUS at 08:47

## 2019-11-19 RX ADMIN — ONDANSETRON 4 MG: 2 INJECTION INTRAMUSCULAR; INTRAVENOUS at 08:58

## 2019-11-19 RX ADMIN — DEXAMETHASONE SODIUM PHOSPHATE 5 MG: 10 INJECTION, SOLUTION INTRAMUSCULAR; INTRAVENOUS at 08:58

## 2019-11-19 RX ADMIN — LIDOCAINE HYDROCHLORIDE 50 MG: 10 INJECTION, SOLUTION INFILTRATION; PERINEURAL at 08:47

## 2019-11-19 RX ADMIN — FENTANYL CITRATE 25 MCG: 50 INJECTION, SOLUTION INTRAMUSCULAR; INTRAVENOUS at 09:00

## 2019-11-19 RX ADMIN — LIDOCAINE HYDROCHLORIDE 0.5 ML: 10 INJECTION, SOLUTION EPIDURAL; INFILTRATION; INTRACAUDAL; PERINEURAL at 07:39

## 2019-11-19 NOTE — H&P
H&P Exam - Orthopedics   Donnie Jean-Baptiste 81 y o  female MRN: [id number]  Unit/Bed#: [facility] 06    Assessment/Plan   Assessment:  L CuTS and L CTS  Plan:  L CuTR and L ECTR today (trigger fingers have resolved)    History of Present Illness   HPI:  Donnie Jean-Baptiste is a 81 y o  female who presents with continued c/o L hand small and ring finger n/t  Previous EMG was positive for both CTS And CuTS  Was previously scheduled to also have TFR, but states her fingers are no longer triggering  Historical Information  Review Of Systems:   · Skin: Normal  · Neuro: See HPI  · Musculoskeletal: See HPI  · 14 point review of systems negative except as stated above     Past Medical History:   Past Medical History:   Diagnosis Date    Anxiety     Arthritis     last assessed 3/24/15     Atherosclerosis     Bell's palsy     Broken femur ([facility] [address] [facility]     Cancer ([facility] [address] )     breast    Cardiac disorder     DCIS (ductal carcinoma in situ) of breast     last assessed 4/4/17     Depression     Endometrial polyp     GERD (gastroesophageal reflux disease)     History of radiation therapy 2010    Hypercholesterolemia     resolved 10/22/14     Hyperlipidemia     Long term use of drug     last assessed 5/23/14     Peripheral neuropathy     Pneumonia     As a Child    PONV (postoperative nausea and vomiting)     Tachycardia     Uterine fibroid        Past Surgical History:   Past Surgical History:   Procedure Laterality Date    BREAST BIOPSY Right     BREAST LUMPECTOMY Right 2010    BREAST LUMPECTOMY Right 5/8/2019    Procedure: BREAST LUMPECTOMY; BREAST NEEDLE LOCALIZATION (NEEDLE LOC AT 0800);   Surgeon: Boris Oropeza MD;  [facility];  Service: Surgical Oncology    CARPAL TUNNEL RELEASE Bilateral     CATARACT EXTRACTION Bilateral     DILATION AND CURETTAGE OF UTERUS      ENDOMETRIAL BIOPSY      without cervical dilation     HEMORROIDECTOMY      JOINT REPLACEMENT Right     Shoulder    JOINT REPLACEMENT Bilateral     Knees    LYMPH NODE BIOPSY Right 5/8/2019    Procedure: SENTINEL LYMPH NODE BIOPSY; LYMPHOSCINTIGRAPHY; INTRAOPERATIVE LYMPHATIC MAPPING (INJECT AT 0900); Surgeon: Susana Singh MD;  Location[facility];  Service: Surgical Oncology    MAMMO NEEDLE LOCALIZATION RIGHT (ALL INC) Right 5/8/2019    OOPHORECTOMY      77    OVARIAN CYST REMOVAL Left     AR RECONSTR TOTAL SHOULDER IMPLANT Right 7/18/2017    Procedure: TOTAL SHOULDER REVERSE ARTHROPLASTY;  Surgeon: Jessie Partida MD;  [facility];  Service: Orthopedics    SENTINEL LYMPH NODE BIOPSY      TONSILLECTOMY      TUBAL LIGATION      US GUIDED BREAST BIOPSY RIGHT COMPLETE Right 3/28/2019       Family History:  Family history reviewed and non-contributory  Family History   Problem Relation Age of Onset    Heart disease Mother         artherosclerosis     Heart disease Father         artherosclerosis     Heart attack Father     Arthritis Family     Heart disease Family         artherosclerosis     Breast cancer Family     Osteoarthritis Family     Supraventricular tachycardia Family     Hypertension Daughter     Ovarian cancer Sister 80    Anemia Neg Hx     Arrhythmia Neg Hx     Asthma Neg Hx     Clotting disorder Neg Hx     Fainting Neg Hx     Hyperlipidemia Neg Hx     Aneurysm Neg Hx        Social History:  Social History     Socioeconomic History    Marital status:       Spouse name: None    Number of children: None    Years of education: None    Highest education level: None   Occupational History    Occupation: retired from work    Social Needs    Financial resource strain: None    Food insecurity:     Worry: None     Inability: None    Transportation needs:     Medical: None     Non-medical: None   Tobacco Use    Smoking status: Never Smoker    Smokeless tobacco: Never Used   Substance and Sexual Activity    Alcohol use: No    Drug use: No    Sexual activity: None   Lifestyle    Physical activity:     Days per week: None     Minutes per session: None    Stress: None   Relationships    Social connections:     Talks on phone: None     Gets together: None     Attends [employer] witness service: None     Active member of club or organization: None     Attends meetings of clubs or organizations: None     Relationship status: None    Intimate partner violence:     Fear of current or ex partner: None     Emotionally abused: None     Physically abused: None     Forced sexual activity: None   Other Topics Concern    None   Social History Narrative    Coffee     Daily coffee consumption 1 cup day     Daily cola consumption can day     Walking             Allergies: Allergies   Allergen Reactions    Amoxicillin-Pot Clavulanate GI Intolerance    Azithromycin GI Intolerance and Rash    Cephalexin GI Intolerance    Cortisone GI Intolerance    Doxycycline GI Intolerance    Penicillins GI Intolerance           Labs:  0   Lab Value Date/Time    HCT 43 1 11/01/2019 1030    HCT 42 1 09/17/2019 0934    HCT 44 2 05/02/2019 1426    HCT 40 1 08/19/2015 0951    HCT 41 1 05/20/2014 0855    HGB 14 3 11/01/2019 1030    HGB 13 8 09/17/2019 0934    HGB 14 8 05/02/2019 1426    HGB 13 6 08/19/2015 0951    HGB 13 7 05/20/2014 0855    WBC 7 45 11/01/2019 1030    WBC 6 29 09/17/2019 0934    WBC 7 25 05/02/2019 1426    WBC 5 43 08/19/2015 0951    WBC 5 47 05/20/2014 0855    ESR 18 10/26/2016 0928       Meds:    Current Facility-Administered Medications:     ceFAZolin (ANCEF) IVPB (premix) 2,000 mg, 2,000 mg, Intravenous, Once, [facility]    Blood Culture:   No results found for: BLOODCX    Wound Culture:   No results found for: WOUNDCULT    Ins and Outs:  No intake/output data recorded              Physical Exam  BP (!) 179/82   Pulse 63   Temp 98 7 °F (37 1 °C) (Tympanic)   Resp 20   SpO2 97%   BP (!) 179/82   Pulse 63   Temp 98 7 °F (37 1 °C) (Tympanic)   Resp 20   SpO2 97%   Gen: Alert and oriented to person, place, time  HEENT: EOMI, eyes clear, moist mucus membranes, hearing intact  Respiratory: Bilateral chest rise  No audible wheezing found  Cardiovascular: Regular Rate and Rhythm  Abdomen: soft nontender/nondistended  Ortho Exam: Well-maintained ROM of the elbow  Neuro Exam: Left cubital tunnel with - tinels but + Froment's sign and finger abduction weakness  Atrophy to 1st dorsal interosseous and hypothenar muscles   +APB weakness as well    Lab Results: Reviewed  Imaging: Reviewed

## 2019-11-19 NOTE — OP NOTE
OPERATIVE REPORT  PATIENT NAME: Debra Samaniego  :  1935  MRN: [id number]  Pt Location: [facility]    SURGERY DATE: 19    Surgeon(s) and Role:     * Malka Espinal MD - Primary     * Lui Muniz MD - Assisting    Pre-Op Diagnosis:  Cubital tunnel syndrome on left [G56 22]  Carpal tunnel syndrome on left [G56 02]  Trigger finger, left middle finger [M65 332]    Post-Op Diagnosis Codes:     * Cubital tunnel syndrome on left [G56 22]     * Carpal tunnel syndrome on left [G56 02]    Procedure(s):  RELEASE CUBITAL TUNNEL left - (Left)  RELEASE CARPAL TUNNEL ENDOSCOPIC (Left)    Specimen(s):  * No orders in the log *    Estimated Blood Loss:   Minimal      Anesthesia Type:   General    Operative Indications: The patient has a history of Carpal Tunnel Syndrome and cubital tunnel syndrome left that was recalcitrant to conservative management  The decision was made to bring the patient to the operating room for Endoscopic Carpal Tunnel Release  left and Cubital Tunnel Release  left  Risks of the procedure were explained which include, but are not limited to bleeding; infection; damage to nerves, arteries,veins, tendons; scar; pain; need for reoperation; failure to give desired result; and risks of anaesthesia  All questions were answered to satisfaction and they were willing to proceed  Operative Findings:  Left carpal tunnel syndrome and left cubital tunnel syndrome    Complications:   None    Procedure and Technique:  After the patient, site, and procedure were identified, the patient was brought into the operating room in a supine position  General anaesthesia and local medication were provided  A well padded tourniquet was applied to the extremity, set at 250 mmHg  The  left upper extremity was then prepped and drapped in a normal, sterile, orthopedic fashion      After reconfirmation of the patient, site, and surgical procedure, which was agreed upon by the entire surgical team, attention was turned to the left wrist   The sites of the proximal and distal incisions were marked  The mia of the proximal incision was placed horizontally at the midline of the wrist   The distal incision mia was longitudinal extending distally from the point of intersection of the line between the long finger and ring finger and the line along the distal border of the fully abducted thumb  The proximal incision was performed  Subcutaneous tissues were dissected  Then the transverse volar antebrachial fascia was perforated with a scalpel  The edges of the skin incision where retracted and the forearm fascia was incised for approximately 1 5 cm proximally with care taken to identify and protect the median nerve  Retractors were used to inspect the transverse carpal ligament distally  A curved Cline dissector was used to glide under the transverse carpal ligament and superficial to the median nerve with confirmation via the washboard feeling  Then the curved Cline was pushed into the palm toward the distal incision site  When the location of the distal skin mia was adequate, the distal incision was made  Then with retraction of the skin, further dissection and perforation of the palmar fascia was performed with the use of tenotomy scissors  The curved Cline was guided from proximal to distal out the distal incisions without any twisting to allow for dilation of the tract  The curved Cline was removed, and the cannula for the camera was inserted along the same tract, making sure to keep the alignment post on the cannula perpendicular to the plane of the hand without twisting  Then while keeping the wrist in extension, and holding the cannula of the camera in place, the wrist was placed on the hyperextension board  The scope was inserted distally, and a cotton-tip applicator was used proximally to clean the tract as well as the scope    A curved cutting knife was introduced from proximal to distal while keeping visualization with the use of the camera  Without twisting of the canula, the knife was used to cut the transverse carpal ligament completely, making sure there were no remnant fibers  Then after this was accomplished, the hand was removed from the extension block  Three maneuvers were used to confirm the full release of the transverse carpal ligament  First, the ease of twisting the trocar of the camera confirmed the release of the ligament  Second, the curved Clien was introduced to make sure there were no remnant fibers that could be felt palmarly  Third, the scope was introduced again to visualize that the whole ligament was released proximally to distally  Additional confirmation of full release included retraction and inspection in the distal and proximal incisions to make sure there were no remnant fibers distally or proximally respectively  After the patient, site, and procedure were once again identified, attention was turned to the left elbow  A small incision is made just posterior to the medial epicondyle, and we carefully dissected through the skin and subcutaneous tissues  Full thickness anterior and posterior flaps were made  We took care in preserving branches of the medial antebrachial cutaneous nerve  The fascia was identified overlying the ulnar nerve in the cubital tunnel  This is sharply divided in line with the incision  A small scissor was utilized to develop a space deep to the subcutaneous tissues but superficial to the fascia in both the proximal and distal direction  Utilizing careful dissection, the fascial overlying the FCU muscle was split in line with the incision and the FCU muscle fibers were split longitudinally  The deep fascia was also split with care taken not to damage the ulnar nerve  The motor branches to the FCU were also identified and protected throughout the procedure    The nerve was then freed proximally around the triceps muscle and the medial intermuscular septum  Care was taken to protect the longitudinal vessels running with the ulnar nerve  The large vascular plexus near the posterior portion of the intermuscular septum was identified and protected  The ulnar nerve was now freed from all entrapment points  Upon completion, the nerve was inspected and verified to be completely released without any evidence of iatrogenic injury  The elbow was then brought through a full range of motion, and the ulnar nerve was not found to sublux  At the completion of the procedure, hemostasis was obtained with cautery and direct pressure  The wounds were copiously irrigated with sterile solution  The wounds were closed with Vicryl and Prolene  Sterile dressings were applied, including Xeroform, gauze, tweeners, webril, ACE  Please note, all sponge, needle, and instrument counts were correct prior to closure  Loupe magnification was utilized  The patient tolerated the procedure well       I was present for the entire procedure    Patient Disposition:  PACU , hemodynamically stable and extubated and stable    SIGNATURE: Vandy Runner, MD  DATE: 11/19/19  TIME: 9:31 AM

## 2019-11-19 NOTE — DISCHARGE INSTRUCTIONS
Post Operative Instructions    You have had surgery on your arm today, please read and follow the information below:  · Elevate your hand above your elbow during the next 24-48 hours to help with swelling  · Place your hand and arm over your head with motion at your shoulder three times a day  · Do not apply any cream/ointment/oil to your incisions including antibiotics  · Do not soak your hands in standing water (dishwater, tubs, Jacuzzi's, pools, etc ) until given permission (typically 2-3 weeks after injury)    Call the office if you notice any:  · Increased numbness or tingling of your hand or fingers that is not relieved with elevation  · Increasing pain that is not controlled with medication  · Difficulty chewing, breathing, swallowing  · Chest pains or shortness of breath  · Fever over 101 4 degrees  Bandage: Do NOT remove bandage until follow-up appointment  Motion: Move fingers into a fist 5 times a day, DO NOT move any splinted fingers  Weight bearing status: Avoid heavy lifting (>5 pounds) with the extremity that was operated on until follow up appointment  Normal activities of daily living are OK  Ice: Ice for 10 minutes every hour as needed for swelling x 24 hours  Sling: No sling necessary  Medications:   Naproxen 220 mg two times a day   Tylenol Extended Release 650 mg every 8 hours  Norco/Hydrocodone one tab every 6 hours AS NEEDED for pain     Follow-up Appointment: 7-10 days  Please call the office if you have any questions or concerns regarding your post-operative care

## 2019-11-19 NOTE — ANESTHESIA PREPROCEDURE EVALUATION
Review of Systems/Medical History  Patient summary reviewed  Chart reviewed  History of anesthetic complications PONV    Cardiovascular  Hyperlipidemia, Hypertension , Past MI , Dysrhythmias , history of PSVT and atrial fibrillation,    Pulmonary  Negative pulmonary ROS        GI/Hepatic    GERD ,        Negative  ROS        Endo/Other    Obesity    GYN  Negative gynecology ROS          Hematology  Negative hematology ROS      Musculoskeletal    Arthritis     Neurology  Negative neurology ROS      Psychology   Anxiety, Depression ,              Physical Exam    Airway    Mallampati score: II  TM Distance: >3 FB  Neck ROM: full     Dental   Comment: Multiple missing no loose, upper partial, No notable dental hx     Cardiovascular  Cardiovascular exam normal    Pulmonary  Pulmonary exam normal     Other Findings        Anesthesia Plan  ASA Score- 3     Anesthesia Type- general with ASA Monitors  Additional Monitors:   Airway Plan:     Comment: TIVA for PONV  Discussed post-op block if needed for pain control  Plan Factors-    Induction- intravenous  Postoperative Plan- Plan for postoperative opioid use  Planned trial extubation    Informed Consent- Anesthetic plan and risks discussed with patient and daughter  I personally reviewed this patient with the CRNA  Discussed and agreed on the Anesthesia Plan with the CRNA  Jeff Kaur

## 2019-11-27 ENCOUNTER — OFFICE VISIT (OUTPATIENT)
Dept: OBGYN CLINIC | Facility: HOSPITAL | Age: 84
End: 2019-11-27

## 2019-11-27 VITALS
WEIGHT: 179 LBS | DIASTOLIC BLOOD PRESSURE: 86 MMHG | SYSTOLIC BLOOD PRESSURE: 127 MMHG | HEART RATE: 66 BPM | HEIGHT: 55 IN | BODY MASS INDEX: 41.42 KG/M2

## 2019-11-27 DIAGNOSIS — G56.02 CARPAL TUNNEL SYNDROME ON LEFT: ICD-10-CM

## 2019-11-27 DIAGNOSIS — G56.22 CUBITAL TUNNEL SYNDROME ON LEFT: Primary | ICD-10-CM

## 2019-11-27 PROCEDURE — 99024 POSTOP FOLLOW-UP VISIT: CPT | Performed by: ORTHOPAEDIC SURGERY

## 2019-11-27 NOTE — PROGRESS NOTES
Assessment:   S/P L CuTR and L ECTR 11/19/19    Plan:   Resume activities as tolerated and scar tissue massage  No soaking x 2 days    Follow Up:  8  week(s)      CHIEF COMPLAINT:  Chief Complaint   Patient presents with    Left Wrist - Post-op    Left Elbow - Post-op         SUBJECTIVE:  Amanda Pandya is a 81 y o  female who presents for follow up S/P L CuTR and L ECTR 11/19/19  Patient states she is doing well and did not need any of the narcotic medication  She has not noticed any changes with the ring and small finger numbness         PHYSICAL EXAMINATION:  Vital signs: Ht 4' 7" (1 397 m)   Wt 81 2 kg (179 lb)   BMI 41 60 kg/m²   General: well developed and well nourished, alert, oriented times 3 and appears comfortable  Psychiatric: Normal    MUSCULOSKELETAL EXAMINATION:  Incision: Clean, dry, intact  Range of Motion: As expected  Neurovascular status: Neuro intact and radial pulse 2+  Activity Restrictions: No restrictions  Done today: Sutures out and Steri strips applied      STUDIES REVIEWED:  No Studies to review      PROCEDURES PERFORMED:  Procedures  No Procedures performed today

## 2019-11-29 ENCOUNTER — HOSPITAL ENCOUNTER (OUTPATIENT)
Dept: MAMMOGRAPHY | Facility: CLINIC | Age: 84
Discharge: HOME | End: 2019-11-29
Payer: MEDICARE

## 2019-11-29 ENCOUNTER — TRANSCRIBE ORDERS (OUTPATIENT)
Dept: MAMMOGRAPHY | Facility: CLINIC | Age: 84
End: 2019-11-29

## 2019-11-29 DIAGNOSIS — Z17.0 CARCINOMA OF UPPER-OUTER QUADRANT OF RIGHT BREAST IN FEMALE, ESTROGEN RECEPTOR POSITIVE (HCC): ICD-10-CM

## 2019-11-29 DIAGNOSIS — R92.8 ABNORMAL FINDINGS ON DIAGNOSTIC IMAGING OF BREAST: Primary | ICD-10-CM

## 2019-11-29 DIAGNOSIS — C50.411 CARCINOMA OF UPPER-OUTER QUADRANT OF RIGHT BREAST IN FEMALE, ESTROGEN RECEPTOR POSITIVE (HCC): ICD-10-CM

## 2019-11-29 PROCEDURE — 77065 DX MAMMO INCL CAD UNI: CPT

## 2019-11-29 PROCEDURE — G0279 TOMOSYNTHESIS, MAMMO: HCPCS

## 2019-12-05 ENCOUNTER — TELEPHONE (OUTPATIENT)
Dept: SURGICAL ONCOLOGY | Facility: CLINIC | Age: 84
End: 2019-12-05

## 2019-12-05 ENCOUNTER — OFFICE VISIT (OUTPATIENT)
Dept: SURGICAL ONCOLOGY | Facility: CLINIC | Age: 84
End: 2019-12-05
Payer: MEDICARE

## 2019-12-05 VITALS
WEIGHT: 174 LBS | BODY MASS INDEX: 40.27 KG/M2 | RESPIRATION RATE: 16 BRPM | SYSTOLIC BLOOD PRESSURE: 160 MMHG | HEIGHT: 55 IN | TEMPERATURE: 99.4 F | DIASTOLIC BLOOD PRESSURE: 78 MMHG | HEART RATE: 79 BPM

## 2019-12-05 DIAGNOSIS — C50.411 CARCINOMA OF UPPER-OUTER QUADRANT OF RIGHT BREAST IN FEMALE, ESTROGEN RECEPTOR POSITIVE (HCC): Primary | ICD-10-CM

## 2019-12-05 DIAGNOSIS — Z17.0 CARCINOMA OF UPPER-OUTER QUADRANT OF RIGHT BREAST IN FEMALE, ESTROGEN RECEPTOR POSITIVE (HCC): Primary | ICD-10-CM

## 2019-12-05 DIAGNOSIS — C50.411 MALIGNANT NEOPLASM OF UPPER-OUTER QUADRANT OF RIGHT BREAST IN FEMALE, ESTROGEN RECEPTOR POSITIVE (HCC): ICD-10-CM

## 2019-12-05 DIAGNOSIS — Z79.811 USE OF ANASTROZOLE (ARIMIDEX): ICD-10-CM

## 2019-12-05 DIAGNOSIS — Z17.0 MALIGNANT NEOPLASM OF UPPER-OUTER QUADRANT OF RIGHT BREAST IN FEMALE, ESTROGEN RECEPTOR POSITIVE (HCC): ICD-10-CM

## 2019-12-05 PROCEDURE — 99214 OFFICE O/P EST MOD 30 MIN: CPT | Performed by: NURSE PRACTITIONER

## 2019-12-05 NOTE — PROGRESS NOTES
Surgical Oncology Follow Up       [facility]  [facility] SURGICAL ONCOLOGY [place]  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, estrogen receptor positive ([address] )  - 6 mo f/u visit with Dr Nasim Tubbs  - bilat mammogram 3/2020    2  Malignant neoplasm of upper-outer quadrant of right breast in female, estrogen receptor positive ([address] )    3  Use of anastrozole (Arimidex)  - Continue use per medical oncology       Discussion/Summary:  Patient is an 60-year-old female with a history of right breast DCIS diagnosed in 2009 and more recently and invasive cancer in a different quadrant of her breast diagnosed in March of 2019  Her initial DCIS was ER/HI 80 90%  She underwent a right lumpectomy and sentinel node biopsy at that time and completed partial breast radiation therapy  She was diagnosed with 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, her 2-  She underwent another lumpectomy and sentinel node biopsy by Dr Nasim Tubbs in May of 2019  She is currently taking anastrozole  She had a right 3D diagnostic mammogram on November 29, 2019 which was BI-RADS 2, category 2 density  She is scheduled for a bilateral diagnostic mammogram in March  She has no new complaints today and there are no concerns on today's exam   We will plan to see the patient back in 6 months or sooner if the need arises  She was instructed to call with any new concerns or symptoms prior to that time  All of her questions were answered today      History of Present Illness:        Malignant neoplasm of upper-outer quadrant of right breast in female, estrogen receptor positive ([address] ) 12/21/2009 Initial Diagnosis     Malignant neoplasm of upper-outer quadrant of right breast in female, estrogen receptor positive ([facility] )    Right breast biopsy  DCIS  ER 80-90%  AL 80-90%      1/18/2010 Surgery     Right lumpectomy, sentinel node biopsy (Dr Bridget Poole)        2010 -  Radiation     Partial breast RT        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      5/8/2019 Surgery     Right breast lumpectomy with sentinel lymph node biopsy  [id number]          -Interval History:  Patient presents today for a six-month follow-up visit for right breast cancer diagnosed most recently in March of 2019  She has no new complaints today  She notices no changes on her self exam   She reports chronic arthralgias but has no other complaints today  She had a right 3D diagnostic mammogram on November 29, 2019 which was BI-RADS 2, category 2 density  Review of Systems:  Review of Systems   Constitutional: Negative for activity change, appetite change, chills, fatigue, fever and unexpected weight change  HENT: Negative for trouble swallowing  Eyes: Negative for pain, redness and visual disturbance  Respiratory: Negative for cough, shortness of breath and wheezing  Cardiovascular: Negative for chest pain, palpitations and leg swelling  Gastrointestinal: Negative for abdominal pain, constipation, diarrhea, nausea and vomiting  Endocrine: Negative for cold intolerance and heat intolerance  Musculoskeletal: Positive for arthralgias  Negative for back pain, gait problem and myalgias  Skin: Negative for color change and rash  Neurological: Negative for dizziness, syncope, light-headedness, numbness and headaches  Hematological: Negative for adenopathy     Psychiatric/Behavioral: Negative for agitation and confusion  All other systems reviewed and are negative        Patient Active Problem List   Diagnosis    Anxiety    Peripheral neuropathy    Hyperlipidemia    Essential hypertension    Arthritis    Atrial fibrillation (HCC)    Difficulty breathing    Diverticulosis    Dizziness    Fatigue    Hyperglycemia    Pain in extremity    Osteopenia    Shortness of breath    Supraventricular tachycardia (HCC)    Vitamin D deficiency    Pain of left hand    Left leg pain    Malignant neoplasm of upper-outer quadrant of right breast in female, estrogen receptor positive ([facility] )    Impingement syndrome of left shoulder    Myocardial infarction type 2 ([facility] )    Ambulatory dysfunction    Contusion of left knee    Elevated troponin    Palpitations    Fall from ground level    Fall    Morbid obesity with body mass index (BMI) of 40 0 to 44 9 in adult [facility])    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, estrogen receptor positive ([facility] )    Other chest pain    Trigger finger, left ring finger    Carpal tunnel syndrome of left wrist    Cubital tunnel syndrome on left    Carpal tunnel syndrome on left    Trigger finger, left middle finger    Preoperative examination    Use of anastrozole (Arimidex)     Past Medical History:   Diagnosis Date    Anxiety     Arthritis     last assessed 3/24/15     Atherosclerosis     Bell's palsy     Breast cancer ([facility] ) 2019    Broken femur ([facility] )     Cancer ([facility] )     breast    Cardiac disorder     DCIS (ductal carcinoma in situ) of breast     last assessed 4/4/17     Depression     Endometrial polyp     GERD (gastroesophageal reflux disease)     History of radiation therapy 2010    Hypercholesterolemia     resolved 10/22/14     Hyperlipidemia     Long term use of drug     last assessed 5/23/14     Peripheral neuropathy     Pneumonia     As a Child    PONV (postoperative nausea and vomiting)     Tachycardia     Uterine fibroid      Past Surgical History:   Procedure Laterality Date    BREAST BIOPSY Right     BREAST LUMPECTOMY Right 2010    BREAST LUMPECTOMY Right 5/8/2019    Procedure: BREAST LUMPECTOMY; BREAST NEEDLE LOCALIZATION (NEEDLE LOC AT 0800); Surgeon: Zaira Lara MD;  [facility];  Service: Surgical Oncology    CARPAL TUNNEL RELEASE Bilateral     CATARACT EXTRACTION Bilateral     DILATION AND CURETTAGE OF UTERUS      ENDOMETRIAL BIOPSY      without cervical dilation     HEMORROIDECTOMY      JOINT REPLACEMENT Right     Shoulder    JOINT REPLACEMENT Bilateral     Knees    LYMPH NODE BIOPSY Right 5/8/2019    Procedure: SENTINEL LYMPH NODE BIOPSY; LYMPHOSCINTIGRAPHY; INTRAOPERATIVE LYMPHATIC MAPPING (INJECT AT 0900);   Surgeon: Zaira Lara MD;  [facility];  Service: Surgical Oncology    MAMMO NEEDLE LOCALIZATION RIGHT (ALL INC) Right 5/8/2019    OOPHORECTOMY      75    OVARIAN CYST REMOVAL Left     SC RECONSTR TOTAL SHOULDER IMPLANT Right 7/18/2017    Procedure: TOTAL SHOULDER REVERSE ARTHROPLASTY;  Surgeon: Fela Hidalgo MD;  [facility];  Service: Orthopedics    SC REVISE ULNAR NERVE AT ELBOW Left 11/19/2019    Procedure: RELEASE CUBITAL TUNNEL left -;  Surgeon: Ronnell Rowan MD;  [facility];  Service: Orthopedics    SC WRIST Paulene Daniel LIG Left 11/19/2019    Procedure: RELEASE CARPAL TUNNEL ENDOSCOPIC;  Surgeon: Ronnell Rowan MD;  [facility];  Service: Orthopedics    SENTINEL LYMPH NODE BIOPSY      TONSILLECTOMY      TUBAL LIGATION      US GUIDED BREAST BIOPSY RIGHT COMPLETE Right 3/28/2019     Family History   Problem Relation Age of Onset    Heart disease Mother         artherosclerosis     Heart disease Father         artherosclerosis     Heart attack Father     Arthritis Family     Heart disease Family         artherosclerosis     Breast cancer Family     Osteoarthritis Family     Supraventricular tachycardia Family     Hypertension Daughter     Ovarian cancer Sister 80    Anemia Neg Hx     Arrhythmia Neg Hx     Asthma Neg Hx     Clotting disorder Neg Hx     Fainting Neg Hx     Hyperlipidemia Neg Hx     Aneurysm Neg Hx      Social History     Socioeconomic History    Marital status:       Spouse name: Not on file    Number of children: Not on file    Years of education: Not on file    Highest education level: Not on file   Occupational History    Occupation: retired from work    Social Needs    Financial resource strain: Not on file    Food insecurity:     Worry: Not on file     Inability: Not on file   Your Policy Manager needs:     Medical: Not on file     Non-medical: Not on file   Tobacco Use    Smoking status: Never Smoker    Smokeless tobacco: Never Used   Substance and Sexual Activity    Alcohol use: No    Drug use: No    Sexual activity: Not on file   Lifestyle    Physical activity:     Days per week: Not on file     Minutes per session: Not on file    Stress: Not on file   Relationships    Social connections:     Talks on phone: Not on file     Gets together: Not on file     Attends Rastafarian service: Not on file     Active member of club or organization: Not on file     Attends meetings of clubs or organizations: Not on file     Relationship status: Not on file    Intimate partner violence:     Fear of current or ex partner: Not on file     Emotionally abused: Not on file     Physically abused: Not on file     Forced sexual activity: Not on file   Other Topics Concern    Not on file   Social History Narrative    Coffee     Daily coffee consumption 1 cup day     Daily cola consumption can day     Walking             Current Outpatient Medications:     anastrozole (ARIMIDEX) 1 mg tablet, Take 1 tablet (1 mg total) by mouth daily, Disp: 90 tablet, Rfl: 3    clorazepate (TRANXENE) 3 75 mg tablet, Take 3 75 mg by mouth daily at bedtime Takes with Dinner and before Bed, Disp: , Rfl:     CRANBERRY PO, Take 1,700 mg by mouth daily in the early morning , Disp: , Rfl:     HYDROcodone-acetaminophen (NORCO) 5-325 mg per tablet, Take 1 tablet by mouth every 6 (six) hours as needed for pain for up to 10 dosesMax Daily Amount: 4 tablets (Patient not taking: Reported on 11/27/2019), Disp: 10 tablet, Rfl: 0    ibuprofen (MOTRIN) 200 mg tablet, Take 200 mg by mouth every 6 (six) hours as needed for mild pain , Disp: , Rfl:     lisinopril (ZESTRIL) 10 mg tablet, Take 1 tablet (10 mg total) by mouth 2 (two) times a day, Disp: 180 tablet, Rfl: 3    Multiple Vitamins-Minerals (PRESERVISION AREDS 2 PO), Take 2 tablets by mouth daily in the early morning , Disp: , Rfl:     naproxen sodium (ALEVE) 220 MG tablet, Take 1 tablet (220 mg total) by mouth 2 (two) times a day with meals for 5 days, Disp: 10 tablet, Rfl: 0    prednisoLONE acetate (PRED FORTE) 1 % ophthalmic suspension, Administer 1 drop to the right eye 2 (two) times a day , Disp: , Rfl: 0    propranolol (INDERAL) 20 mg tablet, TAKE 1 TABLET BY MOUTH 4  TIMES A DAY, Disp: 360 tablet, Rfl: 0    rosuvastatin (CRESTOR) 10 MG tablet, Take 0 5 tablets (5 mg total) by mouth 3 (three) times a week Takes M-W-F, Disp: 18 tablet, Rfl: 3  Allergies   Allergen Reactions    Amoxicillin-Pot Clavulanate GI Intolerance    Azithromycin GI Intolerance and Rash    Cephalexin GI Intolerance    Cortisone GI Intolerance    Doxycycline GI Intolerance    Penicillins GI Intolerance     Vitals:    12/05/19 1339   BP: 160/78   Pulse: 79   Resp: 16   Temp: 99 4 °F (37 4 °C)       Physical Exam   Constitutional: She is oriented to person, place, and time  Vital signs are normal  She appears well-developed and well-nourished  No distress  HENT:   Head: Normocephalic and atraumatic  Neck: Normal range of motion  Cardiovascular: Normal rate, regular rhythm and normal heart sounds     Pulmonary/Chest: Effort normal and breath sounds normal    Bilateral breasts were examined in the sitting and supine position  Right breast and right axillary surgical scars present  There are no masses, skin nodules, nipple changes or nipple discharge  There is no bilateral supraclavicular or axillary lymphadenopathy noted  Abdominal: Soft  Normal appearance  She exhibits no mass  There is no hepatosplenomegaly  There is no tenderness  Musculoskeletal: Normal range of motion  Lymphadenopathy:     She has no axillary adenopathy  Right: No supraclavicular adenopathy present  Left: No supraclavicular adenopathy present  Neurological: She is alert and oriented to person, place, and time  Skin: Skin is warm, dry and intact  No rash noted  She is not diaphoretic  Psychiatric: She has a normal mood and affect  Her speech is normal    Vitals reviewed  Results:    Imaging  Mammo Diagnostic Right W 3d & Cad    Result Date: 11/29/2019  Narrative: DIAGNOSIS: 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, estrogen receptor positive ([facility] [address] ) TECHNIQUE: Digital screening mammography was performed  Computer Aided Detection (CAD) analyzed all applicable images  COMPARISONS: Prior breast imaging dated: 03/19/2019, 03/11/2019, 03/09/2018, 03/06/2017, 02/29/2016, 02/26/2015, and 02/25/2014 RELEVANT HISTORY: Family Breast Cancer History: History of breast cancer in Family  Family Medical History: Family medical history includes breast cancer in family and ovarian cancer in sister  Personal History: Hormone history includes birth control and other  Surgical history includes breast biopsy, lumpectomy, and oophorectomy  Medical history includes breast cancer  RISK ASSESSMENT: 5 Year Tyrer-Cuzick: No Score 10 Year Tyrer-Cuzick: No Score Lifetime Tyrer-Cuzick: No Score TISSUE DENSITY: There are scattered areas of fibroglandular density  INDICATION: Tracy Roger is a 81 y o  female presenting for Right breast cancer   FINDINGS: There are no suspicious masses, grouped microcalcifications or areas of architectural distortion  The skin and nipple areolar complex are unremarkable  There are benign calcifications and postoperative changes in the right breast     Impression:  Benign findings  Recommend diagnostic bilateral mammogram on the normal yearly schedule which is March 2020 ASSESSMENT/BI-RADS CATEGORY: Right: 2 - Benign Overall: 2 - Benign RECOMMENDATION:      - Mammogram for both breasts  Workstation ID: [id number]      I reviewed the above imaging data  Advance Care Planning/Advance Directives:  Discussed disease status, cancer treatment plans and/or cancer treatment goals with the patient

## 2019-12-31 DIAGNOSIS — I10 ESSENTIAL HYPERTENSION: ICD-10-CM

## 2019-12-31 RX ORDER — PROPRANOLOL HYDROCHLORIDE 20 MG/1
20 TABLET ORAL 4 TIMES DAILY
Qty: 360 TABLET | Refills: 3 | Status: SHIPPED | OUTPATIENT
Start: 2019-12-31 | End: 2020-11-14

## 2020-01-22 ENCOUNTER — OFFICE VISIT (OUTPATIENT)
Dept: OBGYN CLINIC | Facility: HOSPITAL | Age: 85
End: 2020-01-22

## 2020-01-22 VITALS
DIASTOLIC BLOOD PRESSURE: 88 MMHG | BODY MASS INDEX: 35.68 KG/M2 | HEIGHT: 59 IN | WEIGHT: 177 LBS | SYSTOLIC BLOOD PRESSURE: 169 MMHG | HEART RATE: 69 BPM

## 2020-01-22 DIAGNOSIS — G56.22 CUBITAL TUNNEL SYNDROME ON LEFT: Primary | ICD-10-CM

## 2020-01-22 PROCEDURE — 99024 POSTOP FOLLOW-UP VISIT: CPT | Performed by: ORTHOPAEDIC SURGERY

## 2020-01-22 RX ORDER — AMOXICILLIN 500 MG/1
CAPSULE ORAL
COMMUNITY
Start: 2020-01-14

## 2020-01-22 NOTE — PROGRESS NOTES
Assessment:   S/P L CuTR and L ECTR 11/19/19    Plan:   The patient has no formal restrictions  She may continue all activities as tolerated  It was discussed with the patient that she is making great progress  She expressed understanding  Follow Up:  2  month(s)    To Do Next Visit:         CHIEF COMPLAINT:  Chief Complaint   Patient presents with    Left Elbow - Post-op    Left Wrist - Post-op    Right Index Finger - Pain         SUBJECTIVE:  Nory Moreira is a 81 y o  female who presents for follow up after S/P L CuTR and L ECTR 11/19/19  Today patient has Numbness to her left small and ring finger  Patient states that the numbness into her index, long and thumb has resolved         PHYSICAL EXAMINATION:  Vital signs: /88   Pulse 69   Ht 4' 11" (1 499 m)   Wt 80 3 kg (177 lb)   BMI 35 75 kg/m²   General: well developed and well nourished, alert, oriented times 3 and appears comfortable  Psychiatric: Normal    MUSCULOSKELETAL EXAMINATION:  Incision: healed  Range of Motion: opposition intact, full composite fist possible and full elbow ROM intrinsic 4+/5, AIN 5/5, apb 4+/5, maximal tinels to distal saldana flexion crease   Neurovascular status: Neuro intact, good cap refill  Activity Restrictions: No restrictions       STUDIES REVIEWED:  No Studies to review      PROCEDURES PERFORMED:  Procedures  No Procedures performed today   Scribe Attestation    I,:   Robinson Domínguez am acting as a scribe while in the presence of the attending physician :        I,:   Lynn Carias MD personally performed the services described in this documentation    as scribed in my presence :

## 2020-02-03 ENCOUNTER — OFFICE VISIT (OUTPATIENT)
Dept: CARDIOLOGY CLINIC | Facility: CLINIC | Age: 85
End: 2020-02-03
Payer: MEDICARE

## 2020-02-03 VITALS
HEIGHT: 59 IN | HEART RATE: 68 BPM | SYSTOLIC BLOOD PRESSURE: 132 MMHG | DIASTOLIC BLOOD PRESSURE: 74 MMHG | WEIGHT: 177.2 LBS | BODY MASS INDEX: 35.72 KG/M2

## 2020-02-03 DIAGNOSIS — E78.2 MIXED HYPERLIPIDEMIA: Chronic | ICD-10-CM

## 2020-02-03 DIAGNOSIS — I10 ESSENTIAL HYPERTENSION: Primary | Chronic | ICD-10-CM

## 2020-02-03 DIAGNOSIS — I47.1 SUPRAVENTRICULAR TACHYCARDIA (HCC): Chronic | ICD-10-CM

## 2020-02-03 PROCEDURE — 3008F BODY MASS INDEX DOCD: CPT | Performed by: INTERNAL MEDICINE

## 2020-02-03 PROCEDURE — 1036F TOBACCO NON-USER: CPT | Performed by: INTERNAL MEDICINE

## 2020-02-03 PROCEDURE — 1160F RVW MEDS BY RX/DR IN RCRD: CPT | Performed by: INTERNAL MEDICINE

## 2020-02-03 PROCEDURE — 3078F DIAST BP <80 MM HG: CPT | Performed by: INTERNAL MEDICINE

## 2020-02-03 PROCEDURE — 4040F PNEUMOC VAC/ADMIN/RCVD: CPT | Performed by: INTERNAL MEDICINE

## 2020-02-03 PROCEDURE — 99214 OFFICE O/P EST MOD 30 MIN: CPT | Performed by: INTERNAL MEDICINE

## 2020-02-03 PROCEDURE — 3075F SYST BP GE 130 - 139MM HG: CPT | Performed by: INTERNAL MEDICINE

## 2020-02-03 NOTE — PROGRESS NOTES
Cardiology Consultation     Donnie Jean-Baptiste  [id number]  1935  [facility] [address] [address]    1  Essential hypertension     2  Supraventricular tachycardia ([facility] )     3  Mixed hyperlipidemia       Chief Complaint   Patient presents with    Follow-up     6 mo follow up     HPI: Patient is here for evaluation and management of frequent PACs and brief runs of SVT seen on Holter  She had surgery on her elbow and tolerated this well from heart standpoint  She has a h/o SVT and is maintained on propranolol  Feels well since last visit, no significant increase in fluttering/palpitations  No lightheadedness or syncope  She had been on propranolol for many years - and takes it every day  She has not varied her caffeine intake or water intake  She was tolerating the change to Toprol XL, at higher dose - but then had more jitteriness, so back to propranolol  Feels well and with less palpitations  No reported chest pain, shortness of breath, LE edema, orthopnea, PND, or significant weight changes        Patient Active Problem List   Diagnosis    Anxiety    Peripheral neuropathy    Hyperlipidemia    Essential hypertension    Arthritis    Atrial fibrillation (HCC)    Difficulty breathing    Diverticulosis    Dizziness    Fatigue    Hyperglycemia    Pain in extremity    Osteopenia    Shortness of breath    Supraventricular tachycardia (HCC)    Vitamin D deficiency    Pain of left hand    Left leg pain    Malignant neoplasm of upper-outer quadrant of right breast in female, estrogen receptor positive ([facility] )    Impingement syndrome of left shoulder    Myocardial infarction type 2 (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, estrogen receptor positive ([facility] )    Other chest pain    Trigger finger, left ring finger    Carpal tunnel syndrome of left wrist    Cubital tunnel syndrome on left    Carpal tunnel syndrome on left    Trigger finger, left middle finger    Preoperative examination    Use of anastrozole (Arimidex)     Past Medical History:   Diagnosis Date    Anxiety     Arthritis     last assessed 3/24/15     Atherosclerosis     Bell's palsy     Breast cancer ([facility] ) 2019    Broken femur ([facility] )     Cancer ([facility] )     breast    Cardiac disorder     DCIS (ductal carcinoma in situ) of breast     last assessed 4/4/17     Depression     Endometrial polyp     GERD (gastroesophageal reflux disease)     History of radiation therapy 2010    Hypercholesterolemia     resolved 10/22/14     Hyperlipidemia     Long term use of drug     last assessed 5/23/14     Peripheral neuropathy     Pneumonia     As a Child    PONV (postoperative nausea and vomiting)     Tachycardia     Uterine fibroid      Social History     Socioeconomic History    Marital status:       Spouse name: Not on file    Number of children: Not on file    Years of education: Not on file    Highest education level: Not on file   Occupational History    Occupation: retired from work    Social Needs    Financial resource strain: Not on file    Food insecurity:     Worry: Not on file     Inability: Not on file   [employer] needs:     Medical: Not on file     Non-medical: Not on file   Tobacco Use    Smoking status: Never Smoker    Smokeless tobacco: Never Used   Substance and Sexual Activity    Alcohol use: No    Drug use: No    Sexual activity: Not on file   Lifestyle    Physical activity:     Days per week: Not on file     Minutes per session: Not on file    Stress: Not on file   Relationships    Social connections:     Talks on phone: Not on file     Gets together: Not on file     Attends Rastafari service: Not on file     Active member of club or organization: Not on file     Attends meetings of clubs or organizations: Not on file     Relationship status: Not on file    Intimate partner violence:     Fear of current or ex partner: Not on file     Emotionally abused: Not on file     Physically abused: Not on file     Forced sexual activity: Not on file   Other Topics Concern    Not on file   Social History Narrative    Coffee     Daily coffee consumption 1 cup day     Daily cola consumption can day     Walking            Family History   Problem Relation Age of Onset    Heart disease Mother         artherosclerosis     Heart disease Father         artherosclerosis     Heart attack Father     Arthritis Family     Heart disease Family         artherosclerosis     Breast cancer Family     Osteoarthritis Family     Supraventricular tachycardia Family     Hypertension Daughter     Ovarian cancer Sister 80    Anemia Neg Hx     Arrhythmia Neg Hx     Asthma Neg Hx     Clotting disorder Neg Hx     Fainting Neg Hx     Hyperlipidemia Neg Hx     Aneurysm Neg Hx      Past Surgical History:   Procedure Laterality Date    BREAST BIOPSY Right     BREAST LUMPECTOMY Right 2010    BREAST LUMPECTOMY Right 5/8/2019    Procedure: BREAST LUMPECTOMY; BREAST NEEDLE LOCALIZATION (NEEDLE LOC AT 0800); Surgeon: Zaira Lara MD;  [facility];  Service: Surgical Oncology    CARPAL TUNNEL RELEASE Bilateral     CATARACT EXTRACTION Bilateral     DILATION AND CURETTAGE OF UTERUS      ENDOMETRIAL BIOPSY      without cervical dilation     HEMORROIDECTOMY      JOINT REPLACEMENT Right     Shoulder    JOINT REPLACEMENT Bilateral     Knees    LYMPH NODE BIOPSY Right 5/8/2019    Procedure: SENTINEL LYMPH NODE BIOPSY; LYMPHOSCINTIGRAPHY; INTRAOPERATIVE LYMPHATIC MAPPING (INJECT AT 0900);   Surgeon: Zaira Lara MD;  [facility];  Service: Surgical Oncology    MAMMO NEEDLE LOCALIZATION RIGHT (ALL INC) Right 5/8/2019    OOPHORECTOMY      75    OVARIAN CYST REMOVAL Left     [place] RECONSTR TOTAL SHOULDER IMPLANT Right 7/18/2017    Procedure: TOTAL SHOULDER REVERSE ARTHROPLASTY;  Surgeon: Jonathan De La Fuente MD;  [facility];  Service: Orthopedics    [place] REVISE ULNAR NERVE AT ELBOW Left 11/19/2019    Procedure: RELEASE CUBITAL TUNNEL left -;  Surgeon: Felicity Cranker, MD;  [facility];  Service: Orthopedics    [place] WRIST [place] Kitty LIG Left 11/19/2019    Procedure: RELEASE CARPAL TUNNEL ENDOSCOPIC;  Surgeon: Felicity Cranker, MD;  [facility];  Service: Orthopedics    SENTINEL LYMPH NODE BIOPSY      TONSILLECTOMY      TUBAL LIGATION      US GUIDED BREAST BIOPSY RIGHT COMPLETE Right 3/28/2019       Current Outpatient Medications:     amoxicillin (AMOXIL) 500 mg capsule, TAKE 4 CAPSULES 1 HOUR BEFORE DENTAL PROCEDURE, Disp: , Rfl:     anastrozole (ARIMIDEX) 1 mg tablet, Take 1 tablet (1 mg total) by mouth daily, Disp: 90 tablet, Rfl: 3    clorazepate (TRANXENE) 3 75 mg tablet, Take 3 75 mg by mouth daily at bedtime Takes with Dinner and before Bed, Disp: , Rfl:     lisinopril (ZESTRIL) 10 mg tablet, Take 1 tablet (10 mg total) by mouth 2 (two) times a day (Patient taking differently: Take 10 mg by mouth 4 (four) times a day ), Disp: 180 tablet, Rfl: 3    Multiple Vitamins-Minerals (PRESERVISION AREDS 2 PO), Take 2 tablets by mouth daily in the early morning , Disp: , Rfl:     propranolol (INDERAL) 20 mg tablet, Take 1 tablet (20 mg total) by mouth 4 (four) times a day, Disp: 360 tablet, Rfl: 3    rosuvastatin (CRESTOR) 10 MG tablet, Take 0 5 tablets (5 mg total) by mouth 3 (three) times a week Takes M-W-F, Disp: 18 tablet, Rfl: 3    CRANBERRY PO, Take 1,700 mg by mouth daily in the early morning , Disp: , Rfl:     HYDROcodone-acetaminophen (NORCO) 5-325 mg per tablet, Take 1 tablet by mouth every 6 (six) hours as needed for pain for up to 10 dosesMax Daily Amount: 4 tablets (Patient not taking: Reported on 2/3/2020), Disp: 10 tablet, Rfl: 0    ibuprofen (MOTRIN) 200 mg tablet, Take 200 mg by mouth every 6 (six) hours as needed for mild pain , Disp: , Rfl:     naproxen sodium (ALEVE) 220 MG tablet, Take 1 tablet (220 mg total) by mouth 2 (two) times a day with meals for 5 days, Disp: 10 tablet, Rfl: 0    prednisoLONE acetate (PRED FORTE) 1 % ophthalmic suspension, Administer 1 drop to the right eye 2 (two) times a day , Disp: , Rfl: 0  Allergies   Allergen Reactions    Amoxicillin-Pot Clavulanate GI Intolerance    Azithromycin GI Intolerance and Rash    Cephalexin GI Intolerance    Cortisone GI Intolerance    Doxycycline GI Intolerance    Penicillins GI Intolerance     Vitals:    02/03/20 1141   BP: 132/74   BP Location: Left arm   Patient Position: Sitting   Cuff Size: Large   Pulse: 68   Weight: 80 4 kg (177 lb 3 2 oz)   Height: 4' 11" (1 499 m)       Labs:  Appointment on 11/01/2019   Component Date Value    WBC 11/01/2019 7 45     RBC 11/01/2019 4 64     Hemoglobin 11/01/2019 14 3     Hematocrit 11/01/2019 43 1     MCV 11/01/2019 93     MCH 11/01/2019 30 8     MCHC 11/01/2019 33 2     RDW 11/01/2019 12 9     Platelets 68/72/5563 182     MPV 11/01/2019 10 3     Sodium 11/01/2019 142     Potassium 11/01/2019 3 7     Chloride 11/01/2019 108     CO2 11/01/2019 27     ANION GAP 11/01/2019 7     BUN 11/01/2019 20     Creatinine 11/01/2019 0 96     Glucose 11/01/2019 108     Calcium 11/01/2019 10 1     eGFR 11/01/2019 55    Appointment on 09/17/2019   Component Date Value    WBC 09/17/2019 6 29     RBC 09/17/2019 4 52     Hemoglobin 09/17/2019 13 8     Hematocrit 09/17/2019 42 1     MCV 09/17/2019 93     MCH 09/17/2019 30 5     MCHC 09/17/2019 32 8     RDW 09/17/2019 12 9     MPV 09/17/2019 10 6     Platelets 60/59/7837 172     nRBC 09/17/2019 0     Neutrophils Relative 09/17/2019 56     Immat GRANS % 09/17/2019 0     Lymphocytes Relative 09/17/2019 26     Monocytes Relative 09/17/2019 8     Eosinophils Relative 09/17/2019 9*    Basophils Relative 09/17/2019 1     Neutrophils Absolute 09/17/2019 3 48     Immature Grans Absolute 09/17/2019 0 02     Lymphocytes Absolute 09/17/2019 1 63     Monocytes Absolute 09/17/2019 0 53     Eosinophils Absolute 09/17/2019 0 56     Basophils Absolute 09/17/2019 0 07     Sodium 09/17/2019 143     Potassium 09/17/2019 4 1     Chloride 09/17/2019 110*    CO2 09/17/2019 27     ANION GAP 09/17/2019 6     BUN 09/17/2019 30*    Creatinine 09/17/2019 0 95     Glucose, Fasting 09/17/2019 101*    Calcium 09/17/2019 9 6     AST 09/17/2019 18     ALT 09/17/2019 26     Alkaline Phosphatase 09/17/2019 70     Total Protein 09/17/2019 6 9     Albumin 09/17/2019 4 3     Total Bilirubin 09/17/2019 0 68     eGFR 09/17/2019 56     Cholesterol 09/17/2019 167     Triglycerides 09/17/2019 93     HDL, Direct 09/17/2019 51     LDL Direct 09/17/2019 100     Vit D, 25-Hydroxy 09/17/2019 26 4*     Lab Results   Component Value Date    TRIG 93 09/17/2019    TRIG 125 12/22/2015    HDL 51 09/17/2019    HDL 57 12/22/2015    LDLDIRECT 100 09/17/2019    LDLDIRECT 98 12/22/2015     Imaging: No results found  Review of Systems:  Review of Systems   Constitutional: Negative for activity change, appetite change, chills, diaphoresis, fatigue and unexpected weight change  HENT: Negative for hearing loss, nosebleeds and sore throat  Eyes: Negative for photophobia and visual disturbance  Respiratory: Negative for cough, chest tightness, shortness of breath and wheezing  Cardiovascular: Positive for leg swelling  Negative for chest pain and palpitations  Gastrointestinal: Negative for abdominal pain, diarrhea, nausea and vomiting  Endocrine: Negative for polyuria  Genitourinary: Negative for dysuria, frequency and hematuria     Musculoskeletal: Positive for arthralgias  Negative for back pain, gait problem and neck pain  Skin: Negative for pallor and rash  Neurological: Negative for dizziness, syncope and headaches  Hematological: Does not bruise/bleed easily  Psychiatric/Behavioral: Negative for behavioral problems and confusion  Physical Exam:  Physical Exam   Constitutional: She is oriented to person, place, and time  She appears well-developed and well-nourished  HENT:   Head: Normocephalic and atraumatic  Nose: Nose normal    Eyes: Pupils are equal, round, and reactive to light  EOM are normal  No scleral icterus  Neck: Normal range of motion  Neck supple  No JVD present  Cardiovascular: Normal rate, regular rhythm and normal heart sounds  Exam reveals no gallop and no friction rub  No murmur heard  Pulmonary/Chest: Effort normal and breath sounds normal  No respiratory distress  She has no wheezes  She has no rales  Abdominal: Soft  Bowel sounds are normal  She exhibits no distension  There is no tenderness  Musculoskeletal: Normal range of motion  She exhibits edema  She exhibits no deformity  Neurological: She is alert and oriented to person, place, and time  No cranial nerve deficit  Skin: Skin is warm and dry  No rash noted  She is not diaphoretic  Psychiatric: She has a normal mood and affect  Her behavior is normal    Vitals reviewed  Blood pressure 132/74, pulse 68, height 4' 11" (1 499 m), weight 80 4 kg (177 lb 3 2 oz)  EKG:  Normal sinus rhythm  Normal ECG    Discussion/Summary:  SVT: changed propranolol to Toprol, symptoms are skipped beats - she did have multiple short runs of SVT, longest lasting 7 beats - that feel improved on Toprol  We also checked an echo that ruled out any structural repercussions from having frequent brief runs of SVT  Continue current regimen  Now back on propranolol due to shakiness with Toprol, and tolerating well with reduced symptoms       HTN: continue current regimen, well controlled today  HLD: continued on statin  LDL 89 in July 2019, 100 in Sept 2019

## 2020-02-03 NOTE — PATIENT INSTRUCTIONS
Supraventricular Tachycardia   AMBULATORY CARE:   Supraventricular tachycardia (SVT)  is a condition that causes your heart to beat much faster than it should  SVT is a type of abnormal heart rhythm, called an arrhythmia, that starts in the upper part of your heart  It may last from a few seconds or hours to several days  Common symptoms include the following:   · A pounding, racing, or fluttering heartbeat    · Fatigue, weakness, or shortness of breath    · Feeling lightheaded, dizzy, or faint  · Pain, pressure, or tightness in your chest, neck, jaw, arms, or upper back    · Nausea    · Feeling anxious, scared, or worried that something bad may happen  Call 911 for the following:   · You have any of the following signs of a heart attack:      ¨ Squeezing, pressure, or pain in your chest that lasts longer than 5 minutes or returns    ¨ Discomfort or pain in your back, neck, jaw, stomach, or arm     ¨ Trouble breathing    ¨ Nausea or vomiting    ¨ Lightheadedness or a sudden cold sweat, especially with chest pain or trouble breathing    Seek care immediately if:   · You have dizziness, lightheadedness, or feel faint  · You have sudden numbness or weakness in your arms or legs  Contact your healthcare provider if:   · Your symptoms get worse, or you have new symptoms  · You have swelling in your ankles or feet  · You have questions or concerns about your condition or care  Treatment  may depend on what is causing SVT and your symptoms  You may not need treatment for SVT  Instead you may need medicine or other procedures to control your heart rate  How to manage or prevent SVT:   · Perform vagal maneuvers as directed when you have symptoms of SVT  Lie down flat and bear down like you are having a bowel movement  Do this for 10 to 30 seconds  · Do not drink caffeine or alcohol  These can increase your risk for SVT  · Keep a record of your symptoms    Write down what you ate or what you were doing before an episode of SVT  Also write down anything you did to make the SVT stop  Bring your record to follow up visits with your healthcare provider  · Eat heart-healthy foods  These include fruits, vegetables, whole-grain breads, low-fat dairy products, beans, lean meats, and fish  Replace butter and margarine with heart-healthy oils such as olive oil and canola oil  · Exercise regularly and maintain a healthy weight  Ask about the best exercise plan for you  Ask your healthcare provider how much you should weigh  Ask him to help you create a weight loss plan if you are overweight  · Do not smoke  Nicotine and other chemicals in cigarettes and cigars can cause heart and lung damage  Ask your healthcare provider for information if you currently smoke and need help to quit  E-cigarettes or smokeless tobacco still contain nicotine  Talk to your healthcare provider before you use these products  Follow up with your healthcare provider as directed:  Write down your questions so you remember to ask them during your visits  © 2017 [facility] Information is for End User's use only and may not be sold, redistributed or otherwise used for commercial purposes  All illustrations and images included in CareNotes® are the copyrighted property of [employer] , [employer]  or Gerson Cardenas  The above information is an  only  It is not intended as medical advice for individual conditions or treatments  Talk to your doctor, nurse or pharmacist before following any medical regimen to see if it is safe and effective for you

## 2020-03-11 ENCOUNTER — TELEPHONE (OUTPATIENT)
Dept: HEMATOLOGY ONCOLOGY | Facility: CLINIC | Age: 85
End: 2020-03-11

## 2020-03-11 NOTE — TELEPHONE ENCOUNTER
Spoke to patient and informed her that her appt on 3/20/20 needed to be R/S  Her new appt is 3/27/20 at 9:40 am with Dr Arabella Baeza at the [place] location  Patient was fine with the appt change

## 2020-03-17 DIAGNOSIS — F41.9 ANXIETY: Primary | ICD-10-CM

## 2020-03-17 RX ORDER — CLORAZEPATE DIPOTASSIUM 3.75 MG/1
3.75 TABLET ORAL
Qty: 90 TABLET | Refills: 3 | Status: SHIPPED | OUTPATIENT
Start: 2020-03-17 | End: 2021-05-17

## 2020-03-24 ENCOUNTER — TELEPHONE (OUTPATIENT)
Dept: HEMATOLOGY ONCOLOGY | Facility: MEDICAL CENTER | Age: 85
End: 2020-03-24

## 2020-03-24 NOTE — TELEPHONE ENCOUNTER
Patient called back  Noted Telephone visit on patient's 3/27/20 @ 9:40AM with Dr Brea Rowley  Reviewed CDC guidelines with patient  Patient verbalized understanding of above

## 2020-03-24 NOTE — TELEPHONE ENCOUNTER
Inbound Calls to Reschedule/Cancel Appointments   Patient Details:  Ailyn Mazariegos  1935  [id number]     Who is calling? patient   Date of original appointment 03/27/2020   Visit Type Follow up    Who is the Provider and Location? Dr Brea Rowley    Is the patient on active treatment? Chemo? Radiation? No    Was the patient Rescheduled or Canceled? Patient would like a Tele- visit [phone]   Reason for rescheduling or cancelation? Coronavirus Crisis    Next Steps:    [facility]: After completing the above information, please route to the appropriate Care Team for review  Care Team: Please review and reach out to the patient if you have any changes

## 2020-03-27 ENCOUNTER — TELEPHONE (OUTPATIENT)
Dept: HEMATOLOGY ONCOLOGY | Facility: CLINIC | Age: 85
End: 2020-03-27

## 2020-03-27 ENCOUNTER — TELEMEDICINE (OUTPATIENT)
Dept: HEMATOLOGY ONCOLOGY | Facility: CLINIC | Age: 85
End: 2020-03-27
Payer: MEDICARE

## 2020-03-27 DIAGNOSIS — Z17.0 MALIGNANT NEOPLASM OF UPPER-OUTER QUADRANT OF RIGHT BREAST IN FEMALE, ESTROGEN RECEPTOR POSITIVE (HCC): Primary | ICD-10-CM

## 2020-03-27 DIAGNOSIS — C50.411 MALIGNANT NEOPLASM OF UPPER-OUTER QUADRANT OF RIGHT BREAST IN FEMALE, ESTROGEN RECEPTOR POSITIVE (HCC): Primary | ICD-10-CM

## 2020-03-27 PROCEDURE — G2012 BRIEF CHECK IN BY MD/QHP: HCPCS | Performed by: INTERNAL MEDICINE

## 2020-03-27 NOTE — PROGRESS NOTES
Virtual Regular Visit    Problem List Items Addressed This Visit        Other    Malignant neoplasm of upper-outer quadrant of right breast in female, estrogen receptor positive ([facility] [place] [address] ) - Primary    Relevant Orders    CBC and differential    Comprehensive metabolic panel               Reason for visit is follow-up phone conversation by the patient request regarding stage I right-sided breast cancer    81-year-old  female seen initially 5/24/2019 regarding invasive ductal carcinoma  He has a with history of degenerative arthritis, ORIF of the left femur, depression, dyslipidemia, Bell's palsy, had a history of DCIS of the right breast in 2010 status post excision as well as radiation therapy with subsequent alopecia     Most recent mammogram in March 2019 showed asymmetry in the outer region of the right breast, subsequent ultrasound showed a mass measuring 7 x 5 x 5 mm around 9 o'clock 3 cm from the nipple     Biopsy showed invasive ductal carcinoma, ER 95%, IL 95%, her 2+ 2 however negative by [facility]     Status post lumpectomy showing invasive ductal carcinoma, 9 mm, negative margin, no evidence of lymphovascular invasion, in the background of DCIS, invasive carcinoma and DCIS are present in the posterior final margin of the excision representing residual carcinoma in the margin, this is not a multifocal tumor, the final into posterior margin is negative for DCIS and invasive carcinoma with the tumor being 2 mm from the new final margin, 1-sentinel lymph node    Initiated on anastrozole 1 mg p o  Daily since May 2019    Encounter provider Concepcion Kamara MD    Provider located at [address] [address] [phone]      Recent Visits  No visits were found meeting these conditions     Showing recent visits within past 7 days and meeting all other requirements     Today's Visits  Date Type Provider Dept   03/27/20 Telemedicine Jeanna Love MD [facility] today's visits and meeting all other requirements     Future Appointments  No visits were found meeting these conditions  Showing future appointments within next 150 days and meeting all other requirements        After connecting through [employer], the patient was identified by name and date of birth  Mciaela Garvin was informed that this is a telemedicine visit and that the visit is being conducted through telephone which may not be secure and therefore, might not be HIPAA-compliant  My office door was closed  No one else was in the room  She acknowledged consent and understanding of privacy and security of the video platform  The patient has agreed to participate and understands they can discontinue the visit at any time  Subjective      Denies any headache blurred vision nausea vomiting diarrhea constipation dysuria hematuria melena or hematochezia no bone pain    Assessment and plan  Micaela Garvin is a 81 y o  female with history of DCIS of the right breast in 2010 status post resection and radiation therapy  History of stage I (T1b, N0) right-sided invasive ductal carcinoma status post lumpectomy on 05/2019 ER 95%, FL 95%, HER2 negative with 1-sentinel lymph nodes, initiated on anastrozole 1 mg p o  Daily on May 2019, no evidence of disease by the phone conversation and lab criteria    Follow-up in 6 months with CBC, CMP and office visit        Past Medical History:   Diagnosis Date    Anxiety     Arthritis     last assessed 3/24/15     Atherosclerosis     Bell's palsy     Breast cancer ([facility] [address] ) 2019    Broken femur ([facility] [address] )     Cancer ([facility] [address] )     breast    Cardiac disorder     DCIS (ductal carcinoma in situ) of breast     last assessed 4/4/17     Depression     Endometrial polyp     GERD (gastroesophageal reflux disease)     History of radiation therapy 2010    Hypercholesterolemia     resolved 10/22/14     Hyperlipidemia     Long term use of drug     last assessed 5/23/14     Peripheral neuropathy     Pneumonia     As a Child    PONV (postoperative nausea and vomiting)     Tachycardia     Uterine fibroid        Past Surgical History:   Procedure Laterality Date    BREAST BIOPSY Right     BREAST LUMPECTOMY Right 2010    BREAST LUMPECTOMY Right 5/8/2019    Procedure: BREAST LUMPECTOMY; BREAST NEEDLE LOCALIZATION (NEEDLE LOC AT 0800); Surgeon: Denver Romance, MD;  [facility];  Service: Surgical Oncology    CARPAL TUNNEL RELEASE Bilateral     CATARACT EXTRACTION Bilateral     DILATION AND CURETTAGE OF UTERUS      ENDOMETRIAL BIOPSY      without cervical dilation     HEMORROIDECTOMY      JOINT REPLACEMENT Right     Shoulder    JOINT REPLACEMENT Bilateral     Knees    LYMPH NODE BIOPSY Right 5/8/2019    Procedure: SENTINEL LYMPH NODE BIOPSY; LYMPHOSCINTIGRAPHY; INTRAOPERATIVE LYMPHATIC MAPPING (INJECT AT 0900);   Surgeon: Denver Romance, MD;  [facility];  Service: Surgical Oncology    MAMMO NEEDLE LOCALIZATION RIGHT (ALL INC) Right 5/8/2019    OOPHORECTOMY      75    OVARIAN CYST REMOVAL Left     AK RECONSTR TOTAL SHOULDER IMPLANT Right 7/18/2017    Procedure: TOTAL SHOULDER REVERSE ARTHROPLASTY;  Surgeon: Keith Rodriguez MD;  [facility];  Service: Orthopedics    AK REVISE ULNAR NERVE AT ELBOW Left 11/19/2019    Procedure: RELEASE CUBITAL TUNNEL left -;  Surgeon: Karel Springer MD;  [facility];  Service: Orthopedics    AK WRIST Delilah Clif LIG Left 11/19/2019    Procedure: RELEASE CARPAL TUNNEL ENDOSCOPIC;  Surgeon: Karel Springer MD;  [facility];  Service: Orthopedics    SENTINEL LYMPH NODE BIOPSY      TONSILLECTOMY      TUBAL LIGATION      US GUIDED BREAST BIOPSY RIGHT COMPLETE Right 3/28/2019       Current Outpatient Medications   Medication Sig Dispense Refill    amoxicillin (AMOXIL) 500 mg capsule TAKE 4 CAPSULES 1 HOUR BEFORE DENTAL PROCEDURE      anastrozole (ARIMIDEX) 1 mg tablet Take 1 tablet (1 mg total) by mouth daily 90 tablet 3    clorazepate (TRANXENE) 3 75 mg tablet Take 1 tablet (3 75 mg total) by mouth daily at bedtime Takes with Dinner and before Bed 90 tablet 3    CRANBERRY PO Take 1,700 mg by mouth daily in the early morning       HYDROcodone-acetaminophen (NORCO) 5-325 mg per tablet Take 1 tablet by mouth every 6 (six) hours as needed for pain for up to 10 dosesMax Daily Amount: 4 tablets (Patient not taking: Reported on 2/3/2020) 10 tablet 0    ibuprofen (MOTRIN) 200 mg tablet Take 200 mg by mouth every 6 (six) hours as needed for mild pain       lisinopril (ZESTRIL) 10 mg tablet Take 1 tablet (10 mg total) by mouth 2 (two) times a day (Patient taking differently: Take 10 mg by mouth 4 (four) times a day ) 180 tablet 3    Multiple Vitamins-Minerals (PRESERVISION AREDS 2 PO) Take 2 tablets by mouth daily in the early morning       naproxen sodium (ALEVE) 220 MG tablet Take 1 tablet (220 mg total) by mouth 2 (two) times a day with meals for 5 days 10 tablet 0    prednisoLONE acetate (PRED FORTE) 1 % ophthalmic suspension Administer 1 drop to the right eye 2 (two) times a day   0    propranolol (INDERAL) 20 mg tablet Take 1 tablet (20 mg total) by mouth 4 (four) times a day 360 tablet 3    rosuvastatin (CRESTOR) 10 MG tablet Take 0 5 tablets (5 mg total) by mouth 3 (three) times a week Takes M-W-F 18 tablet 3     No current facility-administered medications for this visit  Allergies   Allergen Reactions    Amoxicillin-Pot Clavulanate GI Intolerance    Azithromycin GI Intolerance and Rash    Cephalexin GI Intolerance    Cortisone GI Intolerance    Doxycycline GI Intolerance    Penicillins GI Intolerance       Review of Systems      I spent 10 minutes with the patient during this visit

## 2020-03-27 NOTE — TELEPHONE ENCOUNTER
Called patient to let her know I made her 6 month f/u apt and will be sending her labs out in today's mail

## 2020-03-30 ENCOUNTER — TELEPHONE (OUTPATIENT)
Dept: OBGYN CLINIC | Facility: CLINIC | Age: 85
End: 2020-03-30

## 2020-05-07 DIAGNOSIS — E78.2 MIXED HYPERLIPIDEMIA: ICD-10-CM

## 2020-05-08 ENCOUNTER — APPOINTMENT (OUTPATIENT)
Dept: LAB | Age: 85
End: 2020-05-08
Payer: MEDICARE

## 2020-05-08 DIAGNOSIS — I10 ESSENTIAL HYPERTENSION: Chronic | ICD-10-CM

## 2020-05-08 DIAGNOSIS — R73.9 HYPERGLYCEMIA: Chronic | ICD-10-CM

## 2020-05-08 LAB
ALBUMIN SERPL BCP-MCNC: 4 G/DL (ref 3.5–5)
ALP SERPL-CCNC: 68 U/L (ref 46–116)
ALT SERPL W P-5'-P-CCNC: 35 U/L (ref 12–78)
ANION GAP SERPL CALCULATED.3IONS-SCNC: 6 MMOL/L (ref 4–13)
AST SERPL W P-5'-P-CCNC: 22 U/L (ref 5–45)
BILIRUB SERPL-MCNC: 0.81 MG/DL (ref 0.2–1)
BUN SERPL-MCNC: 21 MG/DL (ref 5–25)
CALCIUM SERPL-MCNC: 9.6 MG/DL (ref 8.3–10.1)
CHLORIDE SERPL-SCNC: 109 MMOL/L (ref 100–108)
CHOLEST SERPL-MCNC: 177 MG/DL (ref 50–200)
CO2 SERPL-SCNC: 26 MMOL/L (ref 21–32)
CREAT SERPL-MCNC: 0.99 MG/DL (ref 0.6–1.3)
EST. AVERAGE GLUCOSE BLD GHB EST-MCNC: 103 MG/DL
GFR SERPL CREATININE-BSD FRML MDRD: 53 ML/MIN/1.73SQ M
GLUCOSE P FAST SERPL-MCNC: 99 MG/DL (ref 65–99)
HBA1C MFR BLD: 5.2 %
HDLC SERPL-MCNC: 54 MG/DL
LDLC SERPL DIRECT ASSAY-MCNC: 99 MG/DL (ref 0–100)
POTASSIUM SERPL-SCNC: 4.1 MMOL/L (ref 3.5–5.3)
PROT SERPL-MCNC: 7 G/DL (ref 6.4–8.2)
SODIUM SERPL-SCNC: 141 MMOL/L (ref 136–145)
TRIGL SERPL-MCNC: 134 MG/DL

## 2020-05-08 PROCEDURE — 36415 COLL VENOUS BLD VENIPUNCTURE: CPT

## 2020-05-08 PROCEDURE — 83721 ASSAY OF BLOOD LIPOPROTEIN: CPT

## 2020-05-08 PROCEDURE — 80061 LIPID PANEL: CPT

## 2020-05-08 PROCEDURE — 80053 COMPREHEN METABOLIC PANEL: CPT

## 2020-05-08 PROCEDURE — 83036 HEMOGLOBIN GLYCOSYLATED A1C: CPT

## 2020-05-08 RX ORDER — ROSUVASTATIN CALCIUM 10 MG/1
5 TABLET, COATED ORAL 3 TIMES WEEKLY
Qty: 18 TABLET | Refills: 3 | Status: SHIPPED | OUTPATIENT
Start: 2020-05-08 | End: 2021-05-17 | Stop reason: SDUPTHER

## 2020-05-11 DIAGNOSIS — C50.411 MALIGNANT NEOPLASM OF UPPER-OUTER QUADRANT OF RIGHT BREAST IN FEMALE, ESTROGEN RECEPTOR POSITIVE (HCC): ICD-10-CM

## 2020-05-11 DIAGNOSIS — C50.411 CARCINOMA OF UPPER-OUTER QUADRANT OF RIGHT BREAST IN FEMALE, ESTROGEN RECEPTOR POSITIVE (HCC): ICD-10-CM

## 2020-05-11 DIAGNOSIS — Z17.0 CARCINOMA OF UPPER-OUTER QUADRANT OF RIGHT BREAST IN FEMALE, ESTROGEN RECEPTOR POSITIVE (HCC): ICD-10-CM

## 2020-05-11 DIAGNOSIS — Z17.0 MALIGNANT NEOPLASM OF UPPER-OUTER QUADRANT OF RIGHT BREAST IN FEMALE, ESTROGEN RECEPTOR POSITIVE (HCC): ICD-10-CM

## 2020-05-11 RX ORDER — ANASTROZOLE 1 MG/1
TABLET ORAL
Qty: 90 TABLET | Refills: 3 | Status: SHIPPED | OUTPATIENT
Start: 2020-05-11 | End: 2021-05-17 | Stop reason: SDUPTHER

## 2020-05-15 ENCOUNTER — OFFICE VISIT (OUTPATIENT)
Dept: INTERNAL MEDICINE CLINIC | Facility: CLINIC | Age: 85
End: 2020-05-15
Payer: MEDICARE

## 2020-05-15 DIAGNOSIS — R21 RASH: Primary | ICD-10-CM

## 2020-05-15 DIAGNOSIS — E78.2 MIXED HYPERLIPIDEMIA: Chronic | ICD-10-CM

## 2020-05-15 DIAGNOSIS — Z79.811 USE OF ANASTROZOLE (ARIMIDEX): ICD-10-CM

## 2020-05-15 DIAGNOSIS — I10 ESSENTIAL HYPERTENSION: Chronic | ICD-10-CM

## 2020-05-15 DIAGNOSIS — I48.91 ATRIAL FIBRILLATION, UNSPECIFIED TYPE (HCC): Chronic | ICD-10-CM

## 2020-05-15 PROCEDURE — 3079F DIAST BP 80-89 MM HG: CPT | Performed by: INTERNAL MEDICINE

## 2020-05-15 PROCEDURE — 99215 OFFICE O/P EST HI 40 MIN: CPT | Performed by: INTERNAL MEDICINE

## 2020-05-15 PROCEDURE — 3008F BODY MASS INDEX DOCD: CPT | Performed by: INTERNAL MEDICINE

## 2020-05-15 PROCEDURE — 1036F TOBACCO NON-USER: CPT | Performed by: INTERNAL MEDICINE

## 2020-05-15 PROCEDURE — 3075F SYST BP GE 130 - 139MM HG: CPT | Performed by: INTERNAL MEDICINE

## 2020-05-15 PROCEDURE — 4040F PNEUMOC VAC/ADMIN/RCVD: CPT | Performed by: INTERNAL MEDICINE

## 2020-05-16 VITALS
HEIGHT: 59 IN | HEART RATE: 72 BPM | WEIGHT: 176.8 LBS | RESPIRATION RATE: 14 BRPM | DIASTOLIC BLOOD PRESSURE: 80 MMHG | SYSTOLIC BLOOD PRESSURE: 134 MMHG | BODY MASS INDEX: 35.64 KG/M2

## 2020-05-16 PROBLEM — R21 RASH: Status: ACTIVE | Noted: 2020-05-16

## 2020-05-16 PROBLEM — S80.02XA CONTUSION OF LEFT KNEE: Status: RESOLVED | Noted: 2018-10-22 | Resolved: 2020-05-16
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
## 2020-05-27 ENCOUNTER — OFFICE VISIT (OUTPATIENT)
Dept: OBGYN CLINIC | Facility: HOSPITAL | Age: 85
End: 2020-05-27
Payer: MEDICARE

## 2020-05-27 VITALS
WEIGHT: 176 LBS | HEART RATE: 70 BPM | BODY MASS INDEX: 35.48 KG/M2 | HEIGHT: 59 IN | SYSTOLIC BLOOD PRESSURE: 156 MMHG | DIASTOLIC BLOOD PRESSURE: 100 MMHG

## 2020-05-27 DIAGNOSIS — G56.22 CUBITAL TUNNEL SYNDROME ON LEFT: Primary | ICD-10-CM

## 2020-05-27 PROCEDURE — 1036F TOBACCO NON-USER: CPT | Performed by: ORTHOPAEDIC SURGERY

## 2020-05-27 PROCEDURE — 1160F RVW MEDS BY RX/DR IN RCRD: CPT | Performed by: ORTHOPAEDIC SURGERY

## 2020-05-27 PROCEDURE — 4040F PNEUMOC VAC/ADMIN/RCVD: CPT | Performed by: ORTHOPAEDIC SURGERY

## 2020-05-27 PROCEDURE — 3077F SYST BP >= 140 MM HG: CPT | Performed by: ORTHOPAEDIC SURGERY

## 2020-05-27 PROCEDURE — 3080F DIAST BP >= 90 MM HG: CPT | Performed by: ORTHOPAEDIC SURGERY

## 2020-05-27 PROCEDURE — 99213 OFFICE O/P EST LOW 20 MIN: CPT | Performed by: ORTHOPAEDIC SURGERY

## 2020-05-27 PROCEDURE — 3008F BODY MASS INDEX DOCD: CPT | Performed by: ORTHOPAEDIC SURGERY

## 2020-06-25 ENCOUNTER — HOSPITAL ENCOUNTER (OUTPATIENT)
Dept: MAMMOGRAPHY | Facility: CLINIC | Age: 85
Discharge: HOME | End: 2020-06-25
Payer: MEDICARE

## 2020-06-25 DIAGNOSIS — R92.8 ABNORMAL FINDINGS ON DIAGNOSTIC IMAGING OF BREAST: ICD-10-CM

## 2020-06-25 PROCEDURE — 77066 DX MAMMO INCL CAD BI: CPT

## 2020-06-25 PROCEDURE — G0279 TOMOSYNTHESIS, MAMMO: HCPCS

## 2020-07-03 DIAGNOSIS — I10 ESSENTIAL HYPERTENSION: ICD-10-CM

## 2020-07-03 RX ORDER — LISINOPRIL 10 MG/1
TABLET ORAL
Qty: 180 TABLET | Refills: 3 | Status: SHIPPED | OUTPATIENT
Start: 2020-07-03 | End: 2021-06-18

## 2020-07-06 ENCOUNTER — TELEPHONE (OUTPATIENT)
Dept: INTERNAL MEDICINE CLINIC | Facility: CLINIC | Age: 85
End: 2020-07-06

## 2020-07-06 ENCOUNTER — OFFICE VISIT (OUTPATIENT)
Dept: INTERNAL MEDICINE CLINIC | Facility: CLINIC | Age: 85
End: 2020-07-06
Payer: MEDICARE

## 2020-07-06 VITALS — SYSTOLIC BLOOD PRESSURE: 120 MMHG | HEART RATE: 72 BPM | DIASTOLIC BLOOD PRESSURE: 78 MMHG | RESPIRATION RATE: 14 BRPM

## 2020-07-06 DIAGNOSIS — B35.4 TINEA CORPORIS: Primary | ICD-10-CM

## 2020-07-06 DIAGNOSIS — R21 SKIN RASH: Primary | ICD-10-CM

## 2020-07-06 DIAGNOSIS — I10 ESSENTIAL HYPERTENSION: Chronic | ICD-10-CM

## 2020-07-06 PROCEDURE — 99214 OFFICE O/P EST MOD 30 MIN: CPT | Performed by: INTERNAL MEDICINE

## 2020-07-06 PROCEDURE — 3074F SYST BP LT 130 MM HG: CPT | Performed by: INTERNAL MEDICINE

## 2020-07-06 PROCEDURE — 3078F DIAST BP <80 MM HG: CPT | Performed by: INTERNAL MEDICINE

## 2020-07-06 PROCEDURE — 4040F PNEUMOC VAC/ADMIN/RCVD: CPT | Performed by: INTERNAL MEDICINE

## 2020-07-06 PROCEDURE — 1160F RVW MEDS BY RX/DR IN RCRD: CPT | Performed by: INTERNAL MEDICINE

## 2020-07-06 PROCEDURE — 1036F TOBACCO NON-USER: CPT | Performed by: INTERNAL MEDICINE

## 2020-07-06 RX ORDER — FLUCONAZOLE 150 MG/1
TABLET ORAL
Qty: 3 TABLET | Refills: 0 | Status: SHIPPED | OUTPATIENT
Start: 2020-07-06 | End: 2020-07-20

## 2020-07-06 RX ORDER — CLOTRIMAZOLE AND BETAMETHASONE DIPROPIONATE 10; .64 MG/G; MG/G
CREAM TOPICAL 2 TIMES DAILY
Qty: 60 G | Refills: 1 | Status: SHIPPED | OUTPATIENT
Start: 2020-07-06

## 2020-07-06 NOTE — TELEPHONE ENCOUNTER
Pt says she has re ocuuring lyme disease  In left armpit and is much worse this time with slight fever 98 8   Noticed this about 3 days ago  Very itchy   Appears worse than before, now it is in the crease of her arms   Painful only when lifting her arm   Please advise   Thanks

## 2020-07-06 NOTE — TELEPHONE ENCOUNTER
Sp/w pt to come in for 5pm and she also complains now that she forgot to mention she has some dizzyness as well     Screened neg for COVID

## 2020-07-07 NOTE — PROGRESS NOTES
Assessment/Plan:   1  Skin rash -previously felt that potentially erythema chronicum migrans as she remembered a bite and development of the rash- was treated then with doxycycline for 3 weeks in May  Now appears to have prominent fungal disease- noted in left greater than right axilla and under the breast   She was placed on Diflucan 150 milligrams weekly for 3 weeks and will resume Lotrisone cream b i d  To the area  2  Hypertension -adequate control on current dose of beta-blocker and ACE-inhibitor  Had been on a diuretic in the past but not needed at present  Not felt to be exacerbated by the above  3  Status post left cubital tunnel release and left carpal tunnel release done in November of 7809 without complications for diagnosis of left carpal tunnel syndrome and left cubital tunnel syndrome -because of ongoing arm pain she will be contacting her orthopedic physician    All other problems as per note of May 2020 in August 2018       MEDICAL REGIMEN:      Lotrisone cream b i d  To rash of the left axilla, Diflucan 150 milligrams weekly for 3 weeks, clorazepate 3 75 milligrams b i d  P r n , propanolol 20 milligrams q i d , lisinopril 10 milligrams b i d , Crestor 10 milligrams half tablet 3 times per week, multivitamin, cranberry daily, off the logic vitamins, Zantac 150 milligrams in the morning, intermittent use of prednisone eye drops per Ophthalmology, anastrazole  1 milligram daily started in the spring of 2019       This patient will be seen at previously scheduled appointment with previously scheduled labs  No problem-specific Assessment & Plan notes found for this encounter  Diagnoses and all orders for this visit:    Skin rash    Essential hypertension          Subjective:      Patient ID: Renetta Thrasher is a 81 y o  female  This patient is seen today as an emergency appointment         a this patient had previously been treated for potential erythema chronicum migrans with doxycycline  Left axillary rash had resolved but she was having some left arm pain and noted recurrence of rash in the left axilla  She also notes some recurrence of the right axilla  I previously also treated her with Lotrisone as well  She is also noticing some disease under her breast   This patient denies any systemic symptoms  Specifically there has been no evidence of fever, night sweats, significant weight loss or significant decrease in appetite  As noted she has not had any fever  She was worried the above with exacerbate her hypertension  She is avoiding salt and decongestants  She is having recurrent left arm pain post surgery wanted to know if she should have repeat evaluation by the orthopedic physician  She is making arrangements for this  She denies any other new medications  She is not using any new soaps etcetera  We had a lengthy discussion in reference to all the above  She had previously noted a bite to the area and then development of the rash is noted was treated clinically for potential erythema chronic migraines  She had undergone left cubital tunnel release and left carpal tunnel release in November but is noting seeing some left forearm pain and radiates to the upper arm  She denies pain starting neck and shooting down the arm  She has some residual numbness of the fingers  She will be speaking with the orthopedic physician is noted about this  She has a history of some anxiety  She felt previously this was worsened by metoprolol  She felt better when she resume propanolol  Thyroid function normal    She has some pruritus associated with the rash    She has not had any discharge      The following portions of the patient's history were reviewed and updated as appropriate: allergies, current medications, past family history, past medical history, past social history, past surgical history and problem list     Review of Systems   Constitutional: Negative  Respiratory: Negative  Cardiovascular: Negative  Gastrointestinal: Negative  Endocrine: Negative  Genitourinary: Negative  Musculoskeletal: Negative  Left arm pain   Skin: Positive for rash  Neurological: Negative  Hematological: Negative  Psychiatric/Behavioral: The patient is nervous/anxious  Objective: There were no vitals taken for this visit  Physical Exam   Constitutional: She is oriented to person, place, and time  She appears well-developed and well-nourished  No distress  HENT:   Head: Normocephalic and atraumatic  Right Ear: External ear normal    Left Ear: External ear normal    Nose: Nose normal    Mouth/Throat: Oropharynx is clear and moist  No oropharyngeal exudate  Eyes: Pupils are equal, round, and reactive to light  Conjunctivae and EOM are normal  Right eye exhibits no discharge  Left eye exhibits no discharge  No scleral icterus  Neck: Normal range of motion  Neck supple  No JVD present  No tracheal deviation present  No thyromegaly present  Cardiovascular: Normal rate, regular rhythm, normal heart sounds and intact distal pulses  Exam reveals no gallop and no friction rub  No murmur heard  Pulmonary/Chest: Effort normal and breath sounds normal  No respiratory distress  She has no wheezes  She has no rales  She exhibits no tenderness  Abdominal: Soft  Bowel sounds are normal  She exhibits no distension and no mass  There is no tenderness  There is no rebound and no guarding  Musculoskeletal: Normal range of motion  She exhibits no edema or deformity  Lymphadenopathy:     She has no cervical adenopathy  Neurological: She is alert and oriented to person, place, and time  She has normal reflexes  She displays normal reflexes  No cranial nerve deficit  She exhibits normal muscle tone  Coordination normal    Skin: Skin is warm and dry  No rash noted  No erythema      Prominent erythema the left axillary area with a well-demarcated border  Some mild erythema the right axilla and under the breasts   Psychiatric: She has a normal mood and affect  Her behavior is normal  Judgment and thought content normal    Vitals reviewed

## 2020-09-02 ENCOUNTER — TELEPHONE (OUTPATIENT)
Dept: INTERNAL MEDICINE CLINIC | Facility: CLINIC | Age: 85
End: 2020-09-02

## 2020-09-02 NOTE — TELEPHONE ENCOUNTER
Use over the counter fluticasone nasal spray- 2 squirts in each nostril once daily for next 1-2 weeks

## 2020-09-25 ENCOUNTER — TELEPHONE (OUTPATIENT)
Dept: HEMATOLOGY ONCOLOGY | Facility: CLINIC | Age: 85
End: 2020-09-25

## 2020-09-25 NOTE — TELEPHONE ENCOUNTER
Appointment Confirmation     Appointment with  Brigette Canales    Appointment date & time 9/29/2020 10:30 am   [facility]    Patient verbilized Understanding yes

## 2020-09-25 NOTE — TELEPHONE ENCOUNTER
Spoke with patient today - confirmed with her that she has an appointment on 9/29/20 @ 10:30 with Armand Brittle

## 2020-09-25 NOTE — TELEPHONE ENCOUNTER
"CHIEF COMPLAINT: She has a vaginal discharge--recently on an antibiotic for an abscess   Annual visit No         ROS:  Constitutional: no weight loss, weight gain, fever, fatigue  Eyes:  No vision changes, glasses/contacts  ENT/Mouth: No ulcers, sinus problems, ears ringing, headache  Cardiovascular: No inability to lie flat, chest pain, exercise intolerance, swelling, heart palpitations  Respiratory: No wheezing, coughing blood, shortness of breath, or cough  Gastrointestinal: No diarrhea, bloody stool, nausea/vomiting, constipation, gas, hemorrhoids  Genitourinary: No blood in urine, painful urination, urgency of urination, frequency of urination, incomplete emptying, incontinence, abnormal bleeding, painful periods, heavy periods, vaginal discharge, vaginal odor, painful intercourse, sexual problems, bleeding after intercourse.  Musculoskeletal: No muscle weakness  Skin/Breast: No painful breasts, nipple discharge, masses, rash, ulcers  Neurological: No passing out, seizures, numbness, headache  Endocrine: No diabetes, hypothyroid, hyperthyroid, hot flashes, hair loss, abnormal hair growth, ance  Psychiatric: No depression, crying  Hematologic: No bruises, bleeding, swollen lymph nodes, anemia.    OBJECTIVE:   The patient appears well, alert, oriented x 3, in no distress.  /70  Ht 5' 3" (1.6 m)  Wt 130 lb (58.968 kg)  BMI 23.03 kg/m2  LMP 06/21/2012    ABDOMEN: soft, non-tender; bowel sounds normal; no masses,  no organomegaly and no hernias, masses, or hepatosplenomegaly  GENITALIA: normal external genitalia, no erythema, no discharge  URETHRA: normal urethra, normal urethral meatus  VAGINA: Normal  CERVIX:  no lesions or cervical motion tenderness--iud string visualized  UTERUS: normal size, contour, position, consistency, mobility, non-tender  ADNEXA: no mass, fullness, tenderness    ASSESSMENT:   1. Vaginal discharge        PLAN:   return annually or prn  Vaginal culture  Order diflucan  IUD removal " Patient is calling in to confirm that labs are in her chart, I have confirmed that they are     Patient voiced understanding in [place]

## 2020-09-28 ENCOUNTER — TELEPHONE (OUTPATIENT)
Dept: HEMATOLOGY ONCOLOGY | Facility: CLINIC | Age: 85
End: 2020-09-28

## 2020-09-28 ENCOUNTER — APPOINTMENT (OUTPATIENT)
Dept: LAB | Age: 85
End: 2020-09-28
Payer: MEDICARE

## 2020-09-28 DIAGNOSIS — Z17.0 MALIGNANT NEOPLASM OF UPPER-OUTER QUADRANT OF RIGHT BREAST IN FEMALE, ESTROGEN RECEPTOR POSITIVE (HCC): ICD-10-CM

## 2020-09-28 DIAGNOSIS — C50.411 MALIGNANT NEOPLASM OF UPPER-OUTER QUADRANT OF RIGHT BREAST IN FEMALE, ESTROGEN RECEPTOR POSITIVE (HCC): ICD-10-CM

## 2020-09-28 LAB
ALBUMIN SERPL BCP-MCNC: 4.2 G/DL (ref 3.5–5)
ALP SERPL-CCNC: 75 U/L (ref 46–116)
ALT SERPL W P-5'-P-CCNC: 30 U/L (ref 12–78)
ANION GAP SERPL CALCULATED.3IONS-SCNC: 3 MMOL/L (ref 4–13)
AST SERPL W P-5'-P-CCNC: 19 U/L (ref 5–45)
BASOPHILS # BLD AUTO: 0.06 THOUSANDS/ΜL (ref 0–0.1)
BASOPHILS NFR BLD AUTO: 1 % (ref 0–1)
BILIRUB SERPL-MCNC: 0.65 MG/DL (ref 0.2–1)
BUN SERPL-MCNC: 14 MG/DL (ref 5–25)
CALCIUM SERPL-MCNC: 10.4 MG/DL (ref 8.3–10.1)
CHLORIDE SERPL-SCNC: 110 MMOL/L (ref 100–108)
CO2 SERPL-SCNC: 30 MMOL/L (ref 21–32)
CREAT SERPL-MCNC: 0.88 MG/DL (ref 0.6–1.3)
EOSINOPHIL # BLD AUTO: 0.35 THOUSAND/ΜL (ref 0–0.61)
EOSINOPHIL NFR BLD AUTO: 5 % (ref 0–6)
ERYTHROCYTE [DISTWIDTH] IN BLOOD BY AUTOMATED COUNT: 12.7 % (ref 11.6–15.1)
GFR SERPL CREATININE-BSD FRML MDRD: 61 ML/MIN/1.73SQ M
GLUCOSE P FAST SERPL-MCNC: 83 MG/DL (ref 65–99)
HCT VFR BLD AUTO: 45.4 % (ref 34.8–46.1)
HGB BLD-MCNC: 14.7 G/DL (ref 11.5–15.4)
IMM GRANULOCYTES # BLD AUTO: 0.02 THOUSAND/UL (ref 0–0.2)
IMM GRANULOCYTES NFR BLD AUTO: 0 % (ref 0–2)
LYMPHOCYTES # BLD AUTO: 1.96 THOUSANDS/ΜL (ref 0.6–4.47)
LYMPHOCYTES NFR BLD AUTO: 27 % (ref 14–44)
MCH RBC QN AUTO: 30.9 PG (ref 26.8–34.3)
MCHC RBC AUTO-ENTMCNC: 32.4 G/DL (ref 31.4–37.4)
MCV RBC AUTO: 95 FL (ref 82–98)
MONOCYTES # BLD AUTO: 0.66 THOUSAND/ΜL (ref 0.17–1.22)
MONOCYTES NFR BLD AUTO: 9 % (ref 4–12)
NEUTROPHILS # BLD AUTO: 4.21 THOUSANDS/ΜL (ref 1.85–7.62)
NEUTS SEG NFR BLD AUTO: 58 % (ref 43–75)
NRBC BLD AUTO-RTO: 0 /100 WBCS
PLATELET # BLD AUTO: 183 THOUSANDS/UL (ref 149–390)
PMV BLD AUTO: 11 FL (ref 8.9–12.7)
POTASSIUM SERPL-SCNC: 4.4 MMOL/L (ref 3.5–5.3)
PROT SERPL-MCNC: 6.9 G/DL (ref 6.4–8.2)
RBC # BLD AUTO: 4.76 MILLION/UL (ref 3.81–5.12)
SODIUM SERPL-SCNC: 143 MMOL/L (ref 136–145)
WBC # BLD AUTO: 7.26 THOUSAND/UL (ref 4.31–10.16)

## 2020-09-28 PROCEDURE — 36415 COLL VENOUS BLD VENIPUNCTURE: CPT

## 2020-09-28 PROCEDURE — 80053 COMPREHEN METABOLIC PANEL: CPT

## 2020-09-28 PROCEDURE — 85025 COMPLETE CBC W/AUTO DIFF WBC: CPT

## 2020-09-29 ENCOUNTER — OFFICE VISIT (OUTPATIENT)
Dept: HEMATOLOGY ONCOLOGY | Facility: CLINIC | Age: 85
End: 2020-09-29
Payer: MEDICARE

## 2020-09-29 VITALS
TEMPERATURE: 98 F | BODY MASS INDEX: 36.12 KG/M2 | WEIGHT: 179.2 LBS | OXYGEN SATURATION: 97 % | HEIGHT: 59 IN | RESPIRATION RATE: 17 BRPM | HEART RATE: 69 BPM | SYSTOLIC BLOOD PRESSURE: 130 MMHG | DIASTOLIC BLOOD PRESSURE: 90 MMHG

## 2020-09-29 DIAGNOSIS — D05.10 DUCTAL CARCINOMA IN SITU (DCIS) OF BREAST, UNSPECIFIED LATERALITY: Primary | ICD-10-CM

## 2020-09-29 DIAGNOSIS — C50.411 CARCINOMA OF UPPER-OUTER QUADRANT OF RIGHT BREAST IN FEMALE, ESTROGEN RECEPTOR POSITIVE (HCC): ICD-10-CM

## 2020-09-29 DIAGNOSIS — Z17.0 CARCINOMA OF UPPER-OUTER QUADRANT OF RIGHT BREAST IN FEMALE, ESTROGEN RECEPTOR POSITIVE (HCC): ICD-10-CM

## 2020-09-29 DIAGNOSIS — Z79.811 LONG TERM (CURRENT) USE OF AROMATASE INHIBITORS: ICD-10-CM

## 2020-09-29 PROCEDURE — 99213 OFFICE O/P EST LOW 20 MIN: CPT | Performed by: PHYSICIAN ASSISTANT

## 2020-09-29 NOTE — PROGRESS NOTES
Hematology/Oncology Outpatient Follow- up Note  Tyree Mcdowell 81 y o  female MRN: @ Encounter: [id number]        Date:  9/29/2020      Assessment / Plan:      History of DCIS of the right breast in 2010 status post surgical resection and radiation therapy, she did not receive hormonal therapy     2  History of stage I (T1b, N0, M0) right-sided invasive ductal carcinoma status post lumpectomy 5/8/2019, ER 95%, KS 95%, HER2 negative by FISH, with 1 sentinel lymph node negative for involvement, pathology report showed negative margins there is DCIS as well  Anastrozole 1 mg p o  Daily for 5 years initiated end of May 2019  Bone mineral density on August 2019 DEXA was normal however there was a decrease in bone mineral density compared to 2015 study  Recommend f/u in 1 year for re-assessment as she is on AI  This was ordered as well as diagnostic mammogram for 6/2020  F/U in 1 year with repeat CBCD, CMP               HPI:  28-year-old  female seen initially 5/24/2019 regarding invasive ductal carcinoma  She has a with history of degenerative arthritis, ORIF of the left femur, depression, dyslipidemia, Bell's palsy    She has a history of DCIS of the right breast in 2010 status post excision as well as radiation therapy with subsequent alopecia      Mammogram in March 2019 showed asymmetry in the outer region of the right breast, subsequent ultrasound showed a mass measuring 7 x 5 x 5 mm around 9 o'clock 3 cm from the nipple     Biopsy 3/28/2019 showed invasive ductal carcinoma, ER 95%, KS 95%, her 2+ 2 however negative by [facility]     Status post lumpectomy 5/8/2019 which showed invasive ductal carcinoma, 9 mm, negative margin, no evidence of lymphovascular invasion, in the background of DCIS, invasive carcinoma and DCIS are present in the posterior final margin of the excision representing residual carcinoma in the margin, this is not a multifocal tumor, the final into posterior margin is negative for DCIS and invasive carcinoma with the tumor being 2 mm from the new final margin, 1-sentinel lymph node     Anastrozole 1 mg p o  Daily for 5 years initiated end of May 2019        Dexa 8/2019:  Normal bone mineral density however, compared to DEXA 6/4/2015,  there has been a statistically significant decrease in bone mineral density in the lumbar spine and radius          Interval History:    CBC normal, corrected calcium 10 2    Tolerating anastrazole well  Chronic bilateral knee pain  Interval History:          Test Results:        Labs:   Lab Results   Component Value Date    HGB 14 7 09/28/2020    HCT 45 4 09/28/2020    MCV 95 09/28/2020     09/28/2020    WBC 7 26 09/28/2020    NRBC 0 09/28/2020     Lab Results   Component Value Date     12/22/2015    K 4 4 09/28/2020     (H) 09/28/2020    CO2 30 09/28/2020    ANIONGAP 7 12/22/2015    BUN 14 09/28/2020    CREATININE 0 88 09/28/2020    GLUCOSE 95 12/22/2015    GLUF 83 09/28/2020    CALCIUM 10 4 (H) 09/28/2020    AST 19 09/28/2020    ALT 30 09/28/2020    ALKPHOS 75 09/28/2020    PROT 7 1 12/22/2015    BILITOT 0 71 12/22/2015    EGFR 61 09/28/2020       Imaging: No results found  ROS:  As mentioned in HPI & Interval History otherwise 14 point ROS negative  Allergies: Allergies   Allergen Reactions    Amoxicillin-Pot Clavulanate GI Intolerance    Azithromycin GI Intolerance and Rash    Cephalexin GI Intolerance    Cortisone GI Intolerance    Doxycycline GI Intolerance    Penicillins GI Intolerance     Current Medications: Reviewed  PMH/FH/SH:  Reviewed      Physical Exam:    There is no height or weight on file to calculate BSA      Ht Readings from Last 3 Encounters:   05/27/20 4' 11" (1 499 m)   05/15/20 4' 11" (1 499 m)   02/03/20 4' 11" (1 499 m)        Wt Readings from Last 3 Encounters:   05/27/20 79 8 kg (176 lb)   05/15/20 80 2 kg (176 lb 12 8 oz)   02/03/20 80 4 kg (177 lb 3 2 oz) Temp Readings from Last 3 Encounters:   19 99 4 °F (37 4 °C) (Oral)   19 98 2 °F (36 8 °C) (Tympanic)   19 98 3 °F (36 8 °C) (Tympanic)        BP Readings from Last 3 Encounters:   20 120/78   20 156/100   05/15/20 134/80           Physical Exam  Constitutional:       Appearance: She is well-developed  HENT:      Head: Normocephalic and atraumatic  Cardiovascular:      Rate and Rhythm: Normal rate  Pulmonary:      Effort: Pulmonary effort is normal  No respiratory distress  Musculoskeletal:      Comments: cane   Skin:     General: Skin is warm and dry  Neurological:      Mental Status: She is alert  Psychiatric:         Behavior: Behavior normal          [employer]      Emergency Contacts:     Ronit Tejada, [phone],

## 2020-10-21 ENCOUNTER — OFFICE VISIT (OUTPATIENT)
Dept: INTERNAL MEDICINE CLINIC | Facility: CLINIC | Age: 85
End: 2020-10-21
Payer: MEDICARE

## 2020-10-21 VITALS
BODY MASS INDEX: 35.88 KG/M2 | HEIGHT: 59 IN | TEMPERATURE: 98.9 F | WEIGHT: 178 LBS | HEART RATE: 72 BPM | DIASTOLIC BLOOD PRESSURE: 80 MMHG | RESPIRATION RATE: 14 BRPM | SYSTOLIC BLOOD PRESSURE: 130 MMHG

## 2020-10-21 DIAGNOSIS — I10 ESSENTIAL HYPERTENSION: Chronic | ICD-10-CM

## 2020-10-21 DIAGNOSIS — R21 SKIN RASH: ICD-10-CM

## 2020-10-21 DIAGNOSIS — E55.9 VITAMIN D DEFICIENCY: Primary | Chronic | ICD-10-CM

## 2020-10-21 DIAGNOSIS — Z23 ENCOUNTER FOR IMMUNIZATION: ICD-10-CM

## 2020-10-21 DIAGNOSIS — E83.52 HYPERCALCEMIA: ICD-10-CM

## 2020-10-21 PROCEDURE — 99213 OFFICE O/P EST LOW 20 MIN: CPT | Performed by: INTERNAL MEDICINE

## 2020-10-21 PROCEDURE — G0008 ADMIN INFLUENZA VIRUS VAC: HCPCS

## 2020-10-21 PROCEDURE — G0439 PPPS, SUBSEQ VISIT: HCPCS | Performed by: INTERNAL MEDICINE

## 2020-10-21 PROCEDURE — 90662 IIV NO PRSV INCREASED AG IM: CPT

## 2020-11-09 ENCOUNTER — LAB (OUTPATIENT)
Dept: LAB | Age: 85
End: 2020-11-09
Payer: MEDICARE

## 2020-11-09 DIAGNOSIS — E83.52 HYPERCALCEMIA: ICD-10-CM

## 2020-11-09 DIAGNOSIS — R21 RASH: ICD-10-CM

## 2020-11-09 DIAGNOSIS — I10 ESSENTIAL HYPERTENSION: Chronic | ICD-10-CM

## 2020-11-09 DIAGNOSIS — E78.2 MIXED HYPERLIPIDEMIA: Chronic | ICD-10-CM

## 2020-11-09 LAB
ALBUMIN SERPL BCP-MCNC: 4.1 G/DL (ref 3.5–5)
ALP SERPL-CCNC: 77 U/L (ref 46–116)
ALT SERPL W P-5'-P-CCNC: 28 U/L (ref 12–78)
ANION GAP SERPL CALCULATED.3IONS-SCNC: 6 MMOL/L (ref 4–13)
AST SERPL W P-5'-P-CCNC: 18 U/L (ref 5–45)
BILIRUB SERPL-MCNC: 0.74 MG/DL (ref 0.2–1)
BUN SERPL-MCNC: 15 MG/DL (ref 5–25)
CALCIUM SERPL-MCNC: 10 MG/DL (ref 8.3–10.1)
CHLORIDE SERPL-SCNC: 108 MMOL/L (ref 100–108)
CO2 SERPL-SCNC: 27 MMOL/L (ref 21–32)
CREAT SERPL-MCNC: 0.93 MG/DL (ref 0.6–1.3)
GFR SERPL CREATININE-BSD FRML MDRD: 57 ML/MIN/1.73SQ M
GLUCOSE SERPL-MCNC: 92 MG/DL (ref 65–140)
POTASSIUM SERPL-SCNC: 3.8 MMOL/L (ref 3.5–5.3)
PROT SERPL-MCNC: 7 G/DL (ref 6.4–8.2)
PTH-INTACT SERPL-MCNC: 67 PG/ML (ref 18.4–80.1)
SODIUM SERPL-SCNC: 141 MMOL/L (ref 136–145)

## 2020-11-09 PROCEDURE — 36415 COLL VENOUS BLD VENIPUNCTURE: CPT

## 2020-11-09 PROCEDURE — 80053 COMPREHEN METABOLIC PANEL: CPT

## 2020-11-09 PROCEDURE — 83970 ASSAY OF PARATHORMONE: CPT

## 2020-11-10 ENCOUNTER — TELEPHONE (OUTPATIENT)
Dept: INTERNAL MEDICINE CLINIC | Facility: CLINIC | Age: 85
End: 2020-11-10

## 2020-11-14 DIAGNOSIS — I10 ESSENTIAL HYPERTENSION: ICD-10-CM

## 2020-11-14 RX ORDER — PROPRANOLOL HYDROCHLORIDE 20 MG/1
TABLET ORAL
Qty: 360 TABLET | Refills: 3 | Status: SHIPPED | OUTPATIENT
Start: 2020-11-14 | End: 2021-01-15 | Stop reason: SDUPTHER

## 2021-01-13 ENCOUNTER — TELEPHONE (OUTPATIENT)
Dept: INTERNAL MEDICINE CLINIC | Facility: CLINIC | Age: 86
End: 2021-01-13

## 2021-01-13 NOTE — TELEPHONE ENCOUNTER
numbness on both feet all day long and night especially when getting up a night to use the bathroom     started about one week ago   declines- swelling and weakness  Pt tele- [phone]

## 2021-01-15 DIAGNOSIS — I10 ESSENTIAL HYPERTENSION: ICD-10-CM

## 2021-01-15 RX ORDER — PROPRANOLOL HYDROCHLORIDE 20 MG/1
20 TABLET ORAL 4 TIMES DAILY
Qty: 360 TABLET | Refills: 3 | Status: SHIPPED | OUTPATIENT
Start: 2021-01-15 | End: 2021-04-21 | Stop reason: SDUPTHER

## 2021-01-20 ENCOUNTER — OFFICE VISIT (OUTPATIENT)
Dept: INTERNAL MEDICINE CLINIC | Facility: CLINIC | Age: 86
End: 2021-01-20
Payer: MEDICARE

## 2021-01-20 VITALS
RESPIRATION RATE: 14 BRPM | WEIGHT: 169.6 LBS | DIASTOLIC BLOOD PRESSURE: 88 MMHG | BODY MASS INDEX: 34.19 KG/M2 | HEART RATE: 78 BPM | HEIGHT: 59 IN | SYSTOLIC BLOOD PRESSURE: 130 MMHG

## 2021-01-20 DIAGNOSIS — M79.642 PAIN OF LEFT HAND: Chronic | ICD-10-CM

## 2021-01-20 DIAGNOSIS — I48.91 ATRIAL FIBRILLATION, UNSPECIFIED TYPE (HCC): ICD-10-CM

## 2021-01-20 DIAGNOSIS — I10 ESSENTIAL HYPERTENSION: Primary | Chronic | ICD-10-CM

## 2021-01-20 DIAGNOSIS — R26.2 AMBULATORY DYSFUNCTION: Chronic | ICD-10-CM

## 2021-01-20 DIAGNOSIS — R53.83 FATIGUE, UNSPECIFIED TYPE: ICD-10-CM

## 2021-01-20 DIAGNOSIS — G56.02 CARPAL TUNNEL SYNDROME OF LEFT WRIST: ICD-10-CM

## 2021-01-20 DIAGNOSIS — Z17.0 CARCINOMA OF UPPER-OUTER QUADRANT OF RIGHT BREAST IN FEMALE, ESTROGEN RECEPTOR POSITIVE (HCC): ICD-10-CM

## 2021-01-20 DIAGNOSIS — C50.411 CARCINOMA OF UPPER-OUTER QUADRANT OF RIGHT BREAST IN FEMALE, ESTROGEN RECEPTOR POSITIVE (HCC): ICD-10-CM

## 2021-01-20 DIAGNOSIS — E53.8 VITAMIN B12 DEFICIENCY: ICD-10-CM

## 2021-01-20 DIAGNOSIS — G62.9 PERIPHERAL POLYNEUROPATHY: Chronic | ICD-10-CM

## 2021-01-20 PROCEDURE — 99215 OFFICE O/P EST HI 40 MIN: CPT | Performed by: INTERNAL MEDICINE

## 2021-01-20 RX ORDER — GABAPENTIN 100 MG/1
CAPSULE ORAL
Qty: 60 CAPSULE | Refills: 10 | Status: SHIPPED | OUTPATIENT
Start: 2021-01-20 | End: 2021-05-17

## 2021-01-20 NOTE — PATIENT INSTRUCTIONS
Blood work- no fast needed   Vitamin B6 -over-the-counter - 50 milligrams daily -do not take a dose higher than that  Begin gabapentin-take 1/2 hour before bed -call 1 week later to see if you need a dose increase   Vitamin D3 dose should be 2000 units a day

## 2021-01-20 NOTE — PROGRESS NOTES
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  Prior workup showed immunofixation negative, serum for heavy metals, B12, methylmalonic acid, Lyme titer, rheumatoid factor antinuclear antibody all normal   I recommended at this point that we repeat labs  She will have repeat CBC SMA B12 methylmalonic acid thyroid function protein electrophoresis and serum for free light chain ratio  She does not use alcohol  She will begin vitamin B6 50 milligrams daily  She will begin gabapentin 100 milligrams taken 2 hours before bed  She will call over the next 2 weeks regarding status to determine if we need to increase dose of gabapentin  2  Abnormal gait-associated with the above plus diffuse DJD  Had prior total knee replacement but suspect predominantly abnormality of gait is related to her neuropathy  3  Hypercalcemia-noted previously and of questionable significance  Had repeat labs with PTH normal not felt significant  4  Hypertension-adequate control on current dose of beta-blocker -had been on a diuretic in the past but this is not needed at present  5  Previously noted skin rash -treated previously as potential erythema chronicum migrans as she remembered a bite and then developed a rash- had doxycycline for 3 weeks  Then developed fungal disease left greater than right was treated with Diflucan and Lotrisone    Serology for Lyme disease was ordered previously but not done -will be repeating at this point  6  Status post left cubital tunnel release-left carpal tunnel release done in November 8656 without complications  Still has some numbness and intermittent pain is seeing the orthopedic physician    All other problems as per note of May 2020 in August 2018       MEDICAL REGIMEN:      Vitamin B6 50 milligrams daily, gabapentin 100 milligrams HS, propanolol 20 milligrams q i d , lisinopril 10 milligrams b i d , Crestor 10 milligrams- half tablet 3 times per week, multivitamin, cranberry daily, generic Zantac 150 milligrams in the morning- REVIEW NEXT VISIT intermittent use of prednisone eyedrops per Ophthalmology, vitamin D3/ 2000 units a day, anastrozole 1 milligram daily started in the spring of 2019 to be use for 5 years     Scheduled for labs to include CBC random SMA thyroid function B12 methylmalonic acid protein electrophoresis free light chain ratio Lyme disease titer    Addendum -workup  Of neuropathy shows CBC, SMA normal   B12 level normal   Free T4 TSH normal   Protein electrophoresis normal   Free light chain ratio normal   Methylmalonic acid level  Normal    Addendum-patient called the office on March 5th that she was having CNS symptoms with hallucinations which resolved when she stopped the gabapentin  She also felt cloudy thinking  She will continue off the gabapentin  No problem-specific Assessment & Plan notes found for this encounter  Diagnoses and all orders for this visit:    Essential hypertension  -     CBC and differential; Future  -     Comprehensive metabolic panel; Future  -     T4, free; Future  -     TSH, 3rd generation; Future  -     Protein electrophoresis, serum; Future  -     Vitamin B12; Future  -     Methylmalonic acid, serum; Future  -     Immunoglobulin free LT chains blood; Future    Peripheral polyneuropathy  -     CBC and differential; Future  -     Comprehensive metabolic panel;  Future  -     T4, free; Future  -     TSH, 3rd generation; Future  -     Protein electrophoresis, serum; Future  -     Vitamin B12; Future  -     Methylmalonic acid, serum; Future  -     Immunoglobulin free LT chains blood; Future    Fatigue, unspecified type  -     CBC and differential; Future  -     Comprehensive metabolic panel; Future  -     T4, free; Future  -     TSH, 3rd generation; Future  -     Protein electrophoresis, serum; Future  -     Vitamin B12; Future  -     Methylmalonic acid, serum; Future  -     Immunoglobulin free LT chains blood; Future    Vitamin B12 deficiency  -     CBC and differential; Future  -     Comprehensive metabolic panel; Future  -     T4, free; Future  -     TSH, 3rd generation; Future  -     Protein electrophoresis, serum; Future  -     Vitamin B12; Future  -     Methylmalonic acid, serum; Future  -     Immunoglobulin free LT chains blood; Future    Pain of left hand    Ambulatory dysfunction    Carpal tunnel syndrome of left wrist    Other orders  -     gabapentin (NEURONTIN) 100 mg capsule; 1 PO 2 HOURS BEFORE BED          Subjective:      Patient ID: Gabriel Marroquin is a 81 y o  female  This patient is seen today as an emergency appointment  she is noting increasing symptomatology of her feet that are very bothersome  She feels as though her feet are numb all the time  She has associated pain  The  Pain can be very sharp  It does not necessarily burn  She notes a constant tingling in commented "I have a right now on "  Symptoms may be worse during the day but she also notes some at night  This patient denies any systemic symptoms  Specifically there has been no evidence of fever, night sweats, significant weight loss or significant decrease in appetite  Records reviewed and she is listed as having peripheral neuropathy  Nerve conduction study done over the past several years showed mildly severe mixed axonal demyelinating sensory-motor neuropathy    She had workup with immunofixation negative  Serum for heavy metals, B12, methylmalonic acid, Lyme titer, rheumatoid factor antinuclear antibody all normal   She had minimal symptoms at that time but they are clearly more of an issue now  She is not an alcohol user  She has not been  On any new  meds to potentially exacerbate her neuropathy  She denies any trauma to the above  She has separate left arm pain which she says has been present since prior surgery  She has discussed this with her orthopedic physician  She feels as though this "starts in the shoulder and comes down"  She denies right arm symptoms  She denies loss of control of bowel or bladder  Associated with the above she feels her gait is worsening  She walks with a cane  I watched her walk up and down the lemus today and she has an obvious wide-based gait  She denies any trauma to the arms or legs  She was fearful all the above would exacerbate her hypertension  She is avoiding salt and decongestants  She does not use nonsteroidals frequently  She is not on any excess vitamins  Her medical regimen was reviewed in detail  She denies pain starting in the back and radiating down the legs  She feels as though all the pain -numbness is below her knees  She denies pain starting in the neck and shooting down either arm  Range of motion of her neck does not cause radicular symptoms  Range of motion of her back does not cause radicular symptoms  We had a long discussion today regarding the above  I reviewed with her this is likely related to her known neuropathy  We reviewed potential etiologies for neuropathy and that many of these are idiopathic  We elected to start medical therapy  We will cautiously begin gabapentin 100 milligrams taken 2 hours before bed  She may need higher dose and is aware this  We reviewed that about the potential for sedation  I also recommended that she supplement with vitamin B6    We reviewed that doses greater than 100 milligrams per day vitamin B6 can exacerbate neuropathy  We reviewed the whole meeting of neuropathy  More than 50% of the time was spent counseling the patient formulating a treatment plan  Multiple questions were answered      The following portions of the patient's history were reviewed and updated as appropriate: allergies, current medications, past family history, past medical history, past social history, past surgical history and problem list     Review of Systems   Constitutional: Negative  HENT: Negative  Respiratory: Negative  Cardiovascular: Negative  Gastrointestinal: Negative  Endocrine: Negative  Genitourinary: Negative  Musculoskeletal: Positive for gait problem  Left arm pain   Skin: Negative  Neurological: Positive for numbness  Hematological: Negative  Psychiatric/Behavioral: Negative  Objective:      Ht 4' 11" (1 499 m)   Wt 76 9 kg (169 lb 9 6 oz)   BMI 34 26 kg/m²          Physical Exam  Vitals signs reviewed  Constitutional:       General: She is not in acute distress  Appearance: Normal appearance  She is not ill-appearing, toxic-appearing or diaphoretic  HENT:      Head: Normocephalic and atraumatic  Comments: Known alopecia     Right Ear: Tympanic membrane, ear canal and external ear normal       Left Ear: Tympanic membrane, ear canal and external ear normal       Nose: Nose normal  No congestion or rhinorrhea  Mouth/Throat:      Mouth: Mucous membranes are moist       Pharynx: Oropharynx is clear  No oropharyngeal exudate or posterior oropharyngeal erythema  Eyes:      General: No scleral icterus  Right eye: No discharge  Left eye: No discharge  Extraocular Movements: Extraocular movements intact  Pupils: Pupils are equal, round, and reactive to light  Neck:      Musculoskeletal: Normal range of motion and neck supple  No neck rigidity or muscular tenderness        Vascular: No carotid bruit  Cardiovascular:      Rate and Rhythm: Normal rate and regular rhythm  Pulses: Normal pulses  Heart sounds: Normal heart sounds  No murmur  No friction rub  No gallop  Pulmonary:      Effort: Pulmonary effort is normal  No respiratory distress  Breath sounds: Normal breath sounds  No stridor  No wheezing, rhonchi or rales  Chest:      Chest wall: No tenderness  Abdominal:      General: Abdomen is flat  Bowel sounds are normal  There is no distension  Palpations: Abdomen is soft  There is no mass  Tenderness: There is no abdominal tenderness  There is no right CVA tenderness, left CVA tenderness, guarding or rebound  Hernia: No hernia is present  Musculoskeletal: Normal range of motion  General: No swelling, tenderness, deformity or signs of injury  Right lower leg: No edema  Left lower leg: No edema  Comments: Patient walks with a wide-based gait with a cane   Lymphadenopathy:      Cervical: No cervical adenopathy  Skin:     General: Skin is warm and dry  Coloration: Skin is not jaundiced or pale  Findings: No bruising, erythema, lesion or rash  Neurological:      General: No focal deficit present  Mental Status: She is alert and oriented to person, place, and time  Mental status is at baseline  Cranial Nerves: No cranial nerve deficit  Sensory: Sensory deficit present  Motor: No weakness  Coordination: Coordination normal       Gait: Gait abnormal       Deep Tendon Reflexes: Reflexes abnormal       Comments: Absent knee and ankle jerks  Significant decrease a 2 fine touch, monofilament testing, vibratory sense and position sense  Psychiatric:         Mood and Affect: Mood normal          Behavior: Behavior normal          Thought Content:  Thought content normal          Judgment: Judgment normal

## 2021-01-21 ENCOUNTER — APPOINTMENT (OUTPATIENT)
Dept: LAB | Age: 86
End: 2021-01-21
Payer: MEDICARE

## 2021-01-21 DIAGNOSIS — I10 ESSENTIAL HYPERTENSION: Chronic | ICD-10-CM

## 2021-01-21 DIAGNOSIS — E53.8 VITAMIN B12 DEFICIENCY: ICD-10-CM

## 2021-01-21 DIAGNOSIS — G62.9 PERIPHERAL POLYNEUROPATHY: Chronic | ICD-10-CM

## 2021-01-21 DIAGNOSIS — R53.83 FATIGUE, UNSPECIFIED TYPE: ICD-10-CM

## 2021-01-21 LAB
ALBUMIN SERPL BCP-MCNC: 3.8 G/DL (ref 3.5–5)
ALP SERPL-CCNC: 77 U/L (ref 46–116)
ALT SERPL W P-5'-P-CCNC: 27 U/L (ref 12–78)
ANION GAP SERPL CALCULATED.3IONS-SCNC: 7 MMOL/L (ref 4–13)
AST SERPL W P-5'-P-CCNC: 21 U/L (ref 5–45)
BASOPHILS # BLD AUTO: 0.06 THOUSANDS/ΜL (ref 0–0.1)
BASOPHILS NFR BLD AUTO: 1 % (ref 0–1)
BILIRUB SERPL-MCNC: 0.88 MG/DL (ref 0.2–1)
BUN SERPL-MCNC: 15 MG/DL (ref 5–25)
CALCIUM SERPL-MCNC: 9.5 MG/DL (ref 8.3–10.1)
CHLORIDE SERPL-SCNC: 107 MMOL/L (ref 100–108)
CO2 SERPL-SCNC: 26 MMOL/L (ref 21–32)
CREAT SERPL-MCNC: 0.77 MG/DL (ref 0.6–1.3)
EOSINOPHIL # BLD AUTO: 0.29 THOUSAND/ΜL (ref 0–0.61)
EOSINOPHIL NFR BLD AUTO: 4 % (ref 0–6)
ERYTHROCYTE [DISTWIDTH] IN BLOOD BY AUTOMATED COUNT: 13.2 % (ref 11.6–15.1)
GFR SERPL CREATININE-BSD FRML MDRD: 71 ML/MIN/1.73SQ M
GLUCOSE SERPL-MCNC: 94 MG/DL (ref 65–140)
HCT VFR BLD AUTO: 43.9 % (ref 34.8–46.1)
HGB BLD-MCNC: 14.4 G/DL (ref 11.5–15.4)
IMM GRANULOCYTES # BLD AUTO: 0.04 THOUSAND/UL (ref 0–0.2)
IMM GRANULOCYTES NFR BLD AUTO: 1 % (ref 0–2)
LYMPHOCYTES # BLD AUTO: 1.4 THOUSANDS/ΜL (ref 0.6–4.47)
LYMPHOCYTES NFR BLD AUTO: 20 % (ref 14–44)
MCH RBC QN AUTO: 31 PG (ref 26.8–34.3)
MCHC RBC AUTO-ENTMCNC: 32.8 G/DL (ref 31.4–37.4)
MCV RBC AUTO: 95 FL (ref 82–98)
MONOCYTES # BLD AUTO: 0.67 THOUSAND/ΜL (ref 0.17–1.22)
MONOCYTES NFR BLD AUTO: 10 % (ref 4–12)
NEUTROPHILS # BLD AUTO: 4.48 THOUSANDS/ΜL (ref 1.85–7.62)
NEUTS SEG NFR BLD AUTO: 64 % (ref 43–75)
NRBC BLD AUTO-RTO: 0 /100 WBCS
PLATELET # BLD AUTO: 185 THOUSANDS/UL (ref 149–390)
PMV BLD AUTO: 10 FL (ref 8.9–12.7)
POTASSIUM SERPL-SCNC: 3.9 MMOL/L (ref 3.5–5.3)
PROT SERPL-MCNC: 7 G/DL (ref 6.4–8.2)
RBC # BLD AUTO: 4.64 MILLION/UL (ref 3.81–5.12)
SODIUM SERPL-SCNC: 140 MMOL/L (ref 136–145)
T4 FREE SERPL-MCNC: 0.88 NG/DL (ref 0.76–1.46)
TSH SERPL DL<=0.05 MIU/L-ACNC: 0.87 UIU/ML (ref 0.36–3.74)
VIT B12 SERPL-MCNC: 726 PG/ML (ref 100–900)
WBC # BLD AUTO: 6.94 THOUSAND/UL (ref 4.31–10.16)

## 2021-01-21 PROCEDURE — 84165 PROTEIN E-PHORESIS SERUM: CPT | Performed by: PATHOLOGY

## 2021-01-21 PROCEDURE — 82607 VITAMIN B-12: CPT

## 2021-01-21 PROCEDURE — 83918 ORGANIC ACIDS TOTAL QUANT: CPT

## 2021-01-21 PROCEDURE — 36415 COLL VENOUS BLD VENIPUNCTURE: CPT

## 2021-01-21 PROCEDURE — 85025 COMPLETE CBC W/AUTO DIFF WBC: CPT

## 2021-01-21 PROCEDURE — 83883 ASSAY NEPHELOMETRY NOT SPEC: CPT

## 2021-01-21 PROCEDURE — 84443 ASSAY THYROID STIM HORMONE: CPT

## 2021-01-21 PROCEDURE — 80053 COMPREHEN METABOLIC PANEL: CPT

## 2021-01-21 PROCEDURE — 84165 PROTEIN E-PHORESIS SERUM: CPT

## 2021-01-21 PROCEDURE — 84439 ASSAY OF FREE THYROXINE: CPT

## 2021-01-22 LAB
ALBUMIN SERPL ELPH-MCNC: 4.01 G/DL (ref 3.5–5)
ALBUMIN SERPL ELPH-MCNC: 61.7 % (ref 52–65)
ALPHA1 GLOB SERPL ELPH-MCNC: 0.33 G/DL (ref 0.1–0.4)
ALPHA1 GLOB SERPL ELPH-MCNC: 5 % (ref 2.5–5)
ALPHA2 GLOB SERPL ELPH-MCNC: 0.88 G/DL (ref 0.4–1.2)
ALPHA2 GLOB SERPL ELPH-MCNC: 13.6 % (ref 7–13)
BETA GLOB ABNORMAL SERPL ELPH-MCNC: 0.4 G/DL (ref 0.4–0.8)
BETA1 GLOB SERPL ELPH-MCNC: 6.1 % (ref 5–13)
BETA2 GLOB SERPL ELPH-MCNC: 5.1 % (ref 2–8)
BETA2+GAMMA GLOB SERPL ELPH-MCNC: 0.33 G/DL (ref 0.2–0.5)
GAMMA GLOB ABNORMAL SERPL ELPH-MCNC: 0.55 G/DL (ref 0.5–1.6)
GAMMA GLOB SERPL ELPH-MCNC: 8.5 % (ref 12–22)
IGG/ALB SER: 1.61 {RATIO} (ref 1.1–1.8)
KAPPA LC FREE SER-MCNC: 19.7 MG/L (ref 3.3–19.4)
KAPPA LC FREE/LAMBDA FREE SER: 1.38 {RATIO} (ref 0.26–1.65)
LAMBDA LC FREE SERPL-MCNC: 14.3 MG/L (ref 5.7–26.3)
PROT PATTERN SERPL ELPH-IMP: ABNORMAL
PROT SERPL-MCNC: 6.5 G/DL (ref 6.4–8.2)

## 2021-01-24 DIAGNOSIS — Z23 ENCOUNTER FOR IMMUNIZATION: ICD-10-CM

## 2021-01-25 ENCOUNTER — TELEPHONE (OUTPATIENT)
Dept: INTERNAL MEDICINE CLINIC | Facility: CLINIC | Age: 86
End: 2021-01-25

## 2021-01-25 NOTE — TELEPHONE ENCOUNTER
Sp/w pt and made aware of results  She hasn't taken the gabapentin, she was concerned with the response from the side effects as advised by the pharmacist   Please advise   Thanks

## 2021-01-25 NOTE — TELEPHONE ENCOUNTER
----- Message from Safia Herzog MD sent at 1/23/2021  6:41 AM EST -----   Please call patient -preliminary on all blood work normal

## 2021-01-27 LAB
METHYLMALONATE SERPL-SCNC: 257 NMOL/L (ref 0–378)
SL AMB DISCLAIMER: NORMAL

## 2021-01-30 ENCOUNTER — IMMUNIZATIONS (OUTPATIENT)
Dept: FAMILY MEDICINE CLINIC | Facility: HOSPITAL | Age: 86
End: 2021-01-30

## 2021-01-30 DIAGNOSIS — Z23 ENCOUNTER FOR IMMUNIZATION: Primary | ICD-10-CM

## 2021-01-30 PROCEDURE — 91300 SARS-COV-2 / COVID-19 MRNA VACCINE (PFIZER-BIONTECH) 30 MCG: CPT

## 2021-01-30 PROCEDURE — 0001A SARS-COV-2 / COVID-19 MRNA VACCINE (PFIZER-BIONTECH) 30 MCG: CPT

## 2021-02-20 ENCOUNTER — IMMUNIZATIONS (OUTPATIENT)
Dept: FAMILY MEDICINE CLINIC | Facility: HOSPITAL | Age: 86
End: 2021-02-20

## 2021-02-20 DIAGNOSIS — Z23 ENCOUNTER FOR IMMUNIZATION: Primary | ICD-10-CM

## 2021-02-20 PROCEDURE — 91300 SARS-COV-2 / COVID-19 MRNA VACCINE (PFIZER-BIONTECH) 30 MCG: CPT

## 2021-02-20 PROCEDURE — 0002A SARS-COV-2 / COVID-19 MRNA VACCINE (PFIZER-BIONTECH) 30 MCG: CPT

## 2021-02-23 ENCOUNTER — OFFICE VISIT (OUTPATIENT)
Dept: CARDIOLOGY CLINIC | Facility: CLINIC | Age: 86
End: 2021-02-23
Payer: MEDICARE

## 2021-02-23 VITALS
SYSTOLIC BLOOD PRESSURE: 128 MMHG | HEIGHT: 59 IN | HEART RATE: 68 BPM | DIASTOLIC BLOOD PRESSURE: 80 MMHG | BODY MASS INDEX: 34.15 KG/M2 | WEIGHT: 169.4 LBS

## 2021-02-23 DIAGNOSIS — I48.0 PAROXYSMAL ATRIAL FIBRILLATION (HCC): Chronic | ICD-10-CM

## 2021-02-23 DIAGNOSIS — I47.1 SUPRAVENTRICULAR TACHYCARDIA (HCC): Chronic | ICD-10-CM

## 2021-02-23 DIAGNOSIS — E78.2 MIXED HYPERLIPIDEMIA: Chronic | ICD-10-CM

## 2021-02-23 DIAGNOSIS — I10 ESSENTIAL HYPERTENSION: Primary | ICD-10-CM

## 2021-02-23 PROCEDURE — 93000 ELECTROCARDIOGRAM COMPLETE: CPT | Performed by: INTERNAL MEDICINE

## 2021-02-23 PROCEDURE — 99214 OFFICE O/P EST MOD 30 MIN: CPT | Performed by: INTERNAL MEDICINE

## 2021-02-23 NOTE — PATIENT INSTRUCTIONS
Supraventricular Tachycardia   AMBULATORY CARE:   Supraventricular tachycardia (SVT)  is a condition that causes your heart to beat much faster than it should  SVT is a type of abnormal heart rhythm, called an arrhythmia, that starts in the upper part of your heart  It may last from a few seconds or hours to several days  Common symptoms include the following:   · A pounding, racing, or fluttering heartbeat    · Fatigue, weakness, or shortness of breath    · Feeling lightheaded, dizzy, or faint  · Pain, pressure, or tightness in your chest, neck, jaw, arms, or upper back    · Nausea    · Feeling anxious, scared, or worried that something bad may happen    Call 911 for the following:   · You have any of the following signs of a heart attack:      ? Squeezing, pressure, or pain in your chest    ? You may  also have any of the following:     § Discomfort or pain in your back, neck, jaw, stomach, or arm    § Shortness of breath    § Nausea or vomiting    § Lightheadedness or a sudden cold sweat      Seek care immediately if:   · You have dizziness, lightheadedness, or feel faint  · You have sudden numbness or weakness in your arms or legs  Contact your healthcare provider if:   · Your symptoms get worse, or you have new symptoms  · You have swelling in your ankles or feet  · You have questions or concerns about your condition or care  Treatment  may depend on what is causing SVT and your symptoms  You may not need treatment for SVT  Instead you may need medicine or other procedures to control your heart rate  How to manage or prevent SVT:   · Perform vagal maneuvers as directed when you have symptoms of SVT  Lie down flat and bear down like you are having a bowel movement  Do this for 10 to 30 seconds  · Do not drink caffeine or alcohol  These can increase your risk for SVT  · Keep a record of your symptoms  Write down what you ate or what you were doing before an episode of SVT   Also write down anything you did to make the SVT stop  Bring your record to follow up visits with your healthcare provider  · Eat heart-healthy foods  These include fruits, vegetables, whole-grain breads, low-fat dairy products, beans, lean meats, and fish  Replace butter and margarine with heart-healthy oils such as olive oil and canola oil  · Exercise regularly and maintain a healthy weight  Ask about the best exercise plan for you  Ask your healthcare provider how much you should weigh  Ask him to help you create a weight loss plan if you are overweight  · Do not smoke  Nicotine and other chemicals in cigarettes and cigars can cause heart and lung damage  Ask your healthcare provider for information if you currently smoke and need help to quit  E-cigarettes or smokeless tobacco still contain nicotine  Talk to your healthcare provider before you use these products  Follow up with your healthcare provider as directed:  Write down your questions so you remember to ask them during your visits  © Copyright [address] Information is for End User's use only and may not be sold, redistributed or otherwise used for commercial purposes  All illustrations and images included in CareNotes® are the copyrighted property of [employer] M [employer]  or [facility] Janett Early   The above information is an  only  It is not intended as medical advice for individual conditions or treatments  Talk to your doctor, nurse or pharmacist before following any medical regimen to see if it is safe and effective for you

## 2021-02-23 NOTE — PROGRESS NOTES
Cardiology Consultation     Laura Chen  [id number]  1935  [facility] [address] [address]    1  Essential hypertension  POCT ECG   2  Supraventricular tachycardia (HCC)     3  Paroxysmal atrial fibrillation ([address] )     4  Mixed hyperlipidemia       Chief Complaint   Patient presents with    Follow-up     yearly, no cardiac complaints     HPI: Patient is here for evaluation and management of frequent PACs and brief runs of SVT seen on Holter  Patient feels well, without complaints other than as related to neuropathy  No reported chest pain, shortness of breath, palpitations, lightheadedness, syncope, LE edema, orthopnea, PND, or significant weight changes  Patient remains active without any increased fatigue out of the ordinary        Patient Active Problem List   Diagnosis    Anxiety    Peripheral neuropathy    Hyperlipidemia    Arthritis    Difficulty breathing    Diverticulosis    Dizziness    Fatigue    Hyperglycemia    Pain in extremity    Osteopenia    Shortness of breath    Supraventricular tachycardia ([address] )    Vitamin D deficiency    Pain of left hand    Left leg pain    Malignant neoplasm of upper-outer quadrant of right breast in female, estrogen receptor positive ([address] )    Impingement syndrome of left shoulder    Myocardial infarction type 2 ([address] )    Ambulatory dysfunction    Elevated troponin    Palpitations    Morbid obesity with body mass index (BMI) of 40 0 to 44 9 in adult [facility])    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, estrogen receptor positive ([address] )    Other chest pain    Trigger finger, left ring finger    Carpal tunnel syndrome of left wrist    Cubital tunnel syndrome on left    Carpal tunnel syndrome on left    Trigger finger, left middle finger    Preoperative examination    Use of anastrozole (Arimidex)    Rash    Skin rash    Hypercalcemia     Past Medical History:   Diagnosis Date    Anxiety     Arthritis     last assessed 3/24/15     Atherosclerosis     Bell's palsy     Breast cancer ([facility] ) 2019    Broken femur ([facility] )     Cancer ([facility] )     breast    Cardiac disorder     DCIS (ductal carcinoma in situ) of breast     last assessed 4/4/17     Depression     Endometrial polyp     GERD (gastroesophageal reflux disease)     History of radiation therapy 2010    Hypercholesterolemia     resolved 10/22/14     Hyperlipidemia     Long term use of drug     last assessed 5/23/14     Peripheral neuropathy     Pneumonia     As a Child    PONV (postoperative nausea and vomiting)     Tachycardia     Uterine fibroid      Social History     Socioeconomic History    Marital status:       Spouse name: Not on file    Number of children: Not on file    Years of education: Not on file    Highest education level: Not on file   Occupational History    Occupation: retired from work    Social Needs    Financial resource strain: Not on file    Food insecurity     Worry: Not on file     Inability: Not on file   [place] [employer] needs     Medical: Not on file     Non-medical: Not on file   Tobacco Use    Smoking status: Never Smoker    Smokeless tobacco: Never Used   Substance and Sexual Activity    Alcohol use: No    Drug use: No    Sexual activity: Not on file   Lifestyle    Physical activity     Days per week: Not on file     Minutes per session: Not on file    Stress: Not on file   Relationships    Social connections     Talks on phone: Not on file     Gets together: Not on file     Attends Jewish service: Not on file     Active member of club or organization: Not on file     Attends meetings of clubs or organizations: Not on file     Relationship status: Not on file    Intimate partner violence     Fear of current or ex partner: Not on file     Emotionally abused: Not on file     Physically abused: Not on file     Forced sexual activity: Not on file   Other Topics Concern    Not on file   Social History Narrative    Coffee     Daily coffee consumption 1 cup day     Daily cola consumption can day     Walking            Family History   Problem Relation Age of Onset    Heart disease Mother         artherosclerosis     Heart disease Father         artherosclerosis     Heart attack Father     Arthritis Family     Heart disease Family         artherosclerosis     Breast cancer Family     Osteoarthritis Family     Supraventricular tachycardia Family     Hypertension Daughter     Ovarian cancer Sister 80    Anemia Neg Hx     Arrhythmia Neg Hx     Asthma Neg Hx     Clotting disorder Neg Hx     Fainting Neg Hx     Hyperlipidemia Neg Hx     Aneurysm Neg Hx      Past Surgical History:   Procedure Laterality Date    BREAST BIOPSY Right     BREAST LUMPECTOMY Right 2010    BREAST LUMPECTOMY Right 5/8/2019    Procedure: BREAST LUMPECTOMY; BREAST NEEDLE LOCALIZATION (NEEDLE LOC AT 0800); Surgeon: Maryjo New MD;  [facility];  Service: Surgical Oncology    CARPAL TUNNEL RELEASE Bilateral     CATARACT EXTRACTION Bilateral     DILATION AND CURETTAGE OF UTERUS      ENDOMETRIAL BIOPSY      without cervical dilation     HEMORROIDECTOMY      JOINT REPLACEMENT Right     Shoulder    JOINT REPLACEMENT Bilateral     Knees    LYMPH NODE BIOPSY Right 5/8/2019    Procedure: SENTINEL LYMPH NODE BIOPSY; LYMPHOSCINTIGRAPHY; INTRAOPERATIVE LYMPHATIC MAPPING (INJECT AT 0900);   Surgeon: Maryjo New MD;  [facility];  Service: Surgical Oncology    MAMMO NEEDLE LOCALIZATION RIGHT (ALL INC) Right 5/8/2019    OOPHORECTOMY      75    OVARIAN CYST REMOVAL Left     DE RECONSTR TOTAL SHOULDER IMPLANT Right 7/18/2017    Procedure: TOTAL SHOULDER REVERSE ARTHROPLASTY;  Surgeon: Matthew Zurita MD;  [facility];  Service: Orthopedics    DE REVISE ULNAR NERVE AT ELBOW Left 11/19/2019    Procedure: RELEASE CUBITAL TUNNEL left -;  Surgeon: Vandy Runner, MD;  [facility];  Service: Orthopedics    SD WRIST Gemma Quarry LIG Left 11/19/2019    Procedure: RELEASE CARPAL TUNNEL ENDOSCOPIC;  Surgeon: Vandy Runner, MD;  [facility];  Service: Orthopedics    SENTINEL LYMPH NODE BIOPSY      TONSILLECTOMY      TUBAL LIGATION      US GUIDED BREAST BIOPSY RIGHT COMPLETE Right 3/28/2019       Current Outpatient Medications:     anastrozole (ARIMIDEX) 1 mg tablet, take 1 tablet by mouth once daily, Disp: 90 tablet, Rfl: 3    CRANBERRY PO, Take 1,700 mg by mouth daily in the early morning , Disp: , Rfl:     gabapentin (NEURONTIN) 100 mg capsule, 1 PO 2 HOURS BEFORE BED, Disp: 60 capsule, Rfl: 10    lisinopril (ZESTRIL) 10 mg tablet, TAKE 1 TABLET BY MOUTH TWO  TIMES DAILY, Disp: 180 tablet, Rfl: 3    Multiple Vitamins-Minerals (PRESERVISION AREDS 2 PO), Take 2 tablets by mouth daily in the early morning , Disp: , Rfl:     propranolol (INDERAL) 20 mg tablet, Take 1 tablet (20 mg total) by mouth 4 (four) times a day, Disp: 360 tablet, Rfl: 3    rosuvastatin (CRESTOR) 10 MG tablet, Take 0 5 tablets (5 mg total) by mouth 3 (three) times a week Takes M-W-F, Disp: 18 tablet, Rfl: 3    amoxicillin (AMOXIL) 500 mg capsule, TAKE 4 CAPSULES 1 HOUR BEFORE DENTAL PROCEDURE, Disp: , Rfl:     clorazepate (TRANXENE) 3 75 mg tablet, Take 1 tablet (3 75 mg total) by mouth daily at bedtime Takes with Dinner and before Bed (Patient not taking: Reported on 2/23/2021), Disp: 90 tablet, Rfl: 3    clotrimazole-betamethasone (LOTRISONE) 1-0 05 % cream, Apply topically 2 (two) times a day (Patient not taking: Reported on 2/23/2021), Disp: 60 g, Rfl: 1    HYDROcodone-acetaminophen (NORCO) 5-325 mg per tablet, Take 1 tablet by mouth every 6 (six) hours as needed for pain for up to 10 dosesMax Daily Amount: 4 tablets (Patient not taking: Reported on 2/23/2021), Disp: 10 tablet, Rfl: 0    ibuprofen (MOTRIN) 200 mg tablet, Take 200 mg by mouth every 6 (six) hours as needed for mild pain , Disp: , Rfl:     naproxen sodium (ALEVE) 220 MG tablet, Take 1 tablet (220 mg total) by mouth 2 (two) times a day with meals for 5 days (Patient not taking: Reported on 2/23/2021), Disp: 10 tablet, Rfl: 0    prednisoLONE acetate (PRED FORTE) 1 % ophthalmic suspension, Administer 1 drop to the right eye 2 (two) times a day , Disp: , Rfl: 0  Allergies   Allergen Reactions    Amoxicillin-Pot Clavulanate GI Intolerance    Azithromycin GI Intolerance and Rash    Cephalexin GI Intolerance    Cortisone GI Intolerance    Doxycycline GI Intolerance    Penicillins GI Intolerance     Vitals:    02/23/21 1110   BP: 128/80   BP Location: Left arm   Patient Position: Sitting   Cuff Size: Standard   Pulse: 68   Weight: 76 8 kg (169 lb 6 4 oz)   Height: 4' 11" (1 499 m)       Labs:  Appointment on 01/21/2021   Component Date Value    Free T4 01/21/2021 0 88     TSH 3RD GENERATON 01/21/2021 0 871     A/G Ratio 01/21/2021 1 61     Albumin Electrophoresis 01/21/2021 61 7     Albumin CONC 01/21/2021 4 01     Alpha 1 01/21/2021 5 0     ALPHA 1 CONC 01/21/2021 0 33     Alpha 2 01/21/2021 13 6*    ALPHA 2 CONC 01/21/2021 0 88     Beta-1 01/21/2021 6 1     BETA 1 CONC 01/21/2021 0 40     Beta-2 01/21/2021 5 1     BETA 2 CONC 01/21/2021 0 33     Gamma Globulin 01/21/2021 8 5*    GAMMA CONC 01/21/2021 0 55     Total Protein 01/21/2021 6 5     SPEP Interpretation 01/21/2021 See Comment     Vitamin B-12 01/21/2021 726     Methylmalonic Acid, S 01/21/2021 257     Disclaimer: 01/21/2021 Comment     Ig Kappa Free Light Chain 01/21/2021 19 7*    Ig Lambda Free Light Kylee* 01/21/2021 14 3     Kappa/Lambda FluidC Ratio 01/21/2021 1 38    Lab on 11/09/2020   Component Date Value    Sodium 11/09/2020 141     Potassium 11/09/2020 3 8     Chloride 11/09/2020 108     CO2 11/09/2020 27     ANION GAP 11/09/2020 6     BUN 11/09/2020 15     Creatinine 11/09/2020 0 93     Glucose 11/09/2020 92     Calcium 11/09/2020 10 0     AST 11/09/2020 18     ALT 11/09/2020 28     Alkaline Phosphatase 11/09/2020 77     Total Protein 11/09/2020 7 0     Albumin 11/09/2020 4 1     Total Bilirubin 11/09/2020 0 74     eGFR 11/09/2020 57     PTH 11/09/2020 67 0     WBC 01/21/2021 6 94     RBC 01/21/2021 4 64     Hemoglobin 01/21/2021 14 4     Hematocrit 01/21/2021 43 9     MCV 01/21/2021 95     MCH 01/21/2021 31 0     MCHC 01/21/2021 32 8     RDW 01/21/2021 13 2     MPV 01/21/2021 10 0     Platelets 58/59/3294 185     nRBC 01/21/2021 0     Neutrophils Relative 01/21/2021 64     Immat GRANS % 01/21/2021 1     Lymphocytes Relative 01/21/2021 20     Monocytes Relative 01/21/2021 10     Eosinophils Relative 01/21/2021 4     Basophils Relative 01/21/2021 1     Neutrophils Absolute 01/21/2021 4 48     Immature Grans Absolute 01/21/2021 0 04     Lymphocytes Absolute 01/21/2021 1 40     Monocytes Absolute 01/21/2021 0 67     Eosinophils Absolute 01/21/2021 0 29     Basophils Absolute 01/21/2021 0 06     Sodium 01/21/2021 140     Potassium 01/21/2021 3 9     Chloride 01/21/2021 107     CO2 01/21/2021 26     ANION GAP 01/21/2021 7     BUN 01/21/2021 15     Creatinine 01/21/2021 0 77     Glucose 01/21/2021 94     Calcium 01/21/2021 9 5     AST 01/21/2021 21     ALT 01/21/2021 27     Alkaline Phosphatase 01/21/2021 77     Total Protein 01/21/2021 7 0     Albumin 01/21/2021 3 8     Total Bilirubin 01/21/2021 0 88     eGFR 01/21/2021 71    Appointment on 09/28/2020   Component Date Value    WBC 09/28/2020 7 26     RBC 09/28/2020 4 76     Hemoglobin 09/28/2020 14 7     Hematocrit 09/28/2020 45 4     MCV 09/28/2020 95     MCH 09/28/2020 30 9     MCHC 09/28/2020 32 4     RDW 09/28/2020 12 7     MPV 09/28/2020 11 0     Platelets 62/81/7445 183     nRBC 09/28/2020 0     Neutrophils Relative 09/28/2020 58     Immat GRANS % 09/28/2020 0     Lymphocytes Relative 09/28/2020 27     Monocytes Relative 09/28/2020 9     Eosinophils Relative 09/28/2020 5     Basophils Relative 09/28/2020 1     Neutrophils Absolute 09/28/2020 4 21     Immature Grans Absolute 09/28/2020 0 02     Lymphocytes Absolute 09/28/2020 1 96     Monocytes Absolute 09/28/2020 0 66     Eosinophils Absolute 09/28/2020 0 35     Basophils Absolute 09/28/2020 0 06     Sodium 09/28/2020 143     Potassium 09/28/2020 4 4     Chloride 09/28/2020 110*    CO2 09/28/2020 30     ANION GAP 09/28/2020 3*    BUN 09/28/2020 14     Creatinine 09/28/2020 0 88     Glucose, Fasting 09/28/2020 83     Calcium 09/28/2020 10 4*    AST 09/28/2020 19     ALT 09/28/2020 30     Alkaline Phosphatase 09/28/2020 75     Total Protein 09/28/2020 6 9     Albumin 09/28/2020 4 2     Total Bilirubin 09/28/2020 0 65     eGFR 09/28/2020 61      Lab Results   Component Value Date    TRIG 134 05/08/2020    TRIG 125 12/22/2015    HDL 54 05/08/2020    HDL 57 12/22/2015    LDLDIRECT 99 05/08/2020    LDLDIRECT 98 12/22/2015     Imaging: No results found  Review of Systems:  Review of Systems   Constitutional: Negative for activity change, appetite change, chills, diaphoresis, fatigue and unexpected weight change  HENT: Negative for hearing loss, nosebleeds and sore throat  Eyes: Negative for photophobia and visual disturbance  Respiratory: Negative for cough, chest tightness, shortness of breath and wheezing  Cardiovascular: Negative for chest pain, palpitations and leg swelling  Gastrointestinal: Negative for abdominal pain, diarrhea, nausea and vomiting  Endocrine: Negative for polyuria  Genitourinary: Negative for dysuria, frequency and hematuria  Musculoskeletal: Positive for arthralgias  Negative for back pain, gait problem and neck pain  Skin: Negative for pallor and rash     Neurological: Negative for dizziness, syncope and headaches  Hematological: Does not bruise/bleed easily  Psychiatric/Behavioral: Negative for behavioral problems and confusion  Physical Exam:  Physical Exam   Constitutional: She is oriented to person, place, and time  She appears well-developed and well-nourished  HENT:   Head: Normocephalic and atraumatic  Nose: Nose normal    Eyes: Pupils are equal, round, and reactive to light  EOM are normal  No scleral icterus  Neck: Normal range of motion  Neck supple  No JVD present  Cardiovascular: Normal rate, regular rhythm and normal heart sounds  Exam reveals no gallop and no friction rub  No murmur heard  Pulmonary/Chest: Effort normal and breath sounds normal  No respiratory distress  She has no wheezes  She has no rales  Abdominal: Soft  Bowel sounds are normal  She exhibits no distension  There is no abdominal tenderness  Musculoskeletal: Normal range of motion  General: No deformity or edema  Neurological: She is alert and oriented to person, place, and time  No cranial nerve deficit  Skin: Skin is warm and dry  No rash noted  She is not diaphoretic  Psychiatric: She has a normal mood and affect  Her behavior is normal    Vitals reviewed  Blood pressure 128/80, pulse 68, height 4' 11" (1 499 m), weight 76 8 kg (169 lb 6 4 oz)  EKG:  Normal sinus rhythm with sinus arrhythmia  Moderate voltage criteria for LVH, may be normal variant  Nonspecific T wave abnormality  Abnormal ECG    Discussion/Summary:  SVT: changed propranolol to Toprol, symptoms are skipped beats - she did have multiple short runs of SVT, longest lasting 7 beats - that feel improved on Toprol  We also checked an echo that ruled out any structural repercussions from having frequent brief runs of SVT  Continue current regimen  Now back on propranolol due to shakiness with Toprol, and tolerating well with reduced symptoms  Continues current regimen without symptoms      HTN: continue current regimen, well controlled today  HLD: continued on statin  LDL 89 in July 2019, 100 in Sept 2019, 99 in May 2020

## 2021-03-05 ENCOUNTER — TELEPHONE (OUTPATIENT)
Dept: INTERNAL MEDICINE CLINIC | Facility: CLINIC | Age: 86
End: 2021-03-05

## 2021-03-05 NOTE — TELEPHONE ENCOUNTER
Patient called with some c/o the medication gabapentin     She has not taken it in the past 3 days  she was experiencing very bad hallucinations   Inocente Muniz Hearing voice , people knocking at her bedroom door, thinking people were there but aren't      Patient actually called her home security company to make sure no one was in her home     Patient talked with her children and they don't want her to take it     Also she wanted to let you know for her foot neuropathy she bought a massage thing off line and it is helping

## 2021-03-12 NOTE — TELEPHONE ENCOUNTER
PT CALLED TODAY TO UPDATE YOU   SHE SAID THAT SHE'S NOT HAD ANY PROBLEMS WITH SEEING THINGS OR THINKING SOMEONE IS IN HER HOUSE SINCE SHE STOPPED THE MEDICINE     SHE SAID THAT SHE SEES YOU AGAIN ON 3/22

## 2021-03-22 ENCOUNTER — OFFICE VISIT (OUTPATIENT)
Dept: INTERNAL MEDICINE CLINIC | Facility: CLINIC | Age: 86
End: 2021-03-22
Payer: MEDICARE

## 2021-03-22 VITALS
SYSTOLIC BLOOD PRESSURE: 140 MMHG | WEIGHT: 166.4 LBS | RESPIRATION RATE: 14 BRPM | DIASTOLIC BLOOD PRESSURE: 80 MMHG | BODY MASS INDEX: 33.55 KG/M2 | HEIGHT: 59 IN | HEART RATE: 72 BPM | TEMPERATURE: 98.1 F

## 2021-03-22 DIAGNOSIS — E53.1 VITAMIN B6 DEFICIENCY: ICD-10-CM

## 2021-03-22 DIAGNOSIS — E55.9 VITAMIN D DEFICIENCY: Chronic | ICD-10-CM

## 2021-03-22 DIAGNOSIS — E78.2 MIXED HYPERLIPIDEMIA: Primary | Chronic | ICD-10-CM

## 2021-03-22 DIAGNOSIS — I10 ESSENTIAL HYPERTENSION: ICD-10-CM

## 2021-03-22 PROCEDURE — 99214 OFFICE O/P EST MOD 30 MIN: CPT | Performed by: INTERNAL MEDICINE

## 2021-03-22 NOTE — PROGRESS NOTES
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  She has had extensive workup with workup recently repeated  Immunofixation negative, heavy metals negative, B12 methylmalonic acid Lyme titer, rheumatoid factor antinuclear antibody all normal   Thyroid function normal   B12 level normal   Free light chain ratio normal   Had hallucinations on gabapentin-at this point she remains on vitamin B6 50 milligrams daily  3  Abnormal gait-associated with the above plus diffuse DJD  Had prior total knee replacement but suspect predominantly abnormality of gait is related to her neuropathy -patient walking with a cane  4  Hypercalcemia-noted previously and of questionable significance  Had repeat labs with [facility] normal not felt significant  5  Hypertension-adequate control on current dose of beta-blocker -had been on a diuretic in the past but this is not needed at present  6  Previously noted skin rash -treated previously as potential erythema chronicum migrans as she remembered a bite and then developed a rash- had doxycycline for 3 weeks  Then developed fungal disease left greater than right was treated with Diflucan and Lotrisone  Serology for Lyme disease was ordered previously but not done -will be repeating at this point  7  Status post left cubital tunnel release-left carpal tunnel release done in November 8925 without complications    Still has some numbness and intermittent pain is seeing the orthopedic physician -now has some ongoing left arm numbness and she is scheduled to meet with the orthopedic physician- rule out component of cervical radiculopathy     All other problems as per note of May 2020 in August 2018         MEDICAL REGIMEN:                                                  Vitamin B6 50 milligrams daily, gabapentin 100 milligrams HS, propanolol 20 milligrams q i d , lisinopril 10 milligrams b i d , Crestor 10 milligrams- half tablet 3 times per week, multivitamin, cranberry daily, generic Zantac 150 milligrams in the morning- REVIEW NEXT VISIT intermittent use of prednisone eyedrops per Ophthalmology, vitamin D3/ 2000 units a day, anastrozole 1 milligram daily started in the spring of 2019 to be use for 5 years     Appointment in several months with prior chemistry profile cholesterol profile vitamin B6 level     Addendum -patient seen by Betty Morgan in April and felt to have subacromial impingement syndrome of the left shoulder  She had a steroid injection  She was referred to physical therapy  If symptoms fail to improve with this treatment they are going to proceed with an MRI of his shoulder    Addendum- patient was in the ER April 17th after a fall  She slipped trying to move bird feed her garage  She landed on left shoulder and had a hard time getting up  She used her medical alert bracelet  She has chronic left shoulder issues as before had prior steroid injection  CT scan of the brain showed no acute issues but showed a left parotid 1 2 centimeter lesion nonemergent MRI the neck with gadolinium recommended for further evaluation  CT scan of the cervical spine showed no acute issue  X-ray of shoulder results still pending- patient will be brought into the office to review the parotid lesion     No problem-specific Assessment & Plan notes found for this encounter  Diagnoses and all orders for this visit:    Mixed hyperlipidemia  -     CBC and differential; Future  -     Comprehensive metabolic panel; Future  -     Cholesterol, total; Future  -     HDL cholesterol; Future  -     LDL cholesterol, direct; Future  -     Triglycerides; Future  -     Vitamin D 25 hydroxy; Future  -     Vitamin B6; Future    Vitamin D deficiency  -     CBC and differential; Future  -     Comprehensive metabolic panel;  Future  -     Cholesterol, total; Future  -     HDL cholesterol; Future  -     LDL cholesterol, direct; Future  -     Triglycerides; Future  -     Vitamin D 25 hydroxy; Future  -     Vitamin B6; Future    Essential hypertension  -     CBC and differential; Future  -     Comprehensive metabolic panel; Future  -     Cholesterol, total; Future  -     HDL cholesterol; Future  -     LDL cholesterol, direct; Future  -     Triglycerides; Future  -     Vitamin D 25 hydroxy; Future  -     Vitamin B6; Future    Vitamin B6 deficiency  -     CBC and differential; Future  -     Comprehensive metabolic panel; Future  -     Cholesterol, total; Future  -     HDL cholesterol; Future  -     LDL cholesterol, direct; Future  -     Triglycerides; Future  -     Vitamin D 25 hydroxy; Future  -     Vitamin B6; Future          Subjective:      Patient ID: Laniey Hayes is a 81 y o  female  She very much enjoyed recent wedding 1 of her grandchildren she talked about this in detail  She felt poorly on gabapentin - she had CNS issues with  Hallucinations etcetera  At this point she describes numbness of the feet without severe pain  We will monitor closely  As noted all secondary workup negative    Results for orders placed or performed in visit on 01/21/21  -T4, free       Result                      Value             Ref Range           Free T4                     0 88              0 76 - 1 46 *  -TSH, 3rd generation       Result                      Value             Ref Range           TSH 3RD GENERATON           0 871             0 358 - 3 74*  -Protein electrophoresis, serum       Result                      Value             Ref Range           A/G Ratio                   1 61              1 10 - 1 80         Albumin Electrophoresis     61 7              52 0 - 65 0 %       Albumin CONC                4 01              3 50 - 5 00 *       Alpha 1                     5 0               2 5 - 5 0 %         ALPHA 1 CONC                0 33 0 10 - 0 40 *       Alpha 2                     13 6 (H)          7 0 - 13 0 %        ALPHA 2 CONC                0 88              0 40 - 1 20 *       Beta-1                      6 1               5 0 - 13 0 %        BETA 1 CONC                 0 40              0 40 - 0 80 *       Beta-2                      5 1               2 0 - 8 0 %         BETA 2 CONC                 0 33              0 20 - 0 50 *       Gamma Globulin              8 5 (L)           12 0 - 22 0 %       GAMMA CONC                  0 55              0 50 - 1 60 *       Total Protein               6 5               6 4 - 8 2 g/*       SPEP Interpretation         See Comment                      -Vitamin B12       Result                      Value             Ref Range           Vitamin B-12                726               100 - 900 pg*  -Methylmalonic acid, serum       Result                      Value             Ref Range           Methylmalonic Acid, S       257               0 - 378 nmol*       Disclaimer:                 Comment                          -Immunoglobulin free LT chains blood       Result                      Value             Ref Range           Ig Kappa Free Light Ch*     19 7 (H)          3 3 - 19 4 m*       Ig Lambda Free Light C*     14 3              5 7 - 26 3 m*       Kappa/Lambda FluidC Ra*     1 38              0 26 - 1 65    We went over this in detail  She is on vitamin B6 50 milligrams daily with follow-up B6 level ordered prior to next visit     She is having ongoing numbness of the left arm -as noted had prior surgery for the left arm  She is scheduled to meet with Dr Giuseppe Escamilla -she may have some numbness above the elbow but denies frequent symptoms starting the neck and radiating down the arm  She says her frequency of palpitations is much less- she has not had any syncope or near syncope  She has known hypertension  She is on a beta-blocker and ACE-inhibitor    Systolic BP initially today was 150-154 but by end of exam was 140  There was no evidence of orthostasis  This patient denies any systemic symptoms  Specifically there has been no evidence of fever, night sweats, significant weight loss or significant decrease in appetite  She saw cardiology who felt she was stable  They are now seeing her yearly  I watched her ambulate in the lemus  She has abnormal gait associated with her neuropathy as well as her knee issues  She uses a cane in the right hand  She said she does not use a cane around the home    She had prior left cubital tunnel release-left carpal tunnel release done in November 2019  She had not been back to see the orthopedic physician times months because the COVID pandemic     She had 2 doses of COVID vaccine which she tolerated without difficulty     Records reviewed  She had a DEXA scan done in August 2019-will be repeating 2 years later because of her use of anastrozole     She said that in the past she had used meds for anxiety but she has not used her med times months and is now off it  Thyroid function normal   I told her she should stay off benzodiazepine      The following portions of the patient's history were reviewed and updated as appropriate: allergies, current medications, past family history, past medical history, past social history, past surgical history and problem list     Review of Systems   Constitutional: Negative  HENT: Negative  Respiratory: Negative  Cardiovascular: Negative  Gastrointestinal: Negative  Endocrine: Negative  Genitourinary: Negative  Musculoskeletal: Positive for gait problem  Skin: Negative  Neurological: Positive for numbness  Hematological: Negative  Psychiatric/Behavioral: Negative  Objective:      Temp 98 1 °F (36 7 °C) (Oral)   Ht 4' 11" (1 499 m)   Wt 75 5 kg (166 lb 6 4 oz)   BMI 33 61 kg/m²          Physical Exam  Vitals signs reviewed     Constitutional:       General: She is not in acute distress  Appearance: Normal appearance  She is not ill-appearing, toxic-appearing or diaphoretic  HENT:      Head: Normocephalic and atraumatic  Right Ear: Tympanic membrane, ear canal and external ear normal       Left Ear: Tympanic membrane, ear canal and external ear normal       Nose: Nose normal  No congestion or rhinorrhea  Mouth/Throat:      Mouth: Mucous membranes are moist       Pharynx: Oropharynx is clear  No oropharyngeal exudate or posterior oropharyngeal erythema  Eyes:      General: No scleral icterus  Right eye: No discharge  Left eye: No discharge  Extraocular Movements: Extraocular movements intact  Pupils: Pupils are equal, round, and reactive to light  Neck:      Musculoskeletal: Normal range of motion and neck supple  No neck rigidity or muscular tenderness  Vascular: No carotid bruit  Cardiovascular:      Rate and Rhythm: Normal rate and regular rhythm  Pulses: Normal pulses  Heart sounds: Normal heart sounds  No murmur  No friction rub  No gallop  Pulmonary:      Effort: Pulmonary effort is normal  No respiratory distress  Breath sounds: Normal breath sounds  No stridor  No wheezing, rhonchi or rales  Chest:      Chest wall: No tenderness  Abdominal:      General: Abdomen is flat  Bowel sounds are normal  There is no distension  Palpations: Abdomen is soft  There is no mass  Tenderness: There is no abdominal tenderness  There is no right CVA tenderness, left CVA tenderness, guarding or rebound  Hernia: No hernia is present  Musculoskeletal: Normal range of motion  General: No swelling, tenderness, deformity or signs of injury  Right lower leg: No edema  Left lower leg: No edema  Comments:  Evidence of prior knee surgery   Lymphadenopathy:      Cervical: No cervical adenopathy  Skin:     General: Skin is warm and dry  Coloration: Skin is not jaundiced or pale  Findings: No bruising, erythema, lesion or rash  Comments:  Benign keratoses  Alopecia -evidence of prior right breast surgery   Neurological:      General: No focal deficit present  Mental Status: She is alert and oriented to person, place, and time  Mental status is at baseline  Cranial Nerves: No cranial nerve deficit  Sensory: Sensory deficit present  Motor: No weakness  Coordination: Coordination normal       Gait: Gait abnormal       Deep Tendon Reflexes: Reflexes abnormal       Comments:  Decreased pinprick in a stocking distribution  Decreased ankle jerks   Psychiatric:         Mood and Affect: Mood normal          Behavior: Behavior normal          Thought Content:  Thought content normal          Judgment: Judgment normal

## 2021-03-26 ENCOUNTER — OFFICE VISIT (OUTPATIENT)
Dept: OBGYN CLINIC | Facility: HOSPITAL | Age: 86
End: 2021-03-26
Payer: MEDICARE

## 2021-03-26 VITALS
WEIGHT: 166 LBS | SYSTOLIC BLOOD PRESSURE: 144 MMHG | BODY MASS INDEX: 33.47 KG/M2 | HEIGHT: 59 IN | DIASTOLIC BLOOD PRESSURE: 82 MMHG | HEART RATE: 64 BPM

## 2021-03-26 DIAGNOSIS — M25.512 ACUTE PAIN OF LEFT SHOULDER: Primary | ICD-10-CM

## 2021-03-26 DIAGNOSIS — M65.322 TRIGGER INDEX FINGER OF LEFT HAND: ICD-10-CM

## 2021-03-26 PROCEDURE — 20550 NJX 1 TENDON SHEATH/LIGAMENT: CPT | Performed by: ORTHOPAEDIC SURGERY

## 2021-03-26 PROCEDURE — 99214 OFFICE O/P EST MOD 30 MIN: CPT | Performed by: ORTHOPAEDIC SURGERY

## 2021-03-26 RX ORDER — LIDOCAINE HYDROCHLORIDE 10 MG/ML
1 INJECTION, SOLUTION INFILTRATION; PERINEURAL
Status: COMPLETED | OUTPATIENT
Start: 2021-03-26 | End: 2021-03-26

## 2021-03-26 RX ORDER — BETAMETHASONE SODIUM PHOSPHATE AND BETAMETHASONE ACETATE 3; 3 MG/ML; MG/ML
6 INJECTION, SUSPENSION INTRA-ARTICULAR; INTRALESIONAL; INTRAMUSCULAR; SOFT TISSUE
Status: COMPLETED | OUTPATIENT
Start: 2021-03-26 | End: 2021-03-26

## 2021-03-26 RX ADMIN — BETAMETHASONE SODIUM PHOSPHATE AND BETAMETHASONE ACETATE 6 MG: 3; 3 INJECTION, SUSPENSION INTRA-ARTICULAR; INTRALESIONAL; INTRAMUSCULAR; SOFT TISSUE at 12:23

## 2021-03-26 RX ADMIN — LIDOCAINE HYDROCHLORIDE 1 ML: 10 INJECTION, SOLUTION INFILTRATION; PERINEURAL at 12:23

## 2021-04-12 ENCOUNTER — APPOINTMENT (OUTPATIENT)
Dept: RADIOLOGY | Facility: OTHER | Age: 86
End: 2021-04-12
Payer: MEDICARE

## 2021-04-12 ENCOUNTER — OFFICE VISIT (OUTPATIENT)
Dept: OBGYN CLINIC | Facility: OTHER | Age: 86
End: 2021-04-12
Payer: MEDICARE

## 2021-04-12 VITALS
SYSTOLIC BLOOD PRESSURE: 172 MMHG | DIASTOLIC BLOOD PRESSURE: 72 MMHG | BODY MASS INDEX: 33.51 KG/M2 | HEIGHT: 59 IN | HEART RATE: 62 BPM | WEIGHT: 166.2 LBS

## 2021-04-12 DIAGNOSIS — M25.512 ACUTE PAIN OF LEFT SHOULDER: ICD-10-CM

## 2021-04-12 DIAGNOSIS — M75.82 TENDINITIS OF LEFT ROTATOR CUFF: ICD-10-CM

## 2021-04-12 DIAGNOSIS — M75.52 SUBACROMIAL BURSITIS OF LEFT SHOULDER JOINT: Primary | ICD-10-CM

## 2021-04-12 PROCEDURE — 73030 X-RAY EXAM OF SHOULDER: CPT

## 2021-04-12 PROCEDURE — 20610 DRAIN/INJ JOINT/BURSA W/O US: CPT | Performed by: ORTHOPAEDIC SURGERY

## 2021-04-12 PROCEDURE — 99213 OFFICE O/P EST LOW 20 MIN: CPT | Performed by: ORTHOPAEDIC SURGERY

## 2021-04-12 RX ORDER — BUPIVACAINE HYDROCHLORIDE 2.5 MG/ML
2 INJECTION, SOLUTION INFILTRATION; PERINEURAL
Status: COMPLETED | OUTPATIENT
Start: 2021-04-12 | End: 2021-04-12

## 2021-04-12 RX ORDER — BETAMETHASONE SODIUM PHOSPHATE AND BETAMETHASONE ACETATE 3; 3 MG/ML; MG/ML
6 INJECTION, SUSPENSION INTRA-ARTICULAR; INTRALESIONAL; INTRAMUSCULAR; SOFT TISSUE
Status: COMPLETED | OUTPATIENT
Start: 2021-04-12 | End: 2021-04-12

## 2021-04-12 RX ADMIN — BUPIVACAINE HYDROCHLORIDE 2 ML: 2.5 INJECTION, SOLUTION INFILTRATION; PERINEURAL at 11:43

## 2021-04-12 RX ADMIN — BETAMETHASONE SODIUM PHOSPHATE AND BETAMETHASONE ACETATE 6 MG: 3; 3 INJECTION, SUSPENSION INTRA-ARTICULAR; INTRALESIONAL; INTRAMUSCULAR; SOFT TISSUE at 11:43

## 2021-04-12 NOTE — PROGRESS NOTES
Assessment  Diagnoses and all orders for this visit:    Subacromial bursitis of left shoulder joint    Acute pain of left shoulder    Tendinitis of left rotator cuff     Mild glenohumeral osteoarthritis        Discussion and Plan:    The patient has an examination consistent with subacromial impingement syndrome of the left shoulder  I have discussed with the patient the pathophysiology of this diagnosis and reviewed how the examination correlates with this diagnosis  Treatment options were discussed at length and after discussing these treatment options, the patient elected for and received a subacromial injection of corticosteroid (as described in the procedure note) with a prescription for referral to physical therapy  If the symptoms fail to improve with this treatment the patient would be indicated for further imaging in the form of an MRI scan of the shoulder  Subjective:   Patient ID: Rolando Langley is a 81 y o  female      HPI  The patient presents with a chief complaint of left shoulder pain  The patient was seen in 2018 for left shoulder impingement syndrome  She was provided with a CS injection and referral to PT at that time which relieved her symptoms  The pain began 3 month(s) ago and is not associated with an acute injury  The patient describes the pain as aching and dull in intensity,  intermittent in timing, and localizes the pain to the  left lateral shoulder  The pain is worse with movement and relieved by rest   The pain is not associated with numbness and tingling  The pain is not associated with constitutional symptoms  The patient is awoken at night by the pain  Patient has a RIGHT reverse total shoulder in 2017        The following portions of the patient's history were reviewed and updated as appropriate: allergies, current medications, past family history, past medical history, past social history, past surgical history and problem list     Review of Systems Constitutional: Negative for chills and fever  HENT: Negative for drooling and sneezing  Eyes: Negative for redness  Respiratory: Negative for cough and wheezing  Gastrointestinal: Negative for nausea and vomiting  Musculoskeletal:        Please see ortho exam   Psychiatric/Behavioral: Negative for behavioral problems  The patient is not nervous/anxious  Objective:  BP (!) 172/72   Pulse 62   Ht 4' 11" (1 499 m)   Wt 75 4 kg (166 lb 3 2 oz)   BMI 33 57 kg/m²       Left Shoulder Exam     Tenderness   The patient is experiencing no tenderness  Range of Motion   External rotation: 70   Forward flexion: 160   Internal rotation 0 degrees: Lumbar     Muscle Strength   Abduction: 4/5   External rotation: 4/5     Tests   Barragan test: positive  Impingement: positive    Other   Erythema: absent  Sensation: normal  Pulse: present             Physical Exam  Vitals signs reviewed  Constitutional:       Appearance: She is well-developed  Eyes:      Pupils: Pupils are equal, round, and reactive to light  Pulmonary:      Effort: Pulmonary effort is normal       Breath sounds: Normal breath sounds  Skin:     General: Skin is warm and dry  Neurological:      Mental Status: She is alert and oriented to person, place, and time  Psychiatric:         Behavior: Behavior normal          Thought Content:  Thought content normal          Judgment: Judgment normal        Large joint arthrocentesis: L subacromial bursa  Universal Protocol:  Consent given by: patient  Patient understanding: patient states understanding of the procedure being performed  Site marked: the operative site was marked  Patient identity confirmed: verbally with patient    Supporting Documentation  Indications: pain   Procedure Details  Location: shoulder - L subacromial bursa  Needle size: 22 G  Ultrasound guidance: no  Approach: lateral  Medications administered: 2 mL bupivacaine 0 25 %; 6 mg betamethasone acetate-betamethasone sodium phosphate 6 (3-3) mg/mL    Patient tolerance: patient tolerated the procedure well with no immediate complications  Dressing:  Sterile dressing applied          I have personally reviewed pertinent films in PACS and my interpretation is as follows    Left shoulder x-rays demonstrates no fracture or dislocation, mild degenerative changes      Scribe Attestation    I,:  Gabo Vo am acting as a scribe while in the presence of the attending physician :       I,:  Kavita Vanessa MD personally performed the services described in this documentation    as scribed in my presence :

## 2021-04-12 NOTE — PATIENT INSTRUCTIONS

## 2021-04-17 ENCOUNTER — HOSPITAL ENCOUNTER (EMERGENCY)
Facility: HOSPITAL | Age: 86
Discharge: HOME | End: 2021-04-17
Attending: EMERGENCY MEDICINE | Admitting: EMERGENCY MEDICINE
Payer: MEDICARE

## 2021-04-17 ENCOUNTER — APPOINTMENT (EMERGENCY)
Dept: RADIOLOGY | Facility: HOSPITAL | Age: 86
End: 2021-04-17
Payer: MEDICARE

## 2021-04-17 VITALS
BODY MASS INDEX: 33.53 KG/M2 | RESPIRATION RATE: 20 BRPM | OXYGEN SATURATION: 96 % | HEART RATE: 68 BPM | SYSTOLIC BLOOD PRESSURE: 142 MMHG | TEMPERATURE: 98 F | DIASTOLIC BLOOD PRESSURE: 65 MMHG | WEIGHT: 166 LBS

## 2021-04-17 DIAGNOSIS — R93.89 ABNORMAL FINDING ON CT SCAN: ICD-10-CM

## 2021-04-17 DIAGNOSIS — M25.512 SHOULDER PAIN, LEFT: ICD-10-CM
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
## 2021-04-17 LAB
ANION GAP SERPL CALCULATED.3IONS-SCNC: 5 MMOL/L (ref 4–13)
BACTERIA UR QL AUTO: NORMAL /HPF
BASOPHILS # BLD AUTO: 0.05 THOUSANDS/ΜL (ref 0–0.1)
BASOPHILS NFR BLD AUTO: 0 % (ref 0–1)
BILIRUB UR QL STRIP: NEGATIVE
BUN SERPL-MCNC: 21 MG/DL (ref 5–25)
CALCIUM SERPL-MCNC: 9.2 MG/DL (ref 8.3–10.1)
CHLORIDE SERPL-SCNC: 108 MMOL/L (ref 100–108)
CK SERPL-CCNC: 99 U/L (ref 26–192)
CLARITY UR: CLEAR
CO2 SERPL-SCNC: 28 MMOL/L (ref 21–32)
COLOR UR: YELLOW
CREAT SERPL-MCNC: 0.96 MG/DL (ref 0.6–1.3)
EOSINOPHIL # BLD AUTO: 0.24 THOUSAND/ΜL (ref 0–0.61)
EOSINOPHIL NFR BLD AUTO: 2 % (ref 0–6)
ERYTHROCYTE [DISTWIDTH] IN BLOOD BY AUTOMATED COUNT: 13.1 % (ref 11.6–15.1)
GFR SERPL CREATININE-BSD FRML MDRD: 54 ML/MIN/1.73SQ M
GLUCOSE SERPL-MCNC: 104 MG/DL (ref 65–140)
GLUCOSE UR STRIP-MCNC: NEGATIVE MG/DL
HCT VFR BLD AUTO: 43.9 % (ref 34.8–46.1)
HGB BLD-MCNC: 14.6 G/DL (ref 11.5–15.4)
HGB UR QL STRIP.AUTO: NEGATIVE
IMM GRANULOCYTES # BLD AUTO: 0.13 THOUSAND/UL (ref 0–0.2)
IMM GRANULOCYTES NFR BLD AUTO: 1 % (ref 0–2)
KETONES UR STRIP-MCNC: NEGATIVE MG/DL
LEUKOCYTE ESTERASE UR QL STRIP: ABNORMAL
LYMPHOCYTES # BLD AUTO: 1.74 THOUSANDS/ΜL (ref 0.6–4.47)
LYMPHOCYTES NFR BLD AUTO: 16 % (ref 14–44)
MCH RBC QN AUTO: 31.5 PG (ref 26.8–34.3)
MCHC RBC AUTO-ENTMCNC: 33.3 G/DL (ref 31.4–37.4)
MCV RBC AUTO: 95 FL (ref 82–98)
MONOCYTES # BLD AUTO: 0.8 THOUSAND/ΜL (ref 0.17–1.22)
MONOCYTES NFR BLD AUTO: 7 % (ref 4–12)
NEUTROPHILS # BLD AUTO: 8.27 THOUSANDS/ΜL (ref 1.85–7.62)
NEUTS SEG NFR BLD AUTO: 74 % (ref 43–75)
NITRITE UR QL STRIP: NEGATIVE
NON-SQ EPI CELLS URNS QL MICRO: NORMAL /HPF
NRBC BLD AUTO-RTO: 0 /100 WBCS
PH UR STRIP.AUTO: 7 [PH] (ref 4.5–8)
PLATELET # BLD AUTO: 193 THOUSANDS/UL (ref 149–390)
PMV BLD AUTO: 9.8 FL (ref 8.9–12.7)
POTASSIUM SERPL-SCNC: 4.2 MMOL/L (ref 3.5–5.3)
PROT UR STRIP-MCNC: ABNORMAL MG/DL
RBC # BLD AUTO: 4.63 MILLION/UL (ref 3.81–5.12)
RBC #/AREA URNS AUTO: NORMAL /HPF
SODIUM SERPL-SCNC: 141 MMOL/L (ref 136–145)
SP GR UR STRIP.AUTO: 1.02 (ref 1–1.03)
UROBILINOGEN UR QL STRIP.AUTO: 0.2 E.U./DL
WBC # BLD AUTO: 11.23 THOUSAND/UL (ref 4.31–10.16)
WBC #/AREA URNS AUTO: NORMAL /HPF

## 2021-04-17 PROCEDURE — 99285 EMERGENCY DEPT VISIT HI MDM: CPT | Performed by: EMERGENCY MEDICINE

## 2021-04-17 PROCEDURE — 70450 CT HEAD/BRAIN W/O DYE: CPT

## 2021-04-17 PROCEDURE — 36415 COLL VENOUS BLD VENIPUNCTURE: CPT | Performed by: EMERGENCY MEDICINE

## 2021-04-17 PROCEDURE — 85025 COMPLETE CBC W/AUTO DIFF WBC: CPT | Performed by: EMERGENCY MEDICINE

## 2021-04-17 PROCEDURE — 82550 ASSAY OF CK (CPK): CPT | Performed by: EMERGENCY MEDICINE

## 2021-04-17 PROCEDURE — 99284 EMERGENCY DEPT VISIT MOD MDM: CPT

## 2021-04-17 PROCEDURE — 80048 BASIC METABOLIC PNL TOTAL CA: CPT | Performed by: EMERGENCY MEDICINE

## 2021-04-17 PROCEDURE — 72125 CT NECK SPINE W/O DYE: CPT

## 2021-04-17 PROCEDURE — 73030 X-RAY EXAM OF SHOULDER: CPT

## 2021-04-17 PROCEDURE — 81001 URINALYSIS AUTO W/SCOPE: CPT

## 2021-04-17 RX ORDER — ACETAMINOPHEN 325 MG/1
975 TABLET ORAL ONCE
Status: COMPLETED | OUTPATIENT
Start: 2021-04-17 | End: 2021-04-17

## 2021-04-17 RX ADMIN — ACETAMINOPHEN 975 MG: 325 TABLET ORAL at 21:22

## 2021-04-17 NOTE — ED ATTENDING ATTESTATION
4/17/2021  Terry Kelly DO, saw and evaluated the patient  I have discussed the patient with the resident/non-physician practitioner and agree with the resident's/non-physician practitioner's findings, Plan of Care, and MDM as documented in the resident's/non-physician practitioner's note, except where noted  All available labs and Radiology studies were reviewed  I was present for key portions of any procedure(s) performed by the resident/non-physician practitioner and I was immediately available to provide assistance  At this point I agree with the current assessment done in the Emergency Department  I have conducted an independent evaluation of this patient a history and physical is as follows:    80 yo female presents for evaluation of L shoulder pain s/p fall from standing  Denies head injury, HA, neck pain/stiffness, focal weakness/numbness/tingling  Just had steroid injection in L shoulder by dr Susi Dow  Imp: fall, L shoulder pain plan: films, CT head, Cspine  Reassess  Declines analgesia at this time        ED Course         Critical Care Time  Procedures

## 2021-04-17 NOTE — ED PROVIDER NOTES
History  Chief Complaint   Patient presents with   Ezequiel Lemme Fall     pt presents by s ambulance s/p mechanical fall on L shoulder, had cortisone shot on same shoulder on monday  EMS states pt was laying on ground for almost an hour before she was able to call 911  pt c/o pain in L shoulder and numbness in L leg on arrival     82 yo f who does not take aspirin or thinners presenting after a fall earlier today where she slipped trying to move bird feed in her garage  She states she landed on her L shoulder and had a hard time getting up and needed to use her medic alert bracelet  Denies headstrike or LOC  Denies any symptoms prior to the fall and states she slipped  Denies recent illness, fever, sweats, chills, sore throat, cough, chest pain, shortness of breath, abdominal pain, nausea vomiting, diarrhea constipation dysuria, hematuria, numbness or tingling in arms or legs, headache, neck pain, back pain  Patient has chronic L shoulder issues and follows with orthopedics and recently had a cortisone shot in it for pain  Prior to Admission Medications   Prescriptions Last Dose Informant Patient Reported? Taking?    CRANBERRY PO  Self Yes No   Sig: Take 1,700 mg by mouth daily in the early morning    HYDROcodone-acetaminophen (NORCO) 5-325 mg per tablet Not Taking at Unknown time Self No No   Sig: Take 1 tablet by mouth every 6 (six) hours as needed for pain for up to 10 dosesMax Daily Amount: 4 tablets   Patient not taking: Reported on 4/17/2021   Multiple Vitamins-Minerals (PRESERVISION AREDS 2 PO)  Self Yes No   Sig: Take 2 tablets by mouth daily in the early morning    amoxicillin (AMOXIL) 500 mg capsule Not Taking at Unknown time Self Yes No   Sig: TAKE 4 CAPSULES 1 HOUR BEFORE DENTAL PROCEDURE   anastrozole (ARIMIDEX) 1 mg tablet 4/17/2021 at Unknown time Self No Yes   Sig: take 1 tablet by mouth once daily   clorazepate (TRANXENE) 3 75 mg tablet 4/16/2021 at Unknown time Self No Yes   Sig: Take 1 tablet (3 75 mg total) by mouth daily at bedtime Takes with Dinner and before Bed   clotrimazole-betamethasone (LOTRISONE) 1-0 05 % cream Not Taking at Unknown time Self No No   Sig: Apply topically 2 (two) times a day   Patient not taking: Reported on 2021   gabapentin (NEURONTIN) 100 mg capsule 2021 at Unknown time Self No Yes   Si PO 2 HOURS BEFORE BED   ibuprofen (MOTRIN) 200 mg tablet Not Taking at Unknown time Self Yes No   Sig: Take 200 mg by mouth every 6 (six) hours as needed for mild pain    lisinopril (ZESTRIL) 10 mg tablet 2021 at Unknown time Self No Yes   Sig: TAKE 1 TABLET BY MOUTH TWO  TIMES DAILY   naproxen sodium (ALEVE) 220 MG tablet   No No   Sig: Take 1 tablet (220 mg total) by mouth 2 (two) times a day with meals for 5 days   Patient not taking: Reported on 2021   prednisoLONE acetate (PRED FORTE) 1 % ophthalmic suspension Not Taking at Unknown time Self Yes No   Sig: Administer 1 drop to the right eye 2 (two) times a day    propranolol (INDERAL) 20 mg tablet 2021 at Unknown time Self No Yes   Sig: Take 1 tablet (20 mg total) by mouth 4 (four) times a day   rosuvastatin (CRESTOR) 10 MG tablet 2021 at Unknown time Self No Yes   Sig: Take 0 5 tablets (5 mg total) by mouth 3 (three) times a week Takes --      Facility-Administered Medications: None       Past Medical History:   Diagnosis Date    Anxiety     Arthritis     last assessed 3/24/15     Atherosclerosis     Bell's palsy     Breast cancer ([facility] ) 2019    Broken femur ([facility] )     Cancer [facility]     breast    Cardiac disorder     DCIS (ductal carcinoma in situ) of breast     last assessed 17     Depression     Endometrial polyp     GERD (gastroesophageal reflux disease)     History of radiation therapy     Hypercholesterolemia     resolved 10/22/14     Hyperlipidemia     Long term use of drug     last assessed 14     Neuropathy     Peripheral neuropathy     Pneumonia     As a Child    PONV (postoperative nausea and vomiting)     Tachycardia     Uterine fibroid        Past Surgical History:   Procedure Laterality Date    BREAST BIOPSY Right     BREAST LUMPECTOMY Right 2010    BREAST LUMPECTOMY Right 5/8/2019    Procedure: BREAST LUMPECTOMY; BREAST NEEDLE LOCALIZATION (NEEDLE LOC AT 0800); Surgeon: Mag Rosenberg MD;  [facility];  Service: Surgical Oncology    CARPAL TUNNEL RELEASE Bilateral     CATARACT EXTRACTION Bilateral     DILATION AND CURETTAGE OF UTERUS      ENDOMETRIAL BIOPSY      without cervical dilation     HEMORROIDECTOMY      JOINT REPLACEMENT Right     Shoulder    JOINT REPLACEMENT Bilateral     Knees    LYMPH NODE BIOPSY Right 5/8/2019    Procedure: SENTINEL LYMPH NODE BIOPSY; LYMPHOSCINTIGRAPHY; INTRAOPERATIVE LYMPHATIC MAPPING (INJECT AT 0900);   Surgeon: Mag Rosenberg MD;  [facility];  Service: Surgical Oncology    MAMMO NEEDLE LOCALIZATION RIGHT (ALL INC) Right 5/8/2019    OOPHORECTOMY      75    OVARIAN CYST REMOVAL Left     NJ RECONSTR TOTAL SHOULDER IMPLANT Right 7/18/2017    Procedure: TOTAL SHOULDER REVERSE ARTHROPLASTY;  Surgeon: Odalis Juarez MD;  [facility];  Service: Orthopedics    NJ REVISE ULNAR NERVE AT ELBOW Left 11/19/2019    Procedure: RELEASE CUBITAL TUNNEL left -;  Surgeon: Valencia Sawyer MD;  [facility];  Service: Orthopedics    NJ WRIST Milagros Small LIG Left 11/19/2019    Procedure: RELEASE CARPAL TUNNEL ENDOSCOPIC;  Surgeon: Valencia Sawyer MD;  [facility];  Service: Orthopedics    SENTINEL LYMPH NODE BIOPSY      TONSILLECTOMY      TUBAL LIGATION      US GUIDED BREAST BIOPSY RIGHT COMPLETE Right 3/28/2019       Family History   Problem Relation Age of Onset    Heart disease Mother         artherosclerosis     Heart disease Father         artherosclerosis     Heart attack Father     Arthritis Family     Heart disease Family         artherosclerosis     Breast cancer Family     Osteoarthritis Family     Supraventricular tachycardia Family     Hypertension Daughter     Ovarian cancer Sister 80    Anemia Neg Hx     Arrhythmia Neg Hx     Asthma Neg Hx     Clotting disorder Neg Hx     Fainting Neg Hx     Hyperlipidemia Neg Hx     Aneurysm Neg Hx      I have reviewed and agree with the history as documented  E-Cigarette/Vaping    E-Cigarette Use Never User      E-Cigarette/Vaping Substances    Nicotine No     THC No     CBD No     Flavoring No      Social History     Tobacco Use    Smoking status: Never Smoker    Smokeless tobacco: Never Used   Substance Use Topics    Alcohol use: No    Drug use: No        Review of Systems   Musculoskeletal: Positive for arthralgias (L shoulder)  All other systems reviewed and are negative  Physical Exam  ED Triage Vitals   Temperature Pulse Respirations Blood Pressure SpO2   04/17/21 1843 04/17/21 1843 04/17/21 1843 04/17/21 1843 04/17/21 1843   98 °F (36 7 °C) 72 20 152/80 97 %      Temp Source Heart Rate Source Patient Position - Orthostatic VS BP Location FiO2 (%)   04/17/21 1843 04/17/21 1843 04/17/21 1919 04/17/21 1919 --   Oral Monitor Lying Right arm       Pain Score       04/17/21 2038       Worst Possible Pain             Orthostatic Vital Signs  Vitals:    04/17/21 1843 04/17/21 1919 04/17/21 2038   BP: 152/80 152/80 142/65   Pulse: 72 64 68   Patient Position - Orthostatic VS:  Lying Lying       Physical Exam  Vitals signs and nursing note reviewed  Constitutional:       General: She is not in acute distress  Appearance: She is well-developed  She is not diaphoretic  HENT:      Head: Normocephalic and atraumatic  Right Ear: External ear normal       Left Ear: External ear normal       Nose: Nose normal    Eyes:      General: No scleral icterus  Right eye: No discharge  Left eye: No discharge        Conjunctiva/sclera: Conjunctivae normal       Pupils: Pupils are equal, round, and reactive to light  Neck:      Musculoskeletal: Normal range of motion and neck supple  Vascular: No JVD  Trachea: No tracheal deviation  Cardiovascular:      Rate and Rhythm: Normal rate and regular rhythm  Heart sounds: Normal heart sounds  No murmur  No friction rub  No gallop  Pulmonary:      Effort: Pulmonary effort is normal  No respiratory distress  Breath sounds: Normal breath sounds  No stridor  No wheezing or rales  Abdominal:      General: Bowel sounds are normal  There is no distension  Palpations: Abdomen is soft  There is no mass  Tenderness: There is no abdominal tenderness  There is no guarding  Musculoskeletal:         General: No tenderness or deformity  Comments: Range of motion right arm and bilateral lower extremities full without any pain  Patient does have pain with abduction of the left arm at the shoulder joint  2+ radial pulses bilaterally  Sensation equal and intact in the bilateral upper extremities  No deformity noted of the left shoulder   Skin:     General: Skin is warm and dry  Coloration: Skin is not pale  Findings: No erythema or rash  Neurological:      Mental Status: She is alert and oriented to person, place, and time  Cranial Nerves: No cranial nerve deficit  Sensory: No sensory deficit  Motor: No abnormal muscle tone  Comments: 5/5 strength in RUE/RLE/LLE  Decreased strength in LUE secondary to pain at the shoulder  Gross sensation intact  CN intact  GCS 15     Psychiatric:         Behavior: Behavior normal          Thought Content:  Thought content normal          Judgment: Judgment normal          ED Medications  Medications   acetaminophen (TYLENOL) tablet 975 mg (has no administration in time range)       Diagnostic Studies  Results Reviewed     Procedure Component Value Units Date/Time    Urine Microscopic [[id number]]  (Normal) Collected: 04/17/21 1953    Lab Status: Final result Specimen: Urine, Clean Catch Updated: 04/17/21 2035     RBC, UA None Seen /hpf      WBC, UA 2-4 /hpf      Epithelial Cells Occasional /hpf      Bacteria, UA Occasional /hpf     Basic metabolic panel [[id number]] Collected: 04/17/21 1929    Lab Status: Final result Specimen: Blood from Arm, Right Updated: 04/17/21 1955     Sodium 141 mmol/L      Potassium 4 2 mmol/L      Chloride 108 mmol/L      CO2 28 mmol/L      ANION GAP 5 mmol/L      BUN 21 mg/dL      Creatinine 0 96 mg/dL      Glucose 104 mg/dL      Calcium 9 2 mg/dL      eGFR 54 ml/min/1 73sq m     Narrative:      [place] guidelines for Chronic Kidney Disease (CKD):     Stage 1 with normal or high GFR (GFR > 90 mL/min/1 73 square meters)    Stage 2 Mild CKD (GFR = 60-89 mL/min/1 73 square meters)    Stage 3A Moderate CKD (GFR = 45-59 mL/min/1 73 square meters)    Stage 3B Moderate CKD (GFR = 30-44 mL/min/1 73 square meters)    Stage 4 Severe CKD (GFR = 15-29 mL/min/1 73 square meters)    Stage 5 End Stage CKD (GFR <15 mL/min/1 73 square meters)  Note: GFR calculation is accurate only with a steady state creatinine    CK (with reflex to MB) [[id number]]  (Normal) Collected: 04/17/21 1929    Lab Status: Final result Specimen: Blood from Arm, Right Updated: 04/17/21 1955     Total CK 99 U/L     Urine Macroscopic, POC [[id number]]  (Abnormal) Collected: 04/17/21 1953    Lab Status: Final result Specimen: Urine Updated: 04/17/21 1954     Color, UA Yellow     Clarity, UA Clear     pH, UA 7 0     Leukocytes, UA Trace     Nitrite, UA Negative     Protein,  (2+) mg/dl      Glucose, UA Negative mg/dl      Ketones, UA Negative mg/dl      Urobilinogen, UA 0 2 E U /dl      Bilirubin, UA Negative     Blood, UA Negative     Specific Gravity, UA 1 020    Narrative:      CLINITEK RESULT    CBC and differential [[id number]]  (Abnormal) Collected: 04/17/21 1929    Lab Status: Final result Specimen: Blood from Arm, Right Updated: 04/17/21 1948 WBC 11 23 Thousand/uL      RBC 4 63 Million/uL      Hemoglobin 14 6 g/dL      Hematocrit 43 9 %      MCV 95 fL      MCH 31 5 pg      MCHC 33 3 g/dL      RDW 13 1 %      MPV 9 8 fL      Platelets 284 Thousands/uL      nRBC 0 /100 WBCs      Neutrophils Relative 74 %      Immat GRANS % 1 %      Lymphocytes Relative 16 %      Monocytes Relative 7 %      Eosinophils Relative 2 %      Basophils Relative 0 %      Neutrophils Absolute 8 27 Thousands/µL      Immature Grans Absolute 0 13 Thousand/uL      Lymphocytes Absolute 1 74 Thousands/µL      Monocytes Absolute 0 80 Thousand/µL      Eosinophils Absolute 0 24 Thousand/µL      Basophils Absolute 0 05 Thousands/µL                  CT head without contrast   Final Result by Paris Colorado MD (04/17 2100)         1  No evidence of acute intracranial hemorrhage, mass effect or extra-axial collection  2   Left parotid 1 2 cm lesion, indeterminate  Recommend nonemergent MRI of the neck with gadolinium for further evaluation  Workstation performed: [id number]         CT spine cervical without contrast   Final Result by Paris Colorado MD (04/17 2055)      No acute cervical spine fracture or traumatic malalignment  Workstation performed: [id number]         XR shoulder 2+ views LEFT   ED Interpretation by Olu Mendoza DO (04/17 2010)   No acute osseous abnormality            Procedures  Procedures      ED Course  ED Course as of Apr 17 2113   Sat Apr 17, 2021 1957 Total CK: 99               Identification of Seniors at Risk      Most Recent Value   (ISAR) Identification of Seniors at Risk   Before the illness or injury that brought you to the Emergency, did you need someone to help you on a regular basis? 0 Filed at: 04/17/2021 1845   In the last 24 hours, have you needed more help than usual?  0 Filed at: 04/17/2021 1845   Have you been hospitalized for one or more nights during the past 6 months?   0 Filed at: 04/17/2021 [phone]   In general, do you see well?  0 Filed at: 04/17/2021 1845   In general, do you have serious problems with your memory? 1 Filed at: 04/17/2021 1845   Do you take more than three different medications every day? 1 Filed at: 04/17/2021 1845   ISAR Score  2 Filed at: 04/17/2021 1845                    SBIRT 20yo+      Most Recent Value   SBIRT (23 yo +)   In order to provide better care to our patients, we are screening all of our patients for alcohol and drug use  Would it be okay to ask you these screening questions? No Filed at: 04/17/2021 1848                MDM  Number of Diagnoses or Management Options  Abnormal finding on CT scan:   Fall, initial encounter:   Shoulder pain, left:   Diagnosis management comments: Will check CBC, BMP, CT head, CT C-spine, left shoulder x-ray, CK  Discussed findings of laboratory work, x-ray and CT scan with patient and patient's son at bedside  Stressed follow-up for nonspecific finding of 1 2 cm lesion left parotid gland with her primary care doctor  They state their understanding of this and state they will get a hold of the primary care doctor  Disposition  Final diagnoses:   Fall, initial encounter   Shoulder pain, left   Abnormal finding on CT scan - Left parotid 1 2 cm lesion     Time reflects when diagnosis was documented in both MDM as applicable and the Disposition within this note     Time User Action Codes Description Comment    4/17/2021  9:02 PM Radha Guidryro Coltyesenia  Abbe Diss Fall, initial encounter     4/17/2021  9:02 PM Radha Mcbride Add [W25 [id number]] Shoulder pain, left     4/17/2021  9:02 PM Radha Mcbride Add [R93 [id number]] Abnormal finding on CT scan     4/17/2021  9:02 PM Radha Mcbride Modify [R93 [id number]] Abnormal finding on CT scan Left parotid 1 2 cm lesion      ED Disposition     ED Disposition Condition Date/Time Comment    Discharge Stable Sat Apr 17, 2021  9:02 PM Rob Shamannette discharge to home/self care              Follow-up Information     Follow up With Specialties Details Why Contact Info Additional 676 Peng Mcconnell MD Internal Medicine Schedule an appointment as soon as possible for a visit   [address]  [address]  [address] [facility] [address]  [phone]       [address] [place] [address] Emergency Department Emergency Medicine Go to  As needed, If symptoms worsen [address]  [address] [facility] Emergency Department, [address], [address]   [phone]          Patient's Medications   Discharge Prescriptions    No medications on file     No discharge procedures on file  PDMP Review       Value Time User    PDMP Reviewed  Yes 11/19/2019  9:03 AM Maryjane Johansen PA-C           ED Provider  Attending physically available and evaluated Larisa Leblanc I managed the patient along with the ED Attending      Electronically Signed by         Jud Tran DO  04/17/21 6165

## 2021-04-18 NOTE — DISCHARGE INSTRUCTIONS
Please follow up with your Primary Care doctor for a non-specific finding on your CT scan of   Left parotid 1 2 cm lesion, which needs non-emergent follow up  If you develop any new or worsening symptoms please immediately return to your nearest emergency department

## 2021-04-21 ENCOUNTER — OFFICE VISIT (OUTPATIENT)
Dept: INTERNAL MEDICINE CLINIC | Facility: CLINIC | Age: 86
End: 2021-04-21
Payer: MEDICARE

## 2021-04-21 VITALS
DIASTOLIC BLOOD PRESSURE: 80 MMHG | HEIGHT: 59 IN | RESPIRATION RATE: 14 BRPM | WEIGHT: 165.2 LBS | SYSTOLIC BLOOD PRESSURE: 130 MMHG | BODY MASS INDEX: 33.3 KG/M2 | HEART RATE: 72 BPM

## 2021-04-21 DIAGNOSIS — M75.82 TENDINITIS OF LEFT ROTATOR CUFF: ICD-10-CM

## 2021-04-21 DIAGNOSIS — M25.512 ACUTE PAIN OF LEFT SHOULDER: ICD-10-CM
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
## 2021-04-21 DIAGNOSIS — I10 ESSENTIAL HYPERTENSION: ICD-10-CM

## 2021-04-21 DIAGNOSIS — R93.89 ABNORMAL CAT SCAN: ICD-10-CM
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
## 2021-04-21 PROCEDURE — 99214 OFFICE O/P EST MOD 30 MIN: CPT | Performed by: INTERNAL MEDICINE

## 2021-04-21 RX ORDER — PROPRANOLOL HYDROCHLORIDE 20 MG/1
20 TABLET ORAL 4 TIMES DAILY
Qty: 360 TABLET | Refills: 3 | Status: SHIPPED | OUTPATIENT
Start: 2021-04-21 | End: 2021-07-26 | Stop reason: SDUPTHER

## 2021-04-21 NOTE — PROGRESS NOTES
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  She has had extensive workup with workup recently repeated  Immunofixation negative, heavy metals negative, B12 methylmalonic acid Lyme titer, rheumatoid factor antinuclear antibody all normal   Thyroid function normal   B12 level normal   Free light chain ratio normal   Had hallucinations on gabapentin-at this point she remains on vitamin B6 50 milligrams daily  5  Abnormal gait-associated with the above plus diffuse DJD   Had prior total knee replacement but suspect predominantly abnormality of gait is related to her neuropathy -patient walking with a cane  6  Hypercalcemia-noted previously and of questionable significance   Had repeat labs with [facility] normal not felt significant  7  Hypertension-adequate control on current dose of beta-blocker -had been on a diuretic in the past but this is not needed at present -stable at present without exacerbation post fall  8  Previously noted skin rash -treated previously as potential erythema chronicum migrans as she remembered a bite and then developed a rash- had doxycycline for 3 weeks   Then developed fungal disease left greater than right was treated with Diflucan and Lotrisone   Serology for Lyme disease was ordered previously but not done -will be repeating at this point  9   Status post left cubital tunnel release-left carpal tunnel release done in November 2019 without complications   Still has some numbness and intermittent pain is seeing the orthopedic physician -now has some ongoing left arm numbness and she is scheduled to meet with the orthopedic physician- rule out component of cervical radiculopathy     All other problems as per note of May 2020 in August 2018         MEDICAL REGIMEN:                                                  Vitamin B6 50 milligrams daily, gabapentin 100 milligrams HS, propanolol 20 milligrams q i d , lisinopril 10 milligrams b i d , Crestor 10 milligrams- half tablet 3 times per week, multivitamin, cranberry daily, generic Zantac 150 milligrams in the morning- REVIEW NEXT VISIT intermittent use of prednisone eyedrops per Ophthalmology, vitamin D3/ 2000 units a day, anastrozole 1 milligram daily started in the spring of 2019 to be use for 5 years     await results repeat evaluation by Ortho  Await results of MRI  This patient will be seen at previously scheduled appointment with previously scheduled labs  addendum -patient seen by    On April 22nd-he is worried about rotator cuff pathology  She has new migration  Of the humeral head which was not present on x-rays taken 1 week prior -they are concerned about surgical pathology -rotator cuff tear and she is scheduled for an MRI of her  shoulder      Addendum- patient had repeat MRI showing chronic rotator cuff tear with moderate fatty muscle atrophy -symptoms aggravated by recent fall  She will be proceeding with reversed total shoulder arthroplasty as she had same operation on the right side is doing well from that  patient is scheduled for left shoulder surgery-she is medically cleared -will hold lisinopril day before and day of surgery    Will take usual dose of propanolol morning of surgery with sips of water     Addendum- MRI of the neck shows what appears to be well circumscribed 1 1 left parotid 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- similar to study done in 2009 although that study was not a complete- they wanted to consider 6 month follow-up- I recommended formal ENT eval and we will make arrangements for that  No problem-specific Assessment & Plan notes found for this encounter  Diagnoses and all orders for this visit:    Fall, subsequent encounter    Abnormal CAT scan  -     MRI soft tissue neck wo and w contrast; Future    Tendinitis of left rotator cuff    Acute pain of left shoulder  -     Ambulatory referral to Orthopedic Surgery; Future          Subjective:      Patient ID: Mary Huang is a 81 y o  female  This patient is seen today as an emergency appointment  she is seen today after a fall with a trip to the ER  She was brought to the ER by ambulance  She had a mechanical fall where she had a bag and caught on the steps and landed heavily on her left side  She also had a hard time getting up and had use her medical alert bracelet  She was evaluated in the ER  Left shoulder x-ray showed arthritis but no fracture  CT scan of cervical spine showed no fracture  CT scan of brain showed no acute pathology however a red on her CT a left parotid 1 2 centimeter lesion which was indeterminate  She denies any history of head and neck surgery when she was younger  She denies any history of parotid stone  She has a palpable area on exam     She had prior left shoulder disease and had recent evaluation by the orthopedic group with injection was improving but now is significantly worsened she had a fall  She has known hypertension was fearful that would all be exacerbated  BP was stable  She is avoiding salt decongestants  She is not using frequent nonsteroidals  She was accompanied to the visit by family member  We reviewed all the above  Labs reviewed regarding recent renal function  November of 2020 BUN 15 with creatinine of 0 93  X-rays from ER reviewed in detail      Results for orders placed or performed during the hospital encounter of 04/17/21  -CBC and differential       Result                      Value             Ref Range           WBC                         11 23 (H)         4 31 - 10 16*       RBC                         4 63              3 81 - 5 12 *       Hemoglobin                  14 6              11 5 - 15 4 *       Hematocrit                  43 9              34 8 - 46 1 %       MCV                         95                82 - 98 fL          MCH                         31 5              26 8 - 34 3 *       MCHC                        33 3              31 4 - 37 4 *       RDW                         13 1              11 6 - 15 1 %       MPV                         9 8               8 9 - 12 7 fL       Platelets                   193               149 - 390 Th*       nRBC                        0                 /100 WBCs           Neutrophils Relative        74                43 - 75 %           Immat GRANS %               1                 0 - 2 %             Lymphocytes Relative        16                14 - 44 %           Monocytes Relative          7                 4 - 12 %            Eosinophils Relative        2                 0 - 6 %             Basophils Relative          0                 0 - 1 %             Neutrophils Absolute        8 27 (H)          1 85 - 7 62 *       Immature Grans Absolute     0 13              0 00 - 0 20 *       Lymphocytes Absolute        1 74              0 60 - 4 47 *       Monocytes Absolute          0 80              0 17 - 1 22 *       Eosinophils Absolute        0 24              0 00 - 0 61 *       Basophils Absolute          0 05              0 00 - 0 10 *  -Basic metabolic panel       Result                      Value             Ref Range           Sodium                      141               136 - 145 mm*       Potassium                   4 2               3 5 - 5 3 mm*       Chloride                    108               100 - 108 mm*       CO2                         28 21 - 32 mmol*       ANION GAP                   5                 4 - 13 mmol/L       BUN                         21                5 - 25 mg/dL        Creatinine                  0 96              0 60 - 1 30 *       Glucose                     104               65 - 140 mg/*       Calcium                     9 2               8 3 - 10 1 m*       eGFR                        54                ml/min/1 73s*  -CK (with reflex to MB)       Result                      Value             Ref Range           Total CK                    99                26 - 192 U/L   -Urine Microscopic       Result                      Value             Ref Range           RBC, UA                     None Seen         None Seen, 2*       WBC, UA                     2-4               None Seen, 2*       Epithelial Cells            Occasional        None Seen, O*       Bacteria, UA                Occasional        None Seen, O*  -Urine Macroscopic, POC       Result                      Value             Ref Range           Color, UA                   Yellow                                Clarity, UA                 Clear                                 pH, UA                      7 0               4 5 - 8 0           Leukocytes, UA              Trace (A)         Negative            Nitrite, UA                 Negative          Negative            Protein, UA                 100 (2+) (A)      Negative mg/*       Glucose, UA                 Negative          Negative mg/*       Ketones, UA                 Negative          Negative mg/*       Urobilinogen, UA            0 2               0 2, 1 0 E U*       Bilirubin, UA               Negative          Negative            Blood, UA                   Negative          Negative            Specific [place], UA        1 020             1 003 - 1 030  CT CERVICAL SPINE - WITHOUT CONTRAST     INDICATION:   Neck pain and trauma        COMPARISON:  None      TECHNIQUE:  CT examination of the cervical spine was performed without intravenous contrast   Contiguous axial images were obtained  Sagittal and coronal reconstructions were performed        Radiation dose length product (DLP) for this visit:  587 91 mGy-cm   This examination, like all CT scans performed in the [facility], was performed utilizing techniques to minimize radiation dose exposure, including the use of iterative   reconstruction and automated exposure control        IMAGE QUALITY:  Diagnostic      FINDINGS:     ALIGNMENT:  Normal alignment of the cervical spine  No subluxation      VERTEBRAL BODIES:  No acute cervical spine fracture      DEGENERATIVE CHANGES:  Disc and facet degenerative change resulting in mild bony central canal stenosis and neural foraminal narrowing      PREVERTEBRAL AND PARASPINAL SOFT TISSUES:  No prevertebral soft tissue swelling      THORACIC INLET:  Clear lung apices      IMPRESSION:     No acute cervical spine fracture or traumatic malalignment          CT BRAIN - WITHOUT CONTRAST     INDICATION:   Headache and head trauma      COMPARISON:  10/28/2009      TECHNIQUE:  CT examination of the brain was performed  In addition to axial images, sagittal and coronal 2D reformatted images were created and submitted for interpretation      Radiation dose length product (DLP) for this visit:  907 61 mGy-cm     This examination, like all CT scans performed in the [facility], was performed utilizing techniques to minimize radiation dose exposure, including the use of iterative   reconstruction and automated exposure control        IMAGE QUALITY:  Diagnostic      FINDINGS:     PARENCHYMA:  No acute intracranial hemorrhage or mass effect      Periventricular and subcortical attenuating foci consistent with mild microangiopathic disease      VENTRICLES AND EXTRA-AXIAL SPACES:  Normal for the patient's age      VISUALIZED ORBITS AND PARANASAL SINUSES:  Unremarkable      CALVARIUM AND EXTRACRANIAL SOFT TISSUES:  No lytic or blastic lesion or acute fracture      Circumscribed 1 2 cm lesion in the superficial aspect of the left parotid gland      IMPRESSION:        1  No evidence of acute intracranial hemorrhage, mass effect or extra-axial collection  2   Left parotid 1 2 cm lesion, indeterminate  Recommend nonemergent MRI of the neck with gadolinium for further evaluation          The following portions of the patient's history were reviewed and updated as appropriate: allergies, current medications, past family history, past medical history, past social history, past surgical history and problem list     Review of Systems   Constitutional: Negative  HENT: Negative  Respiratory: Negative  Cardiovascular: Negative  Gastrointestinal: Negative  Endocrine: Negative  Genitourinary: Negative  Musculoskeletal: Negative  Left shoulder pain   Skin: Negative  Neurological: Negative  Hematological: Negative  Psychiatric/Behavioral: Negative  Objective:      Ht 4' 11" (1 499 m)   Wt 74 9 kg (165 lb 3 2 oz)   BMI 33 37 kg/m²          Physical Exam  Vitals signs reviewed  Constitutional:       General: She is not in acute distress  Appearance: Normal appearance  She is not ill-appearing, toxic-appearing or diaphoretic  HENT:      Head: Normocephalic and atraumatic  Right Ear: Tympanic membrane, ear canal and external ear normal       Left Ear: Tympanic membrane, ear canal and external ear normal       Nose: Nose normal  No congestion or rhinorrhea  Mouth/Throat:      Mouth: Mucous membranes are moist       Pharynx: Oropharynx is clear  No oropharyngeal exudate or posterior oropharyngeal erythema  Comments:  Fullness with palpable area the left parotid gland  Eyes:      General: No scleral icterus  Right eye: No discharge  Left eye: No discharge  Extraocular Movements: Extraocular movements intact        Pupils: Pupils are equal, round, and reactive to light  Neck:      Musculoskeletal: Normal range of motion and neck supple  No neck rigidity or muscular tenderness  Vascular: No carotid bruit  Cardiovascular:      Rate and Rhythm: Normal rate and regular rhythm  Pulses: Normal pulses  Heart sounds: Normal heart sounds  No murmur  No friction rub  No gallop  Pulmonary:      Effort: Pulmonary effort is normal  No respiratory distress  Breath sounds: Normal breath sounds  No stridor  No wheezing, rhonchi or rales  Chest:      Chest wall: No tenderness  Abdominal:      General: Abdomen is flat  Bowel sounds are normal  There is no distension  Palpations: Abdomen is soft  There is no mass  Tenderness: There is no abdominal tenderness  There is no right CVA tenderness, left CVA tenderness, guarding or rebound  Hernia: No hernia is present  Musculoskeletal: Normal range of motion  General: No swelling, tenderness, deformity or signs of injury  Right lower leg: No edema  Left lower leg: No edema  Comments:  Significant decrease in range of motion of the left shoulder   Lymphadenopathy:      Cervical: No cervical adenopathy  Skin:     General: Skin is warm and dry  Coloration: Skin is not jaundiced or pale  Findings: No bruising, erythema, lesion or rash  Neurological:      General: No focal deficit present  Mental Status: She is alert and oriented to person, place, and time  Mental status is at baseline  Cranial Nerves: No cranial nerve deficit  Sensory: No sensory deficit  Motor: No weakness  Coordination: Coordination normal       Gait: Gait normal       Deep Tendon Reflexes: Reflexes normal    Psychiatric:         Mood and Affect: Mood normal          Behavior: Behavior normal          Thought Content:  Thought content normal          Judgment: Judgment normal

## 2021-04-22 ENCOUNTER — OFFICE VISIT (OUTPATIENT)
Dept: OBGYN CLINIC | Facility: OTHER | Age: 86
End: 2021-04-22
Payer: MEDICARE

## 2021-04-22 VITALS
HEIGHT: 59 IN | BODY MASS INDEX: 33.02 KG/M2 | SYSTOLIC BLOOD PRESSURE: 154 MMHG | WEIGHT: 163.8 LBS | HEART RATE: 62 BPM | DIASTOLIC BLOOD PRESSURE: 71 MMHG

## 2021-04-22 DIAGNOSIS — M24.812 INTERNAL DERANGEMENT OF SHOULDER, LEFT: Primary | ICD-10-CM

## 2021-04-22 DIAGNOSIS — M25.512 ACUTE PAIN OF LEFT SHOULDER: ICD-10-CM

## 2021-04-22 PROCEDURE — 99214 OFFICE O/P EST MOD 30 MIN: CPT | Performed by: ORTHOPAEDIC SURGERY

## 2021-04-22 NOTE — PROGRESS NOTES
Assessment  Diagnoses and all orders for this visit:    Internal derangement of shoulder, left (suspect acute on chronic rotator cuff tear)    Acute pain of left shoulder        Discussion and Plan:    Patient has an examination  and acute mechanism of injury worrisome for rotator cuff pathology  Patient's new x-rays show new finding of proximal migration of the humeral head which was not present on her x-rays taken 1 week prior  She is indicated this time for an MRI scan to evaluate for surgical pathology  We will review the results of the study when it has been obtained and discuss further treatment options  Subjective:   Patient ID: Nabila Golden is a 81 y o  female      HPI  Patient presents today for follow up of left shoulder impingement syndrome  She received a CS injection at her visit on 4/12/21 and was referred to PT  Patient reports that she had a decrease in her pain symptoms following the injection and felt as though she was doing well  She reports a fall on 4/17/21  Patient states she was carrying groceries into her house when she tripped on the step and fell  Since that time she has had a significant increase in pain and decreased range of motion  The following portions of the patient's history were reviewed and updated as appropriate: allergies, current medications, past family history, past medical history, past social history, past surgical history and problem list     Review of Systems   Constitutional: Negative for chills and fever  HENT: Negative for drooling and sneezing  Eyes: Negative for redness  Respiratory: Negative for cough and wheezing  Gastrointestinal: Negative for nausea and vomiting  Musculoskeletal:        Please see ortho exam   Psychiatric/Behavioral: Negative for behavioral problems  The patient is not nervous/anxious          Objective:  /71   Pulse 62   Ht 4' 11" (1 499 m)   Wt 74 3 kg (163 lb 12 8 oz)   BMI 33 08 kg/m² Left Shoulder Exam     Tenderness   Left shoulder tenderness location: gerneralized tenderness  Range of Motion   External rotation: 40   Forward flexion: 90 (150 PROM)   Internal rotation 0 degrees: Sacrum     Muscle Strength   Abduction: 3/5   External rotation: 4/5     Tests   Barragan test: positive  Drop arm: positive    Other   Erythema: absent  Sensation: normal  Pulse: present     Comments:    Patient has pain in all planes of motion            Physical Exam  Vitals signs reviewed  Constitutional:       Appearance: She is well-developed  Eyes:      Pupils: Pupils are equal, round, and reactive to light  Pulmonary:      Effort: Pulmonary effort is normal       Breath sounds: Normal breath sounds  Skin:     General: Skin is warm and dry  Neurological:      Mental Status: She is alert and oriented to person, place, and time  Psychiatric:         Behavior: Behavior normal          Thought Content: Thought content normal          Judgment: Judgment normal            I have personally reviewed pertinent films in PACS and my interpretation is as follows  Left shoulder x-rays from 4/17/21 demonstrates no fracture or dislocation    There is a new finding of proximal migration of the humeral head when compared to x-rays taken 4/12/21      Scribe Attestation    I,:  Susi Conte am acting as a scribe while in the presence of the attending physician :       I,:  Janene Liriano MD personally performed the services described in this documentation    as scribed in my presence :

## 2021-05-05 ENCOUNTER — HOSPITAL ENCOUNTER (OUTPATIENT)
Dept: RADIOLOGY | Age: 86
Discharge: HOME | End: 2021-05-05
Payer: MEDICARE

## 2021-05-05 DIAGNOSIS — R93.89 ABNORMAL CAT SCAN: ICD-10-CM

## 2021-05-05 PROCEDURE — 70543 MRI ORBT/FAC/NCK W/O &W/DYE: CPT

## 2021-05-05 PROCEDURE — G1004 CDSM NDSC: HCPCS

## 2021-05-05 PROCEDURE — A9585 GADOBUTROL INJECTION: HCPCS | Performed by: INTERNAL MEDICINE

## 2021-05-05 RX ADMIN — GADOBUTROL 7 ML: 604.72 INJECTION INTRAVENOUS at 11:16

## 2021-05-07 ENCOUNTER — HOSPITAL ENCOUNTER (OUTPATIENT)
Dept: RADIOLOGY | Age: 86
Discharge: HOME | End: 2021-05-07
Payer: MEDICARE

## 2021-05-07 DIAGNOSIS — M25.512 ACUTE PAIN OF LEFT SHOULDER: ICD-10-CM

## 2021-05-07 DIAGNOSIS — M24.812 INTERNAL DERANGEMENT OF SHOULDER, LEFT: ICD-10-CM

## 2021-05-07 PROCEDURE — 73221 MRI JOINT UPR EXTREM W/O DYE: CPT

## 2021-05-07 PROCEDURE — G1004 CDSM NDSC: HCPCS

## 2021-05-10 ENCOUNTER — OFFICE VISIT (OUTPATIENT)
Dept: OBGYN CLINIC | Facility: OTHER | Age: 86
End: 2021-05-10
Payer: MEDICARE

## 2021-05-10 ENCOUNTER — TELEPHONE (OUTPATIENT)
Dept: INTERNAL MEDICINE CLINIC | Facility: CLINIC | Age: 86
End: 2021-05-10

## 2021-05-10 VITALS
SYSTOLIC BLOOD PRESSURE: 172 MMHG | HEART RATE: 67 BPM | BODY MASS INDEX: 33.3 KG/M2 | HEIGHT: 59 IN | WEIGHT: 165.2 LBS | DIASTOLIC BLOOD PRESSURE: 79 MMHG

## 2021-05-10 DIAGNOSIS — M12.812 ROTATOR CUFF TEAR ARTHROPATHY OF LEFT SHOULDER: Primary | ICD-10-CM

## 2021-05-10 DIAGNOSIS — Z01.818 PREOPERATIVE EXAMINATION: ICD-10-CM

## 2021-05-10 DIAGNOSIS — M75.102 ROTATOR CUFF TEAR ARTHROPATHY OF LEFT SHOULDER: Primary | ICD-10-CM

## 2021-05-10 PROCEDURE — 99214 OFFICE O/P EST MOD 30 MIN: CPT | Performed by: ORTHOPAEDIC SURGERY

## 2021-05-10 RX ORDER — CHLORHEXIDINE GLUCONATE 0.12 MG/ML
15 RINSE ORAL ONCE
Status: CANCELLED | OUTPATIENT
Start: 2021-05-10 | End: 2021-05-10

## 2021-05-10 NOTE — PATIENT INSTRUCTIONS
What to Expect Before and After Shoulder Replacement Surgery  You are being scheduled for a shoulder replacement by Dr Miles Boucher to treat your shoulder condition  Here is some information which may help to answer questions that you may have  Please do not hesitate to reach out to our team to answer questions not addressed here  Before Surgery  You will be contacted the evening prior to your surgery to confirm the scheduled time of the procedure and when to arrive at the hospital    Do not eat or drink anything after midnight the night before your surgery so that the anesthesia can be performed safely  If you have been fitted for a sling in the office prior to the surgery please remember to bring it to the hospital   You will meet the anesthesiologist the morning of the surgery  The surgery is performed under a general anesthetic but they will also offer you a regional block (shot to numb the arm) to help control your post-operative pain as well as with a catheter that is left in place after the block  The catheter is connected to a small pump which will continue to provide numbing medicine and help prolong the pain control from the block  Unfortunately this catheter is not as effective as the initial block, but can still be very helpful in managing the pain  After Surgery and in the Hospital  The shoulder replacement surgery typically takes 60-90 minutes  When surgery is completed, your surgeon will update your family and friends on your condition and progress  You will remain in the recovery room for at least an hour or until the anesthesia has worn off and your blood pressure and pulse are stable  If you have pain, the nurses will give you medication  Once out of surgery, your surgeon will decide on how long you will be using a sling in order to protect and position your shoulder  However, this won't keep you from starting physical therapy    Exercises typically begin on the day after surgery with emphasis on moving the shoulder, wrist, and hand  The physical therapist will be provided with a detailed protocol but typically the first 6 weeks are used to regain range of motion and then strengthening is initiated  Starting strengthening exercises too early may lead to complications  When You Are Discharged from the [address] [place] [address] can expect to be released from the hospital the day after surgery (this may change if you have special needs or medical conditions)  Before you are released, the treatment team (Orthopaedic surgery residents, physician assistants and physical therapists) will talk with you about the importance of limiting any sudden or stressful movements to the arm for several weeks or longer  Activities that involve pushing, pulling, and lifting should not be done until you are given permission from your surgeon  Your First Day at [address] [place] [address] may need help with your daily activities, so it is a good idea to have family and friends prepared to help you  It is okay to remove the sling and let the arm hang at the side so that you can get cleaned and change your clothes  To put on a shirt, place the bad arm in first and then the good arm  Reverse to take it off  Don't forget to wear the sling every night for at least the first month after surgery, and never use your arm to push yourself up in bed or from a chair  The added weight on your shoulder may cause you to re-injure the joint  How to [place] in the First Week  You are encouraged to return to your normal eating and sleeping patterns as soon as possible  It is important for you to be active in order to control your weight and muscle tone  It is ok to increase your activity level and even perform light aerobic exercise (like walking or riding a stationary bike) within the first week or so if you are feeling up to it    If there is concern about these activates best to wait until the first post-operative visit and discuss this with the team    Surekha Marleny might be able to return to work within several days if you can perform your job while wearing a sling  Consult with your doctor, as this differs from patient to patient  However, if your job requires heavy lifting or climbing, there may be a delay for several months  Until you are seen for your first follow-up visit, please try and keep wound dry  It is okay to shower but try your best to keep the incision out of direct contact with the water (or consider using a waterproof bandage)  If it does gets wet please dry as best as possible afterwards  If you notice any drainage or a foul odor from your incision or your temperature goes above 101 5 degrees, please contact the office  What You Can Expect in the First Month  Your first post-operative appointment will be with Dr Scott Lambert physician assistant (PA) around 2 weeks after the surgery  You skin staples will be removed and X-rays will be obtained at that visit  Please understand that it is quite common to still experience pain at that time but the pain should be steadily improving  Hopefully physical therapy has already begun but if not it will be initiated at this visit and will continue for the 8-12 weeks  At about 12 weeks after surgery you will start a progressive strengthening program  Physical therapy is a deliberate process of not only strengthening your shoulder but also altering how you use your arm  It may be many months before your desired results are achieved, so do not get discouraged  Your shoulder will generally continue to improve steadily up to 6-8 months after surgery  After that point further improvement is very slow; although it has been shown that even after a year or more, activity can increase as muscle strength continues to improve  After the First Month at Home   Because each person heals differently, there are different recovery timelines  An average recovery period typically lasts about between 3-6 months  Talk with your surgeon about which activities will be appropriate for you once you have recovered

## 2021-05-10 NOTE — TELEPHONE ENCOUNTER
PT CALLED, SAID THAT SHE HAS NOT HEARD BACK WITH THE RESULTS OF THE MRI OF HER FACE   RESULTS ARE IN CHART, PLEASE ADVISE     SHE THEN SAID THAT OFF AND ON, SHE HAS BEEN GETTING LIGHTHEADED SINCE SHE SAW YOU LAST     PLEASE ADVISE

## 2021-05-10 NOTE — PROGRESS NOTES
Assessment  Diagnoses and all orders for this visit:    Rotator cuff tear arthropathy of left shoulder      Discussion and Plan:    · Explained to the patient that her MRI does reveal a chronic rotator cuff tear with moderate fatty muscle atrophy  Her symptoms were most likely aggravated by her recent fall  As she recently had a CS injection one can not be repeated this soon  She also has been performing HEP without benefit  She is willing to proceed forward with a reverse total shoulder arthroplasty as she had the same operation on the right side and is doing well from that procedure and states "something has to be done" for her left shoulder given the severity of the symptoms  · A thorough discussion was performed with the patient reviewing all operative and nonoperative options as well as the risks of the procedure  Risks discussed include but not limited to persistent pain, dislocation, loosening of the prosthesis, infection, need for further surgery including revision to a reverse total shoulder, rotator cuff rupture , neurovascular injury, as well as the risk of anesthesia  After this discussion all questions were answered and informed consent was obtained for Total Shoulder Arthroplasty of the left shoulder  · Follow up post operatively with Heydi Amaya PA-C    Subjective:   Patient ID: Claire Sampson is a 81 y o  female      HPI  80 y/o female who presents today for a follow up visit for her left shoulder as well as to discuss MRI results  Patient continues to note significant daily pain about the left shoulder, that is also keeping her up at night and from performing her daily activities  She reports diffuse pain about the shoulder, worse with any motions  She had a fall on 4/17/2021 which increased her symptoms  She takes Tylenol prn for pain relief without benefit  Denies numbness and tingling, fevers or chills         The following portions of the patient's history were reviewed and updated as appropriate: allergies, current medications, past family history, past medical history, past social history, past surgical history and problem list     Review of Systems   Constitutional: Negative for chills, fever and unexpected weight change  HENT: Negative for hearing loss, nosebleeds and sore throat  Eyes: Negative for pain, redness and visual disturbance  Respiratory: Negative for cough, shortness of breath and wheezing  Cardiovascular: Negative for chest pain, palpitations and leg swelling  Gastrointestinal: Negative for abdominal distention, nausea and vomiting  Endocrine: Negative for polydipsia and polyuria  Genitourinary: Negative for dysuria and hematuria  Skin: Negative for rash and wound  Neurological: Negative for dizziness, numbness and headaches  Psychiatric/Behavioral: Negative for decreased concentration and suicidal ideas  Objective:  BP (!) 172/79   Pulse 67   Ht 4' 11" (1 499 m)   Wt 74 9 kg (165 lb 3 2 oz)   BMI 33 37 kg/m²       Left Shoulder Exam     Tenderness   The patient is experiencing no tenderness  Range of Motion   External rotation: 40   Forward flexion: 90 (Passive 150)   Internal rotation 0 degrees: Sacrum     Muscle Strength   Abduction: 3/5   External rotation: 4/5     Tests   Drop arm: positive    Other   Erythema: absent  Sensation: normal  Pulse: present             Physical Exam  Vitals signs and nursing note reviewed  Constitutional:       Appearance: She is well-developed  HENT:      Head: Normocephalic and atraumatic  Eyes:      Pupils: Pupils are equal, round, and reactive to light  Neck:      Musculoskeletal: Normal range of motion and neck supple  Cardiovascular:      Rate and Rhythm: Normal rate and regular rhythm  Heart sounds: Normal heart sounds  Pulmonary:      Effort: Pulmonary effort is normal  No respiratory distress  Breath sounds: Normal breath sounds     Abdominal:      General: There is no distension  Palpations: Abdomen is soft  Skin:     General: Skin is warm and dry  Neurological:      Mental Status: She is alert and oriented to person, place, and time  Psychiatric:         Behavior: Behavior normal          Thought Content: Thought content normal          Judgment: Judgment normal            I have personally reviewed pertinent films in PACS and my interpretation is as follows  MRI Left Shoulder 5/7/2021: Complete tears of the supraspinatus and infraspinatus with significant retraction and moderate muscle atrophy of both the supraspinatus and infraspinatus consistent with chronic rotator cuff tear arthropathy       Scribe Attestation    I,:  Yevgeniy Vincent am acting as a scribe while in the presence of the attending physician :       I,:  Chery Dykes MD personally performed the services described in this documentation    as scribed in my presence :

## 2021-05-11 ENCOUNTER — TELEPHONE (OUTPATIENT)
Dept: INTERNAL MEDICINE CLINIC | Facility: CLINIC | Age: 86
End: 2021-05-11

## 2021-05-11 DIAGNOSIS — K11.8 MASS OF LEFT PAROTID GLAND: Primary | ICD-10-CM

## 2021-05-11 NOTE — TELEPHONE ENCOUNTER
----- Message from Jaison Piper MD sent at 5/11/2021  5:50 AM EDT -----   This patient is scheduled for upcoming shoulder surgery  She does not need an office appointment  Please let her know that she has to skip her lisinopril day before and day of surgery    She is to take her usual dose of propanolol morning of surgery with sips of water -I will be calling her about her x-ray of her parotid gland

## 2021-05-12 ENCOUNTER — ANESTHESIA EVENT (OUTPATIENT)
Dept: PERIOP | Facility: HOSPITAL | Age: 86
End: 2021-05-12
Payer: MEDICARE

## 2021-05-12 NOTE — TELEPHONE ENCOUNTER
----- Message from Mitchel Mendosa MD sent at 5/11/2021  1:44 PM EDT -----   Please schedule this patient with Dr Ernst Billings diagnosis of left parotid mass - referral letter is in the system -okay to be seen over the next 2 months
Daughter Rajat Sampson called # [phone] to report mom is doing worse than before  She feels more dizzy and thinks the gland is getting more bigger which is affecting her balance and causing her to get more dizzy  She has an appt on 06/03/2021 with Gume Moreira however I called their office and  they can bring pt in to [facility] on 5/18 with Feliberto Silveira @ [facility], I called and told daughter this but she wants to know if its ok to take her with Feliberto Silveira before her shoulder sx on 5/25 ?    Please advise Thanks
Have her see Dr Reji Mitchell- unlikely parotid area is calling dizziness- needs repeat exam- schedule her this Friday May 14 at 7 am if that slot is still open
Left daughter a message advising appt is for 2:30pm with Denny Aguero in [facility] [address]
REFERRAL ENTERED AND APPT MADE   PT IS AWARE OF THE DATE/TIME/LOCATION
Sp/w daughter and made aware
No

## 2021-05-14 ENCOUNTER — LAB REQUISITION (OUTPATIENT)
Dept: LAB | Facility: HOSPITAL | Age: 86
End: 2021-05-14
Payer: MEDICARE

## 2021-05-14 ENCOUNTER — HOSPITAL ENCOUNTER (OUTPATIENT)
Dept: RADIOLOGY | Age: 86
Discharge: HOME | End: 2021-05-14
Payer: MEDICARE

## 2021-05-14 ENCOUNTER — TELEPHONE (OUTPATIENT)
Dept: INTERNAL MEDICINE CLINIC | Facility: CLINIC | Age: 86
End: 2021-05-14

## 2021-05-14 ENCOUNTER — OFFICE VISIT (OUTPATIENT)
Dept: INTERNAL MEDICINE CLINIC | Facility: CLINIC | Age: 86
End: 2021-05-14
Payer: MEDICARE

## 2021-05-14 ENCOUNTER — APPOINTMENT (OUTPATIENT)
Dept: LAB | Age: 86
End: 2021-05-14
Payer: MEDICARE

## 2021-05-14 VITALS
HEART RATE: 72 BPM | DIASTOLIC BLOOD PRESSURE: 80 MMHG | RESPIRATION RATE: 14 BRPM | HEIGHT: 59 IN | WEIGHT: 164.2 LBS | SYSTOLIC BLOOD PRESSURE: 136 MMHG | BODY MASS INDEX: 33.1 KG/M2

## 2021-05-14 DIAGNOSIS — M75.102 ROTATOR CUFF TEAR ARTHROPATHY OF LEFT SHOULDER: ICD-10-CM

## 2021-05-14 DIAGNOSIS — Z01.818 PREOPERATIVE EXAMINATION: ICD-10-CM

## 2021-05-14 DIAGNOSIS — M12.812 ROTATOR CUFF TEAR ARTHROPATHY OF LEFT SHOULDER: ICD-10-CM

## 2021-05-14 DIAGNOSIS — H81.10 BENIGN PAROXYSMAL POSITIONAL VERTIGO, UNSPECIFIED LATERALITY: Primary | ICD-10-CM

## 2021-05-14 DIAGNOSIS — R42 DIZZINESS: ICD-10-CM

## 2021-05-14 DIAGNOSIS — Z01.818 ENCOUNTER FOR OTHER PREPROCEDURAL EXAMINATION: ICD-10-CM

## 2021-05-14 DIAGNOSIS — Z01.812 PRE-OPERATIVE LABORATORY EXAMINATION: ICD-10-CM

## 2021-05-14 DIAGNOSIS — K11.8 PAROTID MASS: ICD-10-CM

## 2021-05-14 DIAGNOSIS — M75.102 UNSPECIFIED ROTATOR CUFF TEAR OR RUPTURE OF LEFT SHOULDER, NOT SPECIFIED AS TRAUMATIC: ICD-10-CM

## 2021-05-14 DIAGNOSIS — I47.1 SUPRAVENTRICULAR TACHYCARDIA (HCC): Chronic | ICD-10-CM

## 2021-05-14 DIAGNOSIS — Z01.812 ENCOUNTER FOR PREPROCEDURAL LABORATORY EXAMINATION: ICD-10-CM

## 2021-05-14 LAB
ABO GROUP BLD: NORMAL
ALBUMIN SERPL BCP-MCNC: 3.6 G/DL (ref 3.5–5)
ALP SERPL-CCNC: 82 U/L (ref 46–116)
ALT SERPL W P-5'-P-CCNC: 26 U/L (ref 12–78)
ANION GAP SERPL CALCULATED.3IONS-SCNC: 7 MMOL/L (ref 4–13)
APTT PPP: 31 SECONDS (ref 23–37)
AST SERPL W P-5'-P-CCNC: 18 U/L (ref 5–45)
BASOPHILS # BLD AUTO: 0.05 THOUSANDS/ΜL (ref 0–0.1)
BASOPHILS NFR BLD AUTO: 1 % (ref 0–1)
BILIRUB SERPL-MCNC: 0.53 MG/DL (ref 0.2–1)
BLD GP AB SCN SERPL QL: NEGATIVE
BUN SERPL-MCNC: 24 MG/DL (ref 5–25)
CALCIUM SERPL-MCNC: 9.6 MG/DL (ref 8.3–10.1)
CHLORIDE SERPL-SCNC: 107 MMOL/L (ref 100–108)
CO2 SERPL-SCNC: 27 MMOL/L (ref 21–32)
CREAT SERPL-MCNC: 0.78 MG/DL (ref 0.6–1.3)
EOSINOPHIL # BLD AUTO: 0.35 THOUSAND/ΜL (ref 0–0.61)
EOSINOPHIL NFR BLD AUTO: 4 % (ref 0–6)
ERYTHROCYTE [DISTWIDTH] IN BLOOD BY AUTOMATED COUNT: 13.1 % (ref 11.6–15.1)
EST. AVERAGE GLUCOSE BLD GHB EST-MCNC: 103 MG/DL
GFR SERPL CREATININE-BSD FRML MDRD: 70 ML/MIN/1.73SQ M
GLUCOSE SERPL-MCNC: 103 MG/DL (ref 65–140)
HBA1C MFR BLD: 5.2 %
HCT VFR BLD AUTO: 41.5 % (ref 34.8–46.1)
HGB BLD-MCNC: 13.6 G/DL (ref 11.5–15.4)
IMM GRANULOCYTES # BLD AUTO: 0.05 THOUSAND/UL (ref 0–0.2)
IMM GRANULOCYTES NFR BLD AUTO: 1 % (ref 0–2)
INR PPP: 1.06 (ref 0.84–1.19)
LYMPHOCYTES # BLD AUTO: 1.75 THOUSANDS/ΜL (ref 0.6–4.47)
LYMPHOCYTES NFR BLD AUTO: 21 % (ref 14–44)
MCH RBC QN AUTO: 31.4 PG (ref 26.8–34.3)
MCHC RBC AUTO-ENTMCNC: 32.8 G/DL (ref 31.4–37.4)
MCV RBC AUTO: 96 FL (ref 82–98)
MONOCYTES # BLD AUTO: 0.64 THOUSAND/ΜL (ref 0.17–1.22)
MONOCYTES NFR BLD AUTO: 8 % (ref 4–12)
NEUTROPHILS # BLD AUTO: 5.48 THOUSANDS/ΜL (ref 1.85–7.62)
NEUTS SEG NFR BLD AUTO: 65 % (ref 43–75)
NRBC BLD AUTO-RTO: 0 /100 WBCS
PLATELET # BLD AUTO: 208 THOUSANDS/UL (ref 149–390)
PMV BLD AUTO: 10.4 FL (ref 8.9–12.7)
POTASSIUM SERPL-SCNC: 3.9 MMOL/L (ref 3.5–5.3)
PROT SERPL-MCNC: 6.7 G/DL (ref 6.4–8.2)
PROTHROMBIN TIME: 13.8 SECONDS (ref 11.6–14.5)
RBC # BLD AUTO: 4.33 MILLION/UL (ref 3.81–5.12)
RH BLD: POSITIVE
SODIUM SERPL-SCNC: 141 MMOL/L (ref 136–145)
SPECIMEN EXPIRATION DATE: NORMAL
WBC # BLD AUTO: 8.32 THOUSAND/UL (ref 4.31–10.16)

## 2021-05-14 PROCEDURE — 86900 BLOOD TYPING SEROLOGIC ABO: CPT | Performed by: ORTHOPAEDIC SURGERY

## 2021-05-14 PROCEDURE — 83036 HEMOGLOBIN GLYCOSYLATED A1C: CPT

## 2021-05-14 PROCEDURE — 80053 COMPREHEN METABOLIC PANEL: CPT

## 2021-05-14 PROCEDURE — G1004 CDSM NDSC: HCPCS

## 2021-05-14 PROCEDURE — 99214 OFFICE O/P EST MOD 30 MIN: CPT | Performed by: INTERNAL MEDICINE

## 2021-05-14 PROCEDURE — 73200 CT UPPER EXTREMITY W/O DYE: CPT

## 2021-05-14 PROCEDURE — 86850 RBC ANTIBODY SCREEN: CPT | Performed by: ORTHOPAEDIC SURGERY

## 2021-05-14 PROCEDURE — 86901 BLOOD TYPING SEROLOGIC RH(D): CPT | Performed by: ORTHOPAEDIC SURGERY

## 2021-05-14 PROCEDURE — 85730 THROMBOPLASTIN TIME PARTIAL: CPT

## 2021-05-14 PROCEDURE — 85025 COMPLETE CBC W/AUTO DIFF WBC: CPT

## 2021-05-14 PROCEDURE — 85610 PROTHROMBIN TIME: CPT

## 2021-05-14 PROCEDURE — 36415 COLL VENOUS BLD VENIPUNCTURE: CPT

## 2021-05-14 NOTE — PROGRESS NOTES
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-some of the study done in 2009 although that study was not as complete-she is scheduled to meet with the ENT physician  3  Status post mechanical fall with worsening baseline left shoulder pain  Repeat MRI shows chronic rotator cuff tear with moderate fatty muscle atrophy  She is scheduled for reverse total shoulder arthroplasty and is medically clear for that  14  Hypertension-adequately controlled-no adjustments made in therapy today    All other problems as per note of April 2021, May 2020 in August 2018    MEDICAL REGIMEN:                                                  Vitamin B6 50 milligrams daily, gabapentin 100 milligrams HS, propanolol 20 milligrams q i d , lisinopril 10 milligrams b i d , Crestor 10 milligrams- half tablet 3 times per week, multivitamin, cranberry daily, generic Zantac 150 milligrams in the morning- REVIEW NEXT VISIT intermittent use of prednisone eyedrops per Ophthalmology, vitamin D3/ 2000 units a day, anastrozole 1 milligram daily started in the spring of 2019 to be use for 5 years     Await results of vestibular therapy  This patient will be seen at previously scheduled appointment with previously scheduled labs  ADDENDUM- PATIENT IS MEDICALLY CLEARED FOR UPCOMING ORTHOPEDIC SURGERY -SHE WILL HOLD LISINOPRIL DAY BEFORE DAY OF SURGERY ANDTAKE USUAL DOSE OF PROPANOLOL MORNING OF SURGERY      Addendum -patient underwent left reverse total shoulder arthroplasty May 02AA without complications -discharge hemoglobin 14 1    Discharge BUN 21 creatinine 0 61       Addendum-patient seen by ortho on July 26-they commented that her surgery was on May 25th  She had a fall bracing herself with left upper extremity  X-rays were done showing intact reverse total shoulder prosthesis  She was told to stop physical therapy -if her symptoms persist at next visit we will consider CT scan of the shoulder but they felt this would be unlikely  No problem-specific Assessment & Plan notes found for this encounter  Diagnoses and all orders for this visit:    Benign paroxysmal positional vertigo, unspecified laterality  -     Ambulatory referral to Physical Therapy; Future    Dizziness    Parotid mass    Supraventricular tachycardia (HCC)          Subjective:      Patient ID: Chester Diop is a 81 y o  female  This patient is seen today as an emergency appointment       -she is having symptoms of dizziness and is worried is related to the parotid abnormality  She underwent an MRI of her parotid  This showed well circumscribed 1 1 left parotid mass 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 similar to study done in 2009  She is scheduled for repeat ENT eval   She notes her dizziness when turning her head  She says if she gets up and down quickly she notes that  She had a positive Hallpike maneuver to the left on exam today  She has slight tinnitus but this is not new  She has some decrease in hearing but this is not new  She denies weakness of 1 arm and leg compared to the other  She denies numbness of 1 arm and leg compared to the other  This patient denies any systemic symptoms  Specifically there has been no evidence of fever, night sweats, significant weight loss or significant decrease in appetite  She had no evidence of orthostatic hypotension on examination today  Results of MRI reviewed in detail  MRI OF THE SOFT TISSUES OF THE NECK - WITH AND WITHOUT CONTRAST     INDICATION: Abnormal findings on diagnostic imaging of other specified body structures     COMPARISON:  None      TECHNIQUE:  Sagittal T1, axial DWI and T2    Axial inversion recovery or fat suppressed T2  Axial T1 precontrast   Axial and coronal T1 postcontrast with fat suppression  Imaging performed on 3 0T MRI    IV Contrast:  7 mL of Gadobutrol injection (SINGLE-DOSE)      IMAGE QUALITY:  Diagnostic      FINDINGS:     VISUALIZED BRAIN PARENCHYMA:  Parenchymal volume loss with prominent extra-axial spaces within the middle cranial and posterior fossae      VISUALIZED ORBITS AND PARANASAL SINUSES:  Normal      NASAL CAVITY AND NASOPHARYNX:  Mild nasal septal deviation  Clear nasal cavity and nasopharynx      SUPRAHYOID NECK:  Normal oral cavity, tongue base, tonsillar fossa and epiglottis      INFRAHYOID NECK:  Aryepiglottic folds, laryngeal tissues, and piriform sinuses are normal      THYROID GLAND:   Normal      PAROTID AND SUBMANDIBULAR GLANDS:  There is a well-circumscribed avidly enhancing 1 1 x 0 7 cm x 6 mm (AP, transverse and craniocaudad) left parotid mass  This is located in the superficial lobe of the parotid gland  This lesion was partially visualized   on the MRI brain study from October 28, 2009 where it measured approximately 6 mm in craniocaudad dimension        No additional ipsilateral contralateral parotid lesions are identified  There is no periparotid inflammation or glandular ductal dilatation      The submandibular glands are unremarkable      LYMPH NODES:  No pathologic or enlarged adenopathy      VASCULAR STRUCTURES:  Grossly normal flow voids of the cervical vasculature      THORACIC INLET: Lung apices and upper mediastinum are grossly unremarkable      CERVICAL SPINE:  Mild multilevel cervical spondylosis from C4 to T2      IMPRESSION:     Avidly enhancing well-circumscribed left superficial parotid mass lesion, grossly stable (in the craniocaudad dimension) when compared to the MRI October 28, 2009 study  Statistically, 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    It   is unlikely to represent a metastatic lymph node or aggressive malignant lesion given its stability since the 2009 examination  However, given that the prior study did not allow for complete comparison of the lesion, consider 6 month follow-up to assess   for continued stability       She continues with issues with her shoulder is scheduled for shoulder surgery over the next 2 weeks  She has not had any significant nausea vomiting  She was concerned about the potential relationship of the parotid mass to her symptoms of dizziness  The following portions of the patient's history were reviewed and updated as appropriate: allergies, current medications, past family history, past medical history, past social history, past surgical history and problem list     Review of Systems   Constitutional: Negative  HENT: Negative  Respiratory: Negative  Cardiovascular: Negative  Gastrointestinal: Negative  Endocrine: Negative  Genitourinary: Negative  Musculoskeletal: Negative  Left shoulder pain   Skin: Negative  Neurological: Positive for dizziness  Hematological: Negative  Psychiatric/Behavioral: Negative  Objective:      Ht 4' 11" (1 499 m)   Wt 74 5 kg (164 lb 3 2 oz)   BMI 33 16 kg/m²          Physical Exam  Vitals signs reviewed  Constitutional:       General: She is not in acute distress  Appearance: Normal appearance  She is not ill-appearing, toxic-appearing or diaphoretic  HENT:      Head: Normocephalic and atraumatic  Comments: Palpable mass of the left parotid area     Right Ear: Ear canal and external ear normal       Left Ear: Ear canal and external ear normal       Ears:      Comments: Positive Hallpike maneuver to the left     Nose: Nose normal  No congestion or rhinorrhea  Mouth/Throat:      Mouth: Mucous membranes are moist       Pharynx: Oropharynx is clear  No oropharyngeal exudate or posterior oropharyngeal erythema  Eyes:      General: No scleral icterus  Right eye: No discharge  Left eye: No discharge  Extraocular Movements: Extraocular movements intact  Pupils: Pupils are equal, round, and reactive to light  Neck:      Musculoskeletal: Normal range of motion and neck supple  No neck rigidity or muscular tenderness  Vascular: No carotid bruit  Cardiovascular:      Rate and Rhythm: Normal rate and regular rhythm  Pulses: Normal pulses  Heart sounds: Normal heart sounds  No murmur  No friction rub  No gallop  Pulmonary:      Effort: Pulmonary effort is normal  No respiratory distress  Breath sounds: Normal breath sounds  No stridor  No wheezing, rhonchi or rales  Chest:      Chest wall: No tenderness  Abdominal:      General: Abdomen is flat  Bowel sounds are normal  There is no distension  Palpations: Abdomen is soft  There is no mass  Tenderness: There is no abdominal tenderness  There is no right CVA tenderness, left CVA tenderness, guarding or rebound  Hernia: No hernia is present  Musculoskeletal: Normal range of motion  General: No swelling, tenderness, deformity or signs of injury  Right lower leg: No edema  Left lower leg: No edema  Comments: Significant decrease in range of motion of left shoulder   Lymphadenopathy:      Cervical: No cervical adenopathy  Skin:     General: Skin is warm and dry  Coloration: Skin is not jaundiced or pale  Findings: No bruising, erythema, lesion or rash  Neurological:      General: No focal deficit present  Mental Status: She is alert and oriented to person, place, and time  Mental status is at baseline  Cranial Nerves: No cranial nerve deficit  Sensory: No sensory deficit  Motor: No weakness  Coordination: Coordination normal       Gait: Gait normal       Deep Tendon Reflexes: Reflexes normal    Psychiatric:         Mood and Affect: Mood normal          Behavior: Behavior normal          Thought Content:  Thought content normal  Judgment: Judgment normal

## 2021-05-14 NOTE — TELEPHONE ENCOUNTER
Alex Guajardo is asking if the ENT visit can wait until after Chandajaneda Kocher has surgery on 5/25? She mentioned she started therapy for the vertigo      Please advise

## 2021-05-14 NOTE — TELEPHONE ENCOUNTER
Pt needs to be seen for vestibular therapy within 1 week at [facility] [facility] per Dr Wright  Daughter says she will call to arrange this but will let me know if she has any trouble

## 2021-05-17 ENCOUNTER — TELEPHONE (OUTPATIENT)
Dept: HEMATOLOGY ONCOLOGY | Facility: CLINIC | Age: 86
End: 2021-05-17

## 2021-05-17 DIAGNOSIS — Z17.0 CARCINOMA OF UPPER-OUTER QUADRANT OF RIGHT BREAST IN FEMALE, ESTROGEN RECEPTOR POSITIVE (HCC): ICD-10-CM

## 2021-05-17 DIAGNOSIS — Z17.0 MALIGNANT NEOPLASM OF UPPER-OUTER QUADRANT OF RIGHT BREAST IN FEMALE, ESTROGEN RECEPTOR POSITIVE (HCC): ICD-10-CM

## 2021-05-17 DIAGNOSIS — E78.2 MIXED HYPERLIPIDEMIA: ICD-10-CM

## 2021-05-17 DIAGNOSIS — C50.411 CARCINOMA OF UPPER-OUTER QUADRANT OF RIGHT BREAST IN FEMALE, ESTROGEN RECEPTOR POSITIVE (HCC): ICD-10-CM

## 2021-05-17 DIAGNOSIS — C50.411 MALIGNANT NEOPLASM OF UPPER-OUTER QUADRANT OF RIGHT BREAST IN FEMALE, ESTROGEN RECEPTOR POSITIVE (HCC): ICD-10-CM

## 2021-05-17 RX ORDER — LANOLIN ALCOHOL/MO/W.PET/CERES
50 CREAM (GRAM) TOPICAL DAILY
COMMUNITY

## 2021-05-17 RX ORDER — ANASTROZOLE 1 MG/1
1 TABLET ORAL DAILY
Qty: 90 TABLET | Refills: 1 | Status: SHIPPED | OUTPATIENT
Start: 2021-05-17 | End: 2021-10-20 | Stop reason: SDUPTHER

## 2021-05-17 RX ORDER — ROSUVASTATIN CALCIUM 10 MG/1
5 TABLET, COATED ORAL 3 TIMES WEEKLY
Qty: 21 TABLET | Refills: 3 | Status: SHIPPED | OUTPATIENT
Start: 2021-05-17 | End: 2021-11-02 | Stop reason: SDUPTHER

## 2021-05-17 NOTE — TELEPHONE ENCOUNTER
Medication Refill     Who is Calling  Patient    Medication anastrozole (ARIMIDEX) 1 mg tablet        How many pills left 0   Preferred Pharmacy / Address [address] [address]   Call back number [phone]   Relevant Information

## 2021-05-18 ENCOUNTER — TELEPHONE (OUTPATIENT)
Dept: INTERNAL MEDICINE CLINIC | Facility: CLINIC | Age: 86
End: 2021-05-18

## 2021-05-18 NOTE — TELEPHONE ENCOUNTER
----- Message from Memo Rendon MD sent at 5/18/2021  6:16 AM EDT -----   THIS PATIENT IS SCHEDULED FOR UPCOMING ORTHOPEDIC SURGERY-REMIND HER she is not take lisinopril day before or day of surgery and take usual dose of propanolol morning of surgery

## 2021-05-19 ENCOUNTER — EVALUATION (OUTPATIENT)
Dept: PHYSICAL THERAPY | Facility: CLINIC | Age: 86
End: 2021-05-19
Payer: MEDICARE

## 2021-05-19 DIAGNOSIS — R42 DIZZINESS: Primary | ICD-10-CM

## 2021-05-19 DIAGNOSIS — H81.11 BENIGN PAROXYSMAL POSITIONAL VERTIGO OF RIGHT EAR: ICD-10-CM

## 2021-05-19 PROCEDURE — 97161 PT EVAL LOW COMPLEX 20 MIN: CPT | Performed by: PHYSICAL THERAPIST

## 2021-05-19 PROCEDURE — 95992 CANALITH REPOSITIONING PROC: CPT | Performed by: PHYSICAL THERAPIST

## 2021-05-19 NOTE — PROGRESS NOTES
PT Evaluation     Today's date: 2021  Patient name: Larisa Leblanc  : 1935  MRN: [id number]  Referring provider: Darren Frye MD  Dx:   Encounter Diagnosis     ICD-10-CM    1  Dizziness  R42    2  Benign paroxysmal positional vertigo of right ear  H81 11 Ambulatory referral to Physical Therapy       Start Time: 1400  Stop Time: 1445  Total time in clinic (min): 45 minutes    Assessment  Assessment details: Pt is a 81year old female referred with dizziness and BPPV  Pt positive for right horizontal canal BPPV today as seen with roll test   Nystagmus initially ageotropic, converted to geotropic with head shaking maneuver  Liberatory maneuver completed x2 to the right with complete resolution of nystagmus and symptoms  Reviewed post-CRM precautions, pt and her daughter communicated understanding  Pt to follow-up with PT on Friday for re-assessment of BPPV and potential further vestibular testing  Other impairment: increased dizziness    Goals  STG  1  Pt will demonstrate negative positional testing within 2 weeks  2  Pt will demonstrate independence with HEP as needed within 2 weeks  LTG  1  Pt will deny dizziness within 4 weeks  2  Pt will demonstrate normal vestibular testing within 4 weeks      Plan  Patient would benefit from: skilled physical therapy  Planned therapy interventions: canalith repositioning, balance, neuromuscular re-education, patient education and home exercise program  Frequency: 2x week  Duration in weeks: 4  Plan of Care beginning date: 2021  Plan of Care expiration date: 2021  Treatment plan discussed with: patient and family        Subjective Evaluation    History of Present Illness  Mechanism of injury: Had vertigo "ages ago", went to [place] for it, was put in a neck brace after her maneuvers  Had Layne's years ago as well  This is a recurrent problem, the most recent dizziness has been happening for the last month or so  Gets it mostly in the morning when getting out of bed  Jacqueline Parks about 2 weeks ago as well, tore her RTC, having surgery for that next week  Pain  Location: in her left shoulder from her RTC tear    Social Support  Lives with: alone    Employment status: not working  Patient Goals  Patient goal: decreased dizziness        Objective    Sharp-Fidelia: negative  Vertebral Artery Screen: negative  Cervical AROM:  DixHallpike: Right: negative; Left: negative  Roll Test: Right positive (ageotropic, worse nystagmus and dizziness);  Left positive (ageotropic)               Precautions: L RTC tear, R horizontal BPPV, hx breast CA      Manuals 5/19            CRM R side Head shaking in supine x20, Liberatory maneuver x2                                                   Neuro Re-Ed                                                                                                        Ther Ex                                                                                                                     Ther Activity                                       Gait Training                                       Modalities

## 2021-05-20 ENCOUNTER — APPOINTMENT (OUTPATIENT)
Dept: LAB | Age: 86
End: 2021-05-20
Payer: MEDICARE

## 2021-05-20 ENCOUNTER — LAB (OUTPATIENT)
Dept: LAB | Age: 86
End: 2021-05-20
Payer: MEDICARE

## 2021-05-20 ENCOUNTER — TRANSCRIBE ORDERS (OUTPATIENT)
Dept: ADMINISTRATIVE | Age: 86
End: 2021-05-20

## 2021-05-20 DIAGNOSIS — Z01.818 PRE-OP TESTING: ICD-10-CM

## 2021-05-20 DIAGNOSIS — D72.828 OTHER ELEVATED WHITE BLOOD CELL (WBC) COUNT: ICD-10-CM

## 2021-05-20 DIAGNOSIS — R42 VERTIGO: ICD-10-CM

## 2021-05-20 DIAGNOSIS — R82.71 BACTERIURIA: ICD-10-CM

## 2021-05-20 LAB
BILIRUB UR QL STRIP: NEGATIVE
CLARITY UR: ABNORMAL
COLOR UR: ABNORMAL
GLUCOSE UR STRIP-MCNC: NEGATIVE MG/DL
HGB UR QL STRIP.AUTO: NEGATIVE
KETONES UR STRIP-MCNC: ABNORMAL MG/DL
LEUKOCYTE ESTERASE UR QL STRIP: NEGATIVE
NITRITE UR QL STRIP: NEGATIVE
PH UR STRIP.AUTO: 5.5 [PH]
PROT UR STRIP-MCNC: NEGATIVE MG/DL
SP GR UR STRIP.AUTO: 1.03 (ref 1–1.03)
UROBILINOGEN UR QL STRIP.AUTO: 1 E.U./DL

## 2021-05-20 PROCEDURE — 81003 URINALYSIS AUTO W/O SCOPE: CPT | Performed by: NURSE PRACTITIONER

## 2021-05-20 PROCEDURE — 87086 URINE CULTURE/COLONY COUNT: CPT

## 2021-05-20 PROCEDURE — 93005 ELECTROCARDIOGRAM TRACING: CPT

## 2021-05-21 ENCOUNTER — OFFICE VISIT (OUTPATIENT)
Dept: PHYSICAL THERAPY | Facility: CLINIC | Age: 86
End: 2021-05-21
Payer: MEDICARE

## 2021-05-21 DIAGNOSIS — H81.11 BENIGN PAROXYSMAL POSITIONAL VERTIGO OF RIGHT EAR: Primary | ICD-10-CM

## 2021-05-21 DIAGNOSIS — R42 DIZZINESS: ICD-10-CM

## 2021-05-21 LAB
ATRIAL RATE: 63 BPM
BACTERIA UR CULT: NORMAL
P AXIS: 28 DEGREES
PR INTERVAL: 174 MS
QRS AXIS: -10 DEGREES
QRSD INTERVAL: 68 MS
QT INTERVAL: 400 MS
QTC INTERVAL: 409 MS
T WAVE AXIS: 3 DEGREES
VENTRICULAR RATE: 63 BPM

## 2021-05-21 PROCEDURE — 93010 ELECTROCARDIOGRAM REPORT: CPT | Performed by: INTERNAL MEDICINE

## 2021-05-21 PROCEDURE — 97112 NEUROMUSCULAR REEDUCATION: CPT | Performed by: PHYSICAL THERAPIST

## 2021-05-21 NOTE — PROGRESS NOTES
Daily Note     Today's date: 2021  Patient name: Larisa Leblanc  : 1935  MRN: [id number]  Referring provider: Darren Frye MD  Dx:   Encounter Diagnosis     ICD-10-CM    1  Benign paroxysmal positional vertigo of right ear  H81 11    2  Dizziness  R42                   Subjective: Has not felt dizzy  Doing well  Just has her L shoulder pain  Objective: See treatment diary below    DixHallpike: Right: negative; Left: negative  Roll Test: Right negative; Left negative    Gaze stab (room light): normal all directions    VOMS (Vestibular/Ocular Motor Screen)     Smooth pursuit Normal     Saccades (horizontal) Normal, slowed     Saccades (vertical)  Normal, slowed     Convergence (near point)--Abnormal, difficulty with her trifocals, denied symptoms     VOR (horizontal) Normal, slowed     VOR (vertical) Normal, slowed     Visual Motion Sensitivity Normal, slowed    Modified Clinical Test of Sensory Interaction on Balance  30 eyes open firm surface  30 eyes closed firm surface  30 eyes open foam surface  2 eyes closed foam surface      Assessment: Positional testing negative today  No dizziness triggered during session  Does demonstrate balance deficits, however pt stated aren't worse since her BPPV  Discussed completing therapy for balance training after her shoulder surgery  Pt to follow-up with PT as needed  Plan: 30 day hold       Precautions: L RTC tear, R horizontal BPPV, hx breast CA      Manuals             CRM R side Head shaking in supine x20, Liberatory maneuver x2                                                   Neuro Re-Ed                                                                                                        Ther Ex                                                                                                                     Ther Activity                                       Gait Training                                       Modalities

## 2021-05-25 ENCOUNTER — ANESTHESIA (OUTPATIENT)
Dept: PERIOP | Facility: HOSPITAL | Age: 86
End: 2021-05-25
Payer: MEDICARE

## 2021-05-25 ENCOUNTER — HOSPITAL ENCOUNTER (OUTPATIENT)
Facility: HOSPITAL | Age: 86
Setting detail: OUTPATIENT SURGERY
Discharge: HOME | End: 2021-05-27
Attending: ORTHOPAEDIC SURGERY | Admitting: ORTHOPAEDIC SURGERY
Payer: MEDICARE

## 2021-05-25 ENCOUNTER — APPOINTMENT (OUTPATIENT)
Dept: RADIOLOGY | Facility: HOSPITAL | Age: 86
End: 2021-05-25
Payer: MEDICARE

## 2021-05-25 DIAGNOSIS — Z98.890 S/P SHOULDER SURGERY: ICD-10-CM

## 2021-05-25 DIAGNOSIS — D72.828 OTHER ELEVATED WHITE BLOOD CELL (WBC) COUNT: Primary | ICD-10-CM

## 2021-05-25 DIAGNOSIS — R42 VERTIGO: ICD-10-CM

## 2021-05-25 DIAGNOSIS — Z01.818 PRE-OP TESTING: ICD-10-CM

## 2021-05-25 DIAGNOSIS — R82.71 BACTERIURIA: ICD-10-CM

## 2021-05-25 PROBLEM — M12.812 ROTATOR CUFF TEAR ARTHROPATHY OF LEFT SHOULDER: Status: ACTIVE | Noted: 2017-04-20

## 2021-05-25 PROBLEM — M75.102 ROTATOR CUFF TEAR ARTHROPATHY OF LEFT SHOULDER: Status: ACTIVE | Noted: 2017-04-20

## 2021-05-25 LAB — GLUCOSE SERPL-MCNC: 103 MG/DL (ref 65–140)

## 2021-05-25 PROCEDURE — NC001 PR NO CHARGE: Performed by: ORTHOPAEDIC SURGERY

## 2021-05-25 PROCEDURE — G9197 ORDER FOR CEPH: HCPCS | Performed by: ORTHOPAEDIC SURGERY

## 2021-05-25 PROCEDURE — C1713 ANCHOR/SCREW BN/BN,TIS/BN: HCPCS | Performed by: ORTHOPAEDIC SURGERY

## 2021-05-25 PROCEDURE — C1776 JOINT DEVICE (IMPLANTABLE): HCPCS | Performed by: ORTHOPAEDIC SURGERY

## 2021-05-25 PROCEDURE — 73020 X-RAY EXAM OF SHOULDER: CPT

## 2021-05-25 PROCEDURE — 82948 REAGENT STRIP/BLOOD GLUCOSE: CPT

## 2021-05-25 PROCEDURE — C9290 INJ, BUPIVACAINE LIPOSOME: HCPCS | Performed by: STUDENT IN AN ORGANIZED HEALTH CARE EDUCATION/TRAINING PROGRAM

## 2021-05-25 PROCEDURE — 23472 RECONSTRUCT SHOULDER JOINT: CPT | Performed by: PHYSICIAN ASSISTANT

## 2021-05-25 PROCEDURE — 23472 RECONSTRUCT SHOULDER JOINT: CPT | Performed by: ORTHOPAEDIC SURGERY

## 2021-05-25 DEVICE — IMPLANTABLE DEVICE
Type: IMPLANTABLE DEVICE | Site: SHOULDER | Status: FUNCTIONAL
Brand: AEQUALIS™ ASCEND™ FLEX

## 2021-05-25 DEVICE — IMPLANTABLE DEVICE
Type: IMPLANTABLE DEVICE | Site: SHOULDER | Status: FUNCTIONAL
Brand: AEQUALIS™ PERFORM REVERSED

## 2021-05-25 DEVICE — SCREW PERIPHERAL 5 X 26MM: Type: IMPLANTABLE DEVICE | Site: SHOULDER | Status: FUNCTIONAL

## 2021-05-25 DEVICE — IMPLANTABLE DEVICE
Type: IMPLANTABLE DEVICE | Site: SHOULDER | Status: FUNCTIONAL
Brand: FLEX SHOULDER SYSTEM

## 2021-05-25 DEVICE — SCREW PERIPHERAL 5 X 14MM: Type: IMPLANTABLE DEVICE | Site: SHOULDER | Status: FUNCTIONAL

## 2021-05-25 DEVICE — SCREW CENTRAL 6 X 30MM: Type: IMPLANTABLE DEVICE | Site: SHOULDER | Status: FUNCTIONAL

## 2021-05-25 RX ORDER — OXYCODONE HYDROCHLORIDE 10 MG/1
10 TABLET ORAL EVERY 4 HOURS PRN
Status: DISCONTINUED | OUTPATIENT
Start: 2021-05-25 | End: 2021-05-26

## 2021-05-25 RX ORDER — ONDANSETRON 2 MG/ML
INJECTION INTRAMUSCULAR; INTRAVENOUS AS NEEDED
Status: DISCONTINUED | OUTPATIENT
Start: 2021-05-25 | End: 2021-05-25

## 2021-05-25 RX ORDER — MAGNESIUM HYDROXIDE 1200 MG/15ML
LIQUID ORAL AS NEEDED
Status: DISCONTINUED | OUTPATIENT
Start: 2021-05-25 | End: 2021-05-25 | Stop reason: HOSPADM

## 2021-05-25 RX ORDER — PROPOFOL 10 MG/ML
INJECTION, EMULSION INTRAVENOUS CONTINUOUS PRN
Status: DISCONTINUED | OUTPATIENT
Start: 2021-05-25 | End: 2021-05-25

## 2021-05-25 RX ORDER — METOCLOPRAMIDE HYDROCHLORIDE 5 MG/ML
10 INJECTION INTRAMUSCULAR; INTRAVENOUS ONCE AS NEEDED
Status: DISCONTINUED | OUTPATIENT
Start: 2021-05-25 | End: 2021-05-25

## 2021-05-25 RX ORDER — FENTANYL CITRATE/PF 50 MCG/ML
25 SYRINGE (ML) INJECTION
Status: DISCONTINUED | OUTPATIENT
Start: 2021-05-25 | End: 2021-05-25 | Stop reason: HOSPADM

## 2021-05-25 RX ORDER — SODIUM CHLORIDE, SODIUM LACTATE, POTASSIUM CHLORIDE, CALCIUM CHLORIDE 600; 310; 30; 20 MG/100ML; MG/100ML; MG/100ML; MG/100ML
125 INJECTION, SOLUTION INTRAVENOUS CONTINUOUS
Status: DISCONTINUED | OUTPATIENT
Start: 2021-05-25 | End: 2021-05-26

## 2021-05-25 RX ORDER — SODIUM CHLORIDE, SODIUM LACTATE, POTASSIUM CHLORIDE, CALCIUM CHLORIDE 600; 310; 30; 20 MG/100ML; MG/100ML; MG/100ML; MG/100ML
1.5 INJECTION, SOLUTION INTRAVENOUS CONTINUOUS
Status: DISCONTINUED | OUTPATIENT
Start: 2021-05-25 | End: 2021-05-26

## 2021-05-25 RX ORDER — CEFAZOLIN SODIUM 2 G/50ML
2000 SOLUTION INTRAVENOUS ONCE
Status: COMPLETED | OUTPATIENT
Start: 2021-05-25 | End: 2021-05-25

## 2021-05-25 RX ORDER — ACETAMINOPHEN 325 MG/1
650 TABLET ORAL EVERY 6 HOURS PRN
Status: DISCONTINUED | OUTPATIENT
Start: 2021-05-25 | End: 2021-05-27 | Stop reason: HOSPADM

## 2021-05-25 RX ORDER — ACETAMINOPHEN 325 MG/1
325 TABLET ORAL EVERY 6 HOURS PRN
COMMUNITY

## 2021-05-25 RX ORDER — PROPRANOLOL HYDROCHLORIDE 20 MG/1
20 TABLET ORAL 4 TIMES DAILY
Status: DISCONTINUED | OUTPATIENT
Start: 2021-05-25 | End: 2021-05-27 | Stop reason: HOSPADM

## 2021-05-25 RX ORDER — CEFAZOLIN SODIUM 1 G/50ML
1000 SOLUTION INTRAVENOUS EVERY 8 HOURS
Status: COMPLETED | OUTPATIENT
Start: 2021-05-25 | End: 2021-05-26

## 2021-05-25 RX ORDER — CHLORHEXIDINE GLUCONATE 0.12 MG/ML
15 RINSE ORAL ONCE
Status: COMPLETED | OUTPATIENT
Start: 2021-05-25 | End: 2021-05-25

## 2021-05-25 RX ORDER — DEXAMETHASONE SODIUM PHOSPHATE 10 MG/ML
INJECTION, SOLUTION INTRAMUSCULAR; INTRAVENOUS AS NEEDED
Status: DISCONTINUED | OUTPATIENT
Start: 2021-05-25 | End: 2021-05-25

## 2021-05-25 RX ORDER — METOCLOPRAMIDE HYDROCHLORIDE 5 MG/ML
10 INJECTION INTRAMUSCULAR; INTRAVENOUS ONCE AS NEEDED
Status: DISCONTINUED | OUTPATIENT
Start: 2021-05-25 | End: 2021-05-25 | Stop reason: HOSPADM

## 2021-05-25 RX ORDER — PROPOFOL 10 MG/ML
INJECTION, EMULSION INTRAVENOUS AS NEEDED
Status: DISCONTINUED | OUTPATIENT
Start: 2021-05-25 | End: 2021-05-25

## 2021-05-25 RX ORDER — LIDOCAINE HYDROCHLORIDE 20 MG/ML
INJECTION, SOLUTION INFILTRATION; PERINEURAL
Status: DISCONTINUED | OUTPATIENT
Start: 2021-05-25 | End: 2021-05-25

## 2021-05-25 RX ORDER — SENNOSIDES 8.6 MG
1 TABLET ORAL DAILY
Status: DISCONTINUED | OUTPATIENT
Start: 2021-05-25 | End: 2021-05-27 | Stop reason: HOSPADM

## 2021-05-25 RX ORDER — LIDOCAINE HYDROCHLORIDE 10 MG/ML
INJECTION, SOLUTION EPIDURAL; INFILTRATION; INTRACAUDAL; PERINEURAL AS NEEDED
Status: DISCONTINUED | OUTPATIENT
Start: 2021-05-25 | End: 2021-05-25

## 2021-05-25 RX ORDER — PRAVASTATIN SODIUM 40 MG
40 TABLET ORAL
Status: DISCONTINUED | OUTPATIENT
Start: 2021-05-25 | End: 2021-05-27 | Stop reason: HOSPADM

## 2021-05-25 RX ORDER — LIDOCAINE HYDROCHLORIDE 10 MG/ML
INJECTION, SOLUTION EPIDURAL; INFILTRATION; INTRACAUDAL; PERINEURAL
Status: DISPENSED
Start: 2021-05-25 | End: 2021-05-25

## 2021-05-25 RX ORDER — OXYCODONE HYDROCHLORIDE 5 MG/1
5 TABLET ORAL EVERY 4 HOURS PRN
Qty: 15 TABLET | Refills: 0 | Status: SHIPPED | OUTPATIENT
Start: 2021-05-25 | End: 2022-08-09

## 2021-05-25 RX ORDER — ONDANSETRON 2 MG/ML
4 INJECTION INTRAMUSCULAR; INTRAVENOUS ONCE AS NEEDED
Status: DISCONTINUED | OUTPATIENT
Start: 2021-05-25 | End: 2021-05-25 | Stop reason: HOSPADM

## 2021-05-25 RX ORDER — EPHEDRINE SULFATE 50 MG/ML
INJECTION INTRAVENOUS AS NEEDED
Status: DISCONTINUED | OUTPATIENT
Start: 2021-05-25 | End: 2021-05-25

## 2021-05-25 RX ORDER — ROCURONIUM BROMIDE 10 MG/ML
INJECTION, SOLUTION INTRAVENOUS AS NEEDED
Status: DISCONTINUED | OUTPATIENT
Start: 2021-05-25 | End: 2021-05-25

## 2021-05-25 RX ORDER — FENTANYL CITRATE 50 UG/ML
INJECTION, SOLUTION INTRAMUSCULAR; INTRAVENOUS AS NEEDED
Status: DISCONTINUED | OUTPATIENT
Start: 2021-05-25 | End: 2021-05-25

## 2021-05-25 RX ORDER — OXYCODONE HYDROCHLORIDE 5 MG/1
5 TABLET ORAL EVERY 4 HOURS PRN
Status: DISCONTINUED | OUTPATIENT
Start: 2021-05-25 | End: 2021-05-26

## 2021-05-25 RX ORDER — ANASTROZOLE 1 MG/1
1 TABLET ORAL DAILY
Status: DISCONTINUED | OUTPATIENT
Start: 2021-05-25 | End: 2021-05-27 | Stop reason: HOSPADM

## 2021-05-25 RX ORDER — BUPIVACAINE HYDROCHLORIDE 5 MG/ML
INJECTION, SOLUTION PERINEURAL
Status: DISCONTINUED | OUTPATIENT
Start: 2021-05-25 | End: 2021-05-25

## 2021-05-25 RX ORDER — MIDAZOLAM HYDROCHLORIDE 2 MG/2ML
INJECTION, SOLUTION INTRAMUSCULAR; INTRAVENOUS AS NEEDED
Status: DISCONTINUED | OUTPATIENT
Start: 2021-05-25 | End: 2021-05-25

## 2021-05-25 RX ORDER — LISINOPRIL 10 MG/1
10 TABLET ORAL 2 TIMES DAILY
Status: DISCONTINUED | OUTPATIENT
Start: 2021-05-25 | End: 2021-05-25

## 2021-05-25 RX ORDER — SODIUM CHLORIDE, SODIUM LACTATE, POTASSIUM CHLORIDE, CALCIUM CHLORIDE 600; 310; 30; 20 MG/100ML; MG/100ML; MG/100ML; MG/100ML
INJECTION, SOLUTION INTRAVENOUS CONTINUOUS PRN
Status: DISCONTINUED | OUTPATIENT
Start: 2021-05-25 | End: 2021-05-25

## 2021-05-25 RX ADMIN — PROPRANOLOL HYDROCHLORIDE 20 MG: 20 TABLET ORAL at 14:59

## 2021-05-25 RX ADMIN — MIDAZOLAM 1 MG: 1 INJECTION INTRAMUSCULAR; INTRAVENOUS at 07:37

## 2021-05-25 RX ADMIN — BUPIVACAINE 20 ML: 13.3 INJECTION, SUSPENSION, LIPOSOMAL INFILTRATION at 07:17

## 2021-05-25 RX ADMIN — PROPOFOL 120 MCG/KG/MIN: 10 INJECTION, EMULSION INTRAVENOUS at 07:45

## 2021-05-25 RX ADMIN — PHENYLEPHRINE HYDROCHLORIDE 50 MCG/MIN: 10 INJECTION INTRAVENOUS at 07:45

## 2021-05-25 RX ADMIN — PRAVASTATIN SODIUM 40 MG: 40 TABLET ORAL at 18:15

## 2021-05-25 RX ADMIN — FENTANYL CITRATE 25 MCG: 50 INJECTION INTRAMUSCULAR; INTRAVENOUS at 07:37

## 2021-05-25 RX ADMIN — PROPOFOL 150 MG: 10 INJECTION, EMULSION INTRAVENOUS at 07:42

## 2021-05-25 RX ADMIN — SODIUM CHLORIDE, SODIUM LACTATE, POTASSIUM CHLORIDE, AND CALCIUM CHLORIDE 1.5 ML/KG/HR: .6; .31; .03; .02 INJECTION, SOLUTION INTRAVENOUS at 12:05

## 2021-05-25 RX ADMIN — CEFAZOLIN SODIUM 2000 MG: 2 SOLUTION INTRAVENOUS at 07:37

## 2021-05-25 RX ADMIN — ONDANSETRON 4 MG: 2 INJECTION INTRAMUSCULAR; INTRAVENOUS at 07:55

## 2021-05-25 RX ADMIN — PROPRANOLOL HYDROCHLORIDE 20 MG: 20 TABLET ORAL at 18:16

## 2021-05-25 RX ADMIN — BUPIVACAINE HYDROCHLORIDE 10 ML: 5 INJECTION, SOLUTION PERINEURAL at 07:17

## 2021-05-25 RX ADMIN — SODIUM CHLORIDE, SODIUM LACTATE, POTASSIUM CHLORIDE, AND CALCIUM CHLORIDE: .6; .31; .03; .02 INJECTION, SOLUTION INTRAVENOUS at 07:37

## 2021-05-25 RX ADMIN — PHENYLEPHRINE HYDROCHLORIDE 100 MCG: 10 INJECTION INTRAVENOUS at 09:15

## 2021-05-25 RX ADMIN — Medication 25 MCG: at 10:03

## 2021-05-25 RX ADMIN — CEFAZOLIN SODIUM 1000 MG: 1 SOLUTION INTRAVENOUS at 15:04

## 2021-05-25 RX ADMIN — LIDOCAINE HYDROCHLORIDE 5 ML: 20 INJECTION, SOLUTION INFILTRATION; PERINEURAL at 07:17

## 2021-05-25 RX ADMIN — PROPRANOLOL HYDROCHLORIDE 20 MG: 20 TABLET ORAL at 23:34

## 2021-05-25 RX ADMIN — EPHEDRINE SULFATE 10 MG: 50 INJECTION, SOLUTION INTRAVENOUS at 08:23

## 2021-05-25 RX ADMIN — LIDOCAINE HYDROCHLORIDE 50 MG: 10 INJECTION, SOLUTION EPIDURAL; INFILTRATION; INTRACAUDAL; PERINEURAL at 07:42

## 2021-05-25 RX ADMIN — ANASTROZOLE 1 MG: 1 TABLET ORAL at 14:59

## 2021-05-25 RX ADMIN — REMIFENTANIL HYDROCHLORIDE 0.2 MCG/KG/MIN: 1 INJECTION, POWDER, LYOPHILIZED, FOR SOLUTION INTRAVENOUS at 07:45

## 2021-05-25 RX ADMIN — DEXAMETHASONE SODIUM PHOSPHATE 10 MG: 10 INJECTION, SOLUTION INTRAMUSCULAR; INTRAVENOUS at 07:55

## 2021-05-25 RX ADMIN — FENTANYL CITRATE 75 MCG: 50 INJECTION INTRAMUSCULAR; INTRAVENOUS at 07:42

## 2021-05-25 RX ADMIN — ROCURONIUM BROMIDE 40 MG: 50 INJECTION, SOLUTION INTRAVENOUS at 07:42

## 2021-05-25 RX ADMIN — CHLORHEXIDINE GLUCONATE 15 ML: 1.2 SOLUTION ORAL at 06:12

## 2021-05-25 RX ADMIN — SENNOSIDES 8.6 MG: 8.6 TABLET ORAL at 14:59

## 2021-05-25 RX ADMIN — SUGAMMADEX 200 MG: 100 INJECTION, SOLUTION INTRAVENOUS at 09:03

## 2021-05-25 NOTE — PLAN OF CARE
Problem: Potential for Falls  Goal: Patient will remain free of falls  Description: INTERVENTIONS:  - Assess patient frequently for physical needs  -  Identify cognitive and physical deficits and behaviors that affect risk of falls    -  [place] fall precautions as indicated by assessment   - Educate patient/family on patient safety including physical limitations  - Instruct patient to call for assistance with activity based on assessment  - Modify environment to reduce risk of injury  - Consider OT/PT consult to assist with strengthening/mobility  Outcome: Progressing     Problem: PAIN - ADULT  Goal: Verbalizes/displays adequate comfort level or baseline comfort level  Description: Interventions:  - Encourage patient to monitor pain and request assistance  - Assess pain using appropriate pain scale  - Administer analgesics based on type and severity of pain and evaluate response  - Implement non-pharmacological measures as appropriate and evaluate response  - Consider cultural and social influences on pain and pain management  - Notify physician/advanced practitioner if interventions unsuccessful or patient reports new pain  Outcome: Progressing     Problem: INFECTION - ADULT  Goal: Absence or prevention of progression during hospitalization  Description: INTERVENTIONS:  - Assess and monitor for signs and symptoms of infection  - Monitor lab/diagnostic results  - Monitor all insertion sites, i e  indwelling lines, tubes, and drains  - Monitor endotracheal if appropriate and nasal secretions for changes in amount and color  - [place] appropriate cooling/warming therapies per order  - Administer medications as ordered  - Instruct and encourage patient and family to use good hand hygiene technique  - Identify and instruct in appropriate isolation precautions for identified infection/condition  Outcome: Progressing  Goal: Absence of fever/infection during neutropenic period  Description: INTERVENTIONS:  - Monitor WBC    Outcome: Progressing     Problem: SAFETY ADULT  Goal: Patient will remain free of falls  Description: INTERVENTIONS:  - Assess patient frequently for physical needs  -  Identify cognitive and physical deficits and behaviors that affect risk of falls    -  [place] fall precautions as indicated by assessment   - Educate patient/family on patient safety including physical limitations  - Instruct patient to call for assistance with activity based on assessment  - Modify environment to reduce risk of injury  - Consider OT/PT consult to assist with strengthening/mobility  Outcome: Progressing  Goal: Maintain or return to baseline ADL function  Description: INTERVENTIONS:  -  Assess patient's ability to carry out ADLs; assess patient's baseline for ADL function and identify physical deficits which impact ability to perform ADLs (bathing, care of mouth/teeth, toileting, grooming, dressing, etc )  - Assess/evaluate cause of self-care deficits   - Assess range of motion  - Assess patient's mobility; develop plan if impaired  - Assess patient's need for assistive devices and provide as appropriate  - Encourage maximum independence but intervene and supervise when necessary  - Involve family in performance of ADLs  - Assess for home care needs following discharge   - Consider OT consult to assist with ADL evaluation and planning for discharge  - Provide patient education as appropriate  Outcome: Progressing  Goal: Maintain or return mobility status to optimal level  Description: INTERVENTIONS:  - Assess patient's baseline mobility status (ambulation, transfers, stairs, etc )    - Identify cognitive and physical deficits and behaviors that affect mobility  - Identify mobility aids required to assist with transfers and/or ambulation (gait belt, sit-to-stand, lift, walker, cane, etc )  - [place] fall precautions as indicated by assessment  - Record patient progress and toleration of activity level on Mobility SBAR; progress patient to next Phase/Stage  - Instruct patient to call for assistance with activity based on assessment  - Consider rehabilitation consult to assist with strengthening/weightbearing, etc   Outcome: Progressing     Problem: DISCHARGE PLANNING  Goal: Discharge to home or other facility with appropriate resources  Description: INTERVENTIONS:  - Identify barriers to discharge w/patient and caregiver  - Arrange for needed discharge resources and transportation as appropriate  - Identify discharge learning needs (meds, wound care, etc )  - Arrange for interpretive services to assist at discharge as needed  - Refer to Case Management Department for coordinating discharge planning if the patient needs post-hospital services based on physician/advanced practitioner order or complex needs related to functional status, cognitive ability, or social support system  Outcome: Progressing     Problem: Knowledge Deficit  Goal: Patient/family/caregiver demonstrates understanding of disease process, treatment plan, medications, and discharge instructions  Description: Complete learning assessment and assess knowledge base    Interventions:  - Provide teaching at level of understanding  - Provide teaching via preferred learning methods  Outcome: Progressing

## 2021-05-25 NOTE — ANESTHESIA POSTPROCEDURE EVALUATION
Post-Op Assessment Note    CV Status:  Stable  Pain Score: 0    Pain management: adequate     Mental Status:  Alert and awake   Hydration Status:  Euvolemic   PONV Controlled:  Controlled   Airway Patency:  Patent      Post Op Vitals Reviewed: Yes      Staff: Anesthesiologist, CRNA         No complications documented      BP   157/80   Temp   96 3   Pulse  66   Resp   14   SpO2   98

## 2021-05-25 NOTE — ANESTHESIA PROCEDURE NOTES
Peripheral Block    Patient location during procedure: holding area  Start time: 5/25/2021 7:10 AM  Reason for block: at surgeon's request and post-op pain management  Staffing  Anesthesiologist: Hadley Mendez MD  Performed: anesthesiologist   Preanesthetic Checklist  Completed: patient identified, site marked, surgical consent, pre-op evaluation, timeout performed, IV checked, risks and benefits discussed and monitors and equipment checked  Peripheral Block  Patient position: supine  Prep: ChloraPrep  Patient monitoring: continuous pulse ox, frequent blood pressure checks, heart rate and cardiac monitor  Block type: interscalene  Laterality: left  Injection technique: single-shot  Procedures: ultrasound guided, Ultrasound guidance required for the procedure to increase accuracy and safety of medication placement and decrease risk of complications    Ultrasound permanent image savedbupivacaine (MARCAINE) 0 5 % perineural infiltration, 10 mL  lidocaine (XYLOCAINE) 2 % infiltration, 5 mL  Needle  Needle type: Stimuplex   Needle gauge: 22 G  Needle length: 10 cm  Needle localization: anatomical landmarks and ultrasound guidance  Needle insertion depth: 3 cm  Assessment  Injection assessment: incremental injection, local visualized surrounding nerve on ultrasound, negative aspiration for heme and no paresthesia on injection  Paresthesia pain: none  Heart rate change: no  Slow fractionated injection: yes  Post-procedure:  site cleaned  patient tolerated the procedure well with no immediate complications

## 2021-05-25 NOTE — ANESTHESIA PREPROCEDURE EVALUATION
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, estrogen receptor positive (HCC)   (+) Malignant neoplasm of upper-outer quadrant of right breast in female, estrogen receptor positive (HCC)      MUSCULOSKELETAL   (+) Arthritis      NEURO/PSYCH   (+) Anxiety      PULMONARY   (+) Shortness of breath        Physical Exam    Airway    Mallampati score: II  TM Distance: >3 FB  Neck ROM: full     Dental   upper dentures and lower dentures,     Cardiovascular      Pulmonary      Other Findings        Anesthesia Plan  ASA Score- 3     Anesthesia Type- general and regional with ASA Monitors  Additional Monitors:   Airway Plan: ETT  Comment: Plan for TIVA  Plan Factors-Exercise tolerance (METS): >4 METS  Chart reviewed  EKG reviewed  Existing labs reviewed  Patient summary reviewed  Patient is not a current smoker  There is medical exclusion for perioperative obstructive sleep apnea risk education  Induction- intravenous  Postoperative Plan-     Informed Consent- Anesthetic plan and risks discussed with patient  I personally reviewed this patient with the CRNA  Discussed and agreed on the Anesthesia Plan with the CRNA  Delvin Cowart

## 2021-05-25 NOTE — H&P
I identified and marked the patient in the pre-op holding area after confirming the surgical consent  No changes to medical health since the H&P was preformed  The patient's prescription history was queried in the [facility] to ensure compliance with applicable state laws

## 2021-05-25 NOTE — DISCHARGE INSTRUCTIONS
What to Expect Before and After Shoulder Replacement Surgery  You are being scheduled for a shoulder replacement by Dr Orin Aguilar to treat your shoulder condition  Here is some information which may help to answer questions that you may have  Please do not hesitate to reach out to our team to answer questions not addressed here  Before Surgery  You will be contacted the evening prior to your surgery to confirm the scheduled time of the procedure and when to arrive at the hospital    Do not eat or drink anything after midnight the night before your surgery so that the anesthesia can be performed safely  If you have been fitted for a sling in the office prior to the surgery please remember to bring it to the hospital   You will meet the anesthesiologist the morning of the surgery  The surgery is performed under a general anesthetic but they will also offer you a regional block (shot to numb the arm) to help control your post-operative pain as well as with a catheter that is left in place after the block  The catheter is connected to a small pump which will continue to provide numbing medicine and help prolong the pain control from the block  Unfortunately this catheter is not as effective as the initial block, but can still be very helpful in managing the pain  After Surgery and in the Hospital  The shoulder replacement surgery typically takes 60-90 minutes  When surgery is completed, your surgeon will update your family and friends on your condition and progress  You will remain in the recovery room for at least an hour or until the anesthesia has worn off and your blood pressure and pulse are stable  If you have pain, the nurses will give you medication  Once out of surgery, your surgeon will decide on how long you will be using a sling in order to protect and position your shoulder  However, this won't keep you from starting physical therapy    Exercises typically begin on the day after surgery with emphasis on moving the shoulder, wrist, and hand  The physical therapist will be provided with a detailed protocol but typically the first 6 weeks are used to regain range of motion and then strengthening is initiated  Starting strengthening exercises too early may lead to complications  When You Are Discharged from the [address] [place] [address] can expect to be released from the hospital the day after surgery (this may change if you have special needs or medical conditions)  Before you are released, the treatment team (Orthopaedic surgery residents, physician assistants and physical therapists) will talk with you about the importance of limiting any sudden or stressful movements to the arm for several weeks or longer  Activities that involve pushing, pulling, and lifting should not be done until you are given permission from your surgeon  Your First Day at [address] [place] [address] may need help with your daily activities, so it is a good idea to have family and friends prepared to help you  It is okay to remove the sling and let the arm hang at the side so that you can get cleaned and change your clothes  To put on a shirt, place the bad arm in first and then the good arm  Reverse to take it off  Don't forget to wear the sling every night for at least the first month after surgery, and never use your arm to push yourself up in bed or from a chair  The added weight on your shoulder may cause you to re-injure the joint  How to [place] in the First Week  You are encouraged to return to your normal eating and sleeping patterns as soon as possible  It is important for you to be active in order to control your weight and muscle tone  It is ok to increase your activity level and even perform light aerobic exercise (like walking or riding a stationary bike) within the first week or so if you are feeling up to it    If there is concern about these activates best to wait until the first post-operative visit and discuss this with the team    Melly Wagner might be able to return to work within several days if you can perform your job while wearing a sling  Consult with your doctor, as this differs from patient to patient  However, if your job requires heavy lifting or climbing, there may be a delay for several months  Until you are seen for your first follow-up visit, please try and keep wound dry  It is okay to shower but try your best to keep the incision out of direct contact with the water (or consider using a waterproof bandage)  If it does gets wet please dry as best as possible afterwards  If you notice any drainage or a foul odor from your incision or your temperature goes above 101 5 degrees, please contact the office  What You Can Expect in the First Month  Your first post-operative appointment will be with Dr Lowell Coleman physician assistant (PA) around 2 weeks after the surgery  You skin staples will be removed and X-rays will be obtained at that visit  Please understand that it is quite common to still experience pain at that time but the pain should be steadily improving  Hopefully physical therapy has already begun but if not it will be initiated at this visit and will continue for the 8-12 weeks  At about 12 weeks after surgery you will start a progressive strengthening program  Physical therapy is a deliberate process of not only strengthening your shoulder but also altering how you use your arm  It may be many months before your desired results are achieved, so do not get discouraged  Your shoulder will generally continue to improve steadily up to 6-8 months after surgery  After that point further improvement is very slow; although it has been shown that even after a year or more, activity can increase as muscle strength continues to improve  After the First Month at Home   Because each person heals differently, there are different recovery timelines  An average recovery period typically lasts about between 3-6 months  Talk with your surgeon about which activities will be appropriate for you once you have recovered

## 2021-05-25 NOTE — CONSULTS
Internal Medicine  Consultation Note    Patient: Madelin Nielsen  Age/sex: 81 y o  female  Medical Record #: [id number]    Assessment/Plan    Status Post left reverse Total Shoulder Arthroplasty   Continue post op pain control measures as prescribed   Follow bowel regimen to help decrease narcotic induced constipation    Follow post operative hemoglobin with serial CBC and treat accordingly   Monitor WBC and fever curve post op while encouraging use of incentive spirometer   DVT prophylaxis in place and reviewed  HTN   Hold lisinopril in the post operative setting to avoid hypotension/NORI   OK to resume on dc   Cont inderal as scheduled   Add hydralazine as needed for SBP >160   Monitor trend    Hyperlipidemia   Low cholesterol diet   Continue statin therapy    H/o breast CA s/p surgery/radiation  · Cont anastrazole as scheduled        PRE-OP HGB LEVEL: 13 6    Subjective/ HPI: Madelin Nielsen was seen and examined  Hx of shoulder pain failed out patient conservative measures  Elected to undergo total shoulder arthroplasty We are asked to see patient for post op management of underlying medical co-morbidities as outlined above  Pt did well intra and post operatively with good hemodynamics  Pt currently comfortable and without any reported post op nausea  ROS:   A 10 point ROS was performed; negative except as noted above       Social History:    Substance Use History:   Social History     Substance and Sexual Activity   Alcohol Use Never    Frequency: Never     Social History     Tobacco Use   Smoking Status Never Smoker   Smokeless Tobacco Never Used     Social History     Substance and Sexual Activity   Drug Use No       Family History:    non-contributory      Review of Scheduled Meds:  Current Facility-Administered Medications   Medication Dose Route Frequency Provider Last Rate    acetaminophen  650 mg Oral Q6H PRN Violeta Mendoza PA-C      anastrozole  1 mg Oral Daily Rafael Casas PA-C      cefazolin  1,000 mg Intravenous Q8H Rafael Casas PA-C      lactated ringers  125 mL/hr Intravenous Continuous Justina Koehler MD      lactated ringers  125 mL/hr Intravenous Continuous Lizzy Hebert CRNA      lactated ringers  1 5 mL/kg/hr Intravenous Continuous Rafael Casas PA-C 1 5 mL/kg/hr (21 1205)    lidocaine (PF)           oxyCODONE  10 mg Oral Q4H PRN Rafael Casas PA-C      oxyCODONE  5 mg Oral Q4H PRN Rafael Casas PA-C      pravastatin  40 mg Oral Daily With [employer]      propranolol  20 mg Oral 4x Daily Rafael Casas PA-C      senna  1 tablet Oral Daily Rafael Casas PA-C         Labs: Invalid input(s): LABGLOM, CMP               Results from last 7 days   Lab Units 21  0927   POC GLUCOSE mg/dl 103       Lab Results   Component Value Date    URINECX >100,000 cfu/ml  2021       Input and Output Summary (last 24 hours): Intake/Output Summary (Last 24 hours) at 2021 1410  Last data filed at 2021 1343  Gross per 24 hour   Intake 1725 ml   Output 650 ml   Net 1075 ml       Imaging:     XR shoulder 1 vw left    (Results Pending)       *Labs /Radiology studiesLabs reviewed  *Medications reviewed and reconciled as needed  *Please refer to order section for additional ordered labs studies  *Case discussed with primary attending during morning huddle case rounds    Vitals:   Temp (24hrs), Av 5 °F (36 4 °C), Min:96 8 °F (36 °C), Max:98 °F (36 7 °C)    Temp:  [96 8 °F (36 °C)-98 °F (36 7 °C)] 98 °F (36 7 °C)  HR:  [62-79] 79  Resp:  [14-19] 18  BP: (130-163)/() 130/75  SpO2:  [92 %-100 %] 93 %  Body mass index is 33 12 kg/m²       Physical Exam:   General Appearance: no distress, conversive  HEENT: PERRLA, conjuctiva normal; oropharynx clear; mucous membranes moist;   Neck:  Supple, no lymphadenopathy or thyromegaly  Lungs: CTA, normal respiratory effort, no retractions, expiratory effort normal  CV: regular rate and rhythm , PMI normal   ABD: soft non tender, no masses , no hepatic or splenomegaly  EXT: DP pulses intact, no lymphadenopathy, no edema ;  left shoulder dressing in place  Skin: normal turgor, normal texture, no rash  Psych: affect normal, mood normal  Neuro: AAOx3          Invasive Devices     Peripheral Intravenous Line            Peripheral IV 05/25/21 Right Wrist less than 1 day                   Code Status: Level 1 - Full Code  Current Length of Stay: 0 day(s)    Total floor / unit time spent today 30 minutes with more than 50% spent counseling/coordinating care  Counseling includes discussion with patient re: progress  and discussion with patient of his/her current medical state/information  Coordination of patient's care was performed in conjunction with primary service  Time invested included review of patient's labs, vitals, and management of their comorbidities with continued monitoring  In addition, this patient was discussed with medical team including physician and advanced extenders  The care of the patient was extensively discussed and appropriate treatment plan was formulated unique for this patient  ** Please Note: Fluency Direct voice to text software may have been used in the creation of this document   Audio transcription errors may occur**

## 2021-05-25 NOTE — OP NOTE
OPERATIVE REPORT  PATIENT NAME: Rob Small    :  1935  MRN: [id number]  Pt Location:  OR ROOM 15    SURGERY DATE: 2021     SURGEON: Rekha Aceves MD     ASSISTANT: Carissa Murillo PA-C     NOTE: Carissa Murillo PA-C was present throughout the entire procedure and performed essential assistance with patient prepping, draping, positioning, trial implantation/range of motion, final implant insertion, careful retraction, wound closure, sterile dressing application and sling application, all under my direct supervision  NOTE: No qualified resident physician was available for assistance    PREOPERATIVE DIAGNOSIS: Left Shoulder Rotator Cuff Tear Arthropathy    POSTOPERATIVE DIAGNOSIS: Same    PROCEDURES: Left Reverse Total Shoulder Arthroplasty with Long Head Biceps Tenodesis    ANESTHESIA STAFF: Maite Palma MD     ANESTHESIA TYPE: General with endotracheal tube with ultrasound guided interscalene block (Exparel)  The interscalene block was provided by the anesthesia staff per my request for postoperative pain control and to decrease the use of postoperative narcotic medication for pain control  COMPLICATIONS: None    FINDINGS: Rotator Cuff Tear Arthropathy    SPECIMEN(S): None    ESTIMATED BLOOD LOSS: Minimal    INDICATIONS FOR PROCEDURE:  The patient is a 81 y o  female presenting with pain and lack of function secondary to rotator cuff tear arthropathy of the left shoulder  After a thorough discussion of the risks and benefits of operative and nonoperative care the patient elected for left reverse total shoulder arthroplasty  Informed consent was obtained in the office  OPERATIVE TECHNIQUE:  The day of surgery I identified the patient's left shoulder and marked it with my initials  The patient was taken back to the operating room where anesthesia was induced by the anesthesia staff without complication   The patient was placed in the beachchair position with all bony prominences padded  The left shoulder was prepped and draped in routine sterile fashion and after a time-out for safety and confirming 2 grams of IV Cefazolin were given, a standard deltopectoral approach was performed  The dissection was carried down to the subscapularis which was partially torn  The remnant was released and tagged for repair to the anterior humerus later in the case    The long head of biceps had severe tenosynovitis and degeneration, so it was released and tagged for later tenodesis to the anterior humerus at the end of the case  The humeral head was exposed and found to have severe degenerative change with no remaining rotator cuff attached  The humerus was prepared keeping with the surgical technique for a Tornier Flex reverse prosthesis  After preparing the humerus the glenoid was exposed and prepared for a 25 mm Tornier Perform Plus baseplate  No signficant glenoid wear was present and no bone grafting was required  The baseplate was placed and then secured with a central 6 5 mm diameter 30 mm length screw  Additional fixation was achieved with a 14 mm non-locking screw followed by a superior 26 mm and an inferior 26 mm locking screws  These achieved excellent fixation of the baseplate to the glenoid  A 36 mm + 3 mm lateralized glenosphere was then fixated to the baseplate  The humerus was finished and a size 1A Tornier Flex Stem with a low offset tray and a +6 mm C angle polyethylene (135 degree construct) was trialed and found to have excellent stability with full range of motion and appropriate soft tissue tension  Final implants were placed and the area was irrigated with pulse lavage  The subscapularis remnant was repaired to the anterior humerus and the long head biceps was tenodesed to the anterior humerus with Orthocord sutures  The deltopectoral interval was loosely closed with 0 Vicryl and the subdermal layer with 2-0 Vicryl with staples for skin   Sterile dressings and a sling with abduction pillow was placed and the patient was awoken  The patient was transported to the recovery room in good condition and will be admitted for post-operative care and physical therapy will be initiated following the standard reverse total shoulder arthroplasty protocol      PATIENT DISPOSITION:  Stable to PACU      SIGNATURE: Roger Ramirez MD  DATE: May 25, 2021  TIME: 9:07 AM

## 2021-05-26 PROBLEM — M75.102 ROTATOR CUFF TEAR ARTHROPATHY OF LEFT SHOULDER: Status: RESOLVED | Noted: 2017-04-20 | Resolved: 2021-05-26

## 2021-05-26 PROBLEM — Z96.612 STATUS POST REVERSE TOTAL SHOULDER REPLACEMENT, LEFT: Status: ACTIVE | Noted: 2021-05-26

## 2021-05-26 PROBLEM — M12.812 ROTATOR CUFF TEAR ARTHROPATHY OF LEFT SHOULDER: Status: RESOLVED | Noted: 2017-04-20 | Resolved: 2021-05-26

## 2021-05-26 LAB
ANION GAP SERPL CALCULATED.3IONS-SCNC: 9 MMOL/L (ref 4–13)
BUN SERPL-MCNC: 17 MG/DL (ref 5–25)
CALCIUM SERPL-MCNC: 9.7 MG/DL (ref 8.3–10.1)
CHLORIDE SERPL-SCNC: 108 MMOL/L (ref 100–108)
CO2 SERPL-SCNC: 25 MMOL/L (ref 21–32)
CREAT SERPL-MCNC: 0.66 MG/DL (ref 0.6–1.3)
ERYTHROCYTE [DISTWIDTH] IN BLOOD BY AUTOMATED COUNT: 12.9 % (ref 11.6–15.1)
GFR SERPL CREATININE-BSD FRML MDRD: 81 ML/MIN/1.73SQ M
GLUCOSE SERPL-MCNC: 133 MG/DL (ref 65–140)
HCT VFR BLD AUTO: 39.4 % (ref 34.8–46.1)
HGB BLD-MCNC: 13.1 G/DL (ref 11.5–15.4)
MCH RBC QN AUTO: 31.6 PG (ref 26.8–34.3)
MCHC RBC AUTO-ENTMCNC: 33.2 G/DL (ref 31.4–37.4)
MCV RBC AUTO: 95 FL (ref 82–98)
PLATELET # BLD AUTO: 184 THOUSANDS/UL (ref 149–390)
PMV BLD AUTO: 10.7 FL (ref 8.9–12.7)
POTASSIUM SERPL-SCNC: 3.7 MMOL/L (ref 3.5–5.3)
RBC # BLD AUTO: 4.14 MILLION/UL (ref 3.81–5.12)
SODIUM SERPL-SCNC: 142 MMOL/L (ref 136–145)
WBC # BLD AUTO: 14.87 THOUSAND/UL (ref 4.31–10.16)

## 2021-05-26 PROCEDURE — 85027 COMPLETE CBC AUTOMATED: CPT | Performed by: PHYSICIAN ASSISTANT

## 2021-05-26 PROCEDURE — 80048 BASIC METABOLIC PNL TOTAL CA: CPT | Performed by: PHYSICIAN ASSISTANT

## 2021-05-26 PROCEDURE — 97167 OT EVAL HIGH COMPLEX 60 MIN: CPT

## 2021-05-26 PROCEDURE — NC001 PR NO CHARGE: Performed by: ORTHOPAEDIC SURGERY

## 2021-05-26 PROCEDURE — 97535 SELF CARE MNGMENT TRAINING: CPT

## 2021-05-26 PROCEDURE — 97163 PT EVAL HIGH COMPLEX 45 MIN: CPT

## 2021-05-26 RX ORDER — OXYCODONE HYDROCHLORIDE 5 MG/1
5 TABLET ORAL EVERY 4 HOURS PRN
Qty: 15 TABLET | Refills: 0 | OUTPATIENT
Start: 2021-05-26

## 2021-05-26 RX ADMIN — PROPRANOLOL HYDROCHLORIDE 20 MG: 20 TABLET ORAL at 23:23

## 2021-05-26 RX ADMIN — ACETAMINOPHEN 650 MG: 325 TABLET, FILM COATED ORAL at 17:13

## 2021-05-26 RX ADMIN — PROPRANOLOL HYDROCHLORIDE 20 MG: 20 TABLET ORAL at 13:12

## 2021-05-26 RX ADMIN — PRAVASTATIN SODIUM 40 MG: 40 TABLET ORAL at 17:09

## 2021-05-26 RX ADMIN — PROPRANOLOL HYDROCHLORIDE 20 MG: 20 TABLET ORAL at 17:09

## 2021-05-26 RX ADMIN — PROPRANOLOL HYDROCHLORIDE 20 MG: 20 TABLET ORAL at 09:50

## 2021-05-26 RX ADMIN — CEFAZOLIN SODIUM 1000 MG: 1 SOLUTION INTRAVENOUS at 00:25

## 2021-05-26 RX ADMIN — SENNOSIDES 8.6 MG: 8.6 TABLET ORAL at 09:50

## 2021-05-26 RX ADMIN — ANASTROZOLE 1 MG: 1 TABLET ORAL at 09:50

## 2021-05-26 NOTE — PLAN OF CARE
Problem: OCCUPATIONAL THERAPY ADULT  Goal: Performs self-care activities at highest level of function for planned discharge setting  See evaluation for individualized goals  Description: Treatment Interventions: ADL retraining, Functional transfer training, Endurance training, Patient/family training, Compensatory technique education, Continued evaluation, Energy conservation, Activityengagement          See flowsheet documentation for full assessment, interventions and recommendations  Note: Limitation: Decreased ADL status, Decreased Safe judgement during ADL, Decreased cognition, Decreased endurance, Decreased self-care trans, Decreased high-level ADLs  Prognosis: Good  Assessment: Pt is a 81 y o  YO  female admitted to [facility] on [facility]25/2021 FOR L TSA  Pt  has a past medical history of R TSA, Anxiety, Arthritis, Atherosclerosis, Bell's palsy, Breast cancer ([facility] [address] ), Broken femur ([facility] [address] ), [facility] Cardiac disorder, DCIS (ductal carcinoma in situ) of breast, Depression, Endometrial polyp, GERD (gastroesophageal reflux disease), History of radiation therapy, Hypercholesterolemia, Hyperlipidemia, Long term use of drug, Neuropathy, Peripheral neuropathy, Pneumonia, PONV (postoperative nausea and vomiting), Tachycardia, and Uterine fibroid  Pt with active OT orders and activity as tolerated orders  NWB LUE IN ABDUCTION SLING   Pt resides in a [phone]  Pt was I w/  ADLS and IADLS, & required USE OF SPC PTA  Currently pt is MIN A FOR UB ADLS, MOD A FOR LB ADLS, AND MIN A FOR TRANSFERS/FUNCTIONAL MOBILITY  Pt is limited at this time 2*: pain, endurance, activity tolerance, functional mobility, unsupportive home environment, decreased I w/ ADLS/IADLS, cognitive impairments, decreased safety awareness and decreased insight into deficits  The following Occupational Performance Areas to address include: grooming, bathing/shower, toilet hygiene, dressing, functional mobility and household maintenance   Based on the aforementioned OT evaluation, functional performance deficits, and assessments, pt has been identified as a high complexity evaluation  From OT standpoint, anticipate d/c STR  Pt to continue to benefit from acute immediate OT services to address the following goals 3-5x/week to  w/in 7-10 days:   The patient's raw score on the AM-PAC Daily Activity inpatient short form is 15, standardized score is 34 69, less than 39 4  Patients at this level are likely to benefit from discharge to post-acute rehabilitation services  Please refer to the recommendation of the Occupational Therapist for safe discharge planning  Recommendation: (S) Geriatric Consult  OT Discharge Recommendation: (S) Post acute rehabilitation services(UNSAFE TO D/C HOME ALONE)  OT - OK to Discharge:  Yes

## 2021-05-26 NOTE — OCCUPATIONAL THERAPY NOTE
Occupational Therapy Evaluation [place] [address]     Patient Name: Carlos Zhong  [id number] Date: 5/26/2021  Problem List  Principal Problem:    Status post reverse total shoulder replacement, left    Past Medical History  Past Medical History:   Diagnosis Date    Anxiety     Arthritis     last assessed 3/24/15     Atherosclerosis     Bell's palsy     Breast cancer ([facility] [address] ) 2019    Broken femur ([facility] [address] )     Cancer ([facility] [address] )     breast    Cardiac disorder     DCIS (ductal carcinoma in situ) of breast     last assessed 4/4/17     Depression     Endometrial polyp     GERD (gastroesophageal reflux disease)     controlled    History of radiation therapy 2010    Hypercholesterolemia     resolved 10/22/14     Hyperlipidemia     Long term use of drug     last assessed 5/23/14     Neuropathy     Peripheral neuropathy     Pneumonia     As a Child    PONV (postoperative nausea and vomiting)     Tachycardia     Uterine fibroid      Past Surgical History  Past Surgical History:   Procedure Laterality Date    BREAST BIOPSY Right     BREAST LUMPECTOMY Right 2010    BREAST LUMPECTOMY Right 5/8/2019    Procedure: BREAST LUMPECTOMY; BREAST NEEDLE LOCALIZATION (NEEDLE LOC AT 0800); Surgeon: Cosmo Wilkins MD;  [facility];  Service: Surgical Oncology    CARPAL TUNNEL RELEASE Bilateral     CATARACT EXTRACTION Bilateral     COLONOSCOPY      DILATION AND CURETTAGE OF UTERUS      ENDOMETRIAL BIOPSY      without cervical dilation     HAND SURGERY      HEMORROIDECTOMY      JOINT REPLACEMENT Right     Shoulder    JOINT REPLACEMENT Bilateral     Knees    LYMPH NODE BIOPSY Right 5/8/2019    Procedure: SENTINEL LYMPH NODE BIOPSY; LYMPHOSCINTIGRAPHY; INTRAOPERATIVE LYMPHATIC MAPPING (INJECT AT 0900);   Surgeon: Cosmo Wilkins MD;  [facility];  Service: Surgical Oncology    MAMMO NEEDLE LOCALIZATION RIGHT (ALL INC) Right 5/8/2019    OOPHORECTOMY      75    OVARIAN CYST REMOVAL Left     MT RECONSTR TOTAL SHOULDER IMPLANT Right 7/18/2017    Procedure: TOTAL SHOULDER REVERSE ARTHROPLASTY;  Surgeon: Zully Steinberg MD;  [facility];  Service: Orthopedics    NV REVISE ULNAR NERVE AT ELBOW Left 11/19/2019    Procedure: RELEASE CUBITAL TUNNEL left -;  Surgeon: Kinza Benson MD;  [facility];  Service: Orthopedics    NV WRIST Yoana Alcala LIG Left 11/19/2019    Procedure: RELEASE CARPAL TUNNEL ENDOSCOPIC;  Surgeon: Kinza Benson MD;  [facility];  Service: Orthopedics    SENTINEL LYMPH NODE BIOPSY      TONSILLECTOMY      TUBAL LIGATION      US GUIDED BREAST BIOPSY RIGHT COMPLETE Right 3/28/2019         05/26/21 0840   OT Last Visit   OT Visit Date 05/26/21   Note Type   Note type Evaluation   Restrictions/Precautions   Weight Bearing Precautions Per Order Yes   LUE Weight Bearing Per Order NWB   Braces or Orthoses Sling  (abduction sling)   Other Precautions Cognitive; Chair Alarm; Bed Alarm  (chair alarm active at end of session)   Pain Assessment   Pain Assessment Tool Pain Assessment not indicated - pt denies pain   Pain Score No Pain   Home Living   Type of Home House   Home Layout Two level  (stair glide to second floor)   Bathroom Shower/Tub Tub/shower unit   Bathroom Toilet Standard   Home Equipment Cane   Prior Function   Level of [place] Independent with ADLs and functional mobility   Lives With Alone   Receives Help From Family   ADL Assistance Independent   IADLs Independent   Falls in the last 6 months 1 to 4   Vocational Retired   Comments VERY INCONSISTENT HISTORIAN TO WHETHER DTR COULD STAY W/ HER UPON D/C   Lifestyle   Autonomy PTA PT REPORTS I IN ADLS/IADLS/FUCNTIONAL MOBILITY W/ SPC (PT REPORTS ONE RECENT FALL)   Reciprocal Relationships DTR- UNCLEAR IF SHE COULD STAY W/ HER UPON DC   Service to Others RETIRED   Intrinsic Gratification READING AND WATCHING TV   Psychosocial   Psychosocial (WDL) WDL   Subjective   Subjective "WELL I DIDN'T REMEMBER I COULDN'T USE MY L ARM BECAUSE I USUALLY USE MY R ARM INSTEAD"   ADL   Where Assessed Chair   Eating Assistance 5  Supervision/Setup   Grooming Assistance 5  Supervision/Setup   UB Bathing Assistance 4  Minimal Assistance   LB Bathing Assistance 3  Moderate Assistance   UB Dressing Assistance 4  Minimal Assistance   LB Dressing Assistance 3  Moderate [address]  3  Moderate Assistance   Transfers   Sit to Stand 4  Minimal assistance   Additional items Assist x 1; Increased time required   Stand to Sit 4  Minimal assistance   Additional items Assist x 1; Increased time required; Impulsive;Verbal cues   Functional Mobility   Functional Mobility 4  Minimal assistance   Additional items SPC   Balance   Static Sitting Fair   Dynamic Sitting Fair -   Static Standing Poor +   Dynamic Standing Poor +   Ambulatory Poor +   Activity Tolerance   Activity Tolerance Other (Comment)  (COGNITION)   Medical Staff Made Aware SPOKE TO PT GUANACO   Nurse Made Aware UPDATED RN ANAHI DAVIS Assessment   RUE Assessment WFL   LUMERCEDES Assessment   LUE Assessment X  (IN ABDUCTION SLING)   Cognition   Overall Cognitive Status Impaired   Arousal/Participation Cooperative   Attention Attends with cues to redirect   Orientation Level Oriented to person;Oriented to place; Disoriented to time;Disoriented to situation  (REPORTS SHE GOT SURGERY 1WK PRIOR, UNABLE TO RECALL WBS)   Memory Decreased recall of precautions;Decreased recall of recent events;Decreased short term memory   Following Commands Follows one step commands with increased time or repetition   Comments PT UNABLE TO RECALL WBS- FREQUENTLY BREAKING TSA PRECAUTIONS REQUIRING CONSTANT CUES FOR REDIRECT   PT'S W/ FLUCTUATING COGNITION- CONFUSED ABOUT LENNOX DEVICE REPORTING "HER DTR GAVE HER THAT AND SHE PLANS TO TAKE IT HOME"; UNABLE TO COMPREHEND THIS WAS HOSPITAL EQUIPMENT EVEN AFTER REDIRECTION; BEGAN SESSION AFTER PHYSICAL THERAPIST HAD JUST COMPLETED STAIRS W/ PT REQUIRING INCREASED ASSISTANCE (SEE PT NOTE); WHEN ASKED HOW STAIRS WENT PT REPORTED IT WENT JUST FINE AND SHE WOULD MANAGE THEM AT HOME  OF NOTE: PER PT GUANACO PT REQUIRED CONSTANT CUEING TO NOT REACH FOR HAND RAIL W/ L HAND IN SLING  PT W/ DECREASED INSIGHT INTO DEFICITIS AND DECREASED SAFETY AWARENESS  PT REPORTS SOME MEMORY CONCERNS PTA  RECOMMEND GERIATRICS FOLLOW-UP  PT UNABLE TO REPORT WHETHER SHE WILL HAVE SUPPORT AT HOME- NOT SAFE TO D/C HOME ALONE DUE TO INABILITY TO FOLLOW PRECAUTIONS/WBS AND INCREASED RISK OF FALLING   Assessment   Limitation Decreased ADL status; Decreased Safe judgement during ADL;Decreased cognition;Decreased endurance;Decreased self-care trans;Decreased high-level ADLs   Prognosis Good   Assessment Pt is a 81 y o  YO  female admitted to [facility] on 5/25/2021 FOR L TSA  Pt  has a past medical history of R TSA, Anxiety, Arthritis, Atherosclerosis, Bell's palsy, Breast cancer ([facility] [address] Broken femur ([facility] [address] [facility] Cardiac disorder, DCIS (ductal carcinoma in situ) of breast, Depression, Endometrial polyp, GERD (gastroesophageal reflux disease), History of radiation therapy, Hypercholesterolemia, Hyperlipidemia, Long term use of drug, Neuropathy, Peripheral neuropathy, Pneumonia, PONV (postoperative nausea and vomiting), Tachycardia, and Uterine fibroid  Pt with active OT orders and activity as tolerated orders  NWB LUE IN ABDUCTION SLING   Pt resides in a [id number]  Pt was I w/  ADLS and IADLS, & required USE OF SPC PTA  Currently pt is MIN A FOR UB ADLS, MOD A FOR LB ADLS, AND MIN A FOR TRANSFERS/FUNCTIONAL MOBILITY  Pt is limited at this time 2*: pain, endurance, activity tolerance, functional mobility, unsupportive home environment, decreased I w/ ADLS/IADLS, cognitive impairments, decreased safety awareness and decreased insight into deficits  The following Occupational Performance Areas to address include: grooming, bathing/shower, toilet hygiene, dressing, functional mobility and household maintenance  Based on the aforementioned OT evaluation, functional performance deficits, and assessments, pt has been identified as a high complexity evaluation  From OT standpoint, anticipate d/c STR  Pt to continue to benefit from acute immediate OT services to address the following goals 3-5x/week to  w/in 7-10 days:   The patient's raw score on the AM-PAC Daily Activity inpatient short form is 15, standardized score is 34 69, less than 39 4  Patients at this level are likely to benefit from discharge to post-acute rehabilitation services  Please refer to the recommendation of the Occupational Therapist for safe discharge planning  Goals   Patient Goals GO HOME   LTG Time Frame 7-10   Plan   Treatment Interventions ADL retraining;Functional transfer training; Endurance training;Patient/family training; Compensatory technique education;Continued evaluation; Energy conservation; Activityengagement   Goal Expiration Date 21   OT Treatment Day 1   OT Frequency 3-5x/wk   Additional Treatment Session   Start Time 825   End Time 840   Treatment Assessment Pt seen for follow up tx focusing on one handed dressing techniques, don/doff sling, and functional mobility  Patient participated in Skilled OT session this date with interventions consisting of ADL re training with the use of correct body mechnaics, safety awareness and fall prevention techniques, one handed dressing technique,  therapeutic activities to: increase activity tolerance, increase postural control, increase trunk control and increase OOB/ sitting tolerance  Patient agreeable to OT treatment session, upon arrival patient was found SEATED EOB  Patient requiring verbal cues for safety, verbal cues for correct technique and verbal cues for pacing thru activity steps  Patient REQUIRES UB ADLs at [address]  AND MOD A FOR LB ADLS  PT UNCLEAR ON LEVEL OF SUPPORT FROM DTR   Patient was educated on one handed dressing, sling don/doff, use of L UE for light tasks such as holding toothpaste, & WBS; patient UNABLE TO verbalize understanding and UNABLE TO demonstrate recommended plan correctly  PT IS UNSAFE TO D/C HOME ALONE  From OT standpoint, recommendation at time of d/c would be STR  Recommendation   Recommendation Geriatric Consult   OT Discharge Recommendation Post acute rehabilitation services  (UNSAFE TO D/C HOME ALONE)   OT - OK to Discharge Yes   AM-PAC Daily Activity Inpatient   Lower Body Dressing 2   Bathing 2   Toileting 2   Upper Body Dressing 2   Grooming 3   Eating 4   Daily Activity Raw Score 15   Daily Activity Standardized Score (Calc for Raw Score >=11) 34 69   AM-PAC Applied Cognition Inpatient   Following a Speech/Presentation 2   Understanding Ordinary Conversation 3   Taking Medications 2   Remembering Where Things Are Placed or Put Away 2   Remembering List of 4-5 Errands 2   Taking Care of Complicated Tasks 2   Applied Cognition Raw Score 13   Applied Cognition Standardized Score 30 46     GOALS:    Pt will complete UB/LB dressing/self care w/ min A using adaptive device and DME as needed    Pt will complete bathing w/ Min A w/ use of AE and DME as needed    Pt will demonstrate G carryover of pt/caregiver education and training including one handed dressing techniques and WBS as appropriate       Pt will increase activity tolerance to G for 30 min txment sessions    Pt will complete toileting w/ mod I w/ G hygiene/thoroughness using DME as needed     Pt will improve functional transfers to Mod I on/off all surfaces using DME as needed w/ G balance/safety      Pt will improve functional mobility during ADL/IADL/leisure tasks to Mod I using DME as needed w/ G balance/safety      Pt will engage in ongoing cognitive assessment w/ G participation to assist w/ safe d/c planning/recommendations    Emi Yee MS, OTR/L        Emi Yee MS, OTR/L

## 2021-05-26 NOTE — PLAN OF CARE
Problem: PHYSICAL THERAPY ADULT  Goal: Performs mobility at highest level of function for planned discharge setting  See evaluation for individualized goals  Description: Treatment/Interventions: Functional transfer training, LE strengthening/ROM, Elevations, Therapeutic exercise, Endurance training, Patient/family training, Equipment eval/education, Bed mobility, Gait training, Spoke to nursing, OT  Equipment Recommended: [place]       See flowsheet documentation for full assessment, interventions and recommendations  Note: Prognosis: Good  Problem List: Decreased strength, Decreased range of motion, Decreased endurance, Impaired balance, Decreased mobility, Pain, Orthopedic restrictions  Assessment: Pt is 81 y o  female seen for PT evaluation at [facility] on 5/25/2021  Two pt identifiers were used to confirm  Pt presented for scheduled Left Reverse Total Shoulder Arthroplasty with Long Head Biceps Tenodesis which was performed on 05/25/2021  Pt  with a primary dx of:  S/p Left Reverse Total Shoulder Arthroplasty with Long Head Biceps Tenodesis  PT now consulted for assessment of mobility and d/c needs  Pt with Activity as tolerated orders  Pts current co morbidities affecting treatment include: Anxiety, Bell's palsy, breast cancer, depression, hypercholesterolemia, HLD, peripheral neuropathy, and other active problems including living alone and steps to enter home  Pts current clinical presentation is Unstable/ Unpredictable (high complexity) due to Ongoing medical management for primary dx, Decreased activity tolerance compared to baseline, Fall risk, Increased assistance needed from caregiver at current time, Cog status, Current WBS, Continuous pulse oximetry monitoring , s/p surgical intervention    Upon evaluation, pt currently is requiring ; Min Ax1 for transfers and Min Ax1 for ambulation w/ SPC   Pt presents at PT eval functioning below baseline and currently w/ overall mobility deficits 2* to: BLE weakness, decreased ROM, impaired balance, gait deviations, pain, decreased activity tolerance compared to baseline, decreased safety awareness, impaired judgement, fall risk, orthopedic restrictions, decreased cognition  Pt currently at a fall risk 2* to impairments listed above  Based on the aforementioned PT evaluation, pt will continue to benefit from skilled Acute PT interventions to address stated impairments; to maximize functional mobility; for ongoing pt/ family training; and DME needs  At conclusion of PT session pt returned back in chair and chair alarm engaged with phone and call bell within reach  Pt denies any further questions at this time  PT is currently recommending Rehab  PT will continue to follow during hospital stay  Barriers to Discharge: Inaccessible home environment, Decreased caregiver support        PT Discharge Recommendation: Post acute rehabilitation services     PT - OK to Discharge: Yes( to rehab when medically cleared )    See flowsheet documentation for full assessment

## 2021-05-26 NOTE — PROGRESS NOTES
Internal Medicine Progress Note  Patient: Madelin Will  Age/sex: 81 y o  female  Medical Record #: [id number]      ASSESSMENT/PLAN: (Interval History)  Madelin Will is seen and examined and management for following issues:    Status Post left reverseTotal SHOULDER ARTHROPLASTY   Pain controlled   Continue encourage incentive spirometry; monitor fever curve   DVT prophylaxis in place and reviewed  Results from last 7 days   Lab Units 05/26/21  0448   WBC Thousand/uL 14 87*   HEMOGLOBIN g/dL 13 1   HEMATOCRIT % 39 4   PLATELETS Thousands/uL 184        HTN  · Hold lisinopril in the post operative setting to avoid hypotension/NORI  · OK to resume on dc  · Cont inderal as scheduled  · Add hydralazine as needed for SBP >160  · Monitor trend     Hyperlipidemia  · Low cholesterol diet  · Continue statin therapy     H/o breast CA s/p surgery/radiation  · Cont anastrazole as scheduled     OK for dc from medicine standpoint      PRE-OP HGB LEVEL: 13 6  The above assessment and plan was reviewed and updated as determined by my evaluation of the patient on 5/26/2021      Labs:   Results from last 7 days   Lab Units 05/26/21  0448   WBC Thousand/uL 14 87*   HEMOGLOBIN g/dL 13 1   HEMATOCRIT % 39 4   PLATELETS Thousands/uL 184           Invalid input(s): LABGLOM, CMP          Results from last 7 days   Lab Units 05/25/21  0927   POC GLUCOSE mg/dl 103       Review of Scheduled Meds:  Current Facility-Administered Medications   Medication Dose Route Frequency Provider Last Rate    acetaminophen  650 mg Oral Q6H PRN Igor Mcdonald PA-C      anastrozole  1 mg Oral Daily Igor Mcdonald PA-C      lactated ringers  125 mL/hr Intravenous Continuous Judyshirley Stokes MD      lactated ringers  125 mL/hr Intravenous Continuous Sylvie Patel CRNA Stopped (05/26/21 0459)    lactated ringers  1 5 mL/kg/hr Intravenous Continuous Igor Mcdonald PA-C Stopped (05/25/21 1848)    oxyCODONE  10 mg Oral Q4H PRN [facility] TI[facility]RA Hoang PA-C      oxyCODONE  5 mg Oral Q4H PRN Pearl Echeverria PA-C      pravastatin  40 mg Oral Daily With [employer]      propranolol  20 mg Oral 4x Daily Pearl Echeverria PA-C      senna  1 tablet Oral Daily Pearl Echeverria PA-C         Subjective/ HPI: Jenniechas Arvizu seen and examined  Patient's overnight issues or events were reviewed with nursing or staff during rounds or morning huddle session  New or overnight issues include the following:     Pt without any overnight events or reported nursing issues      ROS:   A 10 point ROS was performed; negative except as noted above  Imaging:     XR shoulder 1 vw left    (Results Pending)       *Labs /Radiology studies Reviewed  *Medications  reviewed and reconciled as needed  *Please refer to order section for additional ordered labs studies  *Case discussed with primary attending during morning huddle case rounds    Physical Examination:  Vitals:   Vitals:    05/25/21 1810 05/25/21 1937 05/25/21 2329 05/26/21 0246   BP: 134/76 136/75 138/75 157/82   Pulse: 86 88 89 90   Resp: 18 18 17    Temp: 98 2 °F (36 8 °C) 98 4 °F (36 9 °C) 97 9 °F (36 6 °C) 98 1 °F (36 7 °C)   TempSrc:       SpO2: 91% 92% 93% 95%   Weight:       Height:           General Appearance: no distress, conversive  HEENT: PERRLA, conjuctiva normal; oropharynx clear; mucous membranes moist;   Neck:  Supple, no lymphadenopathy or thyromegaly  Lungs: CTA, normal respiratory effort, no retractions, expiratory effort normal  CV: regular rate and rhythm , PMI normal   ABD: soft non tender, no masses , no hepatic or splenomegaly  EXT: DP pulses intact, no lymphadenopathy, no edema; left shoulder dressing in place  Skin: normal turgor, normal texture, no rash  Psych: affect normal, mood normal  Neuro: AAOx3      The above physical exam was reviewed and updated as determined by my evaluation of the patient on 5/26/2021      Invasive Devices     Peripheral Intravenous Line            Peripheral IV 05/26/21 Right Antecubital less than 1 day                   VTE Pharmacologic Prophylaxis: Sequential pneumatic compression stocking  Code Status: Level 1 - Full Code  Current Length of Stay: 0 day(s)      Total time spent:  30 minutes with more than 50% spent counseling/coordinating care  Counseling includes discussion with patient re: progress  and discussion with patient of his/her current medical state/information  Coordination of patient's care was performed in conjunction with primary service  Time invested included review of patient's labs, vitals, and management of their comorbidities with continued monitoring  In addition, this patient was discussed with medical team including physician and advanced extenders  The care of the patient was extensively discussed and appropriate treatment plan was formulated unique for this patient  ** Please Note:  voice to text software may have been used in the creation of this document   Although proof errors in transcription or interpretation are a potential of such software**

## 2021-05-26 NOTE — PHYSICAL THERAPY NOTE
PHYSICAL THERAPY EVALUATION  NAME:  Esa Dickey  DATE: 05/26/21    AGE:   81 y o  Mrn:   [id number]  ADMIT DX:  Rotator cuff tear arthropathy of left shoulder [M75 102, M12 812]    Past Medical History:   Diagnosis Date    Anxiety     Arthritis     last assessed 3/24/15     Atherosclerosis     Bell's palsy     Breast cancer ([facility] [address] ) 2019    Broken femur ([facility] [facility] )     [facility]     breast    Cardiac disorder     DCIS (ductal carcinoma in situ) of breast     last assessed 4/4/17     Depression     Endometrial polyp     GERD (gastroesophageal reflux disease)     controlled    History of radiation therapy 2010    Hypercholesterolemia     resolved 10/22/14     Hyperlipidemia     Long term use of drug     last assessed 5/23/14     Neuropathy     Peripheral neuropathy     Pneumonia     As a Child    PONV (postoperative nausea and vomiting)     Tachycardia     Uterine fibroid        Past Surgical History:   Procedure Laterality Date    BREAST BIOPSY Right     BREAST LUMPECTOMY Right 2010    BREAST LUMPECTOMY Right 5/8/2019    Procedure: BREAST LUMPECTOMY; BREAST NEEDLE LOCALIZATION (NEEDLE LOC AT 0800); Surgeon: Rafa Sarah MD;  [facility];  Service: Surgical Oncology    CARPAL TUNNEL RELEASE Bilateral     CATARACT EXTRACTION Bilateral     COLONOSCOPY      DILATION AND CURETTAGE OF UTERUS      ENDOMETRIAL BIOPSY      without cervical dilation     HAND SURGERY      HEMORROIDECTOMY      JOINT REPLACEMENT Right     Shoulder    JOINT REPLACEMENT Bilateral     Knees    LYMPH NODE BIOPSY Right 5/8/2019    Procedure: SENTINEL LYMPH NODE BIOPSY; LYMPHOSCINTIGRAPHY; INTRAOPERATIVE LYMPHATIC MAPPING (INJECT AT 0900);   Surgeon: Rafa Sarah MD;  [facility];  Service: Surgical Oncology    MAMMO NEEDLE LOCALIZATION RIGHT (ALL INC) Right 5/8/2019    OOPHORECTOMY      75    OVARIAN CYST REMOVAL Left     IA RECONSTR TOTAL SHOULDER IMPLANT Right 7/18/2017    Procedure: TOTAL SHOULDER REVERSE ARTHROPLASTY;  Surgeon: Evert Leonard MD;  [facility];  Service: Orthopedics    MT RECONSTR TOTAL SHOULDER IMPLANT Left 5/25/2021    Procedure: ARTHROPLASTY SHOULDER REVERSE, WITH BICEP Yoselin Pal;  Surgeon: Evert Leonard MD;  [facility];  Service: Orthopedics    MT REVISE ULNAR NERVE AT ELBOW Left 11/19/2019    Procedure: RELEASE CUBITAL TUNNEL left -;  Surgeon: Simon Ambrocio MD;  [facility];  Service: Orthopedics    MT WRIST Ashley Early LIG Left 11/19/2019    Procedure: RELEASE CARPAL TUNNEL ENDOSCOPIC;  Surgeon: Simon Ambrocio MD;  [facility];  Service: Orthopedics    SENTINEL LYMPH NODE BIOPSY      TONSILLECTOMY      TUBAL LIGATION      US GUIDED BREAST BIOPSY RIGHT COMPLETE Right 3/28/2019       Length Of Stay: 0    PHYSICAL THERAPY EVALUATION:        05/26/21 0805   Note Type   Note type Evaluation   Pain Assessment   Pain Assessment Tool 0-10   Pain Score 2   Pain Location/Orientation Orientation: Left; Location: Shoulder   Pain Onset/Description Onset: Ongoing;Frequency: Constant/Continuous; Descriptor: Aching   Effect of Pain on Daily Activities Increased pain with activity   Patient's Stated Pain Goal No pain   Hospital Pain Intervention(s) Ambulation/increased activity;Repositioned   Home Living   Type of [address] [facility] Two level;Bed/bath upstairs;Stairs to enter with rails  (1 MAE; stairglide to 2nd floor )   [address] [facility]   Additional Comments Patient reports living alone however states she has local daughter who is able to assist her as needed    Patient questionable historian need to clarify home set up and level support available   Prior Function   Level of [place] Independent with ADLs and functional mobility   Lives With Alone   Receives Help From Family   ADL Assistance Independent   Falls in the last 6 months 1 to 4   Vocational Retired   Comments Patient reports use of a single-point cane for ambulation prior to admission   Restrictions/Precautions   Weight Bearing Precautions Per Order Yes   LUE Weight Bearing Per Order NWB  (In shoulder abduction sling)   Braces or Orthoses Sling  (Shoulder abduction sling)   Other Precautions Cognitive; Chair Alarm; Bed Alarm;WBS;Multiple lines; Fall Risk   General   Additional Pertinent History Patient with limited insight to current physical limitations and weight-bearing restrictions   Family/Caregiver Present No   Cognition   Overall Cognitive Status Impaired   Arousal/Participation Alert   Attention Attends with cues to redirect   Orientation Level Oriented to person;Oriented to place; Disoriented to time;Disoriented to situation   Memory Decreased recall of precautions;Decreased recall of recent events   Following Commands Follows one step commands with increased time or repetition   RUE Assessment   RUE Assessment WFL   LUE Assessment   LUE Assessment X   RLE Assessment   RLE Assessment WFL   Strength RLE   RLE Overall Strength 4-/5   LLE Assessment   LLE Assessment WFL   Strength LLE   LLE Overall Strength 4-/5   Bed Mobility   Additional Comments NA, patient seated out of bed in chair at time of PT eval   Transfers   Sit to Stand 4  Minimal assistance   Additional items Assist x 1; Increased time required;Verbal cues   Stand to Sit 4  Minimal assistance   Additional items Assist x 1; Increased time required;Verbal cues   Additional Comments Verbal cues and tactile cues needed for hand placement and safety during transfers   Ambulation/Elevation   Gait pattern Short stride; Foward flexed   Gait Assistance 4  Minimal assist   Additional items Assist x 1   Assistive Device SPC   Distance 65ft x 2    Stair Management Assistance 3  Moderate assist   Additional items Assist x 1   Stair Management Technique One rail R   Number of Stairs 3   Balance   Static Sitting Fair   Static Standing Poor +   Ambulatory Poor +   Endurance Deficit   Endurance Deficit No   Activity Tolerance   Activity Tolerance Other (Comment)  (Cog status)   Medical Staff Made Aware Princess Oates OT    Nurse Made Aware Patient appropriate to be seen and mobilized per nursing   Assessment   Prognosis Good   Problem List Decreased strength;Decreased range of motion;Decreased endurance; Impaired balance;Decreased mobility;Pain;Orthopedic restrictions   Assessment Pt is 81 y o  female seen for PT [facility] on 5/25/[facility]21  Two pt identifiers were used to confirm  Pt presented for scheduled Left Reverse Total Shoulder Arthroplasty with Long Head Biceps Tenodesis which was performed on 05/25/2021  Pt  with a primary dx of:  S/p Left Reverse Total Shoulder Arthroplasty with Long Head Biceps Tenodesis  PT now consulted for assessment of mobility and d/c needs  Pt with Activity as tolerated orders  Pts current co morbidities affecting treatment include: Anxiety, Bell's palsy, breast cancer, depression, hypercholesterolemia, HLD, peripheral neuropathy, and other active problems including living alone and steps to enter home  Pts current clinical presentation is Unstable/ Unpredictable (high complexity) due to Ongoing medical management for primary dx, Decreased activity tolerance compared to baseline, Fall risk, Increased assistance needed from caregiver at current time, Cog status, Current WBS, Continuous pulse oximetry monitoring , s/p surgical intervention    Upon evaluation, pt currently is requiring ; Min Ax1 for transfers and Min Ax1 for ambulation w/ SPC  Pt presents at PT eval functioning below baseline and currently w/ overall mobility deficits 2* to: BLE weakness, decreased ROM, impaired balance, gait deviations, pain, decreased activity tolerance compared to baseline, decreased safety awareness, impaired judgement, fall risk, orthopedic restrictions, decreased cognition  Pt currently at a fall risk 2* to impairments listed above    Based on the aforementioned PT evaluation, pt will continue to benefit from skilled Acute PT interventions to address stated impairments; to maximize functional mobility; for ongoing pt/ family training; and DME needs  At conclusion of PT session pt returned back in chair and chair alarm engaged with phone and call bell within reach  Pt denies any further questions at this time  PT is currently recommending Rehab  PT will continue to follow during hospital stay  Barriers to Discharge Inaccessible home environment;Decreased caregiver support   Goals   Patient Goals " to go home"   STG Expiration Date 06/05/21   Short Term Goal #1 In 10 days pt will complete: 1) Bed mobility skills with mod I while maintaining NWB to LUE to increase safety and independence as well as decrease caregiver burden  2) Functional transfers with mod I while maintaining NWB to LUE to promote increased independence, safety, and QOL  3) Ambulate 200' using least restrictive AD with mod I while maintaining NWB to LUE without LOB and stable vitals so that pt can negotiate previous living environment safely and promote independence with functional mobility and return to PLOF  4) Stair training up/ down 1 step/s using rail/s with S so that pt can enter/negotiate previous living environment safely and decrease fall risk  5) Improve balance grades by 1/2 grade to increase safety with all mobility and decrease fall risk  6) Improve BLE strength by 1/2 grade to help increase overall functional mobility and decrease fall risk  Plan   Treatment/Interventions Functional transfer training;LE strengthening/ROM; Elevations; Therapeutic exercise; Endurance training;Patient/family training;Equipment eval/education; Bed mobility;Gait training;Spoke to nursing;OT   PT Frequency Other (Comment)  (3-6x a week )   Recommendation   PT Discharge Recommendation Post acute rehabilitation services   Equipment Recommended Cane   PT - OK to Discharge Yes  ( to rehab when medically cleared ) AM-PAC Basic Mobility Inpatient   Turning in Bed Without Bedrails 3   Lying on Back to Sitting on Edge of Flat Bed 3   Moving Bed to Chair 3   Standing Up From Chair 3   Walk in Room 3   Climb 3-5 Stairs 2   Basic Mobility Inpatient Raw Score 17   Basic Mobility Standardized Score 39 67   Modified [place] Scale   Modified Bradley Scale 4   Barthel Index   Feeding 10   Bathing 0   Grooming Score 0   Dressing Score 5   Bladder Score 10   Bowels Score 10   Toilet Use Score 5   Transfers (Bed/Chair) Score 10   Mobility (Level Surface) Score 10   Stairs Score 5   Barthel Index Score 65   Portions of the documentation may have been created using voice recognition software  Occasional wrong word or sound alike substitutions may have occurred due to the inherent limitations of the voice recognition software  Read the chart carefully and recognize, using context, where substitutions have occurred      Nickie Montoya, PT, DPT

## 2021-05-26 NOTE — CASE MANAGEMENT
Pt is not a <30 day readmission or current bundle  Risk of unplanned readmission score is unavailable at this time  Pt s/p left reverse total shoulder replacement  Pt confused, CM met with pt and pt's daughter, Sagar Miguel ([phone]) at bedside to introduce CM role and begin discharge planning  Pt lives alone in a 2 story house that has 1 MAE  Pt reports that her family lives close by and is able to assist with care as needed  Pt reports being independent with ADLs PTA, pt uses a single point cane for ambulation and has access to a rolling walker  Pt reports history of [facility], no history of STR, inpatient MH treatment or D/A treatment  Pt drives and is retired  PCP is Dr Lyndsay Richardson ([phone]) and pt uses [facility] for prescriptions  Pt recommended for STR at time of discharge  CM answered all questions related to sub-acute, acute, and VNA  Pt and pt's daughter report that they would prefer pt go to [facility]  Referral placed via [facility] [address] and sub-acute list provided for review  Pt and pt's daughter aware of limited bed availability and strict admission criteria for [facility]  CM offered [facility] as a backup option however pt and pt's daughter asked that CM send only the [facility] referral at this time  CM reviewed d/c planning process including the following: identifying help at home, patient preference for d/c planning needs, Discharge Lounge, [facility] Meds to Bed program, availability of treatment team to discuss questions or concerns patient and/or family may have regarding understanding medications and recognizing signs and symptoms once discharged  CM also encouraged patient to follow up with all recommended appointments after discharge  Patient advised of importance for patient and family to participate in managing patients medical well being

## 2021-05-26 NOTE — DISCHARGE SUMMARY
ORTHOPEDICS DISCHARGE SUMMARY  Delmis To 81 y o  female MRN: [id number]  Unit/Bed#: -01    Attending Physician: Jerica Mosqueda    Admitting diagnosis: Rotator cuff tear arthropathy of left shoulder [M75 102, M12 812]    Discharge diagnosis: Status post left reverse total shoulder arthroplasty     Date of admission: 5/25/2021    Date of discharge: 05/27/21         Procedure: Left reverse total shoulder arthroplasty     HPI:  This is a 81y o  year old female that presented to the office with signs and symptoms of left rotator cuff arthropathy  They tried and failed conservative treatment measures and wished to proceed with surgical intervention  The risks, benefits, and complications of the procedure were discussed with the patient and informed consent was obtained  Hospital Course: The patient was admitted to the hospital on 5/25/2021 and underwent an uncomplicated left reverse total shoulder arthroplasty  They were transferred to the floor after a brief stay in the post-anesthesia care unit  Their pain was well managed with IV and oral pain medications  On discharge date pt was cleared by PT and the medicine team and determined to be safe for discharge    0   Lab Value Date/Time    HGB 14 1 05/27/2021 0446    HGB 13 1 05/26/2021 0448    HGB 13 6 05/14/2021 1418    HGB 14 6 04/17/2021 1929    HGB 14 4 01/21/2021 1035    HGB 14 7 09/28/2020 1156    HGB 14 3 11/01/2019 1030    HGB 13 8 09/17/2019 0934    HGB 14 8 05/02/2019 1426    HGB 13 2 10/23/2018 0639    HGB 14 6 10/22/2018 1900    HGB 14 2 01/31/2018 0955    HGB 11 6 07/19/2017 0459    HGB 11 6 07/19/2017 0459    HGB 13 1 06/12/2017 1707    HGB 14 0 05/23/2016 0959    HGB 13 6 08/19/2015 0951    HGB 13 7 05/20/2014 0855       Vital signs remained stable and pt was resuscitated with IVF as needed   Body mass index is 33 12 kg/m²  mildly obese  Recommend behavior modifications, nutrition and physical activity  Discharge Instructions:   The patient was discharged nonweight bearing to the left upper extremity  Refrain from PT/OT until cleared by surgeon  Take pain medications as instructed  Discharge Medications: For the complete list of discharge medications, please refer to the patient's medication reconciliation

## 2021-05-27 VITALS
BODY MASS INDEX: 33.06 KG/M2 | RESPIRATION RATE: 19 BRPM | OXYGEN SATURATION: 89 % | SYSTOLIC BLOOD PRESSURE: 134 MMHG | WEIGHT: 164 LBS | DIASTOLIC BLOOD PRESSURE: 59 MMHG | TEMPERATURE: 98.4 F | HEIGHT: 59 IN | HEART RATE: 70 BPM

## 2021-05-27 LAB
ANION GAP SERPL CALCULATED.3IONS-SCNC: 7 MMOL/L (ref 4–13)
BUN SERPL-MCNC: 21 MG/DL (ref 5–25)
CALCIUM SERPL-MCNC: 9.7 MG/DL (ref 8.3–10.1)
CHLORIDE SERPL-SCNC: 108 MMOL/L (ref 100–108)
CO2 SERPL-SCNC: 27 MMOL/L (ref 21–32)
CREAT SERPL-MCNC: 0.61 MG/DL (ref 0.6–1.3)
ERYTHROCYTE [DISTWIDTH] IN BLOOD BY AUTOMATED COUNT: 13.2 % (ref 11.6–15.1)
GFR SERPL CREATININE-BSD FRML MDRD: 84 ML/MIN/1.73SQ M
GLUCOSE SERPL-MCNC: 86 MG/DL (ref 65–140)
HCT VFR BLD AUTO: 43.3 % (ref 34.8–46.1)
HGB BLD-MCNC: 14.1 G/DL (ref 11.5–15.4)
MCH RBC QN AUTO: 31.5 PG (ref 26.8–34.3)
MCHC RBC AUTO-ENTMCNC: 32.6 G/DL (ref 31.4–37.4)
MCV RBC AUTO: 97 FL (ref 82–98)
PLATELET # BLD AUTO: 188 THOUSANDS/UL (ref 149–390)
PMV BLD AUTO: 10.5 FL (ref 8.9–12.7)
POTASSIUM SERPL-SCNC: 3.6 MMOL/L (ref 3.5–5.3)
RBC # BLD AUTO: 4.48 MILLION/UL (ref 3.81–5.12)
SODIUM SERPL-SCNC: 142 MMOL/L (ref 136–145)
WBC # BLD AUTO: 14.3 THOUSAND/UL (ref 4.31–10.16)

## 2021-05-27 PROCEDURE — 80048 BASIC METABOLIC PNL TOTAL CA: CPT | Performed by: PHYSICIAN ASSISTANT

## 2021-05-27 PROCEDURE — 97530 THERAPEUTIC ACTIVITIES: CPT

## 2021-05-27 PROCEDURE — NC001 PR NO CHARGE: Performed by: ORTHOPAEDIC SURGERY

## 2021-05-27 PROCEDURE — 97116 GAIT TRAINING THERAPY: CPT

## 2021-05-27 PROCEDURE — 85027 COMPLETE CBC AUTOMATED: CPT | Performed by: PHYSICIAN ASSISTANT

## 2021-05-27 PROCEDURE — 97535 SELF CARE MNGMENT TRAINING: CPT

## 2021-05-27 RX ADMIN — SENNOSIDES 8.6 MG: 8.6 TABLET ORAL at 08:41

## 2021-05-27 RX ADMIN — PROPRANOLOL HYDROCHLORIDE 20 MG: 20 TABLET ORAL at 12:25

## 2021-05-27 RX ADMIN — ANASTROZOLE 1 MG: 1 TABLET ORAL at 08:40

## 2021-05-27 RX ADMIN — PROPRANOLOL HYDROCHLORIDE 20 MG: 20 TABLET ORAL at 08:41

## 2021-05-27 NOTE — PLAN OF CARE
Problem: PHYSICAL THERAPY ADULT  Goal: Performs mobility at highest level of function for planned discharge setting  See evaluation for individualized goals  Description: Treatment/Interventions: Functional transfer training, LE strengthening/ROM, Elevations, Therapeutic exercise, Endurance training, Patient/family training, Equipment eval/education, Bed mobility, Gait training, Spoke to nursing, OT  Equipment Recommended: [facility]       See flowsheet documentation for full assessment, interventions and recommendations  Outcome: Progressing  Note: Prognosis: Good  Problem List: Decreased strength, Decreased range of motion, Decreased endurance, Impaired balance, Decreased mobility, Pain, Orthopedic restrictions  Assessment: The pt  has improving mobility and is responding appropriately throughout the session  She was able to ambulate multiple times while manuevering around a busy environment without increased time  She does fatigue with ambulation, but she is reliably ambulating beyond household distances  Discussed the patient with occupational therapy and she is appropriate to return home with assistance and supervision of her daughter  Barriers to Discharge: None        PT Discharge Recommendation: Home with outpatient rehabilitation     PT - OK to Discharge: Yes    See flowsheet documentation for full assessment

## 2021-05-27 NOTE — PHYSICAL THERAPY NOTE
Physical Therapy Progress Note     05/27/21 1015   PT Last Visit   PT Visit Date 05/27/21   Note Type   Note Type Treatment   Pain Assessment   Pain Assessment Tool Pain Assessment not indicated - pt denies pain   Pain Score No Pain   Restrictions/Precautions   LUE Weight Bearing Per Order NWB   Braces or Orthoses Other (Comment)  (Abduction sling )   Other Precautions Cognitive; Chair Alarm; Bed Alarm; Fall Risk;Pain;WBS   Subjective   Subjective The pt  states that she has no pain, and that she needs to go to the bathroom  She reports that her daughter is going to stay with her at home  Transfers   Sit to Stand 5  Supervision   Additional items Increased time required   Stand to Sit 5  Supervision   Additional items Increased time required   Ambulation/Elevation   Gait pattern Excessively slow; Short stride; Inconsistent jacquelyn   Gait Assistance 5  Supervision   Additional items Verbal cues   Assistive Device SPC  (RUE )   Distance 190feet, 130 feet, 100 feet x 2, 80 feet  Balance   Static Sitting Good   Dynamic Sitting Fair +   Static Standing Fair   Ambulatory Fair -   Activity Tolerance   Activity Tolerance Patient tolerated treatment well   Wendy Restrepo; April S  LIBRADO/L  Nurse Ni Gautam RN  Assessment   Prognosis Good   Problem List Decreased strength;Decreased range of motion;Decreased endurance; Impaired balance;Decreased mobility;Pain;Orthopedic restrictions   Assessment The pt  has improving mobility and is responding appropriately throughout the session  She was able to ambulate multiple times while manuevering around a busy environment without increased time  She does fatigue with ambulation, but she is reliably ambulating beyond household distances  Discussed the patient with occupational therapy and she is appropriate to return home with assistance and supervision of her daughter  Barriers to Discharge None   Goals   Patient Goals To go home     STG Expiration Date 06/05/21   PT Treatment Day 1   Plan   Treatment/Interventions Functional transfer training;LE strengthening/ROM; Therapeutic exercise; Endurance training;Patient/family training;Bed mobility;Gait training   Progress Progressing toward goals   PT Frequency   (3-6x a week )   Recommendation   PT Discharge Recommendation Home with outpatient rehabilitation   PT - OK to Discharge Yes   AM-PAC Basic Mobility Inpatient   Turning in Bed Without Bedrails 3   Lying on Back to Sitting on Edge of Flat Bed 3   Moving Bed to Chair 3   Standing Up From Chair 3   Walk in Room 3   Climb 3-5 Stairs 3   Basic Mobility Inpatient Raw Score 18   Basic Mobility Standardized Score 41 05     An AM-PAC Basic Mobility standardized score less than 42 9 suggests the patient may benefit from discharge to post-acute rehab services      Lacy Solorzano, PTA

## 2021-05-27 NOTE — PLAN OF CARE
Problem: OCCUPATIONAL THERAPY ADULT  Goal: Performs self-care activities at highest level of function for planned discharge setting  See evaluation for individualized goals  Description: Treatment Interventions: ADL retraining, Functional transfer training, Endurance training, Patient/family training, Compensatory technique education, Continued evaluation, Energy conservation, Activityengagement          See flowsheet documentation for full assessment, interventions and recommendations  Outcome: Progressing  Note: Limitation: Decreased ADL status, Decreased Safe judgement during ADL, Decreased cognition, Decreased endurance, Decreased self-care trans, Decreased high-level ADLs  Prognosis: Good  Assessment: pt participated in am ot session and was  seen focusing on adls, abd sling donning doffing, pt requires asst for all aspects of adls, needs asst for iadls / homemaking   pt will reuqire asst for adls iadls sling management   adls and functoinal moblility c spc  pt does have lob and unsteadiness at times and require hands on asst to avoid falls  pt has handouts for total shoulder and abd sling  pt is recommended to sponge bathe upon d/c until cleared by m d  Recommendation: (S) Geriatric Consult  OT Discharge Recommendation: ([phone] Zeb [phone])  OT - OK to Discharge:  Yes     April A LIBRADO Tom

## 2021-05-27 NOTE — PROGRESS NOTES
Internal Medicine Progress Note  Patient: Eloisa Merida  Age/sex: 81 y o  female  Medical Record #: [id number]      ASSESSMENT/PLAN: (Interval History)  Eloisa Merida is seen and examined and management for following issues:    Status Post left reverseTotal SHOULDER ARTHROPLASTY   Pain controlled   Continue encourage incentive spirometry; monitor fever curve   DVT prophylaxis in place and reviewed  Results from last 7 days   Lab Units 05/27/21  0446   WBC Thousand/uL 14 30*   HEMOGLOBIN g/dL 14 1   HEMATOCRIT % 43 3   PLATELETS Thousands/uL 188       HTN  · Hold lisinopril in the post operative setting to avoid hypotension/NORI  · OK to resume on dc  · Cont inderal as scheduled  · Add hydralazine as needed for SBP >160  · Monitor trend     Hyperlipidemia  · Low cholesterol diet  · Continue statin therapy     H/o breast CA s/p surgery/radiation  · Cont anastrazole as scheduled    Confusion  · Overnight a/t nsg  · Likely some mild sundowning  · Would avoid mind altering medications if at all possible     OK for dc from medicine standpoint      PRE-OP HGB LEVEL: 13 6  The above assessment and plan was reviewed and updated as determined by my evaluation of the patient on 5/27/2021      Labs:   Results from last 7 days   Lab Units 05/27/21  0446 05/26/21  0448   WBC Thousand/uL 14 30* 14 87*   HEMOGLOBIN g/dL 14 1 13 1   HEMATOCRIT % 43 3 39 4   PLATELETS Thousands/uL 188 184     Results from last 7 days   Lab Units 05/27/21  0446 05/26/21  0448   SODIUM mmol/L 142 142   POTASSIUM mmol/L 3 6 3 7   CHLORIDE mmol/L 108 108   CO2 mmol/L 27 25   BUN mg/dL 21 17   CREATININE mg/dL 0 61 0 66   CALCIUM mg/dL 9 7 9 7             Results from last 7 days   Lab Units 05/25/21  0927   POC GLUCOSE mg/dl 103       Review of Scheduled Meds:  Current Facility-Administered Medications   Medication Dose Route Frequency Provider Last Rate    acetaminophen  650 mg Oral Q6H PRN Leti Randhawa PA-C      anastrozole  1 mg Oral Daily Pili Salinas PA-C      pravastatin  40 mg Oral Daily With [employer], BETTINA      propranolol  20 mg Oral 4x Daily Pili Salinas PA-C      senna  1 tablet Oral Daily Pili Salinas PA-C         Subjective/ HPI: Esa Dickey seen and examined  Patient's overnight issues or events were reviewed with nursing or staff during rounds or morning huddle session  New or overnight issues include the following:     Pt without any overnight events or reported nursing issues; states she feels better today; had some confusion overnight, likely some sundowning      ROS:   A 10 point ROS was performed; negative except as noted above  Imaging:     XR shoulder 1 vw left    (Results Pending)       *Labs /Radiology studies Reviewed  *Medications  reviewed and reconciled as needed  *Please refer to order section for additional ordered labs studies  *Case discussed with primary attending during morning huddle case rounds    Physical Examination:  Vitals:   Vitals:    05/26/21 1709 05/26/21 1852 05/26/21 2317 05/27/21 0729   BP: 136/68 104/53 134/58 134/59   Pulse: 79 71 67 70   Resp:  18 17 19   Temp:  98 3 °F (36 8 °C) 97 9 °F (36 6 °C) 98 4 °F (36 9 °C)   TempSrc:       SpO2:  91% 93% (!) 89%   Weight:       Height:           GEN: No apparent distress, interactive  NEURO: Alert and oriented x3  HEENT: Pupils are equal and reactive, EOMI, mucous membranes are moist, face symmetrical  CV: S1 S2 regular, no MRG, no peripheral edema noted  RESP: Lungs are clear bilaterally, no wheezes, rales or rhonchi noted, on room air, respirations easy and non labored  GI: Flat, soft non tender, non distended; +BS x4  : Voiding without difficulty  MUSC: Moves all extremities; except LUE in immobilizer  SKIN: pink, warm and dry, normal turgor, no rashes, lesions        The above physical exam was reviewed and updated as determined by my evaluation of the patient on 5/27/2021      Invasive Devices Peripheral Intravenous Line            Peripheral IV 05/26/21 Right Antecubital 1 day                   VTE Pharmacologic Prophylaxis: Sequential pneumatic compression stocking  Code Status: Level 1 - Full Code  Current Length of Stay: 0 day(s)      Total time spent:  30 minutes with more than 50% spent counseling/coordinating care  Counseling includes discussion with patient re: progress  and discussion with patient of his/her current medical state/information  Coordination of patient's care was performed in conjunction with primary service  Time invested included review of patient's labs, vitals, and management of their comorbidities with continued monitoring  In addition, this patient was discussed with medical team including physician and advanced extenders  The care of the patient was extensively discussed and appropriate treatment plan was formulated unique for this patient  ** Please Note:  voice to text software may have been used in the creation of this document   Although proof errors in transcription or interpretation are a potential of such software**

## 2021-05-27 NOTE — PLAN OF CARE
Problem: Potential for Falls  Goal: Patient will remain free of falls  Description: INTERVENTIONS:  - Assess patient frequently for physical needs  -  Identify cognitive and physical deficits and behaviors that affect risk of falls    -  Boykins fall precautions as indicated by assessment   - Educate patient/family on patient safety including physical limitations  - Instruct patient to call for assistance with activity based on assessment  - Modify environment to reduce risk of injury  - Consider OT/PT consult to assist with strengthening/mobility  Outcome: Adequate for Discharge     Problem: PAIN - ADULT  Goal: Verbalizes/displays adequate comfort level or baseline comfort level  Description: Interventions:  - Encourage patient to monitor pain and request assistance  - Assess pain using appropriate pain scale  - Administer analgesics based on type and severity of pain and evaluate response  - Implement non-pharmacological measures as appropriate and evaluate response  - Consider cultural and social influences on pain and pain management  - Notify physician/advanced practitioner if interventions unsuccessful or patient reports new pain  Outcome: Adequate for Discharge     Problem: INFECTION - ADULT  Goal: Absence or prevention of progression during hospitalization  Description: INTERVENTIONS:  - Assess and monitor for signs and symptoms of infection  - Monitor lab/diagnostic results  - Monitor all insertion sites, i e  indwelling lines, tubes, and drains  - Monitor endotracheal if appropriate and nasal secretions for changes in amount and color  - Boykins appropriate cooling/warming therapies per order  - Administer medications as ordered  - Instruct and encourage patient and family to use good hand hygiene technique  - Identify and instruct in appropriate isolation precautions for identified infection/condition  Outcome: Adequate for Discharge  Goal: Absence of fever/infection during neutropenic period  Description: INTERVENTIONS:  - Monitor WBC    Outcome: Adequate for Discharge     Problem: SAFETY ADULT  Goal: Patient will remain free of falls  Description: INTERVENTIONS:  - Assess patient frequently for physical needs  -  Identify cognitive and physical deficits and behaviors that affect risk of falls    -  Malcolm fall precautions as indicated by assessment   - Educate patient/family on patient safety including physical limitations  - Instruct patient to call for assistance with activity based on assessment  - Modify environment to reduce risk of injury  - Consider OT/PT consult to assist with strengthening/mobility  Outcome: Adequate for Discharge  Goal: Maintain or return to baseline ADL function  Description: INTERVENTIONS:  -  Assess patient's ability to carry out ADLs; assess patient's baseline for ADL function and identify physical deficits which impact ability to perform ADLs (bathing, care of mouth/teeth, toileting, grooming, dressing, etc )  - Assess/evaluate cause of self-care deficits   - Assess range of motion  - Assess patient's mobility; develop plan if impaired  - Assess patient's need for assistive devices and provide as appropriate  - Encourage maximum independence but intervene and supervise when necessary  - Involve family in performance of ADLs  - Assess for home care needs following discharge   - Consider OT consult to assist with ADL evaluation and planning for discharge  - Provide patient education as appropriate  Outcome: Adequate for Discharge  Goal: Maintain or return mobility status to optimal level  Description: INTERVENTIONS:  - Assess patient's baseline mobility status (ambulation, transfers, stairs, etc )    - Identify cognitive and physical deficits and behaviors that affect mobility  - Identify mobility aids required to assist with transfers and/or ambulation (gait belt, sit-to-stand, lift, walker, cane, etc )  - Malcolm fall precautions as indicated by assessment  - Record patient progress and toleration of activity level on Mobility SBAR; progress patient to next Phase/Stage  - Instruct patient to call for assistance with activity based on assessment  - Consider rehabilitation consult to assist with strengthening/weightbearing, etc   Outcome: Adequate for Discharge     Problem: DISCHARGE PLANNING  Goal: Discharge to home or other facility with appropriate resources  Description: INTERVENTIONS:  - Identify barriers to discharge w/patient and caregiver  - Arrange for needed discharge resources and transportation as appropriate  - Identify discharge learning needs (meds, wound care, etc )  - Arrange for interpretive services to assist at discharge as needed  - Refer to Case Management Department for coordinating discharge planning if the patient needs post-hospital services based on physician/advanced practitioner order or complex needs related to functional status, cognitive ability, or social support system  Outcome: Adequate for Discharge     Problem: Knowledge Deficit  Goal: Patient/family/caregiver demonstrates understanding of disease process, treatment plan, medications, and discharge instructions  Description: Complete learning assessment and assess knowledge base    Interventions:  - Provide teaching at level of understanding  - Provide teaching via preferred learning methods  Outcome: Adequate for Discharge

## 2021-05-27 NOTE — PROGRESS NOTES
Subjective: Patient confused yesterday however seems improved this morning, patient answering questions appropriately  Objective:  A 10 point ROS was performed; negative except as noted above       Lab Results   Component Value Date/Time    WBC 14 87 (H) 05/26/2021 04:48 AM    WBC 5 43 08/19/2015 09:51 AM    HGB 13 1 05/26/2021 04:48 AM    HGB 13 6 08/19/2015 09:51 AM       Vitals:    05/26/21 2317   BP: 134/58   Pulse: 67   Resp: 17   Temp: 97 9 °F (36 6 °C)   SpO2: 93%     Left upper extremity  Dressing C/D/I  Motor grossly intact to median, radial and ulnar nerve distributions  Sensation intact   Digits warm and well perfused with brisk capillary refill     Assessment: 81 y o  female post op day #2 from Left reverse total shoulder arthroplasty    Plan:  RAMIRO WEST in sling  Pain control  DVT ppx: mechanical   PT/OT  Will continue to assess for acute blood loss anemia  Dispo: pending clinical improvement

## 2021-05-27 NOTE — OCCUPATIONAL THERAPY NOTE
Occupational Therapy Treatment Note:       05/27/21 1230   OT Last Visit   OT Visit Date 05/27/21   Note Type   Note Type Treatment   Restrictions/Precautions   LUE Weight Bearing Per Order NWB   Braces or Orthoses   (abd sling)   Other Precautions Cognitive; Chair Alarm; Fall Risk   Pain Assessment   Pain Assessment Tool Pain Assessment not indicated - pt denies pain   ADL   Where Assessed Edge of bed   Grooming Assistance 4  Minimal Assistance   UB Bathing Assistance 4  Minimal Assistance   LB Bathing Assistance 3  Moderate Assistance   UB Dressing Assistance 2  Maximal Assistance   LB Dressing Assistance 3  Moderate Assistance   Toileting Assistance  3  Moderate Assistance   Toileting Comments clothing management and anal care   Functional Standing Tolerance   Time fair balance during adl and transfers with spc   Bed Mobility   Additional Comments pt anticipates sleeping in chair initially upon returning home   Transfers   Sit to Stand 4  Minimal assistance   Stand to Sit 4  Minimal assistance   Stand pivot 4  Minimal assistance   Additional items   (spc)   Functional Mobility   Functional Mobility 4  Minimal assistance   Additional items SPC   Cognition   Overall Cognitive Status Impaired   Arousal/Participation Cooperative   Attention Attends with cues to redirect   Memory Decreased recall of precautions   Following Commands Follows one step commands without difficulty   Activity Tolerance   Activity Tolerance Patient tolerated treatment well   Assessment   Assessment pt participated in am ot session and was  seen focusing on adls, abd sling donning doffing, pt requires asst for all aspects of adls, needs asst for iadls / homemaking   pt will reuqire asst for adls iadls sling management   adls and functoinal moblility c spc  pt does have lob and unsteadiness at times and require hands on asst to avoid falls  pt has handouts for total shoulder and abd sling   pt is recommended to sponge bathe upon d/c until cleared by m cindy  Plan   Treatment Interventions ADL retraining;Functional transfer training; Endurance training;Cognitive reorientation;Patient/family training;Equipment evaluation/education; Activityengagement   Goal Expiration Date 06/05/21   OT Treatment Day 2   OT Frequency 3-5x/wk   Recommendation   OT Discharge Recommendation HOME WITH FAMILY SUPPORT / ASSIST   OT - OK to Discharge Yes   April A Ruddy Shields

## 2021-05-27 NOTE — CASE MANAGEMENT
Therapy cleared pt for home out OPPT, no additional concerns or needs expressed or identified at this time

## 2021-05-28 ENCOUNTER — EVALUATION (OUTPATIENT)
Dept: PHYSICAL THERAPY | Age: 86
End: 2021-05-28
Payer: MEDICARE

## 2021-05-28 DIAGNOSIS — Z96.612 STATUS POST REVERSE TOTAL REPLACEMENT OF LEFT SHOULDER: Primary | ICD-10-CM

## 2021-05-28 PROCEDURE — 97161 PT EVAL LOW COMPLEX 20 MIN: CPT | Performed by: PHYSICAL THERAPIST

## 2021-05-28 PROCEDURE — 97140 MANUAL THERAPY 1/> REGIONS: CPT | Performed by: PHYSICAL THERAPIST

## 2021-05-28 NOTE — PROGRESS NOTES
PT Evaluation     Today's date: 2021  Patient name: Chester Diop  : 1935  MRN: [id number]  Referring provider: Rishabh Irene MD  Dx:   Encounter Diagnosis     ICD-10-CM    1  Status post reverse total replacement of left shoulder  [id number]                   Assessment  Assessment details: Patient seen for post-op evaluation s/p reverse total shoulder  PT reviewed patient's protocol in addition to patient's biceps tenodesis  Patient's daughter was also educated/instructed in goals for PT, exercise plan, wearing the sling as she needs it with standing and walking; ok'd sling removal for 10-15 min increments with specific instructions for no use of L UE with activity  Patient's PROM limited from surgery and tightness  Focusing on gentle movements and range to tolerance today  Will progress per protocol  Impairments: abnormal or restricted ROM, activity intolerance, impaired physical strength, lacks appropriate home exercise program, pain with function, scapular dyskinesis, poor posture  and poor body mechanics  Functional limitations: Patient unable to use L UE for all functional activites- severe deficits at this time  Understanding of Dx/Px/POC: good   Prognosis: good    Goals  Impairment Goals to be met within 4 weeks  - Improve ROM to 90 degrees passive scaption  - Increase strength to 3+/5 throughout  - Improve scapular strength and postural control   - progress per protocol  Functional Goals to be met within 4-6 weeks     - Return to Prior Level of Function  - Increase Functional Status Measure to: expected  - Patient will be independent with HEP        Plan  Patient would benefit from: skilled physical therapy  Planned modality interventions: cryotherapy and thermotherapy: hydrocollator packs  Planned therapy interventions: abdominal trunk stabilization, manual therapy, neuromuscular re-education, patient education, postural training, strengthening, stretching, therapeutic activities, therapeutic exercise, home exercise program and body mechanics training  Frequency: 2x week  Duration in weeks: 8  Treatment plan discussed with: patient        Subjective Evaluation    History of Present Illness  Date of surgery: 5/25/2021  Mechanism of injury: surgery  Mechanism of injury: Patient is s/p L reverse total shoulder s/p fall at home with rotator cuff tear  Patient recently discharged from the hospital, has been doing fine at home, daughter is staying with her for now and will be able to assist her mom as needed  Patient currently without any pain  Sleeping in a recliner chair as this is easier for her to get up/down in the night  Pain  No pain reported    Social Support  Steps to enter house: yes  Stairs in house: no   Lives in: [facility]  Lives with: alone    Employment status: not working  Hand dominance: right      Diagnostic Tests  MRI studies: abnormal  Treatments  Previous treatment: physical therapy  Discharged from (in last 30 days): inpatient hospitalization  Patient Goals  Patient goals for therapy: decreased pain, increased motion and return to sport/leisure activities          Objective     Neurological Testing     Additional Neurological Details  H/o n/t in L 2 fingers- 4th and 5th digits  Reports it's from another surgery in the wrist/hand  Was going to see hand surgeon then COVID-19 happened and hasn't had a chance to get back to the surgeon  Passive Range of Motion     Right Elbow   Flexion: [facility]    Additional Passive Range of Motion Details  scaption plan 40* passive ROM  ER to 0*  Full elbow flexion, slightly tight into extension and tight into passive supination  Precautions: L reverse total shoulder, biceps tenodesis- no repetitive biceps curls h/o R reverse total shoulder as well  Fall risk    Goals of max 120 scaption range and lifetime max of 50* ER     Manuals 5/28       L shoulder PROM to tolerance, scaption plane   ER <30 for now   x15' Elbow extension To tolerance       Supination stretch- start in ~ 2 weeks                Neuro Re-Ed                                                                Ther Ex                scap retraction         squeezes                                                Ther Activity                        Gait Training                        Modalities                        Progress per protocol

## 2021-06-01 ENCOUNTER — OFFICE VISIT (OUTPATIENT)
Dept: PHYSICAL THERAPY | Age: 86
End: 2021-06-01
Payer: MEDICARE

## 2021-06-01 DIAGNOSIS — Z96.612 STATUS POST REVERSE TOTAL REPLACEMENT OF LEFT SHOULDER: Primary | ICD-10-CM

## 2021-06-01 PROCEDURE — 97110 THERAPEUTIC EXERCISES: CPT | Performed by: PHYSICAL THERAPIST

## 2021-06-01 PROCEDURE — 97140 MANUAL THERAPY 1/> REGIONS: CPT | Performed by: PHYSICAL THERAPIST

## 2021-06-01 NOTE — PROGRESS NOTES
Daily Note     Today's date: 2021  Patient name: Myles Snell  : 1935  MRN: [id number]  Referring provider: Marcell Hernandez MD  Dx:   Encounter Diagnosis     ICD-10-CM    1  Status post reverse total replacement of left shoulder  [id number]                   Subjective: Patient's daughter reports that she's been giving her mom tylenol and NSAIDs for her pain, which has been helping with her pain vs taking the prescribed pain medication (patient feels this medication is too strong)  Patient's pain is minimal today, <3/10 with PROM  Objective: See treatment diary below      Assessment: Tolerated treatment well  Focusing on passive ROM today, able to achieve ~90* scaption, no pain, painful with ER 0-5*  Kept range less to compensate for pain  IR to 40* today, no pain  Added scapular exercises, stretched into elbow extension, very light as patient is s/p tenodesis as well  Full painfree elbow extension  Plan: Continue per plan of care  Plan to add deltoid isometrics NV  Precautions: L reverse total shoulder, biceps tenodesis- no repetitive biceps curls h/o R reverse total shoulder as well  Fall risk    Goals of max 120 scaption range and lifetime max of 50* ER  Manuals       L shoulder PROM to tolerance, scaption plane   ER <30 for now   x15' x30'      Elbow extension To tolerance To tolerance      Supination stretch- start in ~ 2 weeks  Full supination today              Neuro Re-Ed                                                                Ther Ex                scap retraction  2x10       squeezes  2x10 red              Deltoid isometrics, sub max                                Ther Activity                        Gait Training                        Modalities                        Progress per protocol

## 2021-06-02 ENCOUNTER — TELEPHONE (OUTPATIENT)
Dept: HEMATOLOGY ONCOLOGY | Facility: CLINIC | Age: 86
End: 2021-06-02

## 2021-06-02 NOTE — TELEPHONE ENCOUNTER
Called patient to reschedule her appointment to a later date and time  The patient was agreeable to the date change and was happy with my call

## 2021-06-03 ENCOUNTER — OFFICE VISIT (OUTPATIENT)
Dept: PHYSICAL THERAPY | Age: 86
End: 2021-06-03
Payer: MEDICARE

## 2021-06-03 DIAGNOSIS — Z96.612 STATUS POST REVERSE TOTAL REPLACEMENT OF LEFT SHOULDER: Primary | ICD-10-CM

## 2021-06-03 PROCEDURE — 97140 MANUAL THERAPY 1/> REGIONS: CPT | Performed by: PHYSICAL THERAPIST

## 2021-06-03 NOTE — PROGRESS NOTES
Daily Note     Today's date: 6/3/2021  Patient name: Larisa Leblanc  : 1935  MRN: [id number]  Referring provider: Rae Lew MD  Dx:   Encounter Diagnosis     ICD-10-CM    1  Status post reverse total replacement of left shoulder  [id number]                   Subjective: Patient having more pain today, localized to anterior shoulder joint area, at incision  Still only taking OTC meds as needed for pain  Objective: See treatment diary below      Assessment: Tolerated treatment well  Progressing range, passively  Increased pain today with manual work, needs cuing to not hold arm up with manual work  Once patient's UE relaxes, her pain immediately subsides  Plan: Continue per plan of care  Precautions: L reverse total shoulder, biceps tenodesis- no repetitive biceps curls h/o R reverse total shoulder as well  Fall risk    Goals of max 120 scaption range and lifetime max of 50* ER  Manuals 5/28 6/1 6/3     L shoulder PROM to tolerance, scaption plane   ER <30 for now   x15' x30' x30'     Elbow extension To tolerance To tolerance To tolerance     Supination stretch- start in ~ 2 weeks  Full supination today Full supination             Neuro Re-Ed                                                                Ther Ex                scap retraction  2x10 2x10      squeezes  2x10 red NT             Deltoid isometrics, sub max                                Ther Activity                        Gait Training                        Modalities                        Progress per protocol

## 2021-06-07 ENCOUNTER — APPOINTMENT (OUTPATIENT)
Dept: RADIOLOGY | Facility: OTHER | Age: 86
End: 2021-06-07
Payer: MEDICARE

## 2021-06-07 ENCOUNTER — OFFICE VISIT (OUTPATIENT)
Dept: OBGYN CLINIC | Facility: OTHER | Age: 86
End: 2021-06-07

## 2021-06-07 DIAGNOSIS — Z96.612 STATUS POST REVERSE TOTAL ARTHROPLASTY OF LEFT SHOULDER: Primary | ICD-10-CM

## 2021-06-07 DIAGNOSIS — Z96.612 STATUS POST REVERSE TOTAL ARTHROPLASTY OF LEFT SHOULDER: ICD-10-CM

## 2021-06-07 PROCEDURE — 1123F ACP DISCUSS/DSCN MKR DOCD: CPT | Performed by: ORTHOPAEDIC SURGERY

## 2021-06-07 PROCEDURE — 73030 X-RAY EXAM OF SHOULDER: CPT

## 2021-06-07 PROCEDURE — 99024 POSTOP FOLLOW-UP VISIT: CPT | Performed by: ORTHOPAEDIC SURGERY

## 2021-06-07 NOTE — PROGRESS NOTES
Assessment  Diagnoses and all orders for this visit:    Status post reverse total arthroplasty of left shoulder      Discussion and Plan:    · Doing well, 2 weeks s/p left reverse total shoulder arthroplasty  · Continue with formal PT/HEP as per post operative protocol  · Follow up in 6-8 weeks for re evaluation of symptoms  Repeat x rays of the left shoulder at this time  Subjective:   Patient ID: Marguarite Rinne is a 81 y o  female      HPI  82 y/o female who presents today for her first post operative visit for her left shoulder  She is now 2 weeks s/p left reverse total shoulder arthroplasty performed on 5/25/2021  Patient reports that she is doing well post operatively  She denies any significant pain about the shoulder on a daily basis  She has been performing formal PT/HEP as per post operative protocol  Overall, she is happy with her progress so far post operatively  No numbness or tingling  No fevers or chills  The following portions of the patient's history were reviewed and updated as appropriate: allergies, current medications, past family history, past medical history, past social history, past surgical history and problem list     Review of Systems   Constitutional: Negative for chills, fever and unexpected weight change  HENT: Negative for hearing loss, nosebleeds and sore throat  Eyes: Negative for pain, redness and visual disturbance  Respiratory: Negative for cough, shortness of breath and wheezing  Cardiovascular: Negative for chest pain, palpitations and leg swelling  Gastrointestinal: Negative for abdominal distention, nausea and vomiting  Endocrine: Negative for polydipsia and polyuria  Genitourinary: Negative for dysuria and hematuria  Skin: Negative for rash and wound  Neurological: Negative for dizziness, numbness and headaches  Psychiatric/Behavioral: Negative for decreased concentration and suicidal ideas  Objective:   There were no vitals taken for this visit  Left Shoulder Exam     Tenderness   The patient is experiencing no tenderness  Other   Erythema: absent  Sensation: normal  Pulse: present     Comments:  ROM and strength testing deferred due to post operative period  Incision is clean, dry and intact  Staples were removed today  Steri strips were applied            Physical Exam  Constitutional:       Appearance: She is well-developed  Eyes:      Pupils: Pupils are equal, round, and reactive to light  Pulmonary:      Effort: Pulmonary effort is normal       Breath sounds: Normal breath sounds  Skin:     General: Skin is warm and dry  Neurological:      Mental Status: She is alert and oriented to person, place, and time  Psychiatric:         Behavior: Behavior normal          Thought Content: Thought content normal          Judgment: Judgment normal            I have personally reviewed pertinent films in PACS and my interpretation is as follows  X Ray Left Shoulder 6/7/2021: Well aligned and positioned reverse total shoulder prothesis   No acute osseous abnormality     Scribe Attestation    I,:  Marco Herrera am acting as a scribe while in the presence of the attending physician :       I,:  Evert Leonard MD personally performed the services described in this documentation    as scribed in my presence :

## 2021-06-08 PROBLEM — H90.3 SENSORINEURAL HEARING LOSS (SNHL) OF BOTH EARS: Status: ACTIVE | Noted: 2021-06-08

## 2021-06-09 ENCOUNTER — OFFICE VISIT (OUTPATIENT)
Dept: PHYSICAL THERAPY | Age: 86
End: 2021-06-09
Payer: MEDICARE

## 2021-06-09 DIAGNOSIS — Z96.612 STATUS POST REVERSE TOTAL REPLACEMENT OF LEFT SHOULDER: Primary | ICD-10-CM

## 2021-06-09 PROCEDURE — 97140 MANUAL THERAPY 1/> REGIONS: CPT | Performed by: PHYSICAL THERAPIST

## 2021-06-09 PROCEDURE — 97110 THERAPEUTIC EXERCISES: CPT | Performed by: PHYSICAL THERAPIST

## 2021-06-09 NOTE — PROGRESS NOTES
Daily Note     Today's date: 2021  Patient name: Dai Souza  : 1935  MRN: [id number]  Referring provider: Darline Ca MD  Dx:   Encounter Diagnosis     ICD-10-CM    1  Status post reverse total replacement of left shoulder  Z96 612                Pt consented to work with KATE Beach under the direct supervision of Ronda Anderson, PT, DPT  Subjective: Pt reports that she is feeling good and that she feels better without the abduction pilloq  Objective: See treatment diary below      Assessment: Tolerated treatment well  Pt reported that she has 2/10 pain at rest  ER PROM was painful today; 5/10, at the deltoid Insertion  Flexion PROM was improved from last visit with no pain  Pt had tenderness above shoulder  Attempted to add AAROM cane flexion but it was painful at the deltoid insertion, defer to next week to allow more time for healing in the L shoulder  Plan: Continue per plan of care  Precautions: L reverse total shoulder, biceps tenodesis- no repetitive biceps curls h/o R reverse total shoulder as well  Fall risk    Goals of max 120 scaption range and lifetime max of 50* ER  Manuals 5/28 6/1 6/3 6/9    L shoulder PROM to tolerance, scaption plane   ER <30 for now   x15' x30' x30' 30 min; ER painful at deltoid insertion, elevate to OPP for less pain  Elbow extension To tolerance To tolerance To tolerance To tolerance    Supination stretch- start in ~ 2 weeks  Full supination today Full supination             Neuro Re-Ed                                                                Ther Ex                scap retraction  2x10 2x10 20x      squeezes  2x10 red NT NT            Deltoid isometrics, sub max                                Ther Activity                        Gait Training                        Modalities        CP, seated    10 min            Progress per protocol

## 2021-06-11 ENCOUNTER — APPOINTMENT (OUTPATIENT)
Dept: PHYSICAL THERAPY | Age: 86
End: 2021-06-11
Payer: MEDICARE

## 2021-06-16 ENCOUNTER — OFFICE VISIT (OUTPATIENT)
Dept: PHYSICAL THERAPY | Age: 86
End: 2021-06-16
Payer: MEDICARE

## 2021-06-16 DIAGNOSIS — Z96.612 STATUS POST REVERSE TOTAL REPLACEMENT OF LEFT SHOULDER: Primary | ICD-10-CM

## 2021-06-16 PROCEDURE — 97140 MANUAL THERAPY 1/> REGIONS: CPT | Performed by: PHYSICAL THERAPIST

## 2021-06-16 NOTE — PROGRESS NOTES
Daily Note     Today's date: 2021  Patient name: Horatio Boast  : 1935  MRN: [id number]  Referring provider: Edgar Diallo MD  Dx:   Encounter Diagnosis     ICD-10-CM    1  Status post reverse total replacement of left shoulder  Z96 612                Pt consented to work with KATE Dickinson under the direct supervision of Ty Mathis, PT, DPT  Subjective: Pt reports that she is in a lot of pain today and that she feels "terrible"  Pt says that the pain is at the top of her shoulders  Pt said that she did a lot of walking yesterday after going to an appointment to be fitted for hearing aids  Pt was pushing the cart around the stores while shopping with her daughter  Objective: See treatment diary below      Assessment: Tolerated treatment well  Pt was guarded in the beginning of the session but was able to relax  Pt had increased pain with AAROM flexion  Continued with manual PROM today to tolerance d/t pain at the top of pt shoulder  Pt demonstrated full pronation/supination AROM  Instructed pt to take it easy today and to ice her shoulder again later  Plan: Continue per plan of care  Precautions: L reverse total shoulder, biceps tenodesis- no repetitive biceps curls h/o R reverse total shoulder as well  Fall risk    Goals of max 120 scaption range and lifetime max of 50* ER  Manuals 5/28 6/1 6/3 6/9 6/16   L shoulder PROM to tolerance, scaption plane   ER <30 for now   x15' x30' x30' 30 min; ER painful at deltoid insertion, elevate to OPP for less pain  30 min, pain at told of shoulder, completed to tolerance today      Elbow extension To tolerance To tolerance To tolerance To tolerance To tolerance    Supination stretch- start in ~ 2 weeks  Full supination today Full supination  Full supination            Neuro Re-Ed                                                                Ther Ex                scap retraction  2x10 2x10 20x  NT    squeezes 2x10 red NT NT NT           Deltoid isometrics, sub max                                Ther Activity                        Gait Training                        Modalities        CP, seated    10 min 10 min            Progress per protocol

## 2021-06-18 ENCOUNTER — OFFICE VISIT (OUTPATIENT)
Dept: PHYSICAL THERAPY | Age: 86
End: 2021-06-18
Payer: MEDICARE

## 2021-06-18 DIAGNOSIS — Z96.612 STATUS POST REVERSE TOTAL REPLACEMENT OF LEFT SHOULDER: Primary | ICD-10-CM

## 2021-06-18 DIAGNOSIS — I10 ESSENTIAL HYPERTENSION: ICD-10-CM

## 2021-06-18 PROCEDURE — 97110 THERAPEUTIC EXERCISES: CPT | Performed by: PHYSICAL THERAPIST

## 2021-06-18 PROCEDURE — 97140 MANUAL THERAPY 1/> REGIONS: CPT | Performed by: PHYSICAL THERAPIST

## 2021-06-18 RX ORDER — LISINOPRIL 10 MG/1
TABLET ORAL
Qty: 180 TABLET | Refills: 3 | Status: SHIPPED | OUTPATIENT
Start: 2021-06-18 | End: 2022-05-23

## 2021-06-18 NOTE — PROGRESS NOTES
Daily Note     Today's date: 2021  Patient name: Chester Diop  : 1935  MRN: [id number]  Referring provider: Rishabh Irene MD  Dx:   Encounter Diagnosis     ICD-10-CM    1  Status post reverse total replacement of left shoulder  Z96 612                Pt consented to work with KATE Womack under the direct supervision of Princess Lopez, PT, DPT  Subjective: Pt reports having 1/10 pain upon coming into PT today  Pt mother said that she had no pain pushing the cart and going to the grocery store yesterday  Objective: See treatment diary below      Assessment: Tolerated treatment well  Pt had increased (3/10) pain at the top of the L shoulder  Added cane AAROM and towel slides today to increase ROM of the L shoulder  Pt was able to tolerate the new exercises with no increase in pain  Pt pain did not increase over a 3/10 with treatment  Plan: Continue per plan of care  Precautions: L reverse total shoulder, biceps tenodesis- no repetitive biceps curls h/o R reverse total shoulder as well  Fall risk    Goals of max 120 scaption range and lifetime max of 50* ER  Manuals 6/18 6/1 6/3 6/9 6/16   L shoulder PROM to tolerance, scaption plane   ER <30 for now   30 min pain at told of shoulder, completed to tolerance today  x30' x30' 30 min; ER painful at deltoid insertion, elevate to OPP for less pain  30 min, pain at told of shoulder, completed to tolerance today      Elbow extension To tolerance To tolerance To tolerance To tolerance To tolerance    Supination stretch- start in ~ 2 weeks Full supination  Full supination today Full supination  Full supination            Neuro Re-Ed                                                                Ther Ex                scap retraction  2x10 2x10 20x  NT    squeezes  2x10 red NT NT NT           Deltoid isometrics, sub max                Cane Flexion AAROM  4x10        Towel Slides Fwd 15x Ther Activity                        Gait Training                        Modalities        CP, seated    10 min 10 min            Progress per protocol

## 2021-06-21 ENCOUNTER — TELEPHONE (OUTPATIENT)
Dept: OBGYN CLINIC | Facility: HOSPITAL | Age: 86
End: 2021-06-21

## 2021-06-21 ENCOUNTER — OFFICE VISIT (OUTPATIENT)
Dept: OBGYN CLINIC | Facility: OTHER | Age: 86
End: 2021-06-21

## 2021-06-21 ENCOUNTER — APPOINTMENT (OUTPATIENT)
Dept: RADIOLOGY | Facility: OTHER | Age: 86
End: 2021-06-21
Payer: MEDICARE

## 2021-06-21 VITALS
DIASTOLIC BLOOD PRESSURE: 83 MMHG | HEART RATE: 61 BPM | WEIGHT: 164 LBS | SYSTOLIC BLOOD PRESSURE: 164 MMHG | BODY MASS INDEX: 33.12 KG/M2

## 2021-06-21 DIAGNOSIS — Z47.1 AFTERCARE FOLLOWING LEFT SHOULDER JOINT REPLACEMENT SURGERY: ICD-10-CM

## 2021-06-21 DIAGNOSIS — Z47.1 AFTERCARE FOLLOWING LEFT SHOULDER JOINT REPLACEMENT SURGERY: Primary | ICD-10-CM

## 2021-06-21 DIAGNOSIS — Z96.612 AFTERCARE FOLLOWING LEFT SHOULDER JOINT REPLACEMENT SURGERY: ICD-10-CM

## 2021-06-21 DIAGNOSIS — Z96.612 AFTERCARE FOLLOWING LEFT SHOULDER JOINT REPLACEMENT SURGERY: Primary | ICD-10-CM

## 2021-06-21 PROCEDURE — 99024 POSTOP FOLLOW-UP VISIT: CPT | Performed by: PHYSICIAN ASSISTANT

## 2021-06-21 PROCEDURE — 73030 X-RAY EXAM OF SHOULDER: CPT

## 2021-06-21 RX ORDER — LISINOPRIL 10 MG/1
TABLET ORAL
COMMUNITY
Start: 2021-04-17 | End: 2022-08-09

## 2021-06-21 RX ORDER — PROPRANOLOL HYDROCHLORIDE 20 MG/1
TABLET ORAL
COMMUNITY
Start: 2021-04-24

## 2021-06-21 RX ORDER — ANASTROZOLE 1 MG/1
1 TABLET ORAL DAILY
COMMUNITY
Start: 2021-05-21

## 2021-06-21 RX ORDER — ROSUVASTATIN CALCIUM 10 MG/1
TABLET, COATED ORAL
COMMUNITY
Start: 2021-05-21 | End: 2022-08-10

## 2021-06-21 NOTE — TELEPHONE ENCOUNTER
Patient's daughter Obed Luna wants patient to be seen today  Not sure who has openings but I can't schedule patients  My afternoon is now full  She can be seen by Tamara Juan or Grayson Levin if schedule allows    If not, they are welcome to go to the ER or be seen tomorrow PM in [place] on my schedule

## 2021-06-21 NOTE — TELEPHONE ENCOUNTER
PO Dr Seth Young  RE: Painful shoulder  #     Patients daughter called to speak to DR schneider clinical team  Daughter states patient rotated her shoulder this weekend and now shoulder hurts   Daughter would like to speak to DR Seth Young clinical team    I offered to make f/u appt with PA on Thursday, daughter refused

## 2021-06-21 NOTE — PROGRESS NOTES
Assessment:       1  Aftercare following left shoulder joint replacement surgery          Plan:        Dr Astrid Bansal I reassured the patient that there is no evidence of acute fracture or dislocation following her most recent incident  She should continue physical therapy as scheduled per rehab protocol for reverse total shoulder arthroplasty  She should keep the appointment she made at her last visit, in approximately 6 weeks  All questions were addressed to the patient's satisfaction  Subjective:     Patient ID: Dav Castellanos is a 81 y o  female  Chief Complaint:    HPI     Patient presents to the office for follow-up status post left reverse total shoulder arthroplasty on 05/25/2021  She had been doing well and making progress we PT; however, she had an incident in which she pushed off with her left arm while trying to get out of bed without her sling and has had pain since  Denies any numbness or tingling at this time  She complains of pain in her trapezius and scapular area  Social History     Occupational History    Occupation: retired from work    Tobacco Use    Smoking status: Never Smoker    Smokeless tobacco: Never Used   Vaping Use    Vaping Use: Never used   Substance and Sexual Activity    Alcohol use: Never    Drug use: No    Sexual activity: Not Currently      Review of Systems   Constitutional: Negative  Respiratory: Negative  Musculoskeletal: Positive for myalgias and neck pain  Skin: Positive for wound  Neurological: Positive for weakness  Negative for numbness  Psychiatric/Behavioral: Negative  Objective:     Ortho ExamPhysical Exam  Cardiovascular:      Pulses: Normal pulses  Pulmonary:      Effort: Pulmonary effort is normal    Musculoskeletal:      Comments: Patient tender along the scapula along the infraspinatus ridge  Skin:     General: Skin is warm and dry  Capillary Refill: Capillary refill takes less than 2 seconds  Comments: Surgical incision dry and clean, healed  Neurological:      Mental Status: She is alert and oriented to person, place, and time  Sensory: No sensory deficit  Psychiatric:         Behavior: Behavior normal            I have personally reviewed pertinent films in PACS and my interpretation is Consistent with left reverse total shoulder arthroplasty  No evidence of acute fracture or dislocation  Prosthesis in excellent position and glenohumeral joint preserved  Ana Tate

## 2021-06-21 NOTE — PATIENT INSTRUCTIONS
1  Sling as per protocol  2  PT per protocol  3   Follow-up in 6 weeks (keep appt made at last visit)

## 2021-06-23 ENCOUNTER — OFFICE VISIT (OUTPATIENT)
Dept: PHYSICAL THERAPY | Age: 86
End: 2021-06-23
Payer: MEDICARE

## 2021-06-23 DIAGNOSIS — Z96.612 STATUS POST REVERSE TOTAL REPLACEMENT OF LEFT SHOULDER: Primary | ICD-10-CM

## 2021-06-23 PROCEDURE — 97010 HOT OR COLD PACKS THERAPY: CPT | Performed by: PHYSICAL THERAPIST

## 2021-06-23 PROCEDURE — 97110 THERAPEUTIC EXERCISES: CPT | Performed by: PHYSICAL THERAPIST

## 2021-06-23 PROCEDURE — 97140 MANUAL THERAPY 1/> REGIONS: CPT | Performed by: PHYSICAL THERAPIST

## 2021-06-23 NOTE — PROGRESS NOTES
Daily Note     Today's date: 2021  Patient name: Shen Borja  : 1935  MRN: [id number]  Referring provider: Armando Quintanilla MD  Dx:   Encounter Diagnosis     ICD-10-CM    1  Status post reverse total replacement of left shoulder  Z96 612                Pt consented to work with KATE Naqvi under the direct supervision of Benson Hawkins, PT, DPT  Subjective: Pt reported that she is in more pain today, 8/10 when getting out of the car; 3/10 walking into the clinic  Pt said that she slept without the sling clipped in Saturday night and when she got up to go to the bathroom, the pt used her arm to get out of bed  Pt said that she went to the doctor d/t the increased pain and discomfort  Pt said the MD said that the hardware from the surgery is all in the correct place, there is no acture fx, and that she should come to PT today  Objective: See treatment diary below      Assessment: Tolerated treatment well  Adjusted treatment to pt tolerance d/t increased pain today  Pt was able tolerate PROM ER with no increase in pain  Pt pain increased to 6/10 with PROM flexion at 45 degrees of flexion  Pain decreased with rest  Pt able to tolerate up to 60 degrees of flexion while having a conversation about another topic  Pt had increased pain after 15 reps of PROM, 6/10 which decreased with rest  Deferred AAROM and towel slides today  Added scapular retractions and  squeeze  At end of session, pt pain is 2/10  Plan: Continue per plan of care  Precautions: L reverse total shoulder, biceps tenodesis- no repetitive biceps curls h/o R reverse total shoulder as well  Fall risk    Goals of max 120 scaption range and lifetime max of 50* ER  Manuals 6/18 6/23 6/3 6/9 6/16   L shoulder PROM to tolerance, scaption plane   ER <30 for now   30 min pain at told of shoulder, completed to tolerance today  x30' x30' 30 min; ER painful at deltoid insertion, elevate to OPP for less pain  30 min, pain at told of shoulder, completed to tolerance today  Elbow extension To tolerance  To tolerance To tolerance To tolerance    Supination stretch- start in ~ 2 weeks Full supination   Full supination  Full supination            Neuro Re-Ed                                                                Ther Ex                scap retraction  2x10  2x10 20x  NT    squeezes  20x NT NT NT           Deltoid isometrics, sub max                Cane Flexion AAROM  4x10        Towel Slides Fwd 15x                                                                                       Ther Activity                        Gait Training                        Modalities        CP, seated  CP, seated, 10 min   10 min 10 min            Progress per protocol

## 2021-06-25 ENCOUNTER — OFFICE VISIT (OUTPATIENT)
Dept: PHYSICAL THERAPY | Age: 86
End: 2021-06-25
Payer: MEDICARE

## 2021-06-25 DIAGNOSIS — Z96.612 STATUS POST REVERSE TOTAL REPLACEMENT OF LEFT SHOULDER: Primary | ICD-10-CM

## 2021-06-25 PROCEDURE — 97140 MANUAL THERAPY 1/> REGIONS: CPT

## 2021-06-25 PROCEDURE — 97110 THERAPEUTIC EXERCISES: CPT

## 2021-06-25 PROCEDURE — 97010 HOT OR COLD PACKS THERAPY: CPT

## 2021-06-25 NOTE — PROGRESS NOTES
Daily Note     Today's date: 2021  Patient name: Renetta Thrasher  : 1935  MRN: [id number]  Referring provider: Leyda Smallwood MD  Dx:   Encounter Diagnosis     ICD-10-CM    1  Status post reverse total replacement of left shoulder  [id number] 612                Pt consented to work with Yamilet Ridley, SPT under the direct supervision of Myron Quiñones, PT, DPT  Subjective: Pt reports that she is still in pain today  Pt has been wearing the sling since she left last visit and only had it off once  Pain rating walking into therapy today at 3/10  Objective: See treatment diary below      Assessment: Tolerated treatment well  Pt has palpable tightness in upper trapezius and deltoid but no pain with palpation  Added graston today to provide some relief of tightness  Graston had positive effect in releasing tightness  Pt unable to tolerate PROM >80 degrees today  Pt extremely guarded in PROM today  Pt glenohumeral motion is compensated with upper trapezius activation  Pt unable to fully relax arm today  Pt describes pain in deltoid and into triceps  Plan: Continue per plan of care  Precautions: L reverse total shoulder, biceps tenodesis- no repetitive biceps curls h/o R reverse total shoulder as well   Fall risk    Goals of max 120 scaption range and lifetime max of 50* ER  Manuals    L shoulder PROM to tolerance, scaption plane   ER <30 for now   30 min pain at told of shoulder, completed to tolerance today  x30' x30' 30 min; ER painful at deltoid insertion, elevate to OPP for less pain  30 min, pain at told of shoulder, completed to tolerance today      Elbow extension To tolerance  Full extension and supination  To tolerance To tolerance    Supination stretch- start in ~ 2 weeks Full supination     Full supination            Graston to L upper trap    10 min                        Neuro Re-Ed Ther Ex                scap retraction  2x10  3x10 20x  NT    squeezes  20x 30x NT NT           Deltoid isometrics, sub max                Cane Flexion AAROM  4x10        Towel Slides Fwd 15x                                                                                       Ther Activity                        Gait Training                        Modalities        CP, seated  CP, seated, 10 min  10 min  10 min 10 min            Progress per protocol

## 2021-06-28 ENCOUNTER — PREPPED CHART (OUTPATIENT)
Dept: URBAN - METROPOLITAN AREA CLINIC 6 | Facility: CLINIC | Age: 86
End: 2021-06-28

## 2021-06-30 ENCOUNTER — OFFICE VISIT (OUTPATIENT)
Dept: PHYSICAL THERAPY | Age: 86
End: 2021-06-30
Payer: MEDICARE

## 2021-06-30 DIAGNOSIS — Z96.612 STATUS POST REVERSE TOTAL REPLACEMENT OF LEFT SHOULDER: Primary | ICD-10-CM

## 2021-06-30 PROCEDURE — 97140 MANUAL THERAPY 1/> REGIONS: CPT

## 2021-06-30 PROCEDURE — 97110 THERAPEUTIC EXERCISES: CPT

## 2021-06-30 NOTE — PROGRESS NOTES
Daily Note     Today's date: 2021  Patient name: Yu Fernandez  : 1935  MRN: [id number]  Referring provider: Yumiko Ruiz MD  Dx:   Encounter Diagnosis     ICD-10-CM    1  Status post reverse total replacement of left shoulder  Z96 612                Pt consented to work with KATE Butler under the direct supervision of Myron Quiñones, PT, DPT  Subjective: Pt has been wearing the sling , no change in pain  Pain noted more at anterior shld and L UT  Pt notes tightness in L UT        Objective: See treatment diary below      Assessment: Tolerated treatment well  Pt still with palpable tightness in upper trapezius and deltoid but no pain with palpation  Using Graston today to provide some relief of tightness  Graston had positive effect in releasing tightness along L UT stretch  Pt unable to tolerate PROM >80 degrees today  Reintroduced wall slides on bed with limited success due to pain even with height of bed decreased  May try slides with ball to see if pain tolerance In L shoulder is better      Plan: Continue per plan of care  Precautions: L reverse total shoulder, biceps tenodesis- no repetitive biceps curls h/o R reverse total shoulder as well   Fall risk    Goals of max 120 scaption range and lifetime max of 50* ER  Manuals    L shoulder PROM to tolerance, scaption plane   ER <30 for now   30 min pain at told of shoulder, completed to tolerance today  x30' x30' 30 min; ER painful at deltoid insertion, elevate to OPP for less pain  30 min, pain at told of shoulder, completed to tolerance today      Elbow extension To tolerance  Full extension and supination  x20 reps Full extension and supination  x20 reps To tolerance    Supination stretch- start in ~ 2 weeks Full supination     Full supination            Graston to L upper trap    10 min  10 min    Man L UT str    [id number]              Neuro Re-Ed Ther Ex                scap retraction  2x10  2x10 20x  NT    squeezes  20x 20x  20x red digi  NT           Deltoid isometrics, sub max                Cane Flexion AAROM  4x10        Towel Slides Fwd 15x   Towel slides on table lower height x10     ball slides                                                                                Ther Activity                        Gait Training                        Modalities        CP, seated  CP, seated, 10 min  10 min  10 min 10 min            Progress per protocol

## 2021-07-02 ENCOUNTER — OFFICE VISIT (OUTPATIENT)
Dept: PHYSICAL THERAPY | Age: 86
End: 2021-07-02
Payer: MEDICARE

## 2021-07-02 DIAGNOSIS — Z96.612 STATUS POST REVERSE TOTAL REPLACEMENT OF LEFT SHOULDER: Primary | ICD-10-CM

## 2021-07-02 PROCEDURE — 97140 MANUAL THERAPY 1/> REGIONS: CPT | Performed by: PHYSICAL THERAPIST

## 2021-07-02 PROCEDURE — 97110 THERAPEUTIC EXERCISES: CPT | Performed by: PHYSICAL THERAPIST

## 2021-07-02 NOTE — PROGRESS NOTES
Daily Note     Today's date: 2021  Patient name: Nika Seals  : 1935  MRN: [id number]  Referring provider: Britney Laird MD  Dx:   Encounter Diagnosis     ICD-10-CM    1  Status post reverse total replacement of left shoulder  Z96 612                Pt consented to work with KATE Henderson under the direct supervision of Carver Claude, PT, DPT  Subjective: Pt said that she is feeling okay today  Pt daughter said that the pt is able to use her arm outside of the sling to drink water, and allow the arm to relax  Objective: See treatment diary below      Assessment: Tolerated treatment well  Added pulleys today to increase PROM/AAROM motion of the L shoulder  Pt able to elevate arm to approximaetly 90 degrees with pulleys after a few minutes  Pt able to tolerate PROM ER today with no pain and able to achieve 90 PROM flexion with tightness in the upper arm  Pt has slight increase in pain with active exercises which resolved when exercise is stopped  Pt fatigued post treatment but did not have increase in pain  Plan: Continue per plan of care  Precautions: L reverse total shoulder, biceps tenodesis- no repetitive biceps curls h/o R reverse total shoulder as well   Fall risk    Goals of max 120 scaption range and lifetime max of 50* ER  Manuals  7   L shoulder PROM to tolerance, scaption plane   ER <30 for now   30 min pain at told of shoulder, completed to tolerance today  x30' x30' 30 min; ER painful at deltoid insertion, elevate to OPP for less pain    25 min   Elbow extension To tolerance  Full extension and supination  x20 reps Full extension and supination  x20 reps    Supination stretch- start in ~ 2 weeks Full supination                Graston to L upper trap    10 min  10 min 10 min    Man L UT str    [id number]              Neuro Re-Ed                                                                Ther Ex                pulleys     5 min scap retraction  2x10  2x10 20x  NT    squeezes  20x 20x  20x red digi  20x red            Deltoid isometrics, sub max                Cane Flexion AAROM  4x10        Towel Slides Fwd 15x   Towel slides on table lower height x10 10x     ball slides                                                                                Ther Activity                        Gait Training                        Modalities        CP, seated  CP, seated, 10 min  10 min  10 min 10 min            Progress per protocol

## 2021-07-06 ENCOUNTER — OFFICE VISIT (OUTPATIENT)
Dept: PHYSICAL THERAPY | Age: 86
End: 2021-07-06
Payer: MEDICARE

## 2021-07-06 DIAGNOSIS — Z96.612 STATUS POST REVERSE TOTAL REPLACEMENT OF LEFT SHOULDER: Primary | ICD-10-CM

## 2021-07-06 PROCEDURE — 97140 MANUAL THERAPY 1/> REGIONS: CPT

## 2021-07-06 PROCEDURE — 97110 THERAPEUTIC EXERCISES: CPT

## 2021-07-06 NOTE — PROGRESS NOTES
Daily Note     Today's date: 2021  Patient name: Anca Weller  : 1935  MRN: [id number]  Referring provider: Mario Shah MD  Dx:   Encounter Diagnosis     ICD-10-CM    1  Status post reverse total replacement of left shoulder  [id number]                      Subjective: Comes to PT with no sling on L arm  Pt reports she is doing her exercises at home  Objective: See treatment diary below      Assessment: Tolerated treatment well  Using pulleys t to increase PROM/AAROM motion of the L shoulder  Pt able to elevate arm to near 90 degrees on pulleys   Pt able to tolerate PROM ER today with no pain and able to achieve 90 PROM flexion with tightness in the upper arm  Does feel Graston has helped pain and provides benefit with ROM and pain  Issued pulleys for home use  Pt fatigued post treatment but did not have increase in pain  Plan: Continue per plan of care  Precautions: L reverse total shoulder, biceps tenodesis- no repetitive biceps curls h/o R reverse total shoulder as well   Fall risk    Goals of max 120 scaption range and lifetime max of 50* ER  Manuals  7   L shoulder PROM to tolerance, scaption plane   ER <30 for now   15min x30' x30' 30 min; ER painful at deltoid insertion, elevate to OPP for less pain    25 min   Elbow extension x20 reps  Full extension and supination  x20 reps Full extension and supination  x20 reps    Supination stretch- start in ~ 2 weeks x20 reps               Graston to L upper trap  10 min  10 min  10 min 10 min    Man L UT str    [id number]              Neuro Re-Ed                                                                Ther Ex                pulleys 5  min    5 min             scap retraction 3x10 2x10  2x10 20x  NT    squeezes Red 3x10 20x 20x  20x red digi  20x red            Deltoid isometrics, sub max                Cane Flexion AAROM         Towel Slides Fwd    Towel slides on table lower height x10 10x ball slides 2x10                                                                               Ther Activity                        Gait Training                        Modalities        CP, seated Seated x 10 m CP, seated, 10 min  10 min  10 min 10 min            Progress per protocol

## 2021-07-08 ENCOUNTER — EVALUATION (OUTPATIENT)
Dept: PHYSICAL THERAPY | Age: 86
End: 2021-07-08
Payer: MEDICARE

## 2021-07-08 DIAGNOSIS — Z96.612 STATUS POST REVERSE TOTAL REPLACEMENT OF LEFT SHOULDER: Primary | ICD-10-CM

## 2021-07-08 PROCEDURE — 97140 MANUAL THERAPY 1/> REGIONS: CPT | Performed by: PHYSICAL THERAPIST

## 2021-07-08 PROCEDURE — 97110 THERAPEUTIC EXERCISES: CPT | Performed by: PHYSICAL THERAPIST

## 2021-07-08 NOTE — PROGRESS NOTES
PT Re-Evaluation     Today's date: 2021  Patient name: Yvrose Poster  : 1935  MRN: [id number]  Referring provider: Allison Eldridge MD  Dx:   Encounter Diagnosis     ICD-10-CM    1  Status post reverse total replacement of left shoulder  [id number]                   Assessment  Assessment details: Patient seen for PT re-eval  Patient was initially making great progress with PT and goals; however, had set back when she used her arm to get out of bed one day and has been having shoulder muscle pain since  Since d/c / wean removal of the sling, patient's functional use of the arm has improved significantly  Patient feels that she is able to wash her face, get dressed, prepare her meals, do light household chores (ie washing the dishes) without pain, working within her available range  Patient does have pain with PT exercises, no pain with stretching  Patient does appear to tighten her upper trap to assist with shoulder scaption TE, and all TE  Added Graston to upper trap to decrease tightness and to assist with inhibiting muscle; with light exercises to improve active tolerance to movement and with the main goal to improve patient's functional use of the arm  When patient is distracted, her pain also subsides and she is able to complete the exercises  Patient is overall making good progress with PT, ROM and strength post-op  Impairments: abnormal or restricted ROM, activity intolerance, impaired physical strength, lacks appropriate home exercise program, pain with function, scapular dyskinesis, poor posture  and poor body mechanics  Functional limitations: Patient is able to tolerate light IADLs, light meal prep, Understanding of Dx/Px/POC: good   Prognosis: good    Goals  Impairment Goals to be met within 4 weeks    - Improve ROM to 90 degrees passive scaption met  - Increase strength to 3+/5 throughout progressing  - Improve scapular strength and postural control  progressing  - progress per protocol  met    Functional Goals to be met within 4-6 weeks  - Return to Prior Level of Function progressing  - Increase Functional Status Measure to: expected progressing  - Patient will be independent with HEP progressing        Plan  Patient would benefit from: skilled physical therapy  Planned modality interventions: cryotherapy and thermotherapy: hydrocollator packs  Planned therapy interventions: abdominal trunk stabilization, manual therapy, neuromuscular re-education, patient education, postural training, strengthening, stretching, therapeutic activities, therapeutic exercise, home exercise program and body mechanics training  Frequency: 2x week  Duration in weeks: 6  Treatment plan discussed with: patient        Subjective Evaluation    History of Present Illness  Date of surgery: 2021  Mechanism of injury: surgery  Mechanism of injury: Patient has been doing fine at home, as she is home alone  Has been having shoulder pain with PT and exercises in the clinic  Patient's daughter reports patient is able to use her arm to feed herself, gets herself (patient) dressed, and the patient is able to manage light IADLs and ADLs without increase in pain in the shoulder     Pain  Current pain ratin  At best pain ratin  At worst pain ratin  Location: L shoulder  Quality: sharp, tight and dull ache  Aggravating factors: lifting and overhead activity    Social Support  Steps to enter house: yes  Stairs in house: no   L[facility] in: [facility]  Lives with: alone    Employment status: not working  Hand dominance: right      Diagnostic Tests  MRI studies: abnormal  Treatments  Previous treatment: physical therapy  Discharged from (in last 30 days): inpatient hospitalization  Patient Goals  Patient goals for therapy: decreased pain, increased motion and return to sport/leisure activities          Objective     Neurological Testing     Additional Neurological Details        Active Range of Motion   Left Shoulder   Flexion: 70 degrees with pain    Additional Active Range of Motion Details  Flexion in scaption plane    Passive Range of Motion   Left Shoulder   Flexion: 95 degrees     Right Elbow   Flexion: [facility]    Additional Passive Range of Motion Details  Flexion in scaption plane  Full elbow flexion, slightly tight into extension and tight into passive supination  Strength/Myotome Testing     Left Shoulder     Planes of Motion   Flexion: 3-   Abduction: 3-   External rotation at 0°: 3-   Internal rotation at 0°: 3-     Right Shoulder     Planes of Motion   Flexion: 4   Abduction: 4   External rotation at 0°: 4   Internal rotation at 0°: 4     Additional Strength Details  Flex/abduction= scaption plane             Precautions: L reverse total shoulder, biceps tenodesis- no repetitive biceps curls h/o R reverse total shoulder as well   Fall risk    Goals of max 120 scaption range and lifetime max of 50* ER  Manuals 7/6 7/8 6/25 6/30 7/2   L shoulder PROM to tolerance, scaption plane   ER <30 for now   15min x15' x30' 30 min; ER painful at deltoid insertion, elevate to OPP for less pain    25 min   Elbow extension x20 reps x10 reps Full extension and supination  x20 reps Full extension and supination  x20 reps    Supination stretch- start in ~ 2 weeks x20 reps x20 reps              Graston to L upper trap  10 min x10' 10 min  10 min 10 min    Man L UT str    [id number]              Neuro Re-Ed                                                                Ther Ex                pulleys 5  min 5'   5 min             scap retraction 3x10 3x10  2x10 20x  NT    squeezes Red 3x10 Red 30x 20x  20x red digi  20x red                                    Towel Slides Fwd  2x10 lower height  Towel slides on table lower height x10 10x     ball slides 2x10 2x10                                                                              Ther Activity                        Gait Training Modalities        CP, seated Seated x 10 m CP, seated, 10 min  10 min  10 min 10 min            Progress per protocol

## 2021-07-13 ENCOUNTER — OFFICE VISIT (OUTPATIENT)
Dept: PHYSICAL THERAPY | Age: 86
End: 2021-07-13
Payer: MEDICARE

## 2021-07-13 DIAGNOSIS — Z96.612 STATUS POST REVERSE TOTAL REPLACEMENT OF LEFT SHOULDER: Primary | ICD-10-CM

## 2021-07-13 PROCEDURE — 97140 MANUAL THERAPY 1/> REGIONS: CPT | Performed by: PHYSICAL THERAPIST

## 2021-07-13 NOTE — PROGRESS NOTES
Daily Note     Today's date: 2021  Patient name: Alisa Tobias  : 1935  MRN: [id number]  Referring provider: Myriam Mojica MD  Dx:   Encounter Diagnosis     ICD-10-CM    1  Status post reverse total replacement of left shoulder  [id number]                   Subjective: Patient reports that she's using her arm functionally at home, has no issues with feeding herself, preparing light meals, dressing, etc without any increased pain in the shoulder  Objective: See treatment diary below      Assessment: Tolerated treatment well  Patient demonstrated fatigue post treatment, exhibited good technique with therapeutic exercises and would benefit from continued PT Patient's range * scaption, no pain, has full ER within recommended range from protocol  Tried to progress  strength to green, unable to perform without finger/hand pain  No graston today 2* time constraints       Plan: Continue per plan of care  Precautions: L reverse total shoulder, biceps tenodesis- no repetitive biceps curls h/o R reverse total shoulder as well   Fall risk    Goals of max 120 scaption range and lifetime max of 50* ER  Manuals  7   L shoulder PROM to tolerance, scaption plane   ER <30 for now   15min x15' x15' 30 min; ER painful at deltoid insertion, elevate to OPP for less pain    25 min   Elbow extension x20 reps x10 reps x10 reps Full extension and supination  x20 reps    Supination stretch- start in ~ 2 weeks x20 reps x20 reps x20 reps             Graston to L upper trap  10 min x10' 10 min - NT 10 min 10 min    Man L UT str    [id number]              Neuro Re-Ed                                                                Ther Ex                pulleys 5  min 5' 5'  5 min             scap retraction 3x10 3x10  2x10 20x  NT    squeezes Red 3x10 Red 30x 20x  20x red digi  20x red                                    Towel Slides Fwd  2x10 lower height 2x10 Towel slides on table lower height x10 10x     ball slides 2x10 2x10 2x10                                                                             Ther Activity                        Gait Training                        Modalities        CP, seated Seated x 10 m CP, seated, 10 min  10 min  10 min 10 min            Progress per protocol

## 2021-07-21 ENCOUNTER — OFFICE VISIT (OUTPATIENT)
Dept: PHYSICAL THERAPY | Age: 86
End: 2021-07-21
Payer: MEDICARE

## 2021-07-21 DIAGNOSIS — Z96.612 STATUS POST REVERSE TOTAL REPLACEMENT OF LEFT SHOULDER: Primary | ICD-10-CM

## 2021-07-21 PROCEDURE — 97110 THERAPEUTIC EXERCISES: CPT

## 2021-07-21 PROCEDURE — 97140 MANUAL THERAPY 1/> REGIONS: CPT

## 2021-07-21 NOTE — PROGRESS NOTES
Daily Note     Today's date: 2021  Patient name: Anca Weller  : 1935  MRN: [id number]  Referring provider: Isela Pablo  Dx:   No diagnosis found  Subjective: Patient reports a fall at home where she had to brace herself with L arm  Pt is sore but feels it has not limited her much from functional standpoint      Objective: See treatment diary below      Assessment: Tolerated treatment well  Patient demonstrated fatigue post treatment, exhibited good technique with therapeutic exercises and would benefit from continued PT Patient's range * scaption, no pain, has full ER within recommended range from protocol  Unable to progress  strength to green,  Due to finger/hand pain  Pt's L shld PROM unaffected despite recent fall      Plan: Continue per plan of care  Precautions: L reverse total shoulder, biceps tenodesis- no repetitive biceps curls h/o R reverse total shoulder as well   Fall risk    Goals of max 120 scaption range and lifetime max of 50* ER     Manuals  7   L shoulder PROM to tolerance, scaption plane   ER <30 for now   15min x15' x15' X 15m 25 min   Elbow extension x20 reps x10 reps x10 reps x20 reps    Supination stretch- start in ~ 2 weeks x20 reps x20 reps x20 reps x20 reps            Graston to L upper trap  10 min x10' 10 min - NT 10 min 10 min    Man L UT str    [id number]              Neuro Re-Ed                                                                Ther Ex                pulleys 5  min 5' 5'  5 min             scap retraction 3x10 3x10  2x10 20x  NT    squeezes Red 3x10 Red 30x 20x  20x red digi  20x red                                    Towel Slides Fwd  2x10 lower height 2x10 Towel slides on table lower height 2x10 10x     ball slides 2x10 2x10 2x10 2x10                                                                            Ther Activity                        Gait Training Modalities        CP, seated Seated x 10 m CP, seated, 10 min  10 min  10 min 10 min            Progress per protocol

## 2021-07-23 ENCOUNTER — OFFICE VISIT (OUTPATIENT)
Dept: PHYSICAL THERAPY | Age: 86
End: 2021-07-23
Payer: MEDICARE

## 2021-07-23 DIAGNOSIS — Z96.612 STATUS POST REVERSE TOTAL REPLACEMENT OF LEFT SHOULDER: Primary | ICD-10-CM

## 2021-07-23 PROCEDURE — 97140 MANUAL THERAPY 1/> REGIONS: CPT

## 2021-07-23 PROCEDURE — 97110 THERAPEUTIC EXERCISES: CPT

## 2021-07-23 NOTE — PROGRESS NOTES
Daily Note     Today's date: 2021  Patient name: Princess Brothers  : 1935  MRN: [id number]  Referring provider: Astrid Rivas*  Dx:   Encounter Diagnosis     ICD-10-CM    1  Status post reverse total replacement of left shoulder  [id number]                   Subjective: Patient reports a fall at home where she had to brace herself with L arm and pt still feels affects with pain in L arm  Pt is sore but feels it has not limited her much from functional standpoint      Objective: See treatment diary below      Assessment: Tolerated treatment well  Patient demonstrated fatigue post treatment, exhibited good technique with therapeutic exercises and would benefit from continued PT Patient's range * scaption, no pain, has full ER within recommended range from protocol  Unable to progress  strength to green,  Due to finger/hand pain  Pt's L shld PROM unaffected despite recent fall      Plan: Continue per plan of care  Precautions: L reverse total shoulder, biceps tenodesis- no repetitive biceps curls h/o R reverse total shoulder as well   Fall risk    Goals of max 120 scaption range and lifetime max of 50* ER     Manuals    L shoulder PROM to tolerance, scaption plane   ER <30 for now   15min x15' x15' X 15m 15 min   Elbow extension x20 reps x10 reps x10 reps x20 reps x20 reps   Supination stretch- start in ~ 2 weeks x20 reps x20 reps x20 reps x20 reps x20 reps           Graston to L upper trap  10 min x10' 10 min - NT 10 min 10 min    Man L UT str    [id number]             Neuro Re-Ed                                                                Ther Ex                pulleys 5  min 5' 5'  5 min             scap retraction 3x10 3x10  2x10 20x  2x10    squeezes Red 3x10 Red 30x 20x  20x red digi  30x red                                    Towel Slides Fwd  2x10 lower height 2x10 Towel slides on table lower height 2x10 Towel slides on table lower height 3x10    ball slides 2x10 2x10 2x10 2x10 2x10                                                                           Ther Activity                        Gait Training                        Modalities        CP, seated Seated x 10 m CP, seated, 10 min  10 min  10 min 10 min            Progress per protocol

## 2021-07-26 ENCOUNTER — OFFICE VISIT (OUTPATIENT)
Dept: OBGYN CLINIC | Facility: OTHER | Age: 86
End: 2021-07-26

## 2021-07-26 ENCOUNTER — APPOINTMENT (OUTPATIENT)
Dept: RADIOLOGY | Facility: OTHER | Age: 86
End: 2021-07-26
Payer: MEDICARE

## 2021-07-26 VITALS
SYSTOLIC BLOOD PRESSURE: 174 MMHG | BODY MASS INDEX: 33.06 KG/M2 | HEIGHT: 59 IN | DIASTOLIC BLOOD PRESSURE: 83 MMHG | HEART RATE: 74 BPM | WEIGHT: 164 LBS

## 2021-07-26 DIAGNOSIS — I10 ESSENTIAL HYPERTENSION: ICD-10-CM

## 2021-07-26 DIAGNOSIS — Z96.612 STATUS POST REVERSE TOTAL ARTHROPLASTY OF LEFT SHOULDER: Primary | ICD-10-CM

## 2021-07-26 DIAGNOSIS — Z96.612 STATUS POST REVERSE TOTAL ARTHROPLASTY OF LEFT SHOULDER: ICD-10-CM

## 2021-07-26 PROCEDURE — 73030 X-RAY EXAM OF SHOULDER: CPT

## 2021-07-26 PROCEDURE — 99024 POSTOP FOLLOW-UP VISIT: CPT | Performed by: ORTHOPAEDIC SURGERY

## 2021-07-26 RX ORDER — PROPRANOLOL HYDROCHLORIDE 20 MG/1
20 TABLET ORAL 4 TIMES DAILY
Qty: 360 TABLET | Refills: 3 | Status: SHIPPED | OUTPATIENT
Start: 2021-07-26 | End: 2022-06-22

## 2021-07-26 NOTE — PROGRESS NOTES
Assessment  Diagnoses and all orders for this visit:    Status post reverse total arthroplasty of left shoulder -  New injury following recent fall      Discussion and Plan:    · Explained to the patient that her x rays today reveal an intact reverse total shoulder prosthesis which remains in stable alignment and position without any acute osseous abnormalities  She was instructed to discontinue the use of physical therapy at this time and allow her shoulder rest after the injury  If her symptoms persist at her next follow up visit we could consider a CT Scan of the shoulder but this is unlikely to be the case  She understood and all questions were answered  · Tylenol and ice prn for pain relief  · Follow up in 4 weeks for re evaluation of symptoms  Subjective:   Patient ID: Margo Anaya is a 81 y o  female      HPI  80 y/o female who presents today for a follow up visit for her left shoulder  She is now 2 months s/p left reverse total shoulder performed on 5/25/2021  Patient reports that she slipped and fell, bracing herself with her left upper extremity a few weeks ago  She has been experiencing increasing soreness about the shoulder ever since  She also states that she is limited with her ROM  She continues to perform physical therapy  No numbness or tingling  No fevers or chills  The following portions of the patient's history were reviewed and updated as appropriate: allergies, current medications, past family history, past medical history, past social history, past surgical history and problem list     Review of Systems   Constitutional: Negative for chills, fever and unexpected weight change  HENT: Negative for hearing loss, nosebleeds and sore throat  Eyes: Negative for pain, redness and visual disturbance  Respiratory: Negative for cough, shortness of breath and wheezing  Cardiovascular: Negative for chest pain, palpitations and leg swelling     Gastrointestinal: Negative for abdominal distention, nausea and vomiting  Endocrine: Negative for polydipsia and polyuria  Genitourinary: Negative for dysuria and hematuria  Skin: Negative for rash and wound  Neurological: Negative for dizziness, numbness and headaches  Psychiatric/Behavioral: Negative for decreased concentration and suicidal ideas  Objective:  BP (!) 174/83   Pulse 74   Ht 4' 11" (1 499 m)   Wt 74 4 kg (164 lb)   BMI 33 12 kg/m²       Left Shoulder Exam     Tenderness   The patient is experiencing no tenderness  Range of Motion   Left shoulder forward flexion: PROM 120  Other   Erythema: absent  Sensation: normal  Pulse: present     Comments:  Incision is well healed            Physical Exam  Constitutional:       Appearance: She is well-developed  Eyes:      Pupils: Pupils are equal, round, and reactive to light  Pulmonary:      Effort: Pulmonary effort is normal       Breath sounds: Normal breath sounds  Skin:     General: Skin is warm and dry  Neurological:      Mental Status: She is alert and oriented to person, place, and time  Psychiatric:         Behavior: Behavior normal          Thought Content: Thought content normal          Judgment: Judgment normal            I have personally reviewed pertinent films in PACS and my interpretation is as follows  X Ray Left Shoulder 7/26/2021: Reverse total shoulder arthroplasty remains in stable alignment and position without signs of loosening  No acute osseous abnormalities       Scribe Attestation    I,:  Ritchie Kerr am acting as a scribe while in the presence of the attending physician :       I,:  Kathie Brooks MD personally performed the services described in this documentation    as scribed in my presence :

## 2021-07-28 ENCOUNTER — APPOINTMENT (OUTPATIENT)
Dept: PHYSICAL THERAPY | Age: 86
End: 2021-07-28
Payer: MEDICARE

## 2021-07-30 ENCOUNTER — APPOINTMENT (OUTPATIENT)
Dept: PHYSICAL THERAPY | Age: 86
End: 2021-07-30
Payer: MEDICARE

## 2021-08-02 ENCOUNTER — OFFICE VISIT (OUTPATIENT)
Dept: INTERNAL MEDICINE CLINIC | Facility: CLINIC | Age: 86
End: 2021-08-02
Payer: MEDICARE

## 2021-08-02 VITALS
DIASTOLIC BLOOD PRESSURE: 80 MMHG | HEIGHT: 59 IN | SYSTOLIC BLOOD PRESSURE: 138 MMHG | RESPIRATION RATE: 12 BRPM | BODY MASS INDEX: 32.21 KG/M2 | WEIGHT: 159.8 LBS | HEART RATE: 72 BPM

## 2021-08-02 DIAGNOSIS — R21 SKIN RASH: ICD-10-CM

## 2021-08-02 DIAGNOSIS — I47.1 SUPRAVENTRICULAR TACHYCARDIA (HCC): Chronic | ICD-10-CM

## 2021-08-02 DIAGNOSIS — E78.2 MIXED HYPERLIPIDEMIA: ICD-10-CM

## 2021-08-02 DIAGNOSIS — C50.411 MALIGNANT NEOPLASM OF UPPER-OUTER QUADRANT OF RIGHT BREAST IN FEMALE, ESTROGEN RECEPTOR POSITIVE (HCC): ICD-10-CM

## 2021-08-02 DIAGNOSIS — K11.8 MASS OF LEFT PAROTID GLAND: ICD-10-CM

## 2021-08-02 DIAGNOSIS — I10 ESSENTIAL HYPERTENSION: ICD-10-CM

## 2021-08-02 DIAGNOSIS — Z17.0 MALIGNANT NEOPLASM OF UPPER-OUTER QUADRANT OF RIGHT BREAST IN FEMALE, ESTROGEN RECEPTOR POSITIVE (HCC): ICD-10-CM

## 2021-08-02 DIAGNOSIS — G62.9 PERIPHERAL POLYNEUROPATHY: Primary | Chronic | ICD-10-CM

## 2021-08-02 PROCEDURE — 99214 OFFICE O/P EST MOD 30 MIN: CPT | Performed by: INTERNAL MEDICINE

## 2021-08-02 NOTE — PROGRESS NOTES
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 -with study done in 2009  ENT-  Again feels this is likely benign  He will be examined her again in 6 months  3  Status post prior fall worsening baseline of left shoulder pain  Repeat MRI showed chronic rotator cuff tear with moderate fatty muscle attachment 50  Patient underwent reverse total shoulder was markedly clear with improvement in her shoulder  Then had a fall now with some ongoing pain  Recent x-ray done by Ortho shows stable surgery  This fellow ever pain persist she may need CT scan of her shoulder  4  Peripheral neuropathy -prior nerve conduction study showed 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   She has had extensive workup with workup recently repeated   Immunofixation negative, heavy metals negative, B12 methylmalonic acid Lyme titer, rheumatoid factor antinuclear antibody all normal   Thyroid function normal   B12 level normal   Free light chain ratio normal   Had hallucinations on gabapentin-at this point she remains on vitamin B6 50 milligrams daily  5  Abnormal gait-associated with the above plus diffuse DJD   Had prior total knee replacement but suspect predominantly abnormality of gait is related to her neuropathy -patient walking with a cane  6  Hypercalcemia-noted previously and of questionable significance   Had repeat labs with [facility] normal not felt significant  7  Hypertension-adequate control on current dose of beta-blocker -had been on a diuretic in the past but this is not needed at present -stable at present without exacerbation post fall  8   Previously noted skin rash -treated previously as potential erythema chronicum migrans as she remembered a bite and then developed a rash- had doxycycline for 3 weeks   Then developed fungal disease left greater than right was treated with Diflucan and Lotrisone   Serology for Lyme disease was ordered previously but not done -will be repeating at this point  9  Status post left cubital tunnel release-left carpal tunnel release done in November 8246 without complications   Still has some numbness and intermittent pain is seeing the orthopedic physician -now has some ongoing left arm numbness and she is scheduled to meet with the orthopedic physician- rule out component of cervical radiculopathy     All other problems as per note of May 2020 in August 2018         MEDICAL REGIMEN:                                                  Vitamin B6 50 milligrams daily, gabapentin 100 milligrams HS, propanolol 20 milligrams q i d , lisinopril 10 milligrams b i d , Crestor 10 milligrams- half tablet 3 times per week, multivitamin, cranberry daily, generic Zantac 150 milligrams in the morning- REVIEW NEXT VISIT intermittent use of prednisone eyedrops per Ophthalmology, vitamin D3/ 2000 units a day, anastrozole 1 milligram daily started in the spring of 2019 to be use for 5 years     appointment over the next several months with prior chemistry profile cholesterol profile Lyme disease titer  Addendum -patient seen by Nancy Caldwell on August 30th any stated he is unclear as to why she can no longer actively elevate her arm and still has pain with benign x-ray findings  He is worried about brachial plexus or C-spine abnormality with her recent fall -she ordered an EMG of the left upper extremity and CT of the left shoulder and will then be re-evaluating the patient    Patient had nerve conduction study done showing evidence of prior carpal tunnel syndrome of the left wrist as well as evidence of ulnar nerve entrapment likely caused by the cubital tunnel    There was no evidence of cervical radiculopathy plexopathy or myelopathy    Addendum -patient seen by orthopedic physician Nancy Caldwell on November 22nd who feels she is doing well status post left reverse total shoulder arthroplasty on May 25th followed by a left acromion process fracture sustained after fall on July 2nd -she feels the fracture is healed and she can discontinue her sling and work on range of motion -they felt she should not participate in formal physical therapy and at this point she can be seen as needed  No problem-specific Assessment & Plan notes found for this encounter  Diagnoses and all orders for this visit:    Peripheral polyneuropathy    Supraventricular tachycardia (HCC)    Skin rash  -     Comprehensive metabolic panel; Future  -     Cholesterol, total; Future  -     HDL cholesterol; Future  -     LDL cholesterol, direct; Future  -     Triglycerides; Future  -     Lyme Antibody Profile with reflex to WB; Future    Mixed hyperlipidemia  -     Comprehensive metabolic panel; Future  -     Cholesterol, total; Future  -     HDL cholesterol; Future  -     LDL cholesterol, direct; Future  -     Triglycerides; Future  -     Lyme Antibody Profile with reflex to WB; Future    Essential hypertension  -     Comprehensive metabolic panel; Future  -     Cholesterol, total; Future  -     HDL cholesterol; Future  -     LDL cholesterol, direct; Future  -     Triglycerides; Future  -     Lyme Antibody Profile with reflex to WB; Future    Malignant neoplasm of upper-outer quadrant of right breast in female, estrogen receptor positive ([facility] [address] )    Mass of left parotid gland          Subjective:      Patient ID: Saba Rowland is a 81 y o  female  Reviewed multiple issues  She had been sent for  Vestibular therapy had a very nice response  She has not had any recurrence of the dizziness since that time  She had repeat MRI of her parotid lesion done in May which was reviewed in detail        MRI OF THE SOFT TISSUES OF THE NECK - WITH AND WITHOUT CONTRAST     INDICATION: Abnormal findings on diagnostic imaging of other specified body structures     COMPARISON: None      TECHNIQUE:  Sagittal T1, axial DWI and T2  Axial inversion recovery or fat suppressed T2  Axial T1 precontrast   Axial and coronal T1 postcontrast with fat suppression  Imaging performed on 3 0T MRI    IV Contrast:  7 mL of Gadobutrol injection (SINGLE-DOSE)      IMAGE QUALITY:  Diagnostic      FINDINGS:     VISUALIZED BRAIN PARENCHYMA:  Parenchymal volume loss with prominent extra-axial spaces within the middle cranial and posterior fossae      VISUALIZED ORBITS AND PARANASAL SINUSES:  Normal      NASAL CAVITY AND NASOPHARYNX:  Mild nasal septal deviation  Clear nasal cavity and nasopharynx      SUPRAHYOID NECK:  Normal oral cavity, tongue base, tonsillar fossa and epiglottis      INFRAHYOID NECK:  Aryepiglottic folds, laryngeal tissues, and piriform sinuses are normal      THYROID GLAND:   Normal      PAROTID AND SUBMANDIBULAR GLANDS:  There is a well-circumscribed avidly enhancing 1 1 x 0 7 cm x 6 mm (AP, transverse and craniocaudad) left parotid mass  This is located in the superficial lobe of the parotid gland  This lesion was partially visualized   on the MRI brain study from October 28, 2009 where it measured approximately 6 mm in craniocaudad dimension        No additional ipsilateral contralateral parotid lesions are identified  There is no periparotid inflammation or glandular ductal dilatation      The submandibular glands are unremarkable      LYMPH NODES:  No pathologic or enlarged adenopathy      VASCULAR STRUCTURES:  Grossly normal flow voids of the cervical vasculature      THORACIC INLET: Lung apices and upper mediastinum are grossly unremarkable      CERVICAL SPINE:  Mild multilevel cervical spondylosis from C4 to T2      IMPRESSION:     Avidly enhancing well-circumscribed left superficial parotid mass lesion, grossly stable (in the craniocaudad dimension) when compared to the MRI October 28, 2009 study  Statistically, 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  It   is unlikely to represent a metastatic lymph node or aggressive malignant lesion given its stability since the 2009 examination  However, given that the prior study did not allow for complete comparison of the lesion, consider 6 month follow-up to assess   for continued stability            Workstation performed: [id number]   This is felt clinically likely to be a benign tumor  She saw ENT -Dr Robyn Rush and who felt the area was stable and that it could be reviewed again in 6 months  It was not felt that intervention /biopsy was warranted at this point     She underwent a left reverse total shoulder in May  She subsequently had a fall  She had recently saw Ortho on x-ray show intact left shoulder surgery  She is scheduled for follow-up appointment over the next 4-6 weeks and pending results may need CT scan  He has not had any recurrence of the previously noted rash with concern for Lyme disease  Serology had been ordered in the past but never done  This was ordered prior to her next visit     She is due for her yearly mammogram with her history of breast cancer  She would not proceed with that because of her left shoulder injury  She hopes schedule that over the next 2 months     She has known hypertension  BP adequately controlled on current regimen  Avoiding salt and decongestants  That using frequent nonsteroidals  This patient wanted to know their preferred analgesic agent  Because of their various comorbidities I recommended that this be acetaminophen  This patient has no history of chronic liver disease that would put them at greater risk for use of acetaminophen  This patient may use up to 500-650 mg of acetaminophen at a time and no more than 3 g a day total  Nonsteroidal anti-inflammatory agents have the potential to exacerbate hypertension, hypercoagulability, chronic renal failure, congestive heart failure, and various allergic tendencies          Because of her comorbidities it is preferred she uses seat a minute 10  This was again reviewed  Neuropathy symptoms are unchanged  She ambulates more slowly because of this  She denies new weakness of 1 arm and leg compared to the other    She denies new numbness of 1 arm and leg compared to the other a  Results for orders placed or performed during the hospital encounter of 05/25/21  -UA (URINE) with reflex to Microscopic       Result                      Value             Ref Range           Color, UA                   Dk Yellow                             Clarity, UA                 Turbid                                Specific Sellers, UA        1 030             1 003 - 1 030       pH, UA                      5 5               4 5, 5 0, 5 *       Leukocytes, UA              Negative          Negative            Nitrite, UA                 Negative          Negative            Protein, UA                 Negative          Negative mg/*       Glucose, UA                 Negative          Negative mg/*       Ketones, UA                 Trace (A)         Negative mg/*       Urobilinogen, UA            1 0               0 2, 1 0 E U*       Bilirubin, UA               Negative          Negative            Blood, UA                   Negative          Negative       -Basic metabolic panel       Result                      Value             Ref Range           Sodium                      142               136 - 145 mm*       Potassium                   3 7               3 5 - 5 3 mm*       Chloride                    108               100 - 108 mm*       CO2                         25                21 - 32 mmol*       ANION GAP                   9                 4 - 13 mmol/L       BUN                         17                5 - 25 mg/dL        Creatinine                  0 66              0 60 - 1 30 *       Glucose                     133               65 - 140 mg/*       Calcium                     9 7               8 3 - 10 1 m* eGFR                        81                ml/min/1 73s*  -CBC and Platelet       Result                      Value             Ref Range           WBC                         14 87 (H)         4 31 - 10 16*       RBC                         4 14              3 81 - 5 12 *       Hemoglobin                  13 1              11 5 - 15 4 *       Hematocrit                  39 4              34 8 - 46 1 %       MCV                         95                82 - 98 fL          MCH                         31 6              26 8 - 34 3 *       MCHC                        33 2              31 4 - 37 4 *       RDW                         12 9              11 6 - 15 1 %       Platelets                   184               149 - 390 Th*       MPV                         10 7              8 9 - 12 7 fL  -Basic metabolic panel       Result                      Value             Ref Range           Sodium                      142               136 - 145 mm*       Potassium                   3 6               3 5 - 5 3 mm*       Chloride                    108               100 - 108 mm*       CO2                         27                21 - 32 mmol*       ANION GAP                   7                 4 - 13 mmol/L       BUN                         21                5 - 25 mg/dL        Creatinine                  0 61              0 60 - 1 30 *       Glucose                     86                65 - 140 mg/*       Calcium                     9 7               8 3 - 10 1 m*       eGFR                        84                ml/min/1 73s*  -CBC and Platelet       Result                      Value             Ref Range           WBC                         14 30 (H)         4 31 - 10 16*       RBC                         4 48              3 81 - 5 12 *       Hemoglobin                  14 1              11 5 - 15 4 *       Hematocrit                  43 3              34 8 - 46 1 %       MCV                         97 82 - 98 fL          MCH                         31 5              26 8 - 34 3 *       MCHC                        32 6              31 4 - 37 4 *       RDW                         13 2              11 6 - 15 1 %       Platelets                   188               149 - 390 Th*       MPV                         10 5              8 9 - 12 7 fL  -Fingerstick Glucose (POCT)       Result                      Value             Ref Range           POC Glucose                 103               65 - 140 mg/* prior lab work reviewed in detail  She has rash of the abdomen felt to represent intertrigo -she was counseled on this        The following portions of the patient's history were reviewed and updated as appropriate: allergies, current medications, past family history, past medical history, past social history, past surgical history and problem list     Review of Systems   Constitutional: Negative  HENT: Negative  Respiratory: Negative  Cardiovascular: Negative  Gastrointestinal: Negative  Evidence prior abdominal surgery   Endocrine: Negative  Genitourinary: Negative  Musculoskeletal: Negative  Skin: Negative  Rash between the folds of the lower abdomenEvidence of prior breast surgery   Neurological: Negative  Hematological: Negative  Psychiatric/Behavioral: Negative            Objective:      Ht 4' 11" (1 499 m)   Wt 72 5 kg (159 lb 12 8 oz)   BMI 32 28 kg/m²          Physical Exam

## 2021-08-30 ENCOUNTER — OFFICE VISIT (OUTPATIENT)
Dept: OBGYN CLINIC | Facility: OTHER | Age: 86
End: 2021-08-30
Payer: MEDICARE

## 2021-08-30 VITALS
HEART RATE: 60 BPM | DIASTOLIC BLOOD PRESSURE: 83 MMHG | BODY MASS INDEX: 31.91 KG/M2 | WEIGHT: 158 LBS | SYSTOLIC BLOOD PRESSURE: 159 MMHG

## 2021-08-30 DIAGNOSIS — Z96.612 STATUS POST REVERSE TOTAL ARTHROPLASTY OF LEFT SHOULDER: Primary | ICD-10-CM

## 2021-08-30 DIAGNOSIS — M25.512 ACUTE PAIN OF LEFT SHOULDER: ICD-10-CM

## 2021-08-30 DIAGNOSIS — R29.898 DECREASED STRENGTH OF UPPER EXTREMITY: ICD-10-CM

## 2021-08-30 PROCEDURE — 99214 OFFICE O/P EST MOD 30 MIN: CPT | Performed by: ORTHOPAEDIC SURGERY

## 2021-08-30 NOTE — PROGRESS NOTES
Assessment  Diagnoses and all orders for this visit:    Status post reverse total arthroplasty of left shoulder    Acute pain of left shoulder    Decreased strength of left upper extremity        Discussion and Plan:    ·  it is unclear to me why the patient can no longer actively elevate the arm and why she is having pain as our x-ray findings have been consistent without showing pathology, I am concerned about the numbness and tingling in her hand and the potential that she sustained some sort of injury either to brachial plexus or to her cervical spine which is limiting her ability to elevate  · We will get an EMG of the LUE to evaluate for nerve injury which can be contributing to the decreased strength  · CT left shoulder to further evaluate the prothesis  · Follow up once studies are complete and it is my hope that we were able to identify what happened to the shoulder during the fall and use that information to guide us in further treatment options as I am at a loss as to how to improve her elevation at this time     Subjective:   Patient ID: Nelda Springer is a 81 y o  female      HPI  Patient presents today 3 mos s/p left reverse total shoulder arthroplasty 5/25/21  She did have a fall early July 2021 and x-rays were normal   Today patient reports loss of active range of motion  She reports a soreness in the shoulder with AROM  Patient feels she has digressed since her fall last month  The following portions of the patient's history were reviewed and updated as appropriate: allergies, current medications, past family history, past medical history, past social history, past surgical history and problem list     Review of Systems   Constitutional: Negative for chills and fever  HENT: Negative for drooling and sneezing  Eyes: Negative for redness  Respiratory: Negative for cough and wheezing  Gastrointestinal: Negative for nausea and vomiting     Musculoskeletal:        Please see ortho exam   Psychiatric/Behavioral: Negative for behavioral problems  The patient is not nervous/anxious  Objective:  /83 (BP Location: Right arm, Patient Position: Sitting, Cuff Size: Adult)   Pulse 60   Wt 71 7 kg (158 lb)   BMI 31 91 kg/m²       Left Shoulder Exam     Tenderness   The patient is experiencing no tenderness  Range of Motion   Left shoulder external rotation: PROM 40 without pain  Left shoulder forward flexion: PROM 120 without pain      Other   Erythema: absent  Sensation: normal  Pulse: present     Comments:    No effusion             Physical Exam  Vitals reviewed  Constitutional:       Appearance: She is well-developed  Eyes:      Pupils: Pupils are equal, round, and reactive to light  Pulmonary:      Effort: Pulmonary effort is normal       Breath sounds: Normal breath sounds  Skin:     General: Skin is warm and dry  Neurological:      Mental Status: She is alert and oriented to person, place, and time  Psychiatric:         Behavior: Behavior normal          Thought Content: Thought content normal          Judgment: Judgment normal            I have personally reviewed pertinent films in PACS from 7/26/21 and my interpretation is as follows  Left shoulder x-rays Reverse total shoulder arthroplasty remains in stable alignment and position without signs of loosening   No acute osseous abnormalities      Scribe Attestation    I,:  Iram Fu am acting as a scribe while in the presence of the attending physician :       I,:  Milo Gupta MD personally performed the services described in this documentation    as scribed in my presence :

## 2021-09-02 ENCOUNTER — HOSPITAL ENCOUNTER (OUTPATIENT)
Dept: CT IMAGING | Facility: HOSPITAL | Age: 86
Discharge: HOME | End: 2021-09-02
Attending: ORTHOPAEDIC SURGERY
Payer: MEDICARE

## 2021-09-02 DIAGNOSIS — M25.512 ACUTE PAIN OF LEFT SHOULDER: ICD-10-CM

## 2021-09-02 DIAGNOSIS — Z96.612 STATUS POST REVERSE TOTAL ARTHROPLASTY OF LEFT SHOULDER: ICD-10-CM

## 2021-09-02 PROCEDURE — 73200 CT UPPER EXTREMITY W/O DYE: CPT

## 2021-09-07 ENCOUNTER — TELEPHONE (OUTPATIENT)
Dept: OBGYN CLINIC | Facility: HOSPITAL | Age: 86
End: 2021-09-07

## 2021-09-07 NOTE — TELEPHONE ENCOUNTER
Home has generic VM  Left message on cell  Acromion fx - nondisplaced  Treat in sling until follow up  Prosthesis fine per report

## 2021-09-07 NOTE — PATIENT INSTRUCTIONS
Cubital Tunnel Syndrome  What many people call the funny bone really is a nerve  This ulnar nerve runs behind a bone in the elbow through a space called the cubital tunnel (Figure 1)  Although banging the funny bone usually causes temporary symptoms, chronic pressure on or stretching of the nerve can affect the blood supply to the ulnar nerve, causing numbness or tingling in the ring and small fingers, pain in the forearm, and/or weakness in the hand  This is called cubital tunnel syndrome      Causes  There are a few causes of this ulnar nerve problem  These include:  Pressure  Because the nerve runs through that funny bone groove and has little padding over it, direct pressure (like leaning your arm on an arm rest) can compress the nerve, causing your arm and hand--especially the ring and small fingers--to fall asleep      Stretch  Keeping the elbow bent for a long time can stretchthe nerve behind the elbow  This usually happens during sleep  Anatomy  Sometimes, the ulnar nerve does not stay in its place and snaps back and forth over a bony bump as the elbow is moved  Repetitive snapping can irritate the nerve  Sometimes, the soft tissues over the nerve become thicker or there is an extra muscle over the nerve that can keep the nerve from working correctly  Signs and Symptoms  Cubital tunnel syndrome can cause pain, loss of sensation, and/or tingling  Pins and needles usually are felt in the ring and small fingers  These symptoms are often felt when the elbow is kept bent for a long time, such as while holding a phone or while sleeping  Some people feel weak or clumsy  Loss of sensation and loss of strength or muscle in the hand is serious  Diagnosis  Your doctor will be able to tell a lot by asking you about your symptoms and examining you  S/he might test you for other medical problems like diabetes or thyroid disease   A test called electromyography (EMG) and/or nerve conduction study (NCS) might be needed to see how much the nerve and muscle are being affected  This test also checks for other problems like a pinched nerve in the neck, which can cause similar symptoms  Treatment  The first treatment is to avoid actions that cause symptoms  Wrapping a pillow or towel around the elbow or wearing a splint at night to keep the elbow from bending during sleep can help  Avoiding leaning on the funny bone part of the elbow can help also  A hand therapist can help you learn ways to avoid pressure on the nerve  When symptoms are severe or not getting better, surgery may be needed to relieve the pressure on the nerve  This can involve releasing the nerve, moving the nerve to the front of the elbow, and/or removing a part of the bone  Your surgeon will talk to you about what is the right option for you and guide your care  Therapy sometimes is needed after surgery, and the time it takes to recover varies  Numbness and tingling may improve quickly or slowly, and it may take many months for the strength in your hand to improve  Cubital tunnel symptoms may not totally go away after surgery, especially if symptoms are severe  © 2012 [employer]  www handcare  org      What is it TRIGGER FINGER? Stenosing tenosynovitis, commonly known as trigger finger or trigger thumb, involves the pulleys and tendons in the hand that bend the fingers  The tendons work like long ropes connecting the muscles of the forearm with the bones of the fingers and thumb  In the finger, the pulleys are a series of rings that form a tunnel through which the tendons must glide, much like the guides on a fishing oneal through which the line (or tendon) must pass  These pulleys hold the tendons close against the bone  The tendons and the tunnel have a slick lining that allows easy gliding of the tendon through the pulleys (see Figure 1)      Trigger finger/thumb occurs when the pulley at the base of the finger becomes too thick and constricting around the tendon, making it hard for the tendon to move freely through the pulley  Sometimes the tendon develops a nodule (knot) or swelling of its lining  Because of the increased resistance to the gliding of the tendon through the  pulley, one may feel pain, popping, or a catching feeling in the finger or thumb (see Figure 2)  When the tendon catches, it produces irritation and more swelling  This causes a vicious cycle of triggering, irritation, and swelling  Sometimes the finger becomes stuck or locked, and is hard to straighten or bend  What causes it? Causes for this condition are not always clear  Some trigger fingers are associated with medical conditions such as rheumatoid arthritis, gout, and diabetes  Local trauma to the palm/base of the finger may be a factor on occasion, but in most cases there is not a clear cause  Signs and symptoms   Trigger finger/thumb may start with discomfort felt at the base of the finger or thumb, where they join the palm  This area is often tender to local pressure  A nodule may sometimes be found in this area  When the finger begins to trigger or lock, the patient may think the  problem is at the middle knuckle of the finger or the tip knuckle of the thumb, since the tendon that is sticking is the one that moves these joints  Treatment  The goal of treatment in trigger finger/thumb is to eliminate the catching or locking and allow full movement of the finger or thumb without discomfort  Swelling around the flexor tendon and tendon sheath must be reduced to allow smooth gliding of the tendon  The wearing of a splint or taking an oral anti-inflammatory medication may sometimes  help  Treatment may also include changing activities to reduce swelling  An injection of steroid into the area around the tendon and pulley is often effective in relieving the trigger finger/thumb   If non-surgical forms of treatment do not relieve the symptoms, surgery may be recommended  This surgery is performed as an outpatient, usually with simple local anesthesia  The goal of surgery is to open the pulley at the base of the finger so that the tendon can glide more freely (see Figure 3)  Active motion of the finger generally begins immediately after surgery  Normal use of the hand can usually be resumed once comfort permits  Some patients may feel tenderness, discomfort, and swelling about the area of their surgery longer than others  Occasionally, hand therapy is required after surgery to regain  better use  © 2012 [employer]  www handcare  org 99

## 2021-09-07 NOTE — TELEPHONE ENCOUNTER
Pt has follow up on 20th which is appropriate  She should continue with any pending tests we have ordered  Sling until follow up     I will call patient with above

## 2021-09-07 NOTE — TELEPHONE ENCOUNTER
Dr Preeti Michelle    Patient daughter asking if patient should still get the nerve testing even though it has been determined the pain is from a fx?     Ju Herr # [phone]

## 2021-09-07 NOTE — TELEPHONE ENCOUNTER
Yu Champion from Radiology called in there is significant findings in CT of the upper extremity           Please advise,

## 2021-09-08 NOTE — TELEPHONE ENCOUNTER
Yes   As below states, she should go forward with all pending tests, this includes the nerve conduction study

## 2021-09-14 ENCOUNTER — TELEPHONE (OUTPATIENT)
Dept: OBGYN CLINIC | Facility: OTHER | Age: 86
End: 2021-09-14

## 2021-09-20 ENCOUNTER — OFFICE VISIT (OUTPATIENT)
Dept: OBGYN CLINIC | Facility: OTHER | Age: 86
End: 2021-09-20
Payer: MEDICARE

## 2021-09-20 VITALS
SYSTOLIC BLOOD PRESSURE: 142 MMHG | BODY MASS INDEX: 31.85 KG/M2 | WEIGHT: 158 LBS | DIASTOLIC BLOOD PRESSURE: 84 MMHG | HEIGHT: 59 IN

## 2021-09-20 DIAGNOSIS — Z96.612 STATUS POST REVERSE TOTAL ARTHROPLASTY OF LEFT SHOULDER: Primary | ICD-10-CM

## 2021-09-20 DIAGNOSIS — G56.22 CUBITAL TUNNEL SYNDROME ON LEFT: ICD-10-CM

## 2021-09-20 DIAGNOSIS — S42.125A CLOSED NONDISPLACED FRACTURE OF ACROMIAL PROCESS OF LEFT SCAPULA, INITIAL ENCOUNTER: ICD-10-CM

## 2021-09-20 DIAGNOSIS — G56.02 CARPAL TUNNEL SYNDROME OF LEFT WRIST: ICD-10-CM

## 2021-09-20 PROCEDURE — 99214 OFFICE O/P EST MOD 30 MIN: CPT | Performed by: ORTHOPAEDIC SURGERY

## 2021-09-20 NOTE — PROGRESS NOTES
Assessment  Diagnoses and all orders for this visit:    Status post reverse total arthroplasty of left shoulder  Comments:  5/25/2021    Closed nondisplaced fracture of acromial process of left scapula, initial encounter  Comments:  fall early July 2021    Cubital tunnel syndrome on left    Carpal tunnel syndrome of left wrist        Discussion and Plan:    · Diagnostics reviewed and physical exam performed  Diagnosis, treatment options and associated risks were discussed with the patient including no treatment, nonsurgical treatment and potential for surgical intervention  The patient was given the opportunity to ask questions regarding each  · At this point surgical intervention is not indicated at her left shoulder  And recommend continuation of her sling for protection and support to heal the acromial fracture  I do feel that this should continue to improve and as that injury improves her function of the shoulder should return to the pre-injury level, I see no other reason for her symptoms at this time  · Offered an appointment to see Dr Solange Kapadia regarding her EMG findings in which they declined  · Activities as tolerated  Return in about 2 months (around 11/20/2021) for re-check, or sooner if symptoms worsen or fail to improve  ·     Subjective:   Patient ID: Daphne Tate is a 81 y o  female      Abel Dub returns today with her daughter for repeat evaluation of her left upper extremity  She is status post reversed left total shoulder prosthesis from 05/25/2021  She was doing well postoperatively until she fell early July  Her pain is anteriorly at her shoulder and has numbness and tingling in her ring and small fingers of her left hand  She has a history of carpal and cubital tunnel releases by Dr Solange Kapadia November 2019            The following portions of the patient's history were reviewed and updated as appropriate: allergies, current medications, past family history, past medical history, past social history, past surgical history and problem list     Review of Systems   All other systems reviewed and are negative  Objective:  /84   Ht 4' 11" (1 499 m)   Wt 71 7 kg (158 lb)   BMI 31 91 kg/m²       Left Shoulder Exam     Tenderness   Left shoulder tenderness location: no tenderness over her acromion however mild discomfort anteriorly  Other   Erythema: absent  Sensation: normal     Comments:    Healed incision  No warmth  No ecchymosis            Physical Exam  Vitals reviewed  I have personally reviewed pertinent films in PACS and my interpretation is as follows  Left shoulder CT scan from 9/2/2021 shows: fracture of the acromion process, no signs of loosening of her reverse shoulder prosthesis  Left UE EMG from 9/13/2021 shows: no cervical radiculopathy, cubital tunnel and carpal tunnel syndrome        Scribe Attestation    I,:  Franco Mathew am acting as a scribe while in the presence of the attending physician :       I,:  Jeff Vale MD personally performed the services described in this documentation    as scribed in my presence :

## 2021-10-07 ENCOUNTER — TELEPHONE (OUTPATIENT)
Dept: INTERNAL MEDICINE CLINIC | Facility: CLINIC | Age: 86
End: 2021-10-07

## 2021-10-20 DIAGNOSIS — C50.411 MALIGNANT NEOPLASM OF UPPER-OUTER QUADRANT OF RIGHT BREAST IN FEMALE, ESTROGEN RECEPTOR POSITIVE (HCC): ICD-10-CM

## 2021-10-20 DIAGNOSIS — C50.411 CARCINOMA OF UPPER-OUTER QUADRANT OF RIGHT BREAST IN FEMALE, ESTROGEN RECEPTOR POSITIVE (HCC): ICD-10-CM

## 2021-10-20 DIAGNOSIS — Z17.0 CARCINOMA OF UPPER-OUTER QUADRANT OF RIGHT BREAST IN FEMALE, ESTROGEN RECEPTOR POSITIVE (HCC): ICD-10-CM

## 2021-10-20 DIAGNOSIS — Z17.0 MALIGNANT NEOPLASM OF UPPER-OUTER QUADRANT OF RIGHT BREAST IN FEMALE, ESTROGEN RECEPTOR POSITIVE (HCC): ICD-10-CM

## 2021-10-20 RX ORDER — ANASTROZOLE 1 MG/1
1 TABLET ORAL DAILY
Qty: 90 TABLET | Refills: 3 | Status: SHIPPED | OUTPATIENT
Start: 2021-10-20 | End: 2022-08-09

## 2021-11-02 DIAGNOSIS — E78.2 MIXED HYPERLIPIDEMIA: ICD-10-CM

## 2021-11-03 RX ORDER — ROSUVASTATIN CALCIUM 10 MG/1
5 TABLET, COATED ORAL 3 TIMES WEEKLY
Qty: 21 TABLET | Refills: 3 | Status: SHIPPED | OUTPATIENT
Start: 2021-11-03 | End: 2022-08-09

## 2021-11-22 ENCOUNTER — OFFICE VISIT (OUTPATIENT)
Dept: OBGYN CLINIC | Facility: OTHER | Age: 86
End: 2021-11-22
Payer: MEDICARE

## 2021-11-22 VITALS
BODY MASS INDEX: 31.61 KG/M2 | DIASTOLIC BLOOD PRESSURE: 86 MMHG | HEIGHT: 59 IN | HEART RATE: 66 BPM | SYSTOLIC BLOOD PRESSURE: 153 MMHG | WEIGHT: 156.8 LBS

## 2021-11-22 DIAGNOSIS — Z96.612 STATUS POST REVERSE TOTAL ARTHROPLASTY OF LEFT SHOULDER: Primary | ICD-10-CM

## 2021-11-22 DIAGNOSIS — S42.125A CLOSED NONDISPLACED FRACTURE OF ACROMIAL PROCESS OF LEFT SCAPULA, INITIAL ENCOUNTER: ICD-10-CM

## 2021-11-22 PROCEDURE — 99213 OFFICE O/P EST LOW 20 MIN: CPT | Performed by: ORTHOPAEDIC SURGERY

## 2021-12-13 ENCOUNTER — TELEPHONE (OUTPATIENT)
Dept: INTERNAL MEDICINE CLINIC | Facility: CLINIC | Age: 86
End: 2021-12-13

## 2022-01-07 ENCOUNTER — TELEPHONE (OUTPATIENT)
Dept: INTERNAL MEDICINE CLINIC | Facility: CLINIC | Age: 87
End: 2022-01-07

## 2022-01-07 DIAGNOSIS — R05.9 COUGH: Primary | ICD-10-CM

## 2022-01-07 PROCEDURE — U0003 INFECTIOUS AGENT DETECTION BY NUCLEIC ACID (DNA OR RNA); SEVERE ACUTE RESPIRATORY SYNDROME CORONAVIRUS 2 (SARS-COV-2) (CORONAVIRUS DISEASE [COVID-19]), AMPLIFIED PROBE TECHNIQUE, MAKING USE OF HIGH THROUGHPUT TECHNOLOGIES AS DESCRIBED BY CMS-2020-01-R: HCPCS | Performed by: INTERNAL MEDICINE

## 2022-01-07 NOTE — TELEPHONE ENCOUNTER
PT CALLED, SAID THAT SHE'S BEEN TAKING OTC MEDICATION FOR A COUGH SHE'S BEEN HAVING FOR ALMOST A WEEK    WHEN SHE COUGHS, SHE BRINGS UP LIGHT COLORED GREEN MUCUS     PT SAID NO OTHER SYMPTOMS    PT IS VACCINATED AND HAS THE BOOSTER    PT HAD A GROUP OF PEOPLE AT HER HOUSE A WEEK AGO AND HER DAUGHTER IS SICK, HAS COVID    PT SAID THAT HER ONE SON HAS DONE A FEW AT HOME COVID TESTS FOR HER AND ALL HAVE BEEN NEGATIVE     SHE REALLY JUST WANTS SOMETHING TO HELP WITH HER COUGH/MUCUS    PLEASE ADVISE

## 2022-01-07 NOTE — TELEPHONE ENCOUNTER
SPOKE WITH PT, GAVE MESSAGE, AND ORDERED COVID TEST    PT SAID THAT SHE'D TRY TO GET HER SON LUKAS TO TAKE HER BUT THAT SHE DID A FEW AT HOME TESTS AND REALLY DOESN'T WANT TO BE TESTED AGAIN     PT WILL TRY TO GO TO [place] TO GET HER TEST

## 2022-01-07 NOTE — TELEPHONE ENCOUNTER
She should be tested for covid- have her go for testing today and self-quarantine until results available- please put this note on my desk

## 2022-01-10 RX ORDER — CEFUROXIME AXETIL 250 MG/1
250 TABLET ORAL 2 TIMES DAILY
Qty: 14 TABLET | Refills: 0 | Status: SHIPPED | OUTPATIENT
Start: 2022-01-10 | End: 2022-01-17

## 2022-01-10 NOTE — TELEPHONE ENCOUNTER
PT'S DAUGHTER CALLED, SAID THAT MOM'S COVID RESULTS ARE NEGATIVE BUT SHE'S NOT ANY BETTER    STILL HAS THE COUGH AND CONGESTION    THEY'RE ASKING FOR ANTIBIOTICS     PLEASE ADVISE

## 2022-01-10 NOTE — TELEPHONE ENCOUNTER
Place patient on ceftin 250 mg- one po bid for one week disp 14 with no refill with diagnosis of cough

## 2022-01-10 NOTE — TELEPHONE ENCOUNTER
SPOKE WITH PT, GAVE MESSAGE, AND PUT IN MEDICATION     NEED YOU TO SIGN OFF ON IT BECAUSE HERE ARE A FEW WARNINGS ON IT

## 2022-01-17 ENCOUNTER — TELEPHONE (OUTPATIENT)
Dept: INTERNAL MEDICINE CLINIC | Facility: CLINIC | Age: 87
End: 2022-01-17

## 2022-01-17 NOTE — TELEPHONE ENCOUNTER
Needs to reschedule her wellness visit  If anything this week after 1030 am, next week her daughter not available to bring her  The week after any time again after 1030        Claudia Bueno [phone]

## 2022-02-01 ENCOUNTER — APPOINTMENT (OUTPATIENT)
Dept: LAB | Age: 87
End: 2022-02-01
Payer: MEDICARE

## 2022-02-01 DIAGNOSIS — R21 SKIN RASH: ICD-10-CM

## 2022-02-01 DIAGNOSIS — I10 ESSENTIAL HYPERTENSION: ICD-10-CM

## 2022-02-01 DIAGNOSIS — E55.9 VITAMIN D DEFICIENCY: Chronic | ICD-10-CM

## 2022-02-01 DIAGNOSIS — E78.2 MIXED HYPERLIPIDEMIA: ICD-10-CM

## 2022-02-01 DIAGNOSIS — E53.1 VITAMIN B6 DEFICIENCY: ICD-10-CM

## 2022-02-01 LAB
25(OH)D3 SERPL-MCNC: 30.8 NG/ML (ref 30–100)
ALBUMIN SERPL BCP-MCNC: 4 G/DL (ref 3.5–5)
ALP SERPL-CCNC: 85 U/L (ref 46–116)
ALT SERPL W P-5'-P-CCNC: 27 U/L (ref 12–78)
ANION GAP SERPL CALCULATED.3IONS-SCNC: 5 MMOL/L (ref 4–13)
AST SERPL W P-5'-P-CCNC: 19 U/L (ref 5–45)
BASOPHILS # BLD AUTO: 0.06 THOUSANDS/ΜL (ref 0–0.1)
BASOPHILS NFR BLD AUTO: 1 % (ref 0–1)
BILIRUB SERPL-MCNC: 0.66 MG/DL (ref 0.2–1)
BUN SERPL-MCNC: 18 MG/DL (ref 5–25)
CALCIUM SERPL-MCNC: 10 MG/DL (ref 8.3–10.1)
CHLORIDE SERPL-SCNC: 106 MMOL/L (ref 100–108)
CHOLEST SERPL-MCNC: 188 MG/DL
CO2 SERPL-SCNC: 30 MMOL/L (ref 21–32)
CREAT SERPL-MCNC: 0.84 MG/DL (ref 0.6–1.3)
EOSINOPHIL # BLD AUTO: 0.59 THOUSAND/ΜL (ref 0–0.61)
EOSINOPHIL NFR BLD AUTO: 11 % (ref 0–6)
ERYTHROCYTE [DISTWIDTH] IN BLOOD BY AUTOMATED COUNT: 13.2 % (ref 11.6–15.1)
GFR SERPL CREATININE-BSD FRML MDRD: 63 ML/MIN/1.73SQ M
GLUCOSE P FAST SERPL-MCNC: 96 MG/DL (ref 65–99)
HCT VFR BLD AUTO: 44.2 % (ref 34.8–46.1)
HDLC SERPL-MCNC: 53 MG/DL
HGB BLD-MCNC: 14.5 G/DL (ref 11.5–15.4)
IMM GRANULOCYTES # BLD AUTO: 0.02 THOUSAND/UL (ref 0–0.2)
IMM GRANULOCYTES NFR BLD AUTO: 0 % (ref 0–2)
LDLC SERPL DIRECT ASSAY-MCNC: 87 MG/DL (ref 0–100)
LYMPHOCYTES # BLD AUTO: 1.49 THOUSANDS/ΜL (ref 0.6–4.47)
LYMPHOCYTES NFR BLD AUTO: 27 % (ref 14–44)
MCH RBC QN AUTO: 31 PG (ref 26.8–34.3)
MCHC RBC AUTO-ENTMCNC: 32.8 G/DL (ref 31.4–37.4)
MCV RBC AUTO: 94 FL (ref 82–98)
MONOCYTES # BLD AUTO: 0.45 THOUSAND/ΜL (ref 0.17–1.22)
MONOCYTES NFR BLD AUTO: 8 % (ref 4–12)
NEUTROPHILS # BLD AUTO: 3 THOUSANDS/ΜL (ref 1.85–7.62)
NEUTS SEG NFR BLD AUTO: 53 % (ref 43–75)
NRBC BLD AUTO-RTO: 0 /100 WBCS
PLATELET # BLD AUTO: 194 THOUSANDS/UL (ref 149–390)
PMV BLD AUTO: 10.5 FL (ref 8.9–12.7)
POTASSIUM SERPL-SCNC: 4 MMOL/L (ref 3.5–5.3)
PROT SERPL-MCNC: 7.4 G/DL (ref 6.4–8.2)
RBC # BLD AUTO: 4.68 MILLION/UL (ref 3.81–5.12)
SODIUM SERPL-SCNC: 141 MMOL/L (ref 136–145)
TRIGL SERPL-MCNC: 128 MG/DL
WBC # BLD AUTO: 5.61 THOUSAND/UL (ref 4.31–10.16)

## 2022-02-01 PROCEDURE — 80053 COMPREHEN METABOLIC PANEL: CPT

## 2022-02-01 PROCEDURE — 80061 LIPID PANEL: CPT

## 2022-02-01 PROCEDURE — 36415 COLL VENOUS BLD VENIPUNCTURE: CPT

## 2022-02-01 PROCEDURE — 84207 ASSAY OF VITAMIN B-6: CPT

## 2022-02-01 PROCEDURE — 86618 LYME DISEASE ANTIBODY: CPT

## 2022-02-01 PROCEDURE — 82306 VITAMIN D 25 HYDROXY: CPT

## 2022-02-01 PROCEDURE — 85025 COMPLETE CBC W/AUTO DIFF WBC: CPT

## 2022-02-01 PROCEDURE — 83721 ASSAY OF BLOOD LIPOPROTEIN: CPT

## 2022-02-02 LAB — B BURGDOR IGG+IGM SER-ACNC: 4

## 2022-02-04 ENCOUNTER — OFFICE VISIT (OUTPATIENT)
Dept: INTERNAL MEDICINE CLINIC | Facility: CLINIC | Age: 87
End: 2022-02-04
Payer: MEDICARE

## 2022-02-04 VITALS
HEIGHT: 59 IN | BODY MASS INDEX: 31.85 KG/M2 | SYSTOLIC BLOOD PRESSURE: 148 MMHG | HEART RATE: 72 BPM | RESPIRATION RATE: 12 BRPM | DIASTOLIC BLOOD PRESSURE: 72 MMHG | WEIGHT: 158 LBS

## 2022-02-04 DIAGNOSIS — E53.1 VITAMIN B6 DEFICIENCY: ICD-10-CM

## 2022-02-04 DIAGNOSIS — M85.89 OSTEOPENIA OF MULTIPLE SITES: Chronic | ICD-10-CM

## 2022-02-04 DIAGNOSIS — E78.2 MIXED HYPERLIPIDEMIA: ICD-10-CM

## 2022-02-04 DIAGNOSIS — G62.9 PERIPHERAL POLYNEUROPATHY: ICD-10-CM

## 2022-02-04 DIAGNOSIS — Z23 ENCOUNTER FOR IMMUNIZATION: ICD-10-CM

## 2022-02-04 DIAGNOSIS — I10 ESSENTIAL HYPERTENSION: ICD-10-CM

## 2022-02-04 DIAGNOSIS — S42.125A CLOSED NONDISPLACED FRACTURE OF ACROMIAL PROCESS OF LEFT SCAPULA, INITIAL ENCOUNTER: Primary | ICD-10-CM

## 2022-02-04 PROCEDURE — G0008 ADMIN INFLUENZA VIRUS VAC: HCPCS

## 2022-02-04 PROCEDURE — 90662 IIV NO PRSV INCREASED AG IM: CPT

## 2022-02-04 PROCEDURE — G0438 PPPS, INITIAL VISIT: HCPCS | Performed by: INTERNAL MEDICINE

## 2022-02-04 PROCEDURE — 99213 OFFICE O/P EST LOW 20 MIN: CPT | Performed by: INTERNAL MEDICINE

## 2022-02-04 NOTE — PROGRESS NOTES
Assessment and Plan:     Problem List Items Addressed This Visit        Musculoskeletal and Integument    Closed nondisplaced fracture of left acromial process - Primary           Preventive health issues were discussed with patient, and age appropriate screening tests were ordered as noted in patient's After Visit Summary  Personalized health advice and appropriate referrals for health education or preventive services given if needed, as noted in patient's After Visit Summary       History of Present Illness:     Patient presents for Medicare Annual Wellness visit    Patient Care Team:  Carmen Curiel MD as PCP - Marcell Mile, MD Gean Dove, MD Janee Hick, MD Rudolpho Risser, MD Ky Shackle, MD Georgie Hashimoto, MD     Problem List:     Patient Active Problem List   Diagnosis    Anxiety    Peripheral neuropathy    Hyperlipidemia    Arthritis    Difficulty breathing    Diverticulosis    Dizziness    Fatigue    Hyperglycemia    Pain in extremity    Osteopenia    Shortness of breath    Supraventricular tachycardia ([address] )    Vitamin D deficiency    Pain of left hand    Closed nondisplaced fracture of left acromial process    Left leg pain    Malignant neoplasm of upper-outer quadrant of right breast in female, estrogen receptor positive ([address] )    Impingement syndrome of left shoulder    Myocardial infarction type 2 ([address] )    Ambulatory dysfunction    Elevated troponin    Palpitations    Morbid obesity with body mass index (BMI) of 40 0 to 44 9 in [facility])    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, estrogen receptor positive ([address] )    Other chest pain    Trigger finger, left ring finger    Carpal tunnel syndrome of left wrist    Cubital tunnel syndrome on left    Carpal tunnel syndrome on left    Trigger finger, left middle finger    Preoperative examination    Use of anastrozole (Arimidex)    Rash    Skin rash    Hypercalcemia    Subacromial bursitis of left shoulder joint    Tendinitis of left rotator cuff    Fall    Abnormal CAT scan    Internal derangement of shoulder, left    Mass of left parotid gland    Status post reverse total arthroplasty of left shoulder    Sensorineural hearing loss (SNHL) of both ears    Aftercare following left shoulder joint replacement surgery    Essential hypertension      Past Medical and Surgical History:     Past Medical History:   Diagnosis Date    Anxiety     Arthritis     last assessed 3/24/15     Atherosclerosis     Bell's palsy     Breast cancer [facility] ) 2019    Broken femur ([facility]     Cancer [facility]     breast    Cardiac disorder     DCIS (ductal carcinoma in situ) of breast     last assessed 4/4/17     Depression     Endometrial polyp     GERD (gastroesophageal reflux disease)     controlled    History of radiation therapy 2010    Hypercholesterolemia     resolved 10/22/14     Hyperlipidemia     Long term use of drug     last assessed 5/23/14     Neuropathy     Peripheral neuropathy     Pneumonia     As a Child    PONV (postoperative nausea and vomiting)     Tachycardia     Uterine fibroid      Past Surgical History:   Procedure Laterality Date    BREAST BIOPSY Right     BREAST LUMPECTOMY Right 2010    BREAST LUMPECTOMY Right 5/8/2019    Procedure: BREAST LUMPECTOMY; BREAST NEEDLE LOCALIZATION (NEEDLE LOC AT 0800); Surgeon: Mag Rosenberg MD;  [facility];  Service: Surgical Oncology    CARPAL TUNNEL RELEASE Bilateral     CATARACT EXTRACTION Bilateral     COLONOSCOPY      DILATION AND CURETTAGE OF UTERUS      ENDOMETRIAL BIOPSY      without cervical dilation     HAND SURGERY      HEMORROIDECTOMY      JOINT REPLACEMENT Right     Shoulder    JOINT REPLACEMENT Bilateral     Knees    LYMPH NODE BIOPSY Right 5/8/2019    Procedure: SENTINEL LYMPH NODE BIOPSY; LYMPHOSCINTIGRAPHY; INTRAOPERATIVE LYMPHATIC MAPPING (INJECT AT 0900);   Surgeon: Mag Rosenberg MD; [facility];  Service: Surgical Oncology    MAMMO NEEDLE LOCALIZATION RIGHT (ALL INC) Right 5/8/2019    OOPHORECTOMY      77    OVARIAN CYST REMOVAL Left     NH RECONSTR TOTAL SHOULDER IMPLANT Right 7/18/2017    Procedure: TOTAL SHOULDER REVERSE ARTHROPLASTY;  Surgeon: Evert Leonard MD;  [facility];  Service: Orthopedics    NH RECONSTR TOTAL SHOULDER IMPLANT Left 5/25/2021    Procedure: ARTHROPLASTY SHOULDER REVERSE, WITH BICEP Yoselin Pal;  Surgeon: Evert Leonard MD;  [facility];  Service: Orthopedics    NH REVISE ULNAR NERVE AT ELBOW Left 11/19/2019    Procedure: RELEASE CUBITAL TUNNEL left -;  Surgeon: Simon Ambrocio MD;  [facility];  Service: Orthopedics    NH WRIST Ashley Early LIG Left 11/19/2019    Procedure: RELEASE CARPAL TUNNEL ENDOSCOPIC;  Surgeon: Simon Ambrocio MD;  [facility];  Service: Orthopedics    SENTINEL LYMPH NODE BIOPSY      TONSILLECTOMY      TUBAL LIGATION      US GUIDED BREAST BIOPSY RIGHT COMPLETE Right 3/28/2019      Family History:     Family History   Problem Relation Age of Onset    Heart disease Mother         artherosclerosis     Heart disease Father         artherosclerosis     Heart attack Father     Arthritis Family     Heart disease Family         artherosclerosis     Breast cancer Family     Osteoarthritis Family     Supraventricular tachycardia Family     Hypertension Daughter     Ovarian cancer Sister 80    Anemia Neg Hx     Arrhythmia Neg Hx     Asthma Neg Hx     Clotting disorder Neg Hx     Fainting Neg Hx     Hyperlipidemia Neg Hx     Aneurysm Neg Hx       Social History:     Social History     Socioeconomic History    Marital status:       Spouse name: Not on file    Number of children: Not on file    Years of education: Not on file    Highest education level: Not on file   Occupational History    Occupation: retired from work    Tobacco Use    Smoking status: Never Smoker    Smokeless tobacco: Never Used   Vaping Use    Vaping Use: Never used   Substance and Sexual Activity    Alcohol use: Never    Drug use: No    Sexual activity: Not Currently   Other Topics Concern    Not on file   Social History Narrative    Coffee     Daily coffee consumption 1 cup day     Daily cola consumption can day     Walking           Social Determinants of Health     Financial Resource Strain: Not on file   Food Insecurity: Not on file   Transportation Needs: Not on file   Physical Activity: Not on file   Stress: Not on file   Social Connections: Not on file   Intimate Partner Violence: Not on file   Housing Stability: Not on file      Medications and Allergies:     Current Outpatient Medications   Medication Sig Dispense Refill    acetaminophen (TYLENOL) 325 mg tablet Take 325 mg by mouth every 6 (six) hours as needed for mild pain      amoxicillin (AMOXIL) 500 mg capsule TAKE 4 CAPSULES 1 HOUR BEFORE DENTAL PROCEDURE      anastrozole (ARIMIDEX) 1 mg tablet Take 1 mg by mouth daily      anastrozole (ARIMIDEX) 1 mg tablet Take 1 tablet (1 mg total) by mouth daily 90 tablet 3    Cholecalciferol (VITAMIN D3 PO) Take by mouth      clotrimazole-betamethasone (LOTRISONE) 1-0 05 % cream Apply topically 2 (two) times a day 60 g 1    CRANBERRY PO Take 1,700 mg by mouth daily in the early morning       ibuprofen (MOTRIN) 200 mg tablet Take 200 mg by mouth every 6 (six) hours as needed for mild pain       lisinopril (ZESTRIL) 10 mg tablet TAKE 1 TABLET BY MOUTH  TWICE DAILY 180 tablet 3    lisinopril (ZESTRIL) 10 mg tablet       Multiple Vitamins-Minerals (PRESERVISION AREDS 2 PO) Take 2 tablets by mouth daily in the early morning       oxyCODONE (ROXICODONE) 5 mg immediate release tablet Take 1 tablet (5 mg total) by mouth every 4 (four) hours as needed for moderate pain for up to 15 dosesMax Daily Amount: 30 mg 15 tablet 0    prednisoLONE acetate (PRED FORTE) 1 % ophthalmic suspension Administer 1 drop to the right eye 2 (two) times a day   0    propranolol (INDERAL) 20 mg tablet take 1 tablet by mouth four times a day      propranolol (INDERAL) 20 mg tablet Take 1 tablet (20 mg total) by mouth 4 (four) times a day 360 tablet 3    pyridoxine (VITAMIN B6) 50 mg tablet Take 50 mg by mouth daily      rosuvastatin (CRESTOR) 10 MG tablet TAKE 1/2 TABLET THREE TIMES A WEEK ON MONDAY,WEDNESDAY, AND FRIDAY      rosuvastatin (CRESTOR) 10 MG tablet Take 0 5 tablets (5 mg total) by mouth 3 (three) times a week Takes M-W-F 21 tablet 3     No current facility-administered medications for this visit       Allergies   Allergen Reactions    Gabapentin Hallucinations    Amoxicillin-Pot Clavulanate GI Intolerance    Azithromycin GI Intolerance and Rash    Cephalexin GI Intolerance    Cortisone GI Intolerance    Doxycycline GI Intolerance    Penicillins GI Intolerance      Immunizations:     Immunization History   Administered Date(s) Administered    COVID-19 PFIZER VACCINE 0 3 ML IM 01/30/2021, 02/20/2021    INFLUENZA 09/29/2015, 10/21/2016, 10/04/2017, 10/12/2018    Influenza Quadrivalent Preservative Free 3 years and older IM 10/15/2014, 10/21/2016    Influenza Split High Dose Preservative Free IM 09/29/2015, 10/04/2017    Influenza, high dose seasonal 0 7 mL 10/21/2020    Influenza, seasonal, injectable 09/10/2013    Pneumococcal Conjugate 13-Valent 08/26/2015, 05/24/2019    Pneumococcal Polysaccharide PPV23 08/20/2020    Tdap 06/17/2015    Zoster 06/08/2015    Zoster Vaccine Recombinant 06/08/2015, 09/28/2018, 01/16/2019    influenza, trivalent, adjuvanted 09/11/2019      Health Maintenance:         Topic Date Due    DXA SCAN  08/17/2024         Topic Date Due    COVID-19 Vaccine (3 - Booster for Pfizer series) 07/20/2021    Influenza Vaccine (1) 09/01/2021      Medicare Health Risk Assessment:     Ht 4' 11" (1 499 m)   Wt 71 7 kg (158 lb)   BMI 31 91 kg/m²      Madhu Bliss is here for her Subsequent Wellness visit  Last Medicare Wellness visit information reviewed, patient interviewed and updates made to the record today  Health Risk Assessment:   Patient rates overall health as good  Patient feels that their physical health rating is same  Patient is satisfied with their life  Eyesight was rated as slightly worse  Hearing was rated as slightly worse  Patient feels that their emotional and mental health rating is same  Patients states they are never, rarely angry  Patient states they are sometimes unusually tired/fatigued  Pain experienced in the last 7 days has been some  Patient's pain rating has been 4/10  Patient states that she has experienced no weight loss or gain in last 6 months  Depression Screening:   PHQ-2 Score: 0      Fall Risk Screening: In the past year, patient has experienced: no history of falling in past year      Urinary Incontinence Screening:   Patient has leaked urine accidently in the last six months  Home Safety:  Patient does not have trouble with stairs inside or outside of their home  Patient has working smoke alarms and has working carbon monoxide detector  Home safety hazards include: none  Nutrition:   Current diet is Regular  Medications:   Patient is currently taking over-the-counter supplements  OTC medications include: Vitamins  Patient is able to manage medications  Activities of Daily Living (ADLs)/Instrumental Activities of Daily Living (IADLs):   Walk and transfer into and out of bed and chair?: Yes  Dress and groom yourself?: Yes    Bathe or shower yourself?: Yes    Feed yourself?  Yes  Do your laundry/housekeeping?: Yes  Manage your money, pay your bills and track your expenses?: Yes  Make your own meals?: Yes    Do your own shopping?: Yes    Previous Hospitalizations:   Any hospitalizations or ED visits within the last 12 months?: Yes    How many hospitalizations have you had in the last year?: 1-2    Advance Care Planning:   Living will: Yes    Durable POA for healthcare: Yes    Advanced directive: Yes      Cognitive Screening:   Provider or family/friend/caregiver concerned regarding cognition?: No    PREVENTIVE SCREENINGS      Cardiovascular Screening:    General: Screening Not Indicated, History Lipid Disorder and Screening Current      Diabetes Screening:     General: Screening Current      Colorectal Cancer Screening:     General: Screening Not Indicated      Breast Cancer Screening:     General: History Breast Cancer    Due for: Mammogram        Cervical Cancer Screening:    General: Screening Not Indicated      Osteoporosis Screening:    General: Screening Current      Abdominal Aortic Aneurysm (AAA) Screening:        General: Screening Current      Lung Cancer Screening:     General: Screening Not Indicated      Hepatitis C Screening:    General: Screening Not Indicated    Screening, Brief Intervention, and Referral to Treatment (SBIRT)    Screening  Typical number of drinks in a day: 0  Typical number of drinks in a week: 0  Interpretation: Low risk drinking behavior      AUDIT-C Screenin) How often did you have a drink containing alcohol in the past year? never  2) How many drinks did you have on a typical day when you were drinking in the past year? 0  3) How often did you have 6 or more drinks on one occasion in the past year? never    AUDIT-C Score: 0  Interpretation: Score 0-2 (female): Negative screen for alcohol misuse    Single Item Drug Screening:  How often have you used an illegal drug (including marijuana) or a prescription medication for non-medical reasons in the past year? never    Single Item Drug Screen Score: 0  Interpretation: Negative screen for possible drug use disorder      Yuly Hassan MD

## 2022-02-04 NOTE — PATIENT INSTRUCTIONS
Medicare Preventive Visit Patient Instructions  Thank you for completing your Welcome to Medicare Visit or Medicare Annual Wellness Visit today  Your next wellness visit will be due in one year (2/5/2023)  The screening/preventive services that you may require over the next 5-10 years are detailed below  Some tests may not apply to you based off risk factors and/or age  Screening tests ordered at today's visit but not completed yet may show as past due  Also, please note that scanned in results may not display below  Preventive Screenings:  Service Recommendations Previous Testing/Comments   Colorectal Cancer Screening  * Colonoscopy    * Fecal Occult Blood Test (FOBT)/Fecal Immunochemical Test (FIT)  * Fecal DNA/Cologuard Test  * Flexible Sigmoidoscopy Age: 54-65 years old   Colonoscopy: every 10 years (may be performed more frequently if at higher risk)  OR  FOBT/FIT: every 1 year  OR  Cologuard: every 3 years  OR  Sigmoidoscopy: every 5 years  Screening may be recommended earlier than age 48 if at higher risk for colorectal cancer  Also, an individualized decision between you and your healthcare provider will decide whether screening between the ages of 74-80 would be appropriate  Colonoscopy: Not on file  FOBT/FIT: Not on file  Cologuard: Not on file  Sigmoidoscopy: Not on file    Screening Not Indicated     Breast Cancer Screening Age: 36 years old  Frequency: every 1-2 years  Not required if history of left and right mastectomy Mammogram: 06/25/2020    History Breast Cancer   Cervical Cancer Screening Between the ages of 21-29, pap smear recommended once every 3 years  Between the ages of 33-67, can perform pap smear with HPV co-testing every 5 years     Recommendations may differ for women with a history of total hysterectomy, cervical cancer, or abnormal pap smears in past  Pap Smear: Not on file    Screening Not Indicated   Hepatitis C Screening Once for adults born [facility] frequently in patients at high risk for Hepatitis C Hep C Antibody: Not on file    Screening Not Indicated   Diabetes Screening 1-2 times per year if you're at risk for diabetes or have pre-diabetes Fasting glucose: 96 mg/dL   A1C: 5 2 %    Screening Current   Cholesterol Screening Once every 5 years if you don't have a lipid disorder  May order more often based on risk factors  Lipid panel: 10/23/2018    Screening Not Indicated  History Lipid Disorder     Other Preventive Screenings Covered by Medicare:  1  Abdominal Aortic Aneurysm (AAA) Screening: covered once if your at risk  You're considered to be at risk if you have a family history of AAA  2  Lung Cancer Screening: covers low dose CT scan once per year if you meet all of the following conditions: (1) Age 50-69; (2) No signs or symptoms of lung cancer; (3) Current smoker or have quit smoking within the last 15 years; (4) You have a tobacco smoking history of at least 30 pack years (packs per day multiplied by number of years you smoked); (5) You get a written order from a healthcare provider  3  Glaucoma Screening: covered annually if you're considered high risk: (1) You have diabetes OR (2) Family history of glaucoma OR (3)  aged 48 and older OR (3)  American aged 72 and older  3  Osteoporosis Screening: covered every 2 years if you meet one of the following conditions: (1) You're estrogen deficient and at risk for osteoporosis based off medical history and other findings; (2) Have a vertebral abnormality; (3) On glucocorticoid therapy for more than 3 months; (4) Have primary hyperparathyroidism; (5) On osteoporosis medications and need to assess response to drug therapy  · Last bone density test (DXA Scan): 08/17/2019   5  HIV Screening: covered annually if you're between the age of 15-65  Also covered annually if you are younger than 13 and older than 72 with risk factors for HIV infection   For pregnant patients, it is covered up to 3 times per pregnancy  Immunizations:  Immunization Recommendations   Influenza Vaccine Annual influenza vaccination during flu season is recommended for all persons aged >= 6 months who do not have contraindications   Pneumococcal Vaccine (Prevnar and Pneumovax)  * Prevnar = PCV13  * Pneumovax = PPSV23   Adults 25-60 years old: 1-3 doses may be recommended based on certain risk factors  Adults 72 years old: Prevnar (PCV13) vaccine recommended followed by Pneumovax (PPSV23) vaccine  If already received PPSV23 since turning 65, then PCV13 recommended at least one year after PPSV23 dose  Hepatitis B Vaccine 3 dose series if at intermediate or high risk (ex: diabetes, end stage renal disease, liver disease)   Tetanus (Td) Vaccine - COST NOT COVERED BY MEDICARE PART B Following completion of primary series, a booster dose should be given every 10 years to maintain immunity against tetanus  Td may also be given as tetanus wound prophylaxis  Tdap Vaccine - COST NOT COVERED BY MEDICARE PART B Recommended at least once for all adults  For pregnant patients, recommended with each pregnancy  Shingles Vaccine (Shingrix) - COST NOT COVERED BY MEDICARE PART B  2 shot series recommended in those aged 48 and above     Health Maintenance Due:      Topic Date Due    DXA SCAN  08/17/2024     Immunizations Due:      Topic Date Due    COVID-19 Vaccine (3 - Booster for Pfizer series) 07/20/2021    Influenza Vaccine (1) 09/01/2021     Advance Directives   What are advance directives? Advance directives are legal documents that state your wishes and plans for medical care  These plans are made ahead of time in case you lose your ability to make decisions for yourself  Advance directives can apply to any medical decision, such as the treatments you want, and if you want to donate organs  What are the types of advance directives?   There are many types of advance directives, and each state has rules about how to use them  You may choose a combination of any of the following:  · Living will: This is a written record of the treatment you want  You can also choose which treatments you do not want, which to limit, and which to stop at a certain time  This includes surgery, medicine, IV fluid, and tube feedings  · Durable power of  for healthcare [facility]): This is a written record that states who you want to make healthcare choices for you when you are unable to make them for yourself  This person, called a proxy, is usually a family member or a friend  You may choose more than 1 proxy  · Do not resuscitate (DNR) order:  A DNR order is used in case your heart stops beating or you stop breathing  It is a request not to have certain forms of treatment, such as CPR  A DNR order may be included in other types of advance directives  · Medical directive: This covers the care that you want if you are in a coma, near death, or unable to make decisions for yourself  You can list the treatments you want for each condition  Treatment may include pain medicine, surgery, blood transfusions, dialysis, IV or tube feedings, and a ventilator (breathing machine)  · Values history: This document has questions about your views, beliefs, and how you feel and think about life  This information can help others choose the care that you would choose  Why are advance directives important? An advance directive helps you control your care  Although spoken wishes may be used, it is better to have your wishes written down  Spoken wishes can be misunderstood, or not followed  Treatments may be given even if you do not want them  An advance directive may make it easier for your family to make difficult choices about your care  Urinary Incontinence   Urinary incontinence (UI)  is when you lose control of your bladder  UI develops because your bladder cannot store or empty urine properly   The 3 most common types of UI are stress incontinence, urge incontinence, or both  Medicines:   · May be given to help strengthen your bladder control  Report any side effects of medication to your healthcare provider  Do pelvic muscle exercises often:  Your pelvic muscles help you stop urinating  Squeeze these muscles tight for 5 seconds, then relax for 5 seconds  Gradually work up to squeezing for 10 seconds  Do 3 sets of 15 repetitions a day, or as directed  This will help strengthen your pelvic muscles and improve bladder control  Train your bladder:  Go to the bathroom at set times, such as every 2 hours, even if you do not feel the urge to go  You can also try to hold your urine when you feel the urge to go  For example, hold your urine for 5 minutes when you feel the urge to go  As that becomes easier, hold your urine for 10 minutes  Self-care:   · Keep a UI record  Write down how often you leak urine and how much you leak  Make a note of what you were doing when you leaked urine  · Drink liquids as directed  You may need to limit the amount of liquid you drink to help control your urine leakage  Do not drink any liquid right before you go to bed  Limit or do not have drinks that contain caffeine or alcohol  · Prevent constipation  Eat a variety of high-fiber foods  Good examples are high-fiber cereals, beans, vegetables, and whole-grain breads  Walking is the best way to trigger your intestines to have a bowel movement  · Exercise regularly and maintain a healthy weight  Weight loss and exercise will decrease pressure on your bladder and help you control your leakage  · Use a catheter as directed  to help empty your bladder  A catheter is a tiny, plastic tube that is put into your bladder to drain your urine  · Go to behavior therapy as directed  Behavior therapy may be used to help you learn to control your urge to urinate      Weight Management   Why it is important to manage your weight:  Being overweight increases your risk of health conditions such as heart disease, high blood pressure, type 2 diabetes, and certain types of cancer  It can also increase your risk for osteoarthritis, sleep apnea, and other respiratory problems  Aim for a slow, steady weight loss  Even a small amount of weight loss can lower your risk of health problems  How to lose weight safely:  A safe and healthy way to lose weight is to eat fewer calories and get regular exercise  You can lose up about 1 pound a week by decreasing the number of calories you eat by 500 calories each day  Healthy meal plan for weight management:  A healthy meal plan includes a variety of foods, contains fewer calories, and helps you stay healthy  A healthy meal plan includes the following:  · Eat whole-grain foods more often  A healthy meal plan should contain fiber  Fiber is the part of grains, fruits, and vegetables that is not broken down by your body  Whole-grain foods are healthy and provide extra fiber in your diet  Some examples of whole-grain foods are whole-wheat breads and pastas, oatmeal, brown rice, and bulgur  · Eat a variety of vegetables every day  Include dark, leafy greens such as spinach, kale, mitra greens, and mustard greens  Eat yellow and orange vegetables such as carrots, sweet potatoes, and winter squash  · Eat a variety of fruits every day  Choose fresh or canned fruit (canned in its own juice or light syrup) instead of juice  Fruit juice has very little or no fiber  · Eat low-fat dairy foods  Drink fat-free (skim) milk or 1% milk  Eat fat-free yogurt and low-fat cottage cheese  Try low-fat cheeses such as mozzarella and other reduced-fat cheeses  · Choose meat and other protein foods that are low in fat  Choose beans or other legumes such as split peas or lentils  Choose fish, skinless poultry (chicken or turkey), or lean cuts of red meat (beef or pork)  Before you cook meat or poultry, cut off any visible fat  · Use less fat and oil    Try baking foods instead of frying them  Add less fat, such as margarine, sour cream, regular salad dressing and mayonnaise to foods  Eat fewer high-fat foods  Some examples of high-fat foods include french fries, doughnuts, ice cream, and cakes  · Eat fewer sweets  Limit foods and drinks that are high in sugar  This includes candy, cookies, regular soda, and sweetened drinks  Exercise:  Exercise at least 30 minutes per day on most days of the week  Some examples of exercise include walking, biking, dancing, and swimming  You can also fit in more physical activity by taking the stairs instead of the elevator or parking farther away from stores  Ask your healthcare provider about the best exercise plan for you  © Copyright [employer] 2018 Information is for End User's use only and may not be sold, redistributed or otherwise used for commercial purposes   All illustrations and images included in CareNotes® are the copyrighted property of [employer]  or [address] [place] [address]

## 2022-02-04 NOTE — PROGRESS NOTES
Answers for HPI/ROS submitted by the patient on 2/2/2022  How would you rate your overall health?: good  Compared to last year, how is your physical health?: same  In general, how satisfied are you with your life?: satisfied  Compared to last year, how is your eyesight?: slightly worse  Compared to last year, how is your hearing?: slightly worse  Compared to last year, how is your emotional/mental health?: same  How often is anger a problem for you?: never, rarely  How often do you feel unusually tired/fatigued?: sometimes  In the past 7 days, how much pain have you experienced?: some  If you answered "some" or "a lot", please rate the severity of your pain on a scale of 1 to 10 (1 being the least severe pain and 10 being the most intense pain)  : 4/10  In the past 6 months, have you lost or gained 10 pounds without trying?: No  One or more falls in the last year: Yes  In the past 6 months, have you accidentally leaked urine?: Yes  Do you have trouble with the stairs inside or outside your home?: No  Does your home have working smoke alarms?: Yes  Does your home have a carbon monoxide monitor?: Yes  Which safety hazards (if any) have you experienced in your home? Please select all that apply : none  How would you describe your current diet?  Please select all that apply : Regular  In addition to prescription medications, are you taking any over-the-counter supplements?: Yes  If yes, what supplements are you taking?: Vitamins  Can you manage your medications?: Yes  Are you currently taking any opioid medications?: No  Can you walk and transfer into and out of your bed and chair?: Yes  Can you dress and groom yourself?: Yes  Can you bathe or shower yourself?: Yes  Can you feed yourself?: Yes  Can you do your laundry/ housekeeping?: Yes  Can you manage your money, pay your bills, and track your expenses?: Yes  Can you make your own meals?: Yes  Can you do your own shopping?: Yes  Within the last 12 months, have you had any hospitalizations or Emergency Department visits?: Yes  If yes, how many times have you been hospitalized within the past year?: 1-2  Do you have a living will?: Yes  Do you have a Durable POA ([address]) for healthcare decisions?: Yes  Do you have an Advanced Directive for end of life decisions?: Yes  How often have you used an illegal drug (including marijuana) or a prescription medication for non-medical reasons in the past year?: never  What is the typical number of drinks you consume in a day?: 0  What is the typical number of drinks you consume in a week?: 0  How often did you have a drink containing alcohol in the past year?: never  How many drinks did you have on a typical day  when you were drinking in the past year?: 0  How often did you have 6 or more drinks on one occasion in the past year?: never    Assessment/Plan:  1  Health maintenance-patient had 3 doses of COVID vaccine and was given influenza vaccine today  2  Recent LZMY-azvwqspmj-lcmcsnhsnkfv bacterial component with sinusitis bronchitis-had been treated with Ceftin 250 milligrams b i d  For 1 week and improved  3  Left shoulder pain-prior MRI showed chronic rotator cuff tear with moderate fatty muscle attachment  She underwent reverse total shoulder in May of 2021  She then had a fall in July with a left acromion process fracture-she has recovered from this and is overall improved though still has some pain   4  Previous noted dizziness-felt to be the basis of BPPV-had vestibular therapy no longer an issue  5  Left parotid abnormality-MRI shows well-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 -has seen the ENT on a couple of occasions and felt to be benign    She is scheduled for follow-up exam with them over the next 6 months  6 Peripheral neuropathy -prior nerve conduction study showed 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  Niki Mason has had extensive workup with workup recently repeated   Immunofixation negative, heavy metals negative, B12 methylmalonic acid Lyme titer, rheumatoid factor antinuclear antibody all normal   Thyroid function normal   B12 level normal   Free light chain ratio normal   Had hallucinations on gabapentin-at this point she remains on vitamin B6 50 milligrams daily-vitamin B6 level ordered prior to next visit    7 Abnormal gait-associated with the above plus diffuse DJD   Had prior total knee replacement but suspect predominantly abnormality of gait is related to her neuropathy -patient walking with a cane  8  Hypertension-borderline control control on current dose of beta-blocker plus lisinopril-had been on a diuretic in the past but this is not needed at present -BP elevated today but patient had walked all the way from the parking lot-family will be monitoring closely-REASSESS NEXT VISIT  9  Previously noted skin rash -treated previously as potential erythema chronicum migrans as she remembered a bite and then developed a rash- had doxycycline for 3 weeks   Then developed fungal disease left greater than right was treated with Diflucan and Lotrisone   Serology for Lyme disease done prior to this visit normal  10  Status post left cubital tunnel release-left carpal tunnel release done in November 3563 without complications   Still has some numbness and intermittent pain is seeing the orthopedic physician -now has some ongoing left arm numbness and she is scheduled to meet with the orthopedic physician- rule out component of cervical radiculopathy   11  Breast carcinoma-a has a history of 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 with supplemental RT  She was ER CA positive and radiation therapy was completed in March 2010  On follow-up then found to have new area the right breast felt to be invasive ductal carcinoma different from her prior carcinoma  This was ERPR positive HER2/toy negative  Single node negative    Pathology showed margins negative  Oncology placed her on anastrozole 1 milligram which she started in the spring of 2019 will take to the spring of 2024  She is due for follow-up mammogram but is yet to do that because of her shoulder issues  12  Anxiety-patient felt much more anxious on metoprolol-resumed her prior dose of propanolol feels overall much better with rare use of benzodiazepine  Thyroid function normal  13  Prior mechanical fall with left knee pain  X-ray showed small cortical step-off periosteal elevation of the distal femur above the femoral component of her arthroplasty  Orthopedic physician felt she had a left knee contusion-not an issue times months  14  SVT-prior 48 hour Holter showed 18,000 supraventricular ectopic beats with over 300 short runs of SVT during which she was asymptomatic  No ventricular ectopy  Echo shows EF of 60%, no regional wall motion abnormalities, wall thickness upper limits of normal, grade 1 diastolic dysfunction, left atrium mild to moderately dilated, pulmonary artery systolic pressure normal   Cardiology switched her to metoprolol but she felt poorly now back on propanolol and stable  15  History of right shoulder arthroplasty  She had complicating trauma with right acromial stress fracture but has recovered from this  16  Vitamin-D deficiency-continue supplement  17  Status associated myalgias-improved with switch to lower dose of Crestor  18  Diffuse DJD  19  Alopecia-all workup negative  20 hyperlipidemia with mixed dyslipidemia-stable on current dose of statin fish oil  21 left ovarian cyst-this was enlarging and she underwent laparoscopic bilateral salpingo-oophorectomy-all pathology benign  22 intermittent chronic left lower abdominal pain-CT scan of abdomen pelvis benign  Colonoscopy showed diverticular disease    Underwent surgery for the ovarian cyst and asymptomatic since          MEDICAL REGIMEN:                                                  Vitamin B6 50 milligrams, propanolol 20 milligrams q i d , lisinopril 10 milligrams b i d , Crestor 5 milligrams 3 days per week, multivitamin, cranberry daily,  intermittent use of prednisone eyedrops per Ophthalmology, vitamin D3/ 2000 units a day, anastrozole 1 milligram daily started in the spring of 2019 to be use for 5 years     Family will monitor blood pressure  Patient will return for follow-up exam in 6 months with prior CBC with diff chemistry profile cholesterol profile    Reviewed I am retiring- will now be sen by Dr Ashlyn Arthur as her PCP    Addendum-vitamin B6 level which had been drawn and was not available at the time of her visit on February 4th later came back normal at 13 (2-33)     No problem-specific Assessment & Plan notes found for this encounter  Diagnoses and all orders for this visit:    Closed nondisplaced fracture of acromial process of left scapula, initial encounter    Mixed hyperlipidemia  -     Comprehensive metabolic panel; Future  -     Cholesterol, total; Future  -     HDL cholesterol; Future  -     LDL cholesterol, direct; Future  -     Triglycerides; Future  -     Vitamin B6; Future    Peripheral polyneuropathy  -     Comprehensive metabolic panel; Future  -     Cholesterol, total; Future  -     HDL cholesterol; Future  -     LDL cholesterol, direct; Future  -     Triglycerides; Future  -     Vitamin B6; Future    Essential hypertension  -     Comprehensive metabolic panel; Future  -     Cholesterol, total; Future  -     HDL cholesterol; Future  -     LDL cholesterol, direct; Future  -     Triglycerides; Future  -     Vitamin B6; Future    Vitamin B6 deficiency  -     Comprehensive metabolic panel; Future  -     Cholesterol, total; Future  -     HDL cholesterol; Future  -     LDL cholesterol, direct; Future  -     Triglycerides;  Future  -     Vitamin B6; Future    Osteopenia of multiple sites    Encounter for immunization  -     influenza vaccine, high-dose, PF 0 7 mL (FLUZONE HIGH-DOSE) Subjective:      Patient ID: Marguarite Rinne is a 81 y o  female  She is here for Medicare wellness but has not wanted to discuss other issues  As noted she has ongoing left shoulder pain  She was seen by orthopedic physician in November felt she was stable post left reverse total shoulder arthroplasty in May followed by left acromion process fracture sustained after a fall in July  She still has pain but her range of motion is improved to some degree  She was no other intervention at this point    She had a recent upper respiratory tract infection  She was covered negative  She had nasal congestion with some cough with some fullness of the face  Had been empirically treated with Ceftin 250 milligrams b i d  For a week which helps with her symptoms  At this point this is overall much improved  She has known hypertension-BP borderline elevated today at 0 0 she was rushing into the office    They will continue to monitor if remains elevated she may need additional treatment    Results for orders placed or performed in visit on 02/01/22  -Lyme Antibody Profile with reflex to WB:        Result                      Value             Ref Range           Lyme total antibody         4                 0 00 - 119     -CBC and differential:        Result                      Value             Ref Range           WBC                         5 61              4 31 - 10 16*       RBC                         4 68              3 81 - 5 12 *       Hemoglobin                  14 5              11 5 - 15 4 *       Hematocrit                  44 2              34 8 - 46 1 %       MCV                         94                82 - 98 fL          MCH                         31 0              26 8 - 34 3 *       MCHC                        32 8              31 4 - 37 4 *       RDW                         13 2              11 6 - 15 1 %       MPV                         10 5              8 9 - 12 7 fL       Platelets 194               149 - 390 Th*       nRBC                        0                 /100 WBCs           Neutrophils Relative        53                43 - 75 %           Immat GRANS %               0                 0 - 2 %             Lymphocytes Relative        27                14 - 44 %           Monocytes Relative          8                 4 - 12 %            Eosinophils Relative        11 (H)            0 - 6 %             Basophils Relative          1                 0 - 1 %             Neutrophils Absolute        3 00              1 85 - 7 62 *       Immature Grans Absolute     0 02              0 00 - 0 20 *       Lymphocytes Absolute        1 49              0 60 - 4 47 *       Monocytes Absolute          0 45              0 17 - 1 22 *       Eosinophils Absolute        0 59              0 00 - 0 61 *       Basophils Absolute          0 06              0 00 - 0 10 *  -Comprehensive metabolic panel:        Result                      Value             Ref Range           Sodium                      141               136 - 145 mm*       Potassium                   4 0               3 5 - 5 3 mm*       Chloride                    106               100 - 108 mm*       CO2                         30                21 - 32 mmol*       ANION GAP                   5                 4 - 13 mmol/L       BUN                         18                5 - 25 mg/dL        Creatinine                  0 84              0 60 - 1 30 *       Glucose, Fasting            96                65 - 99 mg/dL       Calcium                     10 0              8 3 - 10 1 m*       AST                         19                5 - 45 U/L          ALT                         27                12 - 78 U/L         Alkaline Phosphatase        85                46 - 116 U/L        Total Protein               7 4               6 4 - 8 2 g/*       Albumin                     4 0               3 5 - 5 0 g/*       Total Bilirubin             0 66              0 20 - 1 00 *       eGFR                        63                ml/min/1 73s*  -Cholesterol, total:        Result                      Value             Ref Range           Cholesterol                 188               See Comment *  -HDL cholesterol:        Result                      Value             Ref Range           HDL, Direct                 53                >=50 mg/dL     -LDL cholesterol, direct:        Result                      Value             Ref Range           LDL Direct                  87                0 - 100 mg/dl  -Triglycerides:        Result                      Value             Ref Range           Triglycerides               128               See Comment *  -Vitamin D 25 hydroxy:        Result                      Value             Ref Range           Vit D, 25-Hydroxy           30 8              30 0 - 100 0*      This patient denies any systemic symptoms  Specifically there has been no evidence of fever, night sweats, significant weight loss or significant decrease in appetite  Prescriptions relatively well  She has not had any other falls  She denies any major anxiety or depressive symptoms        The following portions of the patient's history were reviewed and updated as appropriate: allergies, current medications, past family history, past medical history, past social history, past surgical history and problem list Answers for HPI/ROS submitted by the patient on 2/2/2022  How would you rate your overall health?: good  Compared to last year, how is your physical health?: same  In general, how satisfied are you with your life?: satisfied  Compared to last year, how is your eyesight?: slightly worse  Compared to last year, how is your hearing?: slightly worse  Compared to last year, how is your emotional/mental health?: same  How often is anger a problem for you?: never, rarely  How often do you feel unusually tired/fatigued?: sometimes  In the past 7 days, how much pain have you experienced?: some  If you answered "some" or "a lot", please rate the severity of your pain on a scale of 1 to 10 (1 being the least severe pain and 10 being the most intense pain)  : 4/10  In the past 6 months, have you lost or gained 10 pounds without trying?: No  One or more falls in the last year: Yes  In the past 6 months, have you accidentally leaked urine?: Yes  Do you have trouble with the stairs inside or outside your home?: No  Does your home have working smoke alarms?: Yes  Does your home have a carbon monoxide monitor?: Yes  Which safety hazards (if any) have you experienced in your home? Please select all that apply : none  How would you describe your current diet?  Please select all that apply : Regular  In addition to prescription medications, are you taking any over-the-counter supplements?: Yes  If yes, what supplements are you taking?: Vitamins  Can you manage your medications?: Yes  Are you currently taking any opioid medications?: No  Can you walk and transfer into and out of your bed and chair?: Yes  Can you dress and groom yourself?: Yes  Can you bathe or shower yourself?: Yes  Can you feed yourself?: Yes  Can you do your laundry/ housekeeping?: Yes  Can you manage your money, pay your bills, and track your expenses?: Yes  Can you make your own meals?: Yes  Can you do your own shopping?: Yes  Within the last 12 months, have you had any hospitalizations or Emergency Department visits?: Yes  If yes, how many times have you been hospitalized within the past year?: 1-2  Do you have a living will?: Yes  Do you have a Durable POA (Power of ) for healthcare decisions?: Yes  Do you have an Advanced Directive for end of life decisions?: Yes  How often have you used an illegal drug (including marijuana) or a prescription medication for non-medical reasons in the past year?: never  What is the typical number of drinks you consume in a day?: 0  What is the typical number of drinks you consume in a week?: 0  How often did you have a drink containing alcohol in the past year?: never  How many drinks did you have on a typical day  when you were drinking in the past year?: 0  How often did you have 6 or more drinks on one occasion in the past year?: never      Review of Systems   Constitutional: Negative  HENT: Negative  Eyes: Negative  Respiratory: Negative  Cardiovascular: Negative  Gastrointestinal: Negative  Endocrine: Negative  Musculoskeletal:        Left shoulder pain   Neurological: Negative  Hematological: Negative  Psychiatric/Behavioral: Negative  Objective:      Ht 4' 11" (1 499 m)   Wt 71 7 kg (158 lb)   BMI 31 91 kg/m²          Physical Exam  Vitals reviewed  Constitutional:       General: She is not in acute distress  Appearance: Normal appearance  She is not ill-appearing, toxic-appearing or diaphoretic  HENT:      Head: Normocephalic and atraumatic  Comments: Known alopecia- patient wears a wig     Right Ear: Tympanic membrane, ear canal and external ear normal       Left Ear: Tympanic membrane, ear canal and external ear normal       Nose: Nose normal  No congestion or rhinorrhea  Mouth/Throat:      Mouth: Mucous membranes are moist       Pharynx: Oropharynx is clear  No oropharyngeal exudate or posterior oropharyngeal erythema  Comments: Palpable area left parotid as before  Eyes:      General: No scleral icterus  Right eye: No discharge  Left eye: No discharge  Extraocular Movements: Extraocular movements intact  Pupils: Pupils are equal, round, and reactive to light  Neck:      Vascular: No carotid bruit  Cardiovascular:      Rate and Rhythm: Normal rate and regular rhythm  Pulses: Normal pulses  Heart sounds: Normal heart sounds  No murmur heard  No friction rub  No gallop      Pulmonary:      Effort: Pulmonary effort is normal  No respiratory distress  Breath sounds: Normal breath sounds  No stridor  No wheezing, rhonchi or rales  Chest:      Chest wall: No tenderness  Abdominal:      General: Abdomen is flat  Bowel sounds are normal  There is no distension  Palpations: Abdomen is soft  There is no mass  Tenderness: There is no abdominal tenderness  There is no right CVA tenderness, left CVA tenderness, guarding or rebound  Hernia: No hernia is present  Musculoskeletal:         General: No swelling, tenderness, deformity or signs of injury  Normal range of motion  Cervical back: Normal range of motion and neck supple  No rigidity  No muscular tenderness  Right lower leg: No edema  Left lower leg: No edema  Comments: Pain or decreased range of motion of left shoulder evidence of bilateral prior total knee replacement   Lymphadenopathy:      Cervical: No cervical adenopathy  Skin:     General: Skin is warm and dry  Coloration: Skin is not jaundiced or pale  Findings: No bruising, erythema, lesion or rash  Comments: scar From prior right breast surgery   Neurological:      General: No focal deficit present  Mental Status: She is alert and oriented to person, place, and time  Mental status is at baseline  Cranial Nerves: No cranial nerve deficit  Sensory: No sensory deficit  Motor: No weakness  Coordination: Coordination normal       Gait: Gait normal       Deep Tendon Reflexes: Reflexes normal    Psychiatric:         Mood and Affect: Mood normal          Behavior: Behavior normal          Thought Content:  Thought content normal          Judgment: Judgment normal

## 2022-02-10 LAB — VIT B6 SERPL-MCNC: 13.1 UG/L (ref 2–32.8)

## 2022-02-28 ENCOUNTER — TELEPHONE (OUTPATIENT)
Dept: HEMATOLOGY ONCOLOGY | Facility: CLINIC | Age: 87
End: 2022-02-28

## 2022-02-28 NOTE — TELEPHONE ENCOUNTER
Patient's daughter Adilene Avila called to check if Mammo ordered by Jorden Elizondo    I confirmed order is still active   Olivia Coleman understood and will call to schedule

## 2022-03-07 ENCOUNTER — HOSPITAL ENCOUNTER (OUTPATIENT)
Dept: MAMMOGRAPHY | Facility: CLINIC | Age: 87
Discharge: HOME | End: 2022-03-07
Payer: MEDICARE

## 2022-03-07 VITALS — BODY MASS INDEX: 32 KG/M2 | WEIGHT: 158.73 LBS | HEIGHT: 59 IN

## 2022-03-07 DIAGNOSIS — C50.411 CARCINOMA OF UPPER-OUTER QUADRANT OF RIGHT BREAST IN FEMALE, ESTROGEN RECEPTOR POSITIVE (HCC): ICD-10-CM

## 2022-03-07 DIAGNOSIS — Z17.0 CARCINOMA OF UPPER-OUTER QUADRANT OF RIGHT BREAST IN FEMALE, ESTROGEN RECEPTOR POSITIVE (HCC): ICD-10-CM

## 2022-03-07 PROCEDURE — G0279 TOMOSYNTHESIS, MAMMO: HCPCS

## 2022-03-07 PROCEDURE — 77066 DX MAMMO INCL CAD BI: CPT

## 2022-03-15 ENCOUNTER — TELEMEDICINE (OUTPATIENT)
Dept: INTERNAL MEDICINE CLINIC | Facility: CLINIC | Age: 87
End: 2022-03-15
Payer: MEDICARE

## 2022-03-15 DIAGNOSIS — J06.9 UPPER RESPIRATORY TRACT INFECTION, UNSPECIFIED TYPE: Primary | ICD-10-CM

## 2022-03-15 PROCEDURE — 99213 OFFICE O/P EST LOW 20 MIN: CPT | Performed by: INTERNAL MEDICINE

## 2022-03-15 RX ORDER — AMOXICILLIN 500 MG/1
500 CAPSULE ORAL EVERY 12 HOURS SCHEDULED
Qty: 14 CAPSULE | Refills: 0 | Status: SHIPPED | OUTPATIENT
Start: 2022-03-15 | End: 2022-03-22

## 2022-03-15 NOTE — PROGRESS NOTES
Virtual Regular Visit    Verification of patient location:    Patient is located in the following state in which I hold an active license [place]      Assessment/Plan:    Problem List Items Addressed This Visit     None      Visit Diagnoses     Upper respiratory tract infection, unspecified type    -  Primary    Relevant Medications    amoxicillin (AMOXIL) 500 mg capsule        -congested for 1 week with headache, ear pain, dizziness, nasal congestion without rhinorrhea or cough  -took tylenol cold and sinus with mild relief, recommend she discontinue this due to risk of hypertension and trial OTC nasal saline spray since flonase did not help  -will start on amoxicillin, patient reports that she tolerated this well in the past despite penicillin allergy       Reason for visit is   Chief Complaint   Patient presents with    Virtual Regular Visit        Encounter provider Alpesh Forde DO    Provider located at [address] [place] [address] [address]  [address] [phone]      Recent Visits  No visits were found meeting these conditions  Showing recent visits within past 7 days and meeting all other requirements  Future Appointments  No visits were found meeting these conditions  Showing future appointments within next 150 days and meeting all other requirements       The patient was identified by name and date of birth  Elsa Watson was informed that this is a telemedicine visit and that the visit is being conducted through [facility] and patient was informed this is a secure, HIPAA-complaint platform  She agrees to proceed     My office door was closed  No one else was in the room  She acknowledged consent and understanding of privacy and security of the video platform  The patient has agreed to participate and understands they can discontinue the visit at any time  Patient is aware this is a billable service  Subjective  Maribell Melchor is a 81 y o  female presents for an acute visit  Reports that for the past week she has been having significant nasal congestion with a headache and ear discomfort and feeling dizziness  She states that her appetite feels fine and she does not have any fevers  She denies any cough or any mucus production  She has not taken anything aside from Tylenol cold and Sinus which did provide her with some mild relief  Patient does have hypertension and we recommended that she withhold any cold and sinus medications  Recommended that she start using a nasal saline rinse to see if this would provide her with additional relief  Patient would like to start on amoxicillin and we will send this in to her pharmacy  Daughter reports that patient has tolerated this in the past without any problems but despite a GI intolerance to penicillin  Jesus Manley       HPI     Past Medical History:   Diagnosis Date    Anxiety     Arthritis     last assessed 3/24/15     Atherosclerosis     Bell's palsy     Breast cancer ([facility] ) 2019    right invasive ca    Broken femur ([facility] )     Cancer ([facility] )     breast    Cardiac disorder     DCIS (ductal carcinoma in situ) of breast     last assessed 4/4/17     Depression     Endometrial polyp     GERD (gastroesophageal reflux disease)     controlled    History of radiation therapy 2010    History of radiation therapy 2010    right breast    Hypercholesterolemia     resolved 10/22/14     Hyperlipidemia     Long term use of drug     last assessed 5/23/14     Neuropathy     Peripheral neuropathy     Pneumonia     As a Child    PONV (postoperative nausea and vomiting)     Tachycardia     Uterine fibroid        Past Surgical History:   Procedure Laterality Date    BREAST BIOPSY Right 03/28/2019    us bx- inv ca    BREAST LUMPECTOMY Right 2010    BREAST LUMPECTOMY Right 5/8/2019    Procedure: BREAST LUMPECTOMY; BREAST NEEDLE LOCALIZATION (NEEDLE LOC AT 0800); Surgeon: Puma Lopez MD;  [facility];  Service: Surgical Oncology    CARPAL TUNNEL RELEASE Bilateral     CATARACT EXTRACTION Bilateral     COLONOSCOPY      DILATION AND CURETTAGE OF UTERUS      ENDOMETRIAL BIOPSY      without cervical dilation     HAND SURGERY      HEMORROIDECTOMY      JOINT REPLACEMENT Right     Shoulder    JOINT REPLACEMENT Bilateral     Knees    LYMPH NODE BIOPSY Right 5/8/2019    Procedure: SENTINEL LYMPH NODE BIOPSY; LYMPHOSCINTIGRAPHY; INTRAOPERATIVE LYMPHATIC MAPPING (INJECT AT 0900);   Surgeon: Puma Lopez MD;  [facility];  Service: Surgical Oncology    MAMMO NEEDLE LOCALIZATION RIGHT (ALL INC) Right 5/8/2019    OOPHORECTOMY      75    OVARIAN CYST REMOVAL Left     ME RECONSTR TOTAL SHOULDER IMPLANT Right 7/18/2017    Procedure: TOTAL SHOULDER REVERSE ARTHROPLASTY;  Surgeon: Marck Melo MD;  [facility];  Service: Orthopedics    ME RECONSTR TOTAL SHOULDER IMPLANT Left 5/25/2021    Procedure: ARTHROPLASTY SHOULDER REVERSE, WITH BICEP [facility];  Surgeon: Marck Melo MD;  [facility];  Service: Orthopedics    ME REVISE ULNAR NERVE AT ELBOW Left 11/19/2019    Procedure: RELEASE CUBITAL TUNNEL left -;  Surgeon: Tamara Toro MD;  [facility];  Service: Orthopedics    ME WRIST Pj Heman LIG Left 11/19/2019    Procedure: RELEASE CARPAL TUNNEL ENDOSCOPIC;  Surgeon: Tamara Toro MD;  [facility];  Service: Orthopedics    SENTINEL LYMPH NODE BIOPSY      TONSILLECTOMY      TUBAL LIGATION      US GUIDED BREAST BIOPSY RIGHT COMPLETE Right 3/28/2019       Current Outpatient Medications   Medication Sig Dispense Refill    acetaminophen (TYLENOL) 325 mg tablet Take 325 mg by mouth every 6 (six) hours as needed for mild pain      amoxicillin (AMOXIL) 500 mg capsule TAKE 4 CAPSULES 1 HOUR BEFORE DENTAL PROCEDURE      amoxicillin (AMOXIL) 500 mg capsule Take 1 capsule (500 mg total) by mouth every 12 (twelve) hours for 7 days 14 capsule 0    anastrozole (ARIMIDEX) 1 mg tablet Take 1 mg by mouth daily      anastrozole (ARIMIDEX) 1 mg tablet Take 1 tablet (1 mg total) by mouth daily 90 tablet 3    Cholecalciferol (VITAMIN D3 PO) Take by mouth      clotrimazole-betamethasone (LOTRISONE) 1-0 05 % cream Apply topically 2 (two) times a day 60 g 1    CRANBERRY PO Take 1,700 mg by mouth daily in the early morning       ibuprofen (MOTRIN) 200 mg tablet Take 200 mg by mouth every 6 (six) hours as needed for mild pain       lisinopril (ZESTRIL) 10 mg tablet TAKE 1 TABLET BY MOUTH  TWICE DAILY 180 tablet 3    lisinopril (ZESTRIL) 10 mg tablet       Multiple Vitamins-Minerals (PRESERVISION AREDS 2 PO) Take 2 tablets by mouth daily in the early morning       oxyCODONE (ROXICODONE) 5 mg immediate release tablet Take 1 tablet (5 mg total) by mouth every 4 (four) hours as needed for moderate pain for up to 15 dosesMax Daily Amount: 30 mg 15 tablet 0    prednisoLONE acetate (PRED FORTE) 1 % ophthalmic suspension Administer 1 drop to the right eye 2 (two) times a day   0    propranolol (INDERAL) 20 mg tablet take 1 tablet by mouth four times a day      propranolol (INDERAL) 20 mg tablet Take 1 tablet (20 mg total) by mouth 4 (four) times a day 360 tablet 3    pyridoxine (VITAMIN B6) 50 mg tablet Take 50 mg by mouth daily      rosuvastatin (CRESTOR) 10 MG tablet TAKE 1/2 TABLET THREE TIMES A WEEK ON MONDAY,WEDNESDAY, AND FRIDAY      rosuvastatin (CRESTOR) 10 MG tablet Take 0 5 tablets (5 mg total) by mouth 3 (three) times a week Takes M-W-F 21 tablet 3     No current facility-administered medications for this visit          Allergies   Allergen Reactions    Gabapentin Hallucinations    Amoxicillin-Pot Clavulanate GI Intolerance    Azithromycin GI Intolerance and Rash    Cephalexin GI Intolerance    Cortisone GI Intolerance    Doxycycline GI Intolerance    Penicillins GI Intolerance       Review of Systems   Constitutional: Negative for activity change, appetite change, chills, diaphoresis, fatigue and fever  HENT: Positive for congestion and ear pain  Negative for sore throat and trouble swallowing  Respiratory: Negative for cough and shortness of breath  Cardiovascular: Negative for chest pain and palpitations  Gastrointestinal: Negative for abdominal pain, constipation, diarrhea, nausea and vomiting  Musculoskeletal: Negative for back pain and myalgias  Neurological: Positive for dizziness and headaches  Video Exam    There were no vitals filed for this visit  Physical Exam  Constitutional:       General: She is not in acute distress  Appearance: She is well-developed  She is not diaphoretic  HENT:      Head: Normocephalic and atraumatic  Eyes:      Conjunctiva/sclera: Conjunctivae normal       Pupils: Pupils are equal, round, and reactive to light  Neck:      Vascular: No JVD  Trachea: No tracheal deviation  Pulmonary:      Effort: Pulmonary effort is normal  No respiratory distress  Musculoskeletal:         General: No tenderness or deformity  Normal range of motion  Cervical back: Normal range of motion and neck supple  Skin:     General: Skin is warm and dry  Findings: No erythema  Neurological:      Mental Status: She is alert and oriented to person, place, and time  I spent 15 minutes directly with the patient during this visit    VIRTUAL VISIT 1463 Marci Chang verbally agrees to participate in [employer]  Pt is aware that [employer] could be limited without vital signs or the ability to perform a full hands-on physical exam  Carli Love understands she or the provider may request at any time to terminate the video visit and request the patient to seek care or treatment in person

## 2022-03-28 ENCOUNTER — OFFICE VISIT (OUTPATIENT)
Dept: CARDIOLOGY CLINIC | Facility: CLINIC | Age: 87
End: 2022-03-28
Payer: MEDICARE

## 2022-03-28 VITALS
DIASTOLIC BLOOD PRESSURE: 72 MMHG | BODY MASS INDEX: 33.26 KG/M2 | HEIGHT: 59 IN | WEIGHT: 165 LBS | HEART RATE: 65 BPM | SYSTOLIC BLOOD PRESSURE: 116 MMHG

## 2022-03-28 DIAGNOSIS — E78.2 MIXED HYPERLIPIDEMIA: Chronic | ICD-10-CM

## 2022-03-28 DIAGNOSIS — I10 ESSENTIAL HYPERTENSION: Chronic | ICD-10-CM

## 2022-03-28 DIAGNOSIS — I47.1 SUPRAVENTRICULAR TACHYCARDIA (HCC): Chronic | ICD-10-CM

## 2022-03-28 DIAGNOSIS — I48.0 PAROXYSMAL ATRIAL FIBRILLATION (HCC): Primary | ICD-10-CM

## 2022-03-28 PROCEDURE — 99214 OFFICE O/P EST MOD 30 MIN: CPT | Performed by: INTERNAL MEDICINE

## 2022-03-28 PROCEDURE — 93000 ELECTROCARDIOGRAM COMPLETE: CPT | Performed by: INTERNAL MEDICINE

## 2022-03-28 NOTE — PATIENT INSTRUCTIONS
Supraventricular Tachycardia   AMBULATORY CARE:   Supraventricular tachycardia (SVT)  is a condition that causes your heart to beat much faster than it should  SVT is a type of abnormal heart rhythm, called an arrhythmia, that starts in the upper part of your heart  It may last from a few seconds or hours to several days  Common symptoms include the following:   · A pounding, racing, or fluttering heartbeat    · Fatigue, weakness, or shortness of breath    · Feeling lightheaded, dizzy, or faint  · Pain, pressure, or tightness in your chest, neck, jaw, arms, or upper back    · Nausea    · Feeling anxious, scared, or worried that something bad may happen    Call your local emergency number (911 in the [facility] [facility]) or have someone call if:   · You have any of the following signs of a heart attack:      ? Squeezing, pressure, or pain in your chest    ? You may  also have any of the following:     § Discomfort or pain in your back, neck, jaw, stomach, or arm    § Shortness of breath    § Nausea or vomiting    § Lightheadedness or a sudden cold sweat      Seek care immediately if:   · You are dizzy, lightheaded, or feel faint  · You have sudden numbness or weakness in your arms or legs  Call your doctor or cardiologist if:   · You have new or worsening symptoms  · You have swelling in your ankles or feet  · You have questions or concerns about your condition or care  Treatment  may depend on what is causing SVT and your symptoms  You may not need treatment for SVT  Instead you may need medicine or other procedures to control your heart rate  Check your heart rate (pulse) as directed: Your healthcare provider will show you how to check your pulse, and how often to check it  Write down how fast your pulse is and if it feels regular or like it is skipping beats  Also write down the activity you were doing if your heart rate is above 100  Bring the information with you to your follow-up appointment  How to manage or prevent SVT:   · Perform vagal maneuvers as directed when you have symptoms of SVT  Lie down flat and bear down like you are having a bowel movement  Do this for 10 to 30 seconds  · Do not drink caffeine or alcohol  These can increase your risk for SVT  · Keep a record of your symptoms  Write down what you ate or what you were doing before an episode of SVT  Also write down anything you did to make the SVT stop  Bring your record to follow up visits with your healthcare provider  · Eat heart-healthy foods  These include fruits, vegetables, whole-grain breads, low-fat dairy products, beans, lean meats, and fish  Replace butter and margarine with heart-healthy oils such as olive oil and canola oil  · Exercise regularly and maintain a healthy weight  Ask about the best exercise plan for you  Ask your healthcare provider what a healthy weight is for you  Ask him or her to help you create a safe weight loss plan if you are overweight  · Do not smoke  Nicotine and other chemicals in cigarettes and cigars can cause heart and lung damage  Ask your healthcare provider for information if you currently smoke and need help to quit  E-cigarettes or smokeless tobacco still contain nicotine  Talk to your healthcare provider before you use these products  · Manage other health conditions  Take medicine as directed and follow your treatment plan  Your healthcare provider may need to change a medicine you are taking if it is causing your SVT  Do not  stop taking any medicine unless directed by your provider  Follow up with your doctor or cardiologist as directed:  Write down your questions so you remember to ask them during your visits  © Copyright [employer] 2022 Information is for End User's use only and may not be sold, redistributed or otherwise used for commercial purposes   All illustrations and images included in CareNotes® are the copyrighted property of A  [employer]  or [facility]  The above information is an  only  It is not intended as medical advice for individual conditions or treatments  Talk to your doctor, nurse or pharmacist before following any medical regimen to see if it is safe and effective for you

## 2022-04-18 ENCOUNTER — FOLLOW UP (OUTPATIENT)
Dept: URBAN - METROPOLITAN AREA CLINIC 6 | Facility: CLINIC | Age: 87
End: 2022-04-18

## 2022-04-18 DIAGNOSIS — H04.123: ICD-10-CM

## 2022-04-18 DIAGNOSIS — H35.3132: ICD-10-CM

## 2022-04-18 DIAGNOSIS — Z96.1: ICD-10-CM

## 2022-04-18 DIAGNOSIS — H26.491: ICD-10-CM

## 2022-04-18 DIAGNOSIS — G51.0: ICD-10-CM

## 2022-04-18 PROCEDURE — 92014 COMPRE OPH EXAM EST PT 1/>: CPT

## 2022-04-18 PROCEDURE — 92134 CPTRZ OPH DX IMG PST SGM RTA: CPT

## 2022-04-18 ASSESSMENT — VISUAL ACUITY
OD_CC: 20/40+1
OS_CC: 20/25
OD_GLARE: 20/60
OU_CC: J1
OD_PH: 20/30

## 2022-05-17 ENCOUNTER — SURGERY/PROCEDURE (OUTPATIENT)
Dept: URBAN - METROPOLITAN AREA SURGICAL CENTER 6 | Facility: SURGICAL CENTER | Age: 87
End: 2022-05-17

## 2022-05-17 DIAGNOSIS — H26.491: ICD-10-CM

## 2022-05-17 DIAGNOSIS — Z96.1: ICD-10-CM

## 2022-05-17 PROCEDURE — 66821 AFTER CATARACT LASER SURGERY: CPT

## 2022-05-20 DIAGNOSIS — I10 ESSENTIAL HYPERTENSION: ICD-10-CM

## 2022-05-23 RX ORDER — LISINOPRIL 10 MG/1
TABLET ORAL
Qty: 180 TABLET | Refills: 3 | Status: SHIPPED | OUTPATIENT
Start: 2022-05-23

## 2022-05-25 ENCOUNTER — 1 WEEK POST-OP (OUTPATIENT)
Dept: URBAN - METROPOLITAN AREA CLINIC 6 | Facility: CLINIC | Age: 87
End: 2022-05-25

## 2022-05-25 DIAGNOSIS — Z96.1: ICD-10-CM

## 2022-05-25 DIAGNOSIS — H35.3132: ICD-10-CM

## 2022-05-25 PROCEDURE — 99024 POSTOP FOLLOW-UP VISIT: CPT

## 2022-05-25 ASSESSMENT — VISUAL ACUITY
OD_CC: 20/40+1
OS_CC: 20/40
OU_CC: J3

## 2022-05-25 ASSESSMENT — TONOMETRY
OD_IOP_MMHG: 12
OS_IOP_MMHG: 13

## 2022-06-22 DIAGNOSIS — I10 ESSENTIAL HYPERTENSION: ICD-10-CM

## 2022-06-22 RX ORDER — PROPRANOLOL HYDROCHLORIDE 20 MG/1
TABLET ORAL
Qty: 360 TABLET | Refills: 3 | Status: SHIPPED | OUTPATIENT
Start: 2022-06-22

## 2022-07-29 ENCOUNTER — APPOINTMENT (OUTPATIENT)
Dept: LAB | Age: 87
End: 2022-07-29
Payer: MEDICARE

## 2022-07-29 DIAGNOSIS — E53.1 VITAMIN B6 DEFICIENCY: ICD-10-CM

## 2022-07-29 DIAGNOSIS — E78.2 MIXED HYPERLIPIDEMIA: ICD-10-CM

## 2022-07-29 DIAGNOSIS — G62.9 PERIPHERAL POLYNEUROPATHY: ICD-10-CM

## 2022-07-29 DIAGNOSIS — I10 ESSENTIAL HYPERTENSION: ICD-10-CM

## 2022-07-29 LAB
ALBUMIN SERPL BCP-MCNC: 3.8 G/DL (ref 3.5–5)
ALP SERPL-CCNC: 78 U/L (ref 46–116)
ALT SERPL W P-5'-P-CCNC: 23 U/L (ref 12–78)
ANION GAP SERPL CALCULATED.3IONS-SCNC: 4 MMOL/L (ref 4–13)
AST SERPL W P-5'-P-CCNC: 20 U/L (ref 5–45)
BILIRUB SERPL-MCNC: 0.84 MG/DL (ref 0.2–1)
BUN SERPL-MCNC: 21 MG/DL (ref 5–25)
CALCIUM SERPL-MCNC: 9.5 MG/DL (ref 8.3–10.1)
CHLORIDE SERPL-SCNC: 111 MMOL/L (ref 96–108)
CHOLEST SERPL-MCNC: 181 MG/DL
CO2 SERPL-SCNC: 27 MMOL/L (ref 21–32)
CREAT SERPL-MCNC: 0.98 MG/DL (ref 0.6–1.3)
GFR SERPL CREATININE-BSD FRML MDRD: 52 ML/MIN/1.73SQ M
GLUCOSE P FAST SERPL-MCNC: 93 MG/DL (ref 65–99)
HDLC SERPL-MCNC: 54 MG/DL
LDLC SERPL DIRECT ASSAY-MCNC: 106 MG/DL (ref 0–100)
POTASSIUM SERPL-SCNC: 3.9 MMOL/L (ref 3.5–5.3)
PROT SERPL-MCNC: 6.9 G/DL (ref 6.4–8.4)
SODIUM SERPL-SCNC: 142 MMOL/L (ref 135–147)
TRIGL SERPL-MCNC: 104 MG/DL

## 2022-07-29 PROCEDURE — 80053 COMPREHEN METABOLIC PANEL: CPT

## 2022-07-29 PROCEDURE — 36415 COLL VENOUS BLD VENIPUNCTURE: CPT

## 2022-07-29 PROCEDURE — 84207 ASSAY OF VITAMIN B-6: CPT

## 2022-07-29 PROCEDURE — 80061 LIPID PANEL: CPT

## 2022-07-29 PROCEDURE — 83721 ASSAY OF BLOOD LIPOPROTEIN: CPT

## 2022-08-02 ENCOUNTER — RA CDI HCC (OUTPATIENT)
Dept: OTHER | Facility: HOSPITAL | Age: 87
End: 2022-08-02

## 2022-08-02 NOTE — PROGRESS NOTES
[facility] [address]  coding opportunities       Chart reviewed, no opportunity found: CHART REVIEWED, NO OPPORTUNITY FOUND        Patients Insurance     Medicare Insurance: Medicare

## 2022-08-03 LAB — VIT B6 SERPL-MCNC: 15.7 UG/L (ref 3.4–65.2)

## 2022-08-09 ENCOUNTER — OFFICE VISIT (OUTPATIENT)
Dept: INTERNAL MEDICINE CLINIC | Facility: CLINIC | Age: 87
End: 2022-08-09
Payer: MEDICARE

## 2022-08-09 VITALS — HEART RATE: 70 BPM | WEIGHT: 165 LBS | BODY MASS INDEX: 33.26 KG/M2 | HEIGHT: 59 IN | OXYGEN SATURATION: 96 %

## 2022-08-09 DIAGNOSIS — Z78.0 ASYMPTOMATIC MENOPAUSAL STATE: ICD-10-CM

## 2022-08-09 DIAGNOSIS — E66.01 MORBID OBESITY WITH BODY MASS INDEX (BMI) OF 40.0 TO 44.9 IN ADULT (HCC): ICD-10-CM

## 2022-08-09 DIAGNOSIS — I47.1 SUPRAVENTRICULAR TACHYCARDIA (HCC): ICD-10-CM

## 2022-08-09 DIAGNOSIS — E55.9 VITAMIN D DEFICIENCY: ICD-10-CM

## 2022-08-09 DIAGNOSIS — H91.93 BILATERAL HEARING LOSS, UNSPECIFIED HEARING LOSS TYPE: ICD-10-CM

## 2022-08-09 DIAGNOSIS — I10 ESSENTIAL HYPERTENSION: Primary | ICD-10-CM

## 2022-08-09 DIAGNOSIS — C50.411 CARCINOMA OF UPPER-OUTER QUADRANT OF RIGHT BREAST IN FEMALE, ESTROGEN RECEPTOR POSITIVE (HCC): ICD-10-CM

## 2022-08-09 DIAGNOSIS — E78.2 MIXED HYPERLIPIDEMIA: ICD-10-CM

## 2022-08-09 DIAGNOSIS — Z13.820 SCREENING FOR OSTEOPOROSIS: ICD-10-CM

## 2022-08-09 DIAGNOSIS — C50.411 MALIGNANT NEOPLASM OF UPPER-OUTER QUADRANT OF RIGHT BREAST IN FEMALE, ESTROGEN RECEPTOR POSITIVE (HCC): ICD-10-CM

## 2022-08-09 DIAGNOSIS — Z17.0 MALIGNANT NEOPLASM OF UPPER-OUTER QUADRANT OF RIGHT BREAST IN FEMALE, ESTROGEN RECEPTOR POSITIVE (HCC): ICD-10-CM

## 2022-08-09 DIAGNOSIS — S42.125A CLOSED NONDISPLACED FRACTURE OF ACROMIAL PROCESS OF LEFT SCAPULA, INITIAL ENCOUNTER: ICD-10-CM

## 2022-08-09 DIAGNOSIS — Z17.0 CARCINOMA OF UPPER-OUTER QUADRANT OF RIGHT BREAST IN FEMALE, ESTROGEN RECEPTOR POSITIVE (HCC): ICD-10-CM

## 2022-08-09 DIAGNOSIS — G62.9 PERIPHERAL POLYNEUROPATHY: ICD-10-CM

## 2022-08-09 PROCEDURE — 99215 OFFICE O/P EST HI 40 MIN: CPT | Performed by: INTERNAL MEDICINE

## 2022-08-09 RX ORDER — ANASTROZOLE 1 MG/1
1 TABLET ORAL DAILY
Qty: 90 TABLET | Refills: 3 | Status: SHIPPED | OUTPATIENT
Start: 2022-08-09

## 2022-08-09 RX ORDER — ROSUVASTATIN CALCIUM 10 MG/1
5 TABLET, COATED ORAL 3 TIMES WEEKLY
Qty: 21 TABLET | Refills: 3 | Status: SHIPPED | OUTPATIENT
Start: 2022-08-10

## 2022-08-09 NOTE — PROGRESS NOTES
Assessment/Plan:    #Shoulder Fracture  -noted on left side  -reports mild pain  -did PT but pain was too much and stopped and now doing stretches at home on her own  -had reverse total shoulder repair May 2021    #BPPV  -treated with vestibular therapy    #HTN  -BP stable today on propranolol and lisinopril  -reports she is not checking at home and encouraged to check daily and keep a log    #SVT  -history of on holter with over 300 short runs  -ECHO EF 60% with grade 1 diastolic dysfunction  -seeing cardiology  -had tremors with metoprolol and now on propranolol with relief    #HLD  - HDL 54  -continue crestor    #Left Parotid Mass  -seen on MRI and saw ENT and felt benign    #Peripheral Neuropathy  -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  -immunofixation, heavy metals, B12, MMA, lyme, RF, MIRIAM, TSH all nroaml  -remains on vitamin B6  -had hallucinations with gabapentin    #Carptel Tunnel  -previous left cubital tunnel release and left carpal tunnel release 2019    #Right Shoulder Fracture  -stress fracture to right acromial and underwent right shoulder arthroplasty in the past    #Ovarian Cyst  -enlarged over left side previously and underwent b/l salpingoophorectomy  #Breast Ca  -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  -ER and TX positive and completed radiation 2019  -had new right breast ca, invasive, ductal carcinoma, ER/TX positive, HER2/toy negative  -on anastrazole since Spring 2019    #Health Maintenance  -routine labs and followup 8 months  -covid 1st booster up to date  -lives by herself and keeping busy maintaining her home    No problem-specific Assessment & Plan notes found for this encounter  Diagnoses and all orders for this visit:    Essential hypertension  -     CBC and differential; Future  -     Comprehensive metabolic panel; Future    Mixed hyperlipidemia  -     CBC and differential; Future  -     Comprehensive metabolic panel;  Future  - Lipid Panel with Direct LDL reflex; Future    Closed nondisplaced fracture of acromial process of left scapula, initial encounter  -     CBC and differential; Future  -     Comprehensive metabolic panel; Future    Peripheral polyneuropathy  -     CBC and differential; Future  -     Comprehensive metabolic panel; Future    Malignant neoplasm of upper-outer quadrant of right breast in female, estrogen receptor positive ([facility] )  -     CBC and differential; Future  -     Comprehensive metabolic panel; Future    Supraventricular tachycardia (HCC)  -     CBC and differential; Future  -     Comprehensive metabolic panel; Future    Bilateral hearing loss, unspecified hearing loss type  -     CBC and differential; Future  -     Comprehensive metabolic panel; Future    Vitamin D deficiency  -     Vitamin D 25 hydroxy; Future    Screening for osteoporosis  -     DXA bone density spine hip and pelvis; Future    Morbid obesity with body mass index (BMI) of 40 0 to 44 9 in adult [facility])    Asymptomatic menopausal state   -     DXA bone density spine hip and pelvis;  Future            Current Outpatient Medications:     propranolol (INDERAL) 20 mg tablet, TAKE 1 TABLET BY MOUTH 4  TIMES DAILY, Disp: 360 tablet, Rfl: 3    acetaminophen (TYLENOL) 325 mg tablet, Take 325 mg by mouth every 6 (six) hours as needed for mild pain, Disp: , Rfl:     amoxicillin (AMOXIL) 500 mg capsule, TAKE 4 CAPSULES 1 HOUR BEFORE DENTAL PROCEDURE, Disp: , Rfl:     anastrozole (ARIMIDEX) 1 mg tablet, Take 1 mg by mouth daily, Disp: , Rfl:     Cholecalciferol (VITAMIN D3 PO), Take by mouth, Disp: , Rfl:     clotrimazole-betamethasone (LOTRISONE) 1-0 05 % cream, Apply topically 2 (two) times a day, Disp: 60 g, Rfl: 1    CRANBERRY PO, Take 1,700 mg by mouth daily in the early morning , Disp: , Rfl:     ibuprofen (MOTRIN) 200 mg tablet, Take 200 mg by mouth every 6 (six) hours as needed for mild pain , Disp: , Rfl:     lisinopril (ZESTRIL) 10 mg tablet, TAKE 1 TABLET BY MOUTH  TWICE DAILY, Disp: 180 tablet, Rfl: 3    Multiple Vitamins-Minerals (PRESERVISION AREDS 2 PO), Take 2 tablets by mouth daily in the early morning , Disp: , Rfl:     prednisoLONE acetate (PRED FORTE) 1 % ophthalmic suspension, Administer 1 drop to the right eye 2 (two) times a day , Disp: , Rfl: 0    propranolol (INDERAL) 20 mg tablet, take 1 tablet by mouth four times a day, Disp: , Rfl:     pyridoxine (VITAMIN B6) 50 mg tablet, Take 50 mg by mouth daily, Disp: , Rfl:     rosuvastatin (CRESTOR) 10 MG tablet, TAKE 1/2 TABLET THREE TIMES A WEEK ON MONDAY,WEDNESDAY, AND FRIDAY, Disp: , Rfl:     Subjective:      Patient ID: Madelin Will is a 81 y o  female  HPI     Patient presents for routine checkup  Denies any recent hospitalizations or surgeries  She has a left acromial process fracture and is slowly recovering  She continues to have mild pain here but uses Tylenol as needed  She did physical therapy but it was too much for her and she is not doing home exercises  Her B6 is 50 7  LDL was 106 and HDL 54 currently well controlled with Crestor her blood pressure stable today on lisinopril and propranolol  She has periodic episodes of SVT with occasional heart palpitations  This is currently stable with propranolol and she will continue with this  She has breast cancer and remains on anastrozole therapy  She is due for DEXA scan and we will need to arrange for this  We wean her mammogram in March was up-to-date any issues  She will return to care in 8 months  The following portions of the patient's history were reviewed and updated as appropriate: allergies, current medications, past family history, past medical history, past social history, past surgical history and problem list     Review of Systems   Constitutional: Negative for activity change, appetite change, chills, fatigue and fever  HENT: Positive for hearing loss   Negative for congestion, ear pain, facial swelling, sore throat, tinnitus and trouble swallowing  Eyes: Negative for photophobia and visual disturbance  Respiratory: Negative for cough, shortness of breath and wheezing  Cardiovascular: Negative for chest pain and leg swelling  Gastrointestinal: Negative for abdominal distention, abdominal pain, blood in stool, constipation, diarrhea, nausea and vomiting  Genitourinary: Negative for difficulty urinating, dysuria and pelvic pain  Musculoskeletal: Positive for arthralgias (knees and left shoulder) and gait problem (using cane)  Negative for back pain, joint swelling, myalgias, neck pain and neck stiffness  Skin: Negative for rash and wound  Neurological: Negative for dizziness, tremors, light-headedness and headaches  Objective:      Pulse 70   Ht 4' 11" (1 499 m)   Wt 74 8 kg (165 lb)   SpO2 96%   BMI 33 33 kg/m²          Physical Exam  Vitals reviewed  Constitutional:       Appearance: Normal appearance  She is well-developed  She is obese  HENT:      Head: Normocephalic and atraumatic  Right Ear: Tympanic membrane, ear canal and external ear normal  There is impacted cerumen (mild)  Left Ear: Tympanic membrane, ear canal and external ear normal  There is no impacted cerumen  Ears:      Comments: Hearing aids in place     Nose: Nose normal    Eyes:      Conjunctiva/sclera: Conjunctivae normal       Pupils: Pupils are equal, round, and reactive to light  Neck:      Thyroid: No thyromegaly  Vascular: No JVD  Cardiovascular:      Rate and Rhythm: Normal rate and regular rhythm  Pulses: Normal pulses  Heart sounds: Normal heart sounds  No murmur heard  Pulmonary:      Effort: Pulmonary effort is normal  No respiratory distress  Breath sounds: Normal breath sounds  No stridor  No wheezing, rhonchi or rales  Abdominal:      General: Bowel sounds are normal  There is no distension  Palpations: Abdomen is soft   There is no mass       Tenderness: There is no abdominal tenderness  There is no right CVA tenderness, left CVA tenderness, guarding or rebound  Musculoskeletal:         General: Tenderness (left shoulder) and deformity (b/l knee replacement) present  Cervical back: Normal range of motion and neck supple  Right lower leg: No edema  Left lower leg: No edema  Comments: impaired overhead motion left shoulder   Lymphadenopathy:      Cervical: No cervical adenopathy  Skin:     General: Skin is warm  Findings: No erythema or rash  Neurological:      Mental Status: She is alert and oriented to person, place, and time  Deep Tendon Reflexes: Reflexes are normal and symmetric  This note was completed in part utilizing [employer] direct voice recognition software  Grammatical errors, random word insertion, spelling mistakes, and incomplete sentences may be an occasional consequence of the system secondary to software limitations, ambient noise and hardware issues  At the time of dictation, efforts were made to edit, clarify and /or correct errors  Please read the chart carefully and recognize, using context, where substitutions have occurred  If you have any questions or concerns about the context, text or information contained within the body of this dictation, please contact myself, the provider, for further clarification

## 2022-09-28 ENCOUNTER — HOSPITAL ENCOUNTER (OUTPATIENT)
Dept: RADIOLOGY | Facility: IMAGING CENTER | Age: 87
Discharge: HOME | End: 2022-09-28
Payer: MEDICARE

## 2022-09-28 DIAGNOSIS — Z78.0 ASYMPTOMATIC MENOPAUSAL STATE: ICD-10-CM

## 2022-09-28 DIAGNOSIS — Z13.820 SCREENING FOR OSTEOPOROSIS: ICD-10-CM

## 2022-09-28 PROCEDURE — 77080 DXA BONE DENSITY AXIAL: CPT

## 2022-11-07 ENCOUNTER — TELEPHONE (OUTPATIENT)
Dept: INTERNAL MEDICINE CLINIC | Facility: CLINIC | Age: 87
End: 2022-11-07

## 2022-11-07 DIAGNOSIS — J06.9 UPPER RESPIRATORY TRACT INFECTION, UNSPECIFIED TYPE: Primary | ICD-10-CM

## 2022-11-07 RX ORDER — DOXYCYCLINE HYCLATE 100 MG
100 TABLET ORAL 2 TIMES DAILY
Qty: 14 TABLET | Refills: 0 | Status: SHIPPED | OUTPATIENT
Start: 2022-11-07 | End: 2022-11-14

## 2022-11-07 NOTE — TELEPHONE ENCOUNTER
PT'S DAUGHTER CALLED, SAID THAT FOR THE PAST 2 WEEKS MOM HASN'T FELT GREAT    SHE HAS SINUS PRESSURE, LITTLE EAR PAIN, AND A SORE THROAT   NO TEMP OR ANY OTHER SYMPTOMS     SHE'S BEEN USING THE FLONASE AND THEY WERE GIVING OTC COLD MEDICATION BUT SHE'S STILL NOT BETTER    SAID THAT 2 WEEKS AGO WHEN SYMPTOMS FIRST STARTED THEY DID A HOME COVID TEST WHICH WAS NEGATIVE    SHE'S ASKING IF YOU CAN PRESCRIBE DOXYCYCLINE

## 2022-11-07 NOTE — TELEPHONE ENCOUNTER
It is sent into her [facility]   Schedule her to come in if symptoms persist later this week

## 2022-11-30 ENCOUNTER — FOLLOW UP (OUTPATIENT)
Dept: URBAN - METROPOLITAN AREA CLINIC 6 | Facility: CLINIC | Age: 87
End: 2022-11-30

## 2022-11-30 DIAGNOSIS — H04.123: ICD-10-CM

## 2022-11-30 DIAGNOSIS — H35.3132: ICD-10-CM

## 2022-11-30 DIAGNOSIS — Z96.1: ICD-10-CM

## 2022-11-30 PROCEDURE — 92014 COMPRE OPH EXAM EST PT 1/>: CPT

## 2022-11-30 PROCEDURE — 92134 CPTRZ OPH DX IMG PST SGM RTA: CPT

## 2022-11-30 ASSESSMENT — VISUAL ACUITY
OD_CC: 20/40
OS_CC: 20/40+2

## 2022-11-30 ASSESSMENT — TONOMETRY
OD_IOP_MMHG: 12
OS_IOP_MMHG: 10

## 2023-01-03 ENCOUNTER — TELEPHONE (OUTPATIENT)
Dept: FAMILY MEDICINE CLINIC | Facility: CLINIC | Age: 88
End: 2023-01-03

## 2023-01-03 NOTE — TELEPHONE ENCOUNTER
Patient daughter called, Mother has been feeling dizzy for more than 5 days, when she wakes up from laying or sitting she feels dizzy and daughter is afraid she may fall over when she is not there  The last time she felt this way we gave her a referral to PT      Call daughter Katherine Toribio back [phone]

## 2023-01-04 DIAGNOSIS — H81.13 BENIGN PAROXYSMAL POSITIONAL VERTIGO DUE TO BILATERAL VESTIBULAR DISORDER: Primary | ICD-10-CM

## 2023-01-04 NOTE — TELEPHONE ENCOUNTER
PT'S DAUGHTER CALLED BACK, I GAVE HER YOUR MESSAGE ABOUT MOM NEEDING TO BE SEEN BUT SHE ASKED IF MOM CAN GO FOR THERAPY (LIKE LAST TIME) INSTEAD OF SEEING YOU

## 2023-01-04 NOTE — TELEPHONE ENCOUNTER
Panel Management Review      Patient has the following on his problem list:     Asthma review     ACT Total Scores 10/30/2017   ACT TOTAL SCORE -   ASTHMA ER VISITS -   ASTHMA HOSPITALIZATIONS -   ACT TOTAL SCORE (Goal Greater than or Equal to 20) 22   In the past 12 months, how many times did you visit the emergency room for your asthma without being admitted to the hospital? 0   In the past 12 months, how many times were you hospitalized overnight because of your asthma? 0      1. Is Asthma diagnosis on the Problem List? Yes    2. Is Asthma listed on Health Maintenance? Yes    3. Patient is due for:  ACT      Composite cancer screening  Chart review shows that this patient is due/due soon for the following Colonoscopy and Fecal Colorectal (FIT)  Summary:    Patient is due/failing the following:   ACT, COLONOSCOPY/ FIT    Action needed:   Patient needs referral/order: Colonoscopy/FIT Test    Type of outreach:    Sent letter.    Questions for provider review:    Please sign order for FIT and Colonoscopy and send back to Antoinette Puri MA       Chart routed to closing chart .           Vestibular therapy is ordered, her dizziness might be another condition that is not related to benign positional vertigo however she can see them if she would like to start there

## 2023-01-05 ENCOUNTER — EVALUATION (OUTPATIENT)
Dept: PHYSICAL THERAPY | Facility: CLINIC | Age: 88
End: 2023-01-05

## 2023-01-05 DIAGNOSIS — H81.11 BENIGN PAROXYSMAL POSITIONAL VERTIGO OF RIGHT EAR: Primary | ICD-10-CM

## 2023-01-05 NOTE — PROGRESS NOTES
PT Evaluation     Today's date: 2023  Patient name: Duc Rinaldi  : 1935  MRN: [id number]  Referring provider: Charity Cristobal DO  Dx:   Encounter Diagnosis     ICD-10-CM    1  Benign paroxysmal positional vertigo of right ear  H81 11 Ambulatory referral to Physical Therapy          Start Time: 1800  Stop Time: 1900  Total time in clinic (min): 60 minutes    Assessment  Assessment details: Mrs Duc Rinaldi is an 81year-old female reporting to [facility] OP PT for initial evaluation of c/o vertiginous symptoms subjectively of positional origine  Pt was referred for consultation by Dr Dav Hernandez DO  PMHx significant for cancer, prior BPPV, MI, SVT, falls, anxiety, depression, and numerous orthopedic shoulder conditions   Upon examination testing, Juanita Yun demonstrated ageotropic nystagmus with R and L roll testing, with her R side being more prominent characteristic of R horizontal canal cupulolithiasis  During session via differential diagnosis Gufoni x2 was performed bilaterally ending with R-sided treatment  Noted some reduction in nystagmus amplitude but not full resolution  Based on current presentation vertigo is likely from mechanical BPPV origin that would benefit from further skilled PT care  We will follow up tomorrow morning with further assessment  Impairments: activity intolerance and impaired balance    Symptom irritability: highUnderstanding of Dx/Px/POC: good   Prognosis: good    Goals  4 Weeks:  1  Demonstrate asymptomatic positional changes  2  Return to ADL's at [facility]  3  Complete further balance integration testing to consider further skilled PT care      Plan  Patient would benefit from: skilled physical therapy  Referral necessary: No  Planned modality interventions: biofeedback  Planned therapy interventions: canalith repositioning, gait training, home exercise program, therapeutic exercise, therapeutic activities, patient education, neuromuscular re-education and manual therapy  Frequency: 1x week (1-2x/week PRN)  Duration in weeks: 4  Plan of Care beginning date: 1/5/2023  Plan of Care expiration date: 2/17/2023  Treatment plan discussed with: patient and family        Subjective Evaluation    History of Present Illness  Mechanism of injury: Presents this evening accompanied by her daughter for evaluation of vertiginous symptoms seemingly positional in origin  Says she has been experiencing these symptoms with lying down/getting up from bed and rolling over for nearly the last week - contacted PCP and asked for referral to PT  Was not/has not been prescribed or using meclizine  Seeing PCP tomorrow afternoon for consultation  Has had several instances of an issue similar to this in the past, with most current episode happening in 2021 and was treated here successfully for R horizontal canalithiasis that was prior cupulolithiasis  PMHx significant for R RTC repair, L shoulder fracture, cancer, and multiple falls  Does have Life Alert bracelet  Continues to live independently alone at her home -  passed away in 2015, does not drive, but her daughter lives close to her and takes her to her appts  R eye feels "leaky" - past history of bell's palsy - was using prescribed eye drops but these were also potentially making her feel off-balance from what her and her daughter's observations were  Plans to also discuss this with Dr Josiane Ferrell tomorrow            Recurrent probem    Quality of life: fair    Pain  No pain reported    Social Support  Steps to enter house: no  Stairs in house: yes (Does have stair lift)   Lives in: multiple-level home  Lives with: alone    Employment status: not working  Hand dominance: right    Treatments  Previous treatment: physical therapy and medication  Current treatment: physical therapy  Patient Goals  Patient goals for therapy: improved balance  Patient goal: "To get over what's going on "        Objective    Flowsheet Rows    Flowsheet Row Most Recent Value   PT/OT G-Codes    Current Score 55   Projected Score 0         Dysequilibrium: Yes  Lightheadedness: No  Vertigo: Yes  Rocking or Swaying: Yes         Oscillopsia: No  Diplopia: No  Motion sickness: No  Floating, Swimming, Disconnected: No    Exacerbation Factors:  Bending over: Yes  Turning Head: Yes  Rolling in bed: Yes  Walking: No  Looking up: Yes  Supine to/from sitting: Yes  Optokinetic movement: No  Walking in busy environment: No    Duration of Symptoms: Momentary, seconds     Concurrent Complaints:  Tinnitus:Yes  Aural Fullness:No  Known hearing loss:Yes  Nausea, Vomiting: No  Altered Vision: No  Poor Concentration: No  Memory Loss: No  Peripheral Neuropathy:No  Cervical Pain: No   Headache: No      PHYSICAL FINDINGS:  Oculomotor ROM: WNL  Resting nystagmus: No  Gaze holding nystagmus No   Smooth pursuit Normal    Vertical Saccades:Normal - slowed  Horizontal Saccades:Normal - slowed  Convergence: Abnormal - outside of 4-6 cm range but consistent with prior IE in 2021      Cover/Uncover/Crosscover Test: Normal    Head thrust (room light): TBA     Dynamic Visual Acuity: TBA      MCTSIB: TBA  FGA: TBA      DHI: 46  0-30 mild , 30-60 moderate,  severe disability      Positional testing: Right Left   Roro Silva pike Symptomatic, geotropic nystagmus non-fatiguing Unremarkable   Roll test: Significant symptoms, ageotropic nystagmus Ageotropic nystagmus, lesser amplitude versus R       Cervical ROM:  Flexion: WFL  Extension: WFL  Right rotation: WFL  Left rotation: WFL  Right lateral flexion: 25% limited  Left lateral flexion: 25% limited       Goal Expiration Date:(2/17/23)  POC Expiration Date: (2/17/23)     Precautions: Past history of vertigo, cancer, bell's palsy (R)       Manuals 1/5/23 IE       CRM L Gufoni  x2  R Gufoni x2                               Neuro Re-Ed         Patient Education Human balance system components, vestibular anatomy and pathology, BPPV versus peripheral and central nervous signs, post-CRM recommendations                                                       Ther Ex                                                                        Ther Activity                        Gait Training                        Modalities

## 2023-01-06 ENCOUNTER — OFFICE VISIT (OUTPATIENT)
Dept: PHYSICAL THERAPY | Facility: CLINIC | Age: 88
End: 2023-01-06

## 2023-01-06 ENCOUNTER — OFFICE VISIT (OUTPATIENT)
Dept: INTERNAL MEDICINE CLINIC | Facility: CLINIC | Age: 88
End: 2023-01-06

## 2023-01-06 VITALS
DIASTOLIC BLOOD PRESSURE: 66 MMHG | BODY MASS INDEX: 32.86 KG/M2 | SYSTOLIC BLOOD PRESSURE: 128 MMHG | HEIGHT: 59 IN | WEIGHT: 163 LBS | HEART RATE: 66 BPM | RESPIRATION RATE: 19 BRPM | OXYGEN SATURATION: 97 % | TEMPERATURE: 98 F

## 2023-01-06 DIAGNOSIS — E78.2 MIXED HYPERLIPIDEMIA: ICD-10-CM

## 2023-01-06 DIAGNOSIS — H81.12 BPPV (BENIGN PAROXYSMAL POSITIONAL VERTIGO), LEFT: Primary | ICD-10-CM

## 2023-01-06 DIAGNOSIS — H81.11 BENIGN PAROXYSMAL POSITIONAL VERTIGO OF RIGHT EAR: Primary | ICD-10-CM

## 2023-01-06 DIAGNOSIS — G62.9 PERIPHERAL POLYNEUROPATHY: Chronic | ICD-10-CM

## 2023-01-06 RX ORDER — ROSUVASTATIN CALCIUM 10 MG/1
5 TABLET, COATED ORAL 2 TIMES WEEKLY
Qty: 21 TABLET | Refills: 3 | Status: SHIPPED | OUTPATIENT
Start: 2023-01-09

## 2023-01-06 NOTE — PROGRESS NOTES
Daily Note     Today's date: 2023  Patient name: Jensen Morales  : 1935  MRN: [id number]  Referring provider: Nithin Adair DO  Dx:   Encounter Diagnosis     ICD-10-CM    1  Benign paroxysmal positional vertigo of right ear  H81 11           Start Time: 830  Stop Time: 915  Total time in clinic (min): 45 minutes    Subjective: Returns to OP PT feeling a little better than yesterday - says she did have an instance of room-spinning upon getting up out of bed this morning at 5:30  Sees her PCP this afternoon for further consultation  Objective: See treatment diary below      Assessment: Session focused on addressing ongoing positional symptoms with further assessment of nystagmus  Continues with presentation of ageotropic nystagmus with R roll test - some improvements in symptoms and nystagmus with R Danika maneuvers completed today x2  Reviewed again post-CRM recommendations  Will follow up Monday for further assessment and treatment pending symptoms and presentation  Plan: Continue per plan of care        Goal Expiration Date:(23)  POC Expiration Date: (23)    Precautions: Past history of vertigo, cancer, bell's palsy (R)       Manuals 23 IE 23      CRM L Codyfodominic  x2  R Codyfodominic x2 R Thea Dues x2                              Neuro Re-Ed         Patient Education Human balance system components, vestibular anatomy and pathology, BPPV versus peripheral and central nervous signs, post-CRM recommendations       Assessment  Positional assessment for nystagmus x25 min w/symptom discussion                                               Ther Ex                                                                        Ther Activity                        Gait Training                        Modalities

## 2023-01-06 NOTE — PROGRESS NOTES
Assessment/Plan:    #Peripheral Neuropathy  -noted in feet  -unable to tolerate gabapentin  -defers trying capsaicin for now    #HLD  -has achiness in legs and will cut down on crestor to 2 times per week to see if it will help    #BPPV  -noted in left ear on doris hallpike  -will continue seeing PT  -orthostatic vitals unremarkable today  -denies chest pain, palpitations  -has vertigo when getting out of bed in the morning    No problem-specific Assessment & Plan notes found for this encounter  Diagnoses and all orders for this visit:    BPPV (benign paroxysmal positional vertigo), left    Mixed hyperlipidemia  -     rosuvastatin (CRESTOR) 10 MG tablet;  Take 0 5 tablets (5 mg total) by mouth 2 (two) times a week Takes M-W-F    Peripheral polyneuropathy            Current Outpatient Medications:   •  acetaminophen (TYLENOL) 325 mg tablet, Take 325 mg by mouth every 6 (six) hours as needed for mild pain, Disp: , Rfl:   •  amoxicillin (AMOXIL) 500 mg capsule, TAKE 4 CAPSULES 1 HOUR BEFORE DENTAL PROCEDURE, Disp: , Rfl:   •  anastrozole (ARIMIDEX) 1 mg tablet, Take 1 mg by mouth daily, Disp: , Rfl:   •  anastrozole (ARIMIDEX) 1 mg tablet, Take 1 tablet (1 mg total) by mouth daily, Disp: 90 tablet, Rfl: 3  •  Cholecalciferol (VITAMIN D3 PO), Take by mouth, Disp: , Rfl:   •  clotrimazole-betamethasone (LOTRISONE) 1-0 05 % cream, Apply topically 2 (two) times a day, Disp: 60 g, Rfl: 1  •  CRANBERRY PO, Take 1,700 mg by mouth daily in the early morning , Disp: , Rfl:   •  ibuprofen (MOTRIN) 200 mg tablet, Take 200 mg by mouth every 6 (six) hours as needed for mild pain , Disp: , Rfl:   •  lisinopril (ZESTRIL) 10 mg tablet, TAKE 1 TABLET BY MOUTH  TWICE DAILY, Disp: 180 tablet, Rfl: 3  •  Multiple Vitamins-Minerals (PRESERVISION AREDS 2 PO), Take 2 tablets by mouth daily in the early morning , Disp: , Rfl:   •  prednisoLONE acetate (PRED FORTE) 1 % ophthalmic suspension, Administer 1 drop to the right eye 2 (two) times a day , Disp: , Rfl: 0  •  propranolol (INDERAL) 20 mg tablet, take 1 tablet by mouth four times a day, Disp: , Rfl:   •  propranolol (INDERAL) 20 mg tablet, TAKE 1 TABLET BY MOUTH 4  TIMES DAILY, Disp: 360 tablet, Rfl: 3  •  pyridoxine (VITAMIN B6) 50 mg tablet, Take 50 mg by mouth daily, Disp: , Rfl:   •  [START ON 1/9/2023] rosuvastatin (CRESTOR) 10 MG tablet, Take 0 5 tablets (5 mg total) by mouth 2 (two) times a week Takes M-W-F, Disp: 21 tablet, Rfl: 3    Subjective:      Patient ID: Neela Hooker is a 81 y o  female  HPI patient presents for an acute visit  Reports that she has been having intermittent bouts of dizziness that come on whenever she wakes up in the morning and tries to go to the bathroom  She states that the whole room is spinning  She denies any chest pains or heart palpitations  She does not feel lightheaded  Orthostatic vitals were unremarkable today  She is currently seeing vestibular therapy for therapy to her right ear which has been helping  We did the Epley maneuver today and it was positive over the left ear  Encouraged her to follow-up with physical therapy regarding this  She also has been experiencing achiness in her lower extremities and we will cut down her Crestor to 2 times a week  In terms of her peripheral neuropathy she had hallucinations with gabapentin  We did suggest considering capsaicin topical however she would like to hold off on this for now  The following portions of the patient's history were reviewed and updated as appropriate: allergies, current medications, past family history, past medical history, past social history, past surgical history and problem list     Review of Systems   Constitutional: Negative for activity change, appetite change, chills, diaphoresis, fatigue and fever  HENT: Negative for congestion, sore throat and trouble swallowing  Respiratory: Negative for cough and shortness of breath      Cardiovascular: Negative for chest pain and palpitations  Gastrointestinal: Negative for abdominal pain, constipation, diarrhea, nausea and vomiting  Musculoskeletal: Negative for back pain and myalgias  Neurological: Positive for dizziness  Negative for headaches  Objective:      /66 (BP Location: Left arm, Patient Position: Sitting, Cuff Size: Adult)   Pulse 66   Temp 98 °F (36 7 °C) (Tympanic)   Resp 19   Ht 4' 11" (1 499 m)   Wt 73 9 kg (163 lb)   SpO2 97%   BMI 32 92 kg/m²          Physical Exam  Constitutional:       General: She is not in acute distress  Appearance: She is well-developed  She is not diaphoretic  HENT:      Head: Normocephalic and atraumatic  Right Ear: Tympanic membrane, ear canal and external ear normal  There is no impacted cerumen  Left Ear: Tympanic membrane, ear canal and external ear normal  There is no impacted cerumen  Ears:      Comments: Hearing aids in place     Nose: No congestion  Mouth/Throat:      Pharynx: Oropharynx is clear  No oropharyngeal exudate or posterior oropharyngeal erythema  Eyes:      Conjunctiva/sclera: Conjunctivae normal       Pupils: Pupils are equal, round, and reactive to light  Neck:      Vascular: No JVD  Trachea: No tracheal deviation  Cardiovascular:      Rate and Rhythm: Normal rate and regular rhythm  Pulses: Normal pulses  Heart sounds: Normal heart sounds  No murmur heard  Pulmonary:      Effort: Pulmonary effort is normal  No respiratory distress  Breath sounds: Normal breath sounds  No stridor  No wheezing, rhonchi or rales  Abdominal:      General: Abdomen is flat  Bowel sounds are normal  There is no distension  Palpations: Abdomen is soft  Tenderness: There is no abdominal tenderness  Musculoskeletal:         General: No tenderness or deformity  Normal range of motion  Cervical back: Normal range of motion and neck supple  Right lower leg: No edema        Left lower leg: No edema  Skin:     General: Skin is warm and dry  Findings: No erythema  Neurological:      Mental Status: She is alert  Mental status is at baseline  Cranial Nerves: No cranial nerve deficit  Sensory: Sensory deficit (lower feet) present  Motor: No weakness  Gait: Gait abnormal (using cane)  Comments: AAOx2, doris lemus pike positive left ear           This note was completed in part utilizing FND direct voice recognition software  Grammatical errors, random word insertion, spelling mistakes, and incomplete sentences may be an occasional consequence of the system secondary to software limitations, ambient noise and hardware issues  At the time of dictation, efforts were made to edit, clarify and /or correct errors  Please read the chart carefully and recognize, using context, where substitutions have occurred  If you have any questions or concerns about the context, text or information contained within the body of this dictation, please contact myself, the provider, for further clarification  Surgeon/Pathologist Verbiage (Will Incorporate Name Of Surgeon From Intro If Not Blank): operated in two distinct and integrated capacities as the surgeon and pathologist.

## 2023-01-09 ENCOUNTER — OFFICE VISIT (OUTPATIENT)
Dept: PHYSICAL THERAPY | Facility: CLINIC | Age: 88
End: 2023-01-09

## 2023-01-09 DIAGNOSIS — H81.11 BENIGN PAROXYSMAL POSITIONAL VERTIGO OF RIGHT EAR: Primary | ICD-10-CM

## 2023-01-09 NOTE — PROGRESS NOTES
Daily Note     Today's date: 2023  Patient name: Tyree Mcdowell  : 1935  MRN: [id number]   Referring provider: Ana Hirsch DO  Dx:   Encounter Diagnosis     ICD-10-CM    1  Benign paroxysmal positional vertigo of right ear  H81 11           Start Time: 930  Stop Time: 1000  Total time in clinic (min): 30 minutes    Subjective: Returns to OP PT having seen PCP after our session this past Friday - PCP in agreement with positional vertigo but was finding symptoms and reproduction with TIERRA Perdomo per chart review  Woke up with same vertiginous sensation getting OOB today  Objective: See treatment diary below      Assessment: Session focused on addressing ongoing positional symptoms with further assessment of nystagmus  Initiated assessment with TIERRA Perdomo appreciating ageotropic nystagmus but not upbeating torsional in nature  Noted stronger findings with R roll test  Trialed new maneuver involving Gufani and liberatory manuevers remaining in each specified position for 3 minutes  Noted some improvements in symptoms upon completion of maneuver with transition to upright  Will reassess during full 60-minute session 23  Plan: Continue per plan of care        Goal Expiration Date:(23)  POC Expiration Date: (23)    Precautions: Past history of vertigo, cancer, bell's palsy (R)       Manuals 23 IE 23     CRM L Gufoni  x2  R Gufoni x2 R Danika x2 R Liberatory->Gufoni x1 (15 min)    [email or web address]                             Neuro Re-Ed         Patient Education Human balance system components, vestibular anatomy and pathology, BPPV versus peripheral and central nervous signs, post-CRM recommendations       Assessment  Positional assessment for nystagmus x25 min w/symptom discussion  Positional assessment for nystagmus x15 min                                              Ther Ex Ther Activity                        Gait Training                        Modalities

## 2023-01-11 ENCOUNTER — OFFICE VISIT (OUTPATIENT)
Dept: PHYSICAL THERAPY | Facility: CLINIC | Age: 88
End: 2023-01-11

## 2023-01-11 DIAGNOSIS — H81.11 BENIGN PAROXYSMAL POSITIONAL VERTIGO OF RIGHT EAR: Primary | ICD-10-CM

## 2023-01-11 NOTE — PROGRESS NOTES
Daily Note     Today's date: 2023  Patient name: Marlyn Springer  : 1935  MRN: [id number]   Referring provider: Matteo Steel DO  Dx:   Encounter Diagnosis     ICD-10-CM    1  Benign paroxysmal positional vertigo of right ear  H81 11           Start Time: 900  Stop Time: 1000  Total time in clinic (min): 60 minutes    Subjective: Returns to OP PT still experiencing vertiginous symptoms with getting OOB on left side  Objective: See treatment diary below    Group: [id number]  1:1 w/PT: [id number]    Assessment: Session focused on addressing ongoing positional symptoms with further assessment of nystagmus  Initiated assessment with BRITTANY Avitia appreciating ageotropic nystagmus but not upbeating torsional in nature (similar to findings with TIERRA Avitia last session)  Trialed head shake maneuver for 15s followed by R Gufoni maneuver x2 noting some lessened amplitude in nystagmus  Trialed head shake maneuver again followed by L Gufoni x2 with some nystagmus (ageotropic again, seemingly worse accompanied by worse symptoms) - following manuevers noted some improvement in nystagmus amplitude  Will consider sidelying test/liberatory maneuver NV pending reassessment  Plan: Continue per plan of care        Goal Expiration Date:(23)  POC Expiration Date: (23)    Precautions: Past history of vertigo, cancer, bell's palsy (R)       Manuals 23 IE 23    CRM L Gufoni  x2  R Gufoni x2 R Danika x2 R Liberatory->Gufoni x1 (15 min)    [email or web address] Head shake maneuver x15s    R Gufoni x2  L Gufoni x2                            Neuro Re-Ed         Patient Education Human balance system components, vestibular anatomy and pathology, BPPV versus peripheral and central nervous signs, post-CRM recommendations       Assessment  Positional assessment for nystagmus x25 min w/symptom discussion  Positional assessment for nystagmus x15 min Positional assessment for nystagmus                                            Ther Ex                                                                        Ther Activity                        Gait Training                        Modalities

## 2023-01-13 ENCOUNTER — OFFICE VISIT (OUTPATIENT)
Dept: PHYSICAL THERAPY | Facility: CLINIC | Age: 88
End: 2023-01-13

## 2023-01-13 DIAGNOSIS — H81.11 BENIGN PAROXYSMAL POSITIONAL VERTIGO OF RIGHT EAR: Primary | ICD-10-CM

## 2023-01-13 NOTE — PROGRESS NOTES
Daily Note     Today's date: 2023  Patient name: Mark Davis  : 1935  MRN: [id number]   Referring provider: Gregory Allan DO  Dx:   Encounter Diagnosis     ICD-10-CM    1  Benign paroxysmal positional vertigo of right ear  H81 11           Start Time: 830  Stop Time: 915  Total time in clinic (min): 45 minutes    Subjective: Returns to OP PT saying she felt better when she got OOB this morning compared to times past  She said she still experienced it but the sensation was not as significant as it had been  Objective: See treatment diary below    1:1 w/PT [id number]  Group: [id number]    Assessment: Session focused on addressing ongoing positional symptoms with further assessment of nystagmus  Trialed sidelying test BL today with only slight ageotropic nystagmus noted for four seconds when testing to R not indicative of posterior origin  Revisited Gufoni bilaterally with again ageotropic nystagmus most prominent on R side  Trialed finishing maneuver both looking down and up with no other changes  Recommended trialing laying on R side at home for 5-10 min followed by sleeping on L side with one pillow versus preferred two for longer-duration maneuver  Plan: Continue per plan of care        Goal Expiration Date:(23)  POC Expiration Date: (23)    Precautions: Past history of vertigo, cancer, bell's palsy (R)       Manuals 23 IE 23   CRM L Gufoni  x2  R Gufoni x2 R Danika x2 R Liberatory->Gufoni x1 (15 min)    [email or web address] Head shake maneuver x15s    R Gufoni x2  L Gufoni x2 R Gufoni x2 with prolonged udrations                           Neuro Re-Ed         Patient Education Human balance system components, vestibular anatomy and pathology, BPPV versus peripheral and central nervous signs, post-CRM recommendations       Assessment  Positional assessment for nystagmus x25 min w/symptom discussion  Positional assessment for nystagmus x15 min  Positional assessment for nystagmus BL sidelying test                                               Ther Ex                                                                        Ther Activity                        Gait Training                        Modalities

## 2023-01-16 ENCOUNTER — OFFICE VISIT (OUTPATIENT)
Dept: PHYSICAL THERAPY | Facility: CLINIC | Age: 88
End: 2023-01-16

## 2023-01-16 DIAGNOSIS — H81.11 BENIGN PAROXYSMAL POSITIONAL VERTIGO OF RIGHT EAR: Primary | ICD-10-CM

## 2023-01-16 NOTE — PROGRESS NOTES
Daily Note     Today's date: 2023  Patient name: Angélica Zuniga  : 1935  MRN: [id number]   Referring provider: Manuel Elizondo DO  Dx:   Encounter Diagnosis     ICD-10-CM    1  Benign paroxysmal positional vertigo of right ear  H81 11           Start Time: 1630  Stop Time: 1715  Total time in clinic (min): 45 minutes    Subjective: Returns to OP PT saying she was unable to perform her HEP maneuver secondary to discomfort laying on her R side for an extended period  Says she most notices this room spinning sensation now not when she gets OOB on the L side but when she then starts walking to the bathroom in the morning  Objective: See treatment diary below      Assessment: Session focused on addressing ongoing positional symptoms with further assessment of nystagmus  Reassessed oculomotor screen - unremarkable  Reassessed all canals with BL posterior unremarkable; R head roll test positive for ageotropic nystagmus taking greater than 2 min to fatigue  Revisited BL Jasmin wallace with noted improvement in R symptoms  Made recommendation for daughter to follow up with [facility] for further vestibular/nystagmus assessment for additional info - will follow up upon awaiting return call from her  At this time additionally considering transfer to to [address] [place] [address] Neuro PT to be seen and assessed by prior evaluating/treating PT to see if this is the same presentation seen 2 years prior  Plan: Continue per plan of care        Goal Expiration Date:(23)  POC Expiration Date: (23)    Precautions: Past history of vertigo, cancer, bell's palsy (R)       Manuals 23   CRM L Gufoni x1  R Gufoni x1 R Danika x2 R [facility]->Gufoni x1 (15 min)    [email or web address] Head shake maneuver x15s    R Gufoni x2  L Gufoni x2 R Gufoni x2 with prolonged udrations                           Neuro Re-Ed         Patient Education Assessment BL sidelying test, BL head roll test - greater amplitude ageotropic nystagmus noted with R head roll test Positional assessment for nystagmus x25 min w/symptom discussion  Positional assessment for nystagmus x15 min  Positional assessment for nystagmus BL sidelying test                                               Ther Ex                                                                        Ther Activity                        Gait Training                        Modalities

## 2023-02-09 ENCOUNTER — EVALUATION (OUTPATIENT)
Dept: PHYSICAL THERAPY | Age: 88
End: 2023-02-09

## 2023-02-09 DIAGNOSIS — R26.9 ABNORMALITY OF GAIT: Primary | ICD-10-CM

## 2023-02-09 DIAGNOSIS — H81.11 BENIGN PAROXYSMAL POSITIONAL VERTIGO OF RIGHT EAR: ICD-10-CM

## 2023-02-10 NOTE — PROGRESS NOTES
PT Re-Evaluation     Today's date: 2023  Patient name: Brayden Vasquez  : 1935  MRN: [id number]  Referring provider: Pily Tracey PA-C  Dx:   Encounter Diagnosis     ICD-10-CM    1  Abnormality of gait  R26 9       2  Benign paroxysmal positional vertigo of right ear  H81 11 Ambulatory Referral to Physical Therapy                     Assessment/Plan    Subjective Evaluation    History of Present Illness  Mechanism of injury: Brayden Vasquez is an 81year old female now transferred from the [address] [place] [address] to [place] [address] for continued rehab for vertigo, gait abnormality and imbalance  Recurrent probem    Pain  Current pain ratin  At best pain ratin  At worst pain ratin  Location: neck stiffness  Quality: dull ache  Relieving factors: heat and change in position  Aggravating factors: overhead activity  Progression: no change          Objective      PMH: diverticulosis, supraventricular tachycardia, MI type 2, essential HTN, sensorineural hearing loss, peripheral neuropathy both feet, [place] [place] carpal tunnel release surgery 19, trigger finger left 3rd and 4th digits, right reverse total shoulder surgery 17 and 21, bilateral TKR , osteopenia, hemmorroid surgery, left sh impingement syndrome, closed nondisplaced fx left acromial process, arthritis, vit d defic, fatigue, dizziness, hyperglycemia, hypercalcemia, anxiety, ambulatory dysfunction, use of anastrozole, SOB, palpitations, other chest pain, mass left parotid gland, s/p right breast cancer lumpectomy surgery x 2   and 2019, pt lives alone , bilateral hearing aides, hx of vertigo + 20 years, hx of right Bell's palsy, hx of procedure for cataract 1 yr ago puts drops in eyes 3 x a day,     Precautions:  Allergies,  Decreased balance may need cueing to increase hip flexion with gait , uses st cane   Reviewed by Estella Angeles Why on 2023   Severity Reactions Comments   Gabapentin High Hallucinations    Amoxicillin-pot Clavulanate Low GI Intolerance    Azithromycin Low GI Intolerance, Rash    Cephalexin Low GI Intolerance    Cortisone Low GI Intolerance    Doxycycline Low GI Intolerance    Penicillins Low GI Intolerance            Manuals 2/9/23            doris hallpike testing  No nystagmus however on right slight visual change            R/l rolling  Neg no nystagmus                                      Neuro Re-Ed             Chin tucks  5 reps 5 sec hold             Cervical isometrics              Ankle sway referencing eo, ec in ll bars             Sidestepping in ll bars             Fwd, backward walking in ll bars              Squats with chin tuck  5 reps             Head diagonals  5 reps 10 sec hold each diagonal            Ther Ex             Issue eye head exer nv                                                                                                        Ther Activity                                       Gait Training                                       Modalities

## 2023-02-16 ENCOUNTER — APPOINTMENT (OUTPATIENT)
Dept: PHYSICAL THERAPY | Age: 88
End: 2023-02-16

## 2023-02-22 ENCOUNTER — OFFICE VISIT (OUTPATIENT)
Dept: PHYSICAL THERAPY | Age: 88
End: 2023-02-22

## 2023-02-22 DIAGNOSIS — R26.9 ABNORMALITY OF GAIT: Primary | ICD-10-CM

## 2023-02-22 DIAGNOSIS — H81.11 BENIGN PAROXYSMAL POSITIONAL VERTIGO OF RIGHT EAR: ICD-10-CM

## 2023-02-23 NOTE — PROGRESS NOTES
Daily Note     Today's date: 2023  Patient name: Horatio Boast  : 1935  MRN: [id number]  Referring provider: Dennis Aguilar PA-C  Dx:   Encounter Diagnosis     ICD-10-CM    1  Abnormality of gait  R26 9       2  Benign paroxysmal positional vertigo of right ear  H81 11                      Subjective: "I am feeling much better "      Objective: See treatment diary below      Assessment: Tolerated treatment well  Patient would benefit from continued PT      Plan: Continue per plan of care  Precautions:  Allergies,  Decreased balance may need cueing to increase hip flexion with gait , uses st cane   Reviewed by Yoni Nguyen on 2023   Severity Reactions Comments   Gabapentin High Hallucinations    Amoxicillin-pot Clavulanate Low GI Intolerance    Azithromycin Low GI Intolerance, Rash    Cephalexin Low GI Intolerance    Cortisone Low GI Intolerance    Doxycycline Low GI Intolerance    Penicillins Low GI Intolerance            Manuals 23           doris hallpike testing  No nystagmus however on right slight visual change Retest negative right and left            R/l rolling  Neg no nystagmus neg                                     Neuro Re-Ed             Chin tucks  5 reps 5 sec hold  5 reps            Cervical isometrics   5 reps each           Ankle sway referencing eo, ec in ll bars  10 reps each eo, ec flat surface only           Sidestepping in ll bars  10 ft x 4           Fwd, backward walking in ll bars   10 ft x 4           Squats with chin tuck  5 reps             Head diagonals  5 reps 10 sec hold each diagonal 5 reps 10 sechold            Ther Ex             Issue eye head exer nv                                                                                                        Ther Activity                                       Gait Training                                       Modalities

## 2023-02-27 ENCOUNTER — OFFICE VISIT (OUTPATIENT)
Dept: PHYSICAL THERAPY | Age: 88
End: 2023-02-27

## 2023-02-27 DIAGNOSIS — R26.9 ABNORMALITY OF GAIT: Primary | ICD-10-CM

## 2023-02-27 NOTE — PROGRESS NOTES
Daily Note     Today's date: 2023  Patient name: Angélica Zuniga  : 1935  MRN: [id number]  Referring provider: Kati Daniel PA-C  Dx:   Encounter Diagnosis     ICD-10-CM    1  Abnormality of gait  R26 9                      Subjective: Pt  Reports her balance is improved  Objective: See treatment diary below      Assessment: Tolerated treatment well  Patient would benefit from continued PT      Plan: Continue per plan of care  Precautions:  Allergies,  Decreased balance may need cueing to increase hip flexion with gait , uses st cane   Reviewed by Damaris Hamman Why on 2023   Severity Reactions Comments   Gabapentin High Hallucinations    Amoxicillin-pot Clavulanate Low GI Intolerance    Azithromycin Low GI Intolerance, Rash    Cephalexin Low GI Intolerance    Cortisone Low GI Intolerance    Doxycycline Low GI Intolerance    Penicillins Low GI Intolerance            Manuals 23          doris hallpike testing  No nystagmus however on right slight visual change Retest negative right and left            R/l rolling  Neg no nystagmus neg                                     Neuro Re-Ed             Chin tucks  5 reps 5 sec hold  5 reps  5x          Cervical isometrics   5 reps each 5sec x 5          Ankle sway referencing eo, ec in ll bars  10 reps each eo, ec flat surface only           Sidestepping in ll bars  10 ft x 4 5x          Fwd, backward walking in ll bars   10 ft x 4 5x          Squats with chin tuck  5 reps   3x 10          Head diagonals  5 reps 10 sec hold each diagonal 5 reps 10 sechold            Ther Ex             Issue eye head exer nv             Hip add/abd   30x           Cone hand switch   4x          LAQ   2# 30x                                                              Ther Activity                                       Gait Training                                       Modalities

## 2023-03-02 ENCOUNTER — OFFICE VISIT (OUTPATIENT)
Dept: PHYSICAL THERAPY | Age: 88
End: 2023-03-02

## 2023-03-02 DIAGNOSIS — R26.9 ABNORMALITY OF GAIT: Primary | ICD-10-CM

## 2023-03-02 DIAGNOSIS — H81.11 BENIGN PAROXYSMAL POSITIONAL VERTIGO OF RIGHT EAR: ICD-10-CM

## 2023-03-02 NOTE — PROGRESS NOTES
Daily Note / Discharge Summary    Today's date: 3/2/2023  Patient name: Viry Rojo  : 1935  MRN: [id number]  Referring provider: Ryan Elaine PA-C  Dx:   Encounter Diagnosis     ICD-10-CM    1  Abnormality of gait  R26 9       2  Benign paroxysmal positional vertigo of right ear  H81 11                      Subjective: "I don't have any dizziness today   I feel 100 percent "      Objective: See treatment diary below      Assessment: Tolerated treatment well  Patient to continue with written home exer program at this time  Negative doris hallpik, neg rolling, neg sidelying to sit,   TUG with st cane 10 seconds, Pt has achieved set goals  D/c of PT       Plan: d/c of PT at this time set goals achieved  Precautions:  Allergies,  Decreased balance may need cueing to increase hip flexion with gait , uses st cane   Reviewed by Jamaica Nguyen on 2023   Severity Reactions Comments   Gabapentin High Hallucinations    Amoxicillin-pot Clavulanate Low GI Intolerance    Azithromycin Low GI Intolerance, Rash    Cephalexin Low GI Intolerance    Cortisone Low GI Intolerance    Doxycycline Low GI Intolerance    Penicillins Low GI Intolerance            Manuals 23 3         doris hallpike testing  No nystagmus however on right slight visual change Retest negative right and left   neg         R/l rolling  Neg no nystagmus neg  neg                                   Neuro Re-Ed             Chin tucks  5 reps 5 sec hold  5 reps  5x 10 reps 5 sechold         Cervical isometrics   5 reps each 5sec x 5 5 sec x 5         Ankle sway referencing eo, ec in ll bars  10 reps each eo, ec flat surface only  10 reps each flat and foam surf         Sidestepping in ll bars  10 ft x 4 5x 10 ft x 4         Fwd, backward walking in ll bars   10 ft x 4 5x 10 ft x 4         Squats with chin tuck  5 reps   3x 10 2 x 10         Head diagonals  5 reps 10 sec hold each diagonal 5 reps 10 sechold   5 reps 10 sec hold Ther Ex             Heel raises , toe raises    10 reps x 2 difficult on right le toe raise         Hip add/abd   30x           Cone hand switch   4x          LAQ   2# 30x 3 x 10                                                              Ther Activity                                       Gait Training                                       Modalities

## 2023-03-06 ENCOUNTER — APPOINTMENT (OUTPATIENT)
Dept: PHYSICAL THERAPY | Age: 88
End: 2023-03-06

## 2023-03-09 ENCOUNTER — APPOINTMENT (OUTPATIENT)
Dept: PHYSICAL THERAPY | Age: 88
End: 2023-03-09

## 2023-03-25 DIAGNOSIS — E78.2 MIXED HYPERLIPIDEMIA: Primary | ICD-10-CM

## 2023-03-27 RX ORDER — ROSUVASTATIN CALCIUM 5 MG/1
TABLET, COATED ORAL
Qty: 250 TABLET | Refills: 1 | Status: SHIPPED | OUTPATIENT
Start: 2023-03-27

## 2023-03-31 DIAGNOSIS — I10 ESSENTIAL HYPERTENSION: ICD-10-CM

## 2023-03-31 RX ORDER — LISINOPRIL 10 MG/1
TABLET ORAL
Qty: 180 TABLET | Refills: 3 | Status: SHIPPED | OUTPATIENT
Start: 2023-03-31

## 2023-05-01 ENCOUNTER — EVALUATION (OUTPATIENT)
Dept: PHYSICAL THERAPY | Age: 88
End: 2023-05-01

## 2023-05-01 DIAGNOSIS — M25.562 CHRONIC PAIN OF LEFT KNEE: Primary | ICD-10-CM

## 2023-05-01 DIAGNOSIS — R26.2 AMBULATORY DYSFUNCTION: ICD-10-CM

## 2023-05-01 DIAGNOSIS — G89.29 CHRONIC PAIN OF LEFT KNEE: Primary | ICD-10-CM

## 2023-05-01 NOTE — PROGRESS NOTES
PT Evaluation     Today's date: 2023  Patient name: David Araujo  : 1935  MRN: [id number]  Referring provider: Fadia John DO  Dx:   Encounter Diagnosis     ICD-10-CM    1  Chronic pain of left knee  M25 562 Ambulatory referral to Physical Therapy    G89 29       2  Ambulatory dysfunction  R26 2 Ambulatory referral to Physical Therapy                     Assessment  Assessment details: Patient seen for PT evaluation for L knee pain  Upon evaluation, patient presents with possible IT band tendonitis or possible patella dislocation as patella moves minimally during flex/ext of the L knee and pressure increases and is maintained with knee ROM  PT did talk to patient about concerns, recommending ortho consult, possible x ray vs additional imaging to assess knee joint  This is the knee replacement where she has a distal femur fx as well  Patient denies any acute trauma or falls, pain with getting out of bed and pressure hasn't left since  Patient has not had any imaging at this time, does present with localized swelling and pain to palpation at lateral joint line with slight bruising at proximal knee joint  Limited ROM and significant pain with MMT knee extension  Recommending ortho consult, imaging for L knee  Patient has been using ice at home - only if she remembers  Patient would benefit from PT after ortho visit pending results- recommended patient's daughter to contact PT with results from ortho visit  Thank you for your referrals  Impairments: abnormal gait, abnormal or restricted ROM, activity intolerance, impaired balance, impaired physical strength, lacks appropriate home exercise program, pain with function, poor posture  and poor body mechanics  Functional limitations: Moderate-severe pain with IADLs, with recreational activities, pain with sit<>stand, walking and stair climbing, pain with bed mobility  Understanding of Dx/Px/POC: good   Prognosis: good    Goals  STG/LTG  - Ortho evaluation for L knee pain  - Reduce swelling at lateral knee   - Reduce knee pain by 25%  - increase L knee AROM by 5-10 degrees both flex and ext  Plan  Patient would benefit from: skilled physical therapy  Planned modality interventions: cryotherapy  Planned therapy interventions: abdominal trunk stabilization, manual therapy, joint mobilization, neuromuscular re-education, patient education, postural training, strengthening, stretching, therapeutic activities, therapeutic exercise, home exercise program, body mechanics training and balance  Frequency: 2x week  Duration in weeks: 8  Treatment plan discussed with: patient and family        Subjective Evaluation    History of Present Illness  Mechanism of injury: Patient with progressive worsening of L knee pain x ~ 5 months ago and significantly increasing over the past 1-2 months  Patient was previously using her restorator at home (small bike for when she's sitting in the chair) and had to stop when she was feeling vertigo, attempted to return to using and has had an increase in L knee pain, describes as a pressure pain  Patient's pain is specifically located to the medial and lateral joint line and pressure will radiate distally from the lateral joint line  Patient has noticed an increase in swelling at lateral knee joint     Pain  Current pain ratin  At best pain ratin  At worst pain ratin  Location: L knee  Quality: sharp, pressure, discomfort, pulling and tight  Relieving factors: ice  Aggravating factors: sitting, standing, walking and stair climbing  Progression: worsening    Social Support  Steps to enter house: yes  1  Stairs in house: yes   3  Lives in: multiple-level home  Lives with: alone    Employment status: not working    Diagnostic Tests  No diagnostic tests performed  Treatments  Previous treatment: physical therapy  Patient Goals  Patient goals for therapy: decreased pain, improved balance, increased motion, increased strength, decreased edema and independence with ADLs/IADLs          Objective     Tenderness     Right Knee   Tenderness in the lateral joint line, lateral retinaculum and medial joint line  No tenderness in the patellar tendon  Additional Tenderness Details  Palpable swelling at proximal lateral knee joint (distal proximal thigh), possible bruising as well  Swelling does radiate distally across joint line and distally to knee joint  Active Range of Motion   Left Knee   Flexion: 95 degrees with pain  Extension: -5 degrees with pain    Additional Active Range of Motion Details  Pressure like pain at end range flexion and at end range extension  Painful equally in both directions  Mobility   Patellar Mobility:   Left Knee   Hypomobile: left medial, left lateral, left superior and left inferior    Additional Mobility Details  Assessed mobility gently  Patient's L patella doesn't move adequately with knee extension and pressure pain increases with active knee extension, and unable to fully extend L knee 2* pain  Patellar Static Positioning   Left Knee: lateral tilt    Additional Patellar Static Positioning Details  L patella laterally shifted > R   Frog-eyed position patellas L>R      Strength/Myotome Testing     Left Knee   Flexion: 3+  Extension: 3  Quadriceps contraction: fair    Right Knee   Flexion: 4  Extension: 4    Additional Strength Details  Pain with extension and QS    Ambulation     Observational Gait   Gait: antalgic   Decreased walking speed and stride length  Additional Observational Gait Details  Patient ambulates with slow gait speed, decreased B step length, guarded knee flex/ext during swing phase, especially on L  Immediate increase in L knee pain with sit>stand transfer, feels unsteady and usually stands for 30-60 seconds as pain decreases               Precautions: L knee pain     Supine with wedge  Manuals Neuro Re-Ed                                                                Ther Ex                QS        IT band stretch        Ham stretch, seated        LAQ        SLR abd        Ham curls                                                                                                Ther Activity                        Gait Training                        Modalities        CP

## 2023-05-09 DIAGNOSIS — C50.411 CARCINOMA OF UPPER-OUTER QUADRANT OF RIGHT BREAST IN FEMALE, ESTROGEN RECEPTOR POSITIVE (HCC): ICD-10-CM

## 2023-05-09 DIAGNOSIS — Z17.0 CARCINOMA OF UPPER-OUTER QUADRANT OF RIGHT BREAST IN FEMALE, ESTROGEN RECEPTOR POSITIVE (HCC): ICD-10-CM

## 2023-05-09 DIAGNOSIS — Z17.0 MALIGNANT NEOPLASM OF UPPER-OUTER QUADRANT OF RIGHT BREAST IN FEMALE, ESTROGEN RECEPTOR POSITIVE (HCC): ICD-10-CM

## 2023-05-09 DIAGNOSIS — I10 ESSENTIAL HYPERTENSION: ICD-10-CM

## 2023-05-09 DIAGNOSIS — C50.411 MALIGNANT NEOPLASM OF UPPER-OUTER QUADRANT OF RIGHT BREAST IN FEMALE, ESTROGEN RECEPTOR POSITIVE (HCC): ICD-10-CM

## 2023-05-10 RX ORDER — ANASTROZOLE 1 MG/1
TABLET ORAL
Qty: 90 TABLET | Refills: 3 | Status: SHIPPED | OUTPATIENT
Start: 2023-05-10

## 2023-05-10 RX ORDER — PROPRANOLOL HYDROCHLORIDE 20 MG/1
TABLET ORAL
Qty: 360 TABLET | Refills: 3 | Status: SHIPPED | OUTPATIENT
Start: 2023-05-10

## 2023-05-22 ENCOUNTER — EVALUATION (OUTPATIENT)
Dept: PHYSICAL THERAPY | Age: 88
End: 2023-05-22

## 2023-05-22 DIAGNOSIS — M25.562 CHRONIC PAIN OF LEFT KNEE: Primary | ICD-10-CM

## 2023-05-22 DIAGNOSIS — R26.2 AMBULATORY DYSFUNCTION: ICD-10-CM

## 2023-05-22 DIAGNOSIS — G89.29 CHRONIC PAIN OF LEFT KNEE: Primary | ICD-10-CM

## 2023-05-22 NOTE — PROGRESS NOTES
PT Re-Evaluation     Today's date: 2023  Patient name: Loyda Canales  : 1935  MRN: [id number]  Referring provider: Diya West DO  Dx:   Encounter Diagnosis     ICD-10-CM    1  Chronic pain of left knee  M25 562     G89 29       2  Ambulatory dysfunction  R26 2                      Assessment  Assessment details: PT re-assessed patient  PT initiated gentle patellar mobilizations to reduce lateral tightness, initiated use of Graston and taped patient's knee with kinesiotape for patellar tracking  PT trialed J brace, too tight over patient's thigh to fit comfortably  Patient tolerating k tape well today without any increase in knee pain  Patient tolerating Graston well as well- palpable tightness along lateral boarder, IT insertion and lateral retinaculum  Patient is a good candidate for PT, recommending 2x/week x 4-6 weeks  Impairments: abnormal gait, abnormal or restricted ROM, activity intolerance, impaired balance, impaired physical strength, lacks appropriate home exercise program, pain with function, poor posture  and poor body mechanics  Functional limitations: Moderate-severe pain with IADLs, with recreational activities, pain with sit<>stand, walking and stair climbing, pain with bed mobility  Understanding of Dx/Px/POC: good   Prognosis: good    Goals  STG/LTG  - Ortho evaluation for L knee pain met  - Reduce swelling at lateral knee  progressing  - Reduce knee pain by 25% progressing  - increase L knee AROM by 5-10 degrees both flex and ext   progressing      Plan  Patient would benefit from: skilled physical therapy  Planned modality interventions: cryotherapy  Planned therapy interventions: abdominal trunk stabilization, manual therapy, joint mobilization, neuromuscular re-education, patient education, postural training, strengthening, stretching, therapeutic activities, therapeutic exercise, home exercise program, body mechanics training and balance  Frequency: 2x week  Duration in weeks: 8  Treatment plan discussed with: patient and family        Subjective Evaluation    History of Present Illness  Mechanism of injury: Patient returns to PT with patellar mal alignment from ortho x ray, recommended a J brace and conservative PT treatments for patellar tracking to reduce her knee pain vs lateral release surgery  Patient prefers to have PT and wear the brace as she would like to avoid surgery  Pain  Current pain ratin  At best pain ratin  At worst pain ratin  Location: L knee  Quality: sharp, pressure, discomfort, pulling and tight  Relieving factors: ice  Aggravating factors: sitting, standing, walking and stair climbing  Progression: worsening    Social Support  Steps to enter house: yes  1  Stairs in house: yes   3  Lives in: multiple-level home  Lives with: alone    Employment status: not working    Diagnostic Tests  No diagnostic tests performed  Treatments  Previous treatment: physical therapy  Patient Goals  Patient goals for therapy: decreased pain, improved balance, increased motion, increased strength, decreased edema and independence with ADLs/IADLs          Objective     Tenderness     Right Knee   Tenderness in the lateral joint line, lateral retinaculum and medial joint line  No tenderness in the patellar tendon  Additional Tenderness Details  Palpable swelling at proximal lateral knee joint (distal proximal thigh), possible bruising as well  Swelling does radiate distally across joint line and distally to knee joint  Active Range of Motion   Left Knee   Flexion: 90 degrees with pain  Extension: 0 degrees with pain    Additional Active Range of Motion Details  Pressure like pain at end range flexion and at end range extension  Painful equally in both directions      Mobility   Patellar Mobility:   Left Knee   Hypomobile: left medial, left lateral, left superior and left inferior    Additional Mobility Details  Still lacking full patellar mobility, responding well to gentle patellar mobs and restriction is reduced  Patellar Static Positioning   Left Knee: lateral tilt    Additional Patellar Static Positioning Details  L patella laterally shifted > R   Frog-eyed position patellas L>R      Strength/Myotome Testing     Left Knee   Flexion: 3+  Extension: 3+  Quadriceps contraction: fair    Right Knee   Flexion: 4  Extension: 4    Additional Strength Details  Pain with extension and QS    Ambulation     Observational Gait   Gait: antalgic   Decreased walking speed and stride length  Additional Observational Gait Details  Patient ambulates with slow gait speed, decreased B step length, guarded knee flex/ext during swing phase, especially on L  Immediate increase in L knee pain with sit>stand transfer, feels unsteady and usually stands for 30-60 seconds as pain decreases  Neuro Exam:     Functional outcomes   TU with SPC (seconds)  Functional outcome gait comment: Patient ambulates with decreased B step length, decreased gait speed, wide base of support                Precautions: L knee pain     Supine with wedge  Manuals        Seated L patellar - medial direction and superior direction  Grade I, II   10x       Graston L knee - IT band, distal quads, proximal lateral fibular head, patellar tendon and lateral retinaculum x10' gentle                        Neuro Re-Ed                kinesiotape C shape I strip to pull the patella medially; U shape I strip to lift the patella superiorly                                               Ther Ex                QS        IT band stretch        Ham stretch, seated        LAQ 2x10       SLR abd        Ham curls                                                                                                Ther Activity                        Gait Training        Brace fitting, wear J brace today               Modalities        CP

## 2023-05-24 ENCOUNTER — OFFICE VISIT (OUTPATIENT)
Dept: PHYSICAL THERAPY | Age: 88
End: 2023-05-24

## 2023-05-24 DIAGNOSIS — G89.29 CHRONIC PAIN OF LEFT KNEE: Primary | ICD-10-CM

## 2023-05-24 DIAGNOSIS — M25.562 CHRONIC PAIN OF LEFT KNEE: Primary | ICD-10-CM

## 2023-05-24 DIAGNOSIS — R26.2 AMBULATORY DYSFUNCTION: ICD-10-CM

## 2023-05-24 NOTE — PROGRESS NOTES
Daily Note     Today's date: 2023  Patient name: Monet Jaramillo  : 1935  MRN: [id number]  Referring provider: Arnulfo Zapata DO  Dx:   Encounter Diagnosis     ICD-10-CM    1  Chronic pain of left knee  M25 562     G89 29       2  Ambulatory dysfunction  R26 2                      Subjective: Patient reports that her knee is still painful, pressure at the lateral knee and the distal patella area  Patient does feel the tape was somewhat helpful, still has pain and stiffness with initial sit> stand transfers in the early AM       Objective: See treatment diary below      Assessment: Tolerated treatment well  PT continuing with progressive patellar mobs, Graston to alleviate muscle and tendon restrictions  Improving patellar mobility with mobilizations  Continuing with k tape for patellar stability and support  Still sore at knee with mobilizations, no sharp pain as patient was having on evaluation  Plan: Continue per plan of care        Precautions: L knee pain     Supine with wedge  Manuals       Seated L patellar - medial direction and superior direction  Grade I, II   10x 10x today      Thang L knee - IT band, distal quads, proximal lateral fibular head, patellar tendon and lateral retinaculum x10' gentle  x10' gentle                      Neuro Re-Ed                kinesiotape C shape I strip to pull the patella medially; U shape I strip to lift the patella superiorly Yes today                                              Ther Ex                QS        IT band stretch        Ham stretch, seated  3x:10      LAQ 2x10 2x10      SLR abd  Standing 2x10      Ham curls  Standing 2x10                                                                                              Ther Activity                        Gait Training        Brace fitting, wear J brace today               Modalities        CP

## 2023-06-02 ENCOUNTER — APPOINTMENT (OUTPATIENT)
Dept: PHYSICAL THERAPY | Age: 88
End: 2023-06-02
Payer: MEDICARE

## 2023-06-07 ENCOUNTER — OFFICE VISIT (OUTPATIENT)
Dept: PHYSICAL THERAPY | Age: 88
End: 2023-06-07
Payer: MEDICARE

## 2023-06-07 DIAGNOSIS — R26.2 AMBULATORY DYSFUNCTION: ICD-10-CM

## 2023-06-07 DIAGNOSIS — M25.562 CHRONIC PAIN OF LEFT KNEE: Primary | ICD-10-CM

## 2023-06-07 DIAGNOSIS — G89.29 CHRONIC PAIN OF LEFT KNEE: Primary | ICD-10-CM

## 2023-06-07 PROCEDURE — 97110 THERAPEUTIC EXERCISES: CPT | Performed by: PHYSICAL THERAPIST

## 2023-06-07 PROCEDURE — 97140 MANUAL THERAPY 1/> REGIONS: CPT | Performed by: PHYSICAL THERAPIST

## 2023-06-07 NOTE — PROGRESS NOTES
Daily Note     Today's date: 2023  Patient name: Denisha Ribera  : 1935  MRN: [id number]  Referring provider: Anthony Salgado DO  Dx:   Encounter Diagnosis     ICD-10-CM    1  Chronic pain of left knee  M25 562     G89 29       2  Ambulatory dysfunction  R26 2                      Subjective: Patient reports her knee pain is steadily resolving  Objective: See treatment diary below      Assessment: Tolerated treatment well  Patient presents with improved patella mobility specifically reduction in tightness at lateral retinaculum  PT continued with manual work, focusing on stretching and addressing distal quad, IT band tightness  Re-taped with kinesiotape, able to pull patella slightly more medial today to improve and reduce patellar pain; however, patient feeling more sore today after tape  Discussed soreness with patient and patient's daughter  Recommended patient to try the tape, to use ice later and she did not take any medications prior to PT  Plan: Continue per plan of care        Precautions: L knee pain     Supine with wedge  Manuals      Seated L patellar - medial direction and superior direction  Grade I, II   10x 10x today 10x     Graston L knee - IT band, distal quads, proximal lateral fibular head, patellar tendon and lateral retinaculum x10' gentle  x10' gentle x10' gentle                     Neuro Re-Ed                kinesiotape C shape I strip to pull the patella medially; U shape I strip to lift the patella superiorly Yes today Yes today, able to shift the patella slightly more medial                                             Ther Ex                QS        IT band stretch        Ham stretch, seated  3x:10 5x:10     LAQ 2x10 2x10 2x10     SLR abd  Standing 2x10 Standing 2x10     Ham curls  Standing 2x10 Standing 2x10                                                                                             Ther Activity                        Gait Training Brace fitting, wear J brace today               Modalities        CP

## 2023-06-09 ENCOUNTER — OFFICE VISIT (OUTPATIENT)
Dept: PHYSICAL THERAPY | Age: 88
End: 2023-06-09
Payer: MEDICARE

## 2023-06-09 DIAGNOSIS — M25.562 CHRONIC PAIN OF LEFT KNEE: Primary | ICD-10-CM

## 2023-06-09 DIAGNOSIS — G89.29 CHRONIC PAIN OF LEFT KNEE: Primary | ICD-10-CM

## 2023-06-09 DIAGNOSIS — R26.2 AMBULATORY DYSFUNCTION: ICD-10-CM

## 2023-06-09 PROCEDURE — 97110 THERAPEUTIC EXERCISES: CPT | Performed by: PHYSICAL THERAPIST

## 2023-06-09 PROCEDURE — 97140 MANUAL THERAPY 1/> REGIONS: CPT | Performed by: PHYSICAL THERAPIST

## 2023-06-09 NOTE — PROGRESS NOTES
Daily Note     Today's date: 2023  Patient name: Denisha Ribera  : 1935  MRN: [id number]  Referring provider: Anthony Salgado DO  Dx:   Encounter Diagnosis     ICD-10-CM    1  Chronic pain of left knee  M25 562     G89 29       2  Ambulatory dysfunction  R26 2                      Subjective: Patient feels the tape is helping  Objective: See treatment diary below      Assessment: Tolerated treatment well  Patient tolerating program well, less pain with TE, feeling more supported at the knee with tape with exercises  Plan: Continue per plan of care  Precautions: L knee pain     Supine with wedge  Manuals     Seated L patellar - medial direction and superior direction  Grade I, II   10x 10x today 10x 10x    Graston L knee - IT band, distal quads, proximal lateral fibular head, patellar tendon and lateral retinaculum x10' gentle  x10' gentle x10' gentle x10' gentle, tighter at lateral, superior aspect today and distal quads                    Neuro Re-Ed                kinesiotape C shape I strip to pull the patella medially; U shape I strip to lift the patella superiorly Yes today Yes today, able to shift the patella slightly more medial Yes today- regular tape today as the light tape was falling off                                              Ther Ex                QS        IT band stretch        Ham stretch, seated  3x:10 5x:10 5x:10 ea    LAQ 2x10 2x10 2x10 2x10    SLR abd  Standing 2x10 Standing 2x10 Standing 2x10    Ham curls  Standing 2x10 Standing 2x10 Standing 2x10                                                                                            Ther Activity                        Gait Training        Brace fitting, wear J brace today               Modalities        CP

## 2023-06-12 ENCOUNTER — OFFICE VISIT (OUTPATIENT)
Dept: PHYSICAL THERAPY | Age: 88
End: 2023-06-12
Payer: MEDICARE

## 2023-06-12 DIAGNOSIS — M25.562 CHRONIC PAIN OF LEFT KNEE: Primary | ICD-10-CM

## 2023-06-12 DIAGNOSIS — G89.29 CHRONIC PAIN OF LEFT KNEE: Primary | ICD-10-CM

## 2023-06-12 DIAGNOSIS — R26.2 AMBULATORY DYSFUNCTION: ICD-10-CM

## 2023-06-12 PROCEDURE — 97110 THERAPEUTIC EXERCISES: CPT | Performed by: PHYSICAL THERAPIST

## 2023-06-12 PROCEDURE — 97140 MANUAL THERAPY 1/> REGIONS: CPT | Performed by: PHYSICAL THERAPIST

## 2023-06-12 NOTE — PROGRESS NOTES
Daily Note     Today's date: 2023  Patient name: Elie Boateng  : 1935  MRN: [id number]  Referring provider: Phill Barbosa DO  Dx:   Encounter Diagnosis     ICD-10-CM    1  Chronic pain of left knee  M25 562     G89 29       2  Ambulatory dysfunction  R26 2                      Subjective: Patient reports the tape stuck well to her skin and she had good relief of knee pain while wearing the tape  Objective: See treatment diary below      Assessment: Tolerated treatment well  Exercised and performed Graston while patient was wearing the tape to ease her pain  PT continued with the patellar mobs, improved mobility into medial direction and superior direction  Plan: Continue per plan of care  Precautions: L knee pain     Supine with wedge  Manuals    Seated L patellar - medial direction and superior direction  Grade I, II   10x 10x today 10x 10x 10x   Graston L knee - IT band, distal quads, proximal lateral fibular head, patellar tendon and lateral retinaculum x10' gentle  x10' gentle x10' gentle x10' gentle, tighter at lateral, superior aspect today and distal quads x10' gentle                   Neuro Re-Ed                kinesiotape C shape I strip to pull the patella medially; U shape I strip to lift the patella superiorly Yes today Yes today, able to shift the patella slightly more medial Yes today- regular tape today as the light tape was falling off   Regular tape lasted 3 days, removed existing tape for skin to breathe, then will re-tape Thursday if needed                                           Ther Ex                QS        IT band stretch        Ham stretch, seated  3x:10 5x:10 5x:10 ea 5x:10 ea   LAQ 2x10 2x10 2x10 2x10 2x10   SLR abd  Standing 2x10 Standing 2x10 Standing 2x10 Standing 2x10   Ham curls  Standing 2x10 Standing 2x10 Standing 2x10 Standing 2x10 Ther Activity                        Gait Training        Brace fitting, wear J brace today               Modalities        CP

## 2023-06-15 ENCOUNTER — OFFICE VISIT (OUTPATIENT)
Dept: PHYSICAL THERAPY | Age: 88
End: 2023-06-15
Payer: MEDICARE

## 2023-06-15 DIAGNOSIS — G89.29 CHRONIC PAIN OF LEFT KNEE: Primary | ICD-10-CM

## 2023-06-15 DIAGNOSIS — R26.2 AMBULATORY DYSFUNCTION: ICD-10-CM

## 2023-06-15 DIAGNOSIS — M25.562 CHRONIC PAIN OF LEFT KNEE: Primary | ICD-10-CM

## 2023-06-15 PROCEDURE — 97140 MANUAL THERAPY 1/> REGIONS: CPT | Performed by: PHYSICAL THERAPIST

## 2023-06-15 PROCEDURE — 97110 THERAPEUTIC EXERCISES: CPT | Performed by: PHYSICAL THERAPIST

## 2023-06-15 NOTE — PROGRESS NOTES
Daily Note     Today's date: 6/15/2023  Patient name: Katie Yuan  : 1935  MRN: [id number]  Referring provider: Mo Navarro DO  Dx:   Encounter Diagnosis     ICD-10-CM    1  Chronic pain of left knee  M25 562     G89 29       2  Ambulatory dysfunction  R26 2                      Subjective: Patient reports no knee pain since last session  Objective: See treatment diary below      Assessment: Tolerated treatment well  PT continued with Graston as patient is still tight and tender at quads and IT band area  Patient able to complete her program without the k tape; attempted to progress patient to nu step today to simulate restorator at home; however, after 7 min patient's knee stiffened and became painful again  PT added tape for the patella support and stability, and iced the knee post treatment  Patient reporting less pain after exacerbation of symptoms; however, still feeling stiff in the knee afterwards  Discussed patient's knee pain with daughter, recommended patient to try her restorator at home over the weekend, see how her knee feels and either continues to holds depending on her knee pain  Poor patella position from TKA surgery years ago is contributing to patient's lateral knee pain with repeated flex/ext during restorator and nustep movements  Patient may need to walk or exercise differently during the day to assist        Plan: Continue per plan of care        Precautions: L knee pain     Supine with wedge  Manuals 6/15 5/24 6/7 6/9 6/12   Seated L patellar - medial direction and superior direction  Grade I, II   10x 10x today 10x 10x 10x   Graston L knee - IT band, distal quads, proximal lateral fibular head, patellar tendon and lateral retinaculum x10' gentle  x10' gentle x10' gentle x10' gentle, tighter at lateral, superior aspect today and distal quads x10' gentle                   Neuro Re-Ed                kinesiotape C shape I strip to pull the patella medially; U shape I strip to lift the patella superiorly Yes today Yes today, able to shift the patella slightly more medial Yes today- regular tape today as the light tape was falling off   Regular tape lasted 3 days, removed existing tape for skin to breathe, then will re-tape Thursday if needed                                           Ther Ex                QS        IT band stretch        Ham stretch, seated  3x:10 5x:10 5x:10 ea 5x:10 ea   LAQ 2x10 2x10 2x10 2x10 2x10   SLR abd Standing 2x10, and ext Standing 2x10 Standing 2x10 Standing 2x10 Standing 2x10   Ham curls Standing 2x10 Standing 2x10 Standing 2x10 Standing 2x10 Standing 2x10            nustep x 7' - pain*                                                                               Ther Activity                        Gait Training                        Modalities        CP X10' after program

## 2023-06-20 ENCOUNTER — OFFICE VISIT (OUTPATIENT)
Dept: PHYSICAL THERAPY | Age: 88
End: 2023-06-20
Payer: MEDICARE

## 2023-06-20 DIAGNOSIS — G89.29 CHRONIC PAIN OF LEFT KNEE: Primary | ICD-10-CM

## 2023-06-20 DIAGNOSIS — M25.562 CHRONIC PAIN OF LEFT KNEE: Primary | ICD-10-CM

## 2023-06-20 DIAGNOSIS — R26.2 AMBULATORY DYSFUNCTION: ICD-10-CM

## 2023-06-20 PROCEDURE — 97110 THERAPEUTIC EXERCISES: CPT | Performed by: PHYSICAL THERAPIST

## 2023-06-20 NOTE — PROGRESS NOTES
PT Re-Evaluation     Today's date: 2023  Patient name: Phyllis Carlisle  : 1935  MRN: [id number]  Referring provider: Odalis Oconnell DO  Dx:   Encounter Diagnosis     ICD-10-CM    1  Chronic pain of left knee  M25 562     G89 29       2  Ambulatory dysfunction  R26 2                      Assessment  Assessment details: Patient seen for PT re-assessment  Patient is making good progress with program  PT utilizing Graston and kinesiotape to address tightness at lateral knee, patellar tendon- as a result, patient presents with improved patellar mobility and less pain since IE  Patient is making good progress with PT  PT planning to progress patient's strength, balance exercises as patient's pain is decreasing, held Graston and k-tape as a result  Printed HEP for patient to exercise at home while in the chair or standing at the counter  Impairments: abnormal gait, abnormal or restricted ROM, activity intolerance, impaired balance, impaired physical strength, lacks appropriate home exercise program, pain with function, poor posture  and poor body mechanics  Functional limitations: Moderate pain with IADLs, with recreational activities, pain with sit<>stand, walking and stair climbing, pain with bed mobility  Understanding of Dx/Px/POC: good   Prognosis: good    Goals  STG/LTG  - Ortho evaluation for L knee pain met  - Reduce swelling at lateral knee  met  - Reduce knee pain by 25% met  - increase L knee AROM by 5-10 degrees both flex and ext  Progressing    Goals added to be met in 6-8 weeks  - Patient to be able to tolerate progressive strengthening program without increase in knee pain  - Patient to be able report her knee pain <3/10 with activity  - Patient to be able to tolerate stair stepping strengthening        Plan  Patient would benefit from: skilled physical therapy  Planned modality interventions: cryotherapy  Planned therapy interventions: abdominal trunk stabilization, manual therapy, joint mobilization, neuromuscular re-education, patient education, postural training, strengthening, stretching, therapeutic activities, therapeutic exercise, home exercise program, body mechanics training and balance  Frequency: 2x week  Duration in weeks: 8  Treatment plan discussed with: patient and family        Subjective Evaluation    History of Present Illness  Mechanism of injury: Patient has seen ortho and was recommended to start HEP for home, and try 1x/week for PT as her patella is moving better and her pain is decreasing  Pain  Current pain ratin  At best pain ratin  At worst pain ratin  Location: L knee  Quality: sharp, pressure, discomfort, pulling and tight  Relieving factors: ice  Aggravating factors: sitting, standing, walking and stair climbing  Progression: worsening    Social Support  Steps to enter house: yes  1  Stairs in house: yes   3  Lives in: multiple-level home  Lives with: alone    Employment status: not working    Diagnostic Tests  No diagnostic tests performed  Treatments  Previous treatment: physical therapy  Patient Goals  Patient goals for therapy: decreased pain, improved balance, increased motion, increased strength, decreased edema and independence with ADLs/IADLs          Objective     Tenderness     Right Knee   Tenderness in the lateral joint line, lateral retinaculum and medial joint line  No tenderness in the patellar tendon  Additional Tenderness Details  Palpable swelling at proximal lateral knee joint (distal proximal thigh), possible bruising as well  Swelling does radiate distally across joint line and distally to knee joint  Active Range of Motion   Left Knee   Flexion: 90 degrees with pain  Extension: 0 degrees with pain    Additional Active Range of Motion Details  Pressure like pain at end range flexion and at end range extension  Painful equally in both directions      Mobility   Patellar Mobility:   Left Knee   WFL: medial, lateral, superior and inferior  Additional Mobility Details  Improving patellar mobility and position- still laterally shifted  Patellar Static Positioning   Left Knee: lateral tilt    Additional Patellar Static Positioning Details  L patella laterally shifted > R   Frog-eyed position patellas L>R      Strength/Myotome Testing     Left Knee   Flexion: 4-  Extension: 3+  Quadriceps contraction: fair    Right Knee   Flexion: 4  Extension: 4    Additional Strength Details  Pain with extension and QS    Ambulation     Observational Gait   Gait: antalgic   Decreased walking speed and stride length  Additional Observational Gait Details  Patient ambulates with slow gait speed, decreased B step length, guarded knee flex/ext during swing phase, especially on L  Immediate increase in L knee pain with sit>stand transfer, feels unsteady and usually stands for 30-60 seconds as pain decreases  Neuro Exam:     Functional outcomes   TU sec with SPC (seconds)  Functional outcome gait comment: Patient ambulates with decreased B step length, decreased gait speed, wide base of support, uses SPC for balance  Precautions: L knee pain     Supine with wedge  Manuals 6/15 6/20 6   Seated L patellar - medial direction and superior direction  Grade I, II   10x 10x today 10x 10x 10x   Graston L knee - IT band, distal quads, proximal lateral fibular head, patellar tendon and lateral retinaculum x10' gentle  x10' gentle x10' gentle x10' gentle, tighter at lateral, superior aspect today and distal quads x10' gentle                   Neuro Re-Ed                kinesiotape C shape I strip to pull the patella medially; U shape I strip to lift the patella superiorly Trial a discharge Yes today, able to shift the patella slightly more medial Yes today- regular tape today as the light tape was falling off   Regular tape lasted 3 days, removed existing tape for skin to breathe, then will re-tape Thursday if needed     Side stepping // bars        Tandem walking // bars                              Ther Ex                                Ham stretch, seated  5x:10 5x:10 5x:10 ea 5x:10 ea   LAQ 2x10 2x10 2x10 2x10 2x10   SLR abd and extension Standing 2x10, and ext Standing 2x10  Standing 2x10 Standing 2x10 Standing 2x10   Ham curls Standing 2x10 Standing 2x10 Standing 2x10 Standing 2x10 Standing 2x10            nustep x 7' - pain* Seated HR/TR 20x         Seated ball squeeze NV        Seated hip TB        Seated marches                                                      Ther Activity                        Gait Training                        Modalities        CP X10' after program Doesn't need today

## 2023-06-23 ENCOUNTER — OFFICE VISIT (OUTPATIENT)
Dept: PHYSICAL THERAPY | Age: 88
End: 2023-06-23
Payer: MEDICARE

## 2023-06-23 DIAGNOSIS — M25.562 CHRONIC PAIN OF LEFT KNEE: Primary | ICD-10-CM

## 2023-06-23 DIAGNOSIS — R26.2 AMBULATORY DYSFUNCTION: ICD-10-CM

## 2023-06-23 DIAGNOSIS — G89.29 CHRONIC PAIN OF LEFT KNEE: Primary | ICD-10-CM

## 2023-06-23 PROCEDURE — 97110 THERAPEUTIC EXERCISES: CPT | Performed by: PHYSICAL THERAPIST

## 2023-06-23 NOTE — PROGRESS NOTES
Daily Note     Today's date: 2023  Patient name: Tye Calvert  : 1935  MRN: [id number]  Referring provider: Carlita Brown DO  Dx:   Encounter Diagnosis     ICD-10-CM    1  Chronic pain of left knee  M25 562     G89 29       2  Ambulatory dysfunction  R26 2                      Subjective: Patient reports that her knee is feeling good with PT  Patient was able to tolerate using her bike at home without increase in knee pain  Objective: See treatment diary below      Assessment: Tolerated treatment well  Patient with increased knee pain with 2nd set of 10 reps with LAQ exercise- held reps to 10 today for all TE to not aggravate the R LE so patient is able to continue to use her bike at home  Continued with program, focusing on gentle movements  No further progression today as patient developing a soreness in the knee after TE today  Declines ice, will use at home  Focusing on slowly progressing activities to avoid exacerbating her knee pain  Plan: Continue per plan of care  Precautions: L knee pain     Supine with wedge  Manuals 6/15 6/20 6/23 6/9 6/12   Seated L patellar - medial direction and superior direction  Grade I, II   10x 10x today Not needed 10x 10x   Graston L knee - IT band, distal quads, proximal lateral fibular head, patellar tendon and lateral retinaculum x10' gentle  x10' gentle Not needed x10' gentle, tighter at lateral, superior aspect today and distal quads x10' gentle                   Neuro Re-Ed                kinesiotape C shape I strip to pull the patella medially; U shape I strip to lift the patella superiorly Trial a discharge Discharged taping Yes today- regular tape today as the light tape was falling off   Regular tape lasted 3 days, removed existing tape for skin to breathe, then will re-tape Thursday if needed     Side stepping // bars        Tandem walking // bars                              Ther Ex                                Ham stretch, seated  5x:10 5x:10 5x:10 ea 5x:10 ea   LAQ 2x10 2x10 x10 P* at 2nd set 2x10 2x10   SLR abd and extension Standing 2x10, and ext Standing 2x10  Standing 2x10 Standing 2x10 Standing 2x10   Ham curls Standing 2x10 Standing 2x10 Standing 2x10 Standing 2x10 Standing 2x10            nustep x 7' - pain* Seated HR/TR 20x  20x       Seated ball squeeze NV NV       Seated hip TB        Seated marches 10x alternating                                                     Ther Activity                        Gait Training                        Modalities        CP X10' after program Doesn't need today Doesn't need today

## 2023-06-28 ENCOUNTER — OFFICE VISIT (OUTPATIENT)
Dept: PHYSICAL THERAPY | Age: 88
End: 2023-06-28
Payer: MEDICARE

## 2023-06-28 DIAGNOSIS — M25.562 CHRONIC PAIN OF LEFT KNEE: Primary | ICD-10-CM

## 2023-06-28 DIAGNOSIS — G89.29 CHRONIC PAIN OF LEFT KNEE: Primary | ICD-10-CM

## 2023-06-28 DIAGNOSIS — R26.2 AMBULATORY DYSFUNCTION: ICD-10-CM

## 2023-06-28 PROCEDURE — 97110 THERAPEUTIC EXERCISES: CPT | Performed by: PHYSICAL THERAPIST

## 2023-06-28 NOTE — PROGRESS NOTES
Daily Note     Today's date: 2023  Patient name: Tye Calvert  : 1935  MRN: [id number]  Referring provider: Carlita Brown DO  Dx:   Encounter Diagnosis     ICD-10-CM    1  Chronic pain of left knee  M25 562     G89 29       2  Ambulatory dysfunction  R26 2                      Subjective: Patient reports that she's using her restorator at home, having no difficulty nor knee pain while using  Patient reports that she has no knee pain with walking and her activities at home  Objective: See treatment diary below      Assessment: Tolerated treatment well  Patient still having pain with exercises, pain is not severe, rates it as a tightness in the L knee compared to the R LE  Progressed program today to new exercises to progress her strength and balance  Plan: Continue per plan of care        Precautions: L knee pain     Supine with wedge  Manuals 6/15 6/20 6/23 6/28 6/12   Seated L patellar - medial direction and superior direction  Grade I, II   10x 10x today Not needed  10x   Graston L knee - IT band, distal quads, proximal lateral fibular head, patellar tendon and lateral retinaculum x10' gentle  x10' gentle Not needed  x10' gentle                   Neuro Re-Ed                kinesiotape C shape I strip to pull the patella medially; U shape I strip to lift the patella superiorly Trial a discharge Discharged taping  Regular tape lasted 3 days, removed existing tape for skin to breathe, then will re-tape Thursday if needed     Side stepping // bars  2 laps // bars, S assist      Tandem walking // bars                              Ther Ex                                Ham stretch, seated  5x:10 5x:10 5x:10 ea 5x:10 ea   LAQ 2x10 2x10 x10 P* at 2nd set x10 2x10   SLR abd and extension Standing 2x10, and ext Standing 2x10  Standing 2x10 Standing x10 Standing 2x10   Ham curls Standing 2x10 Standing 2x10 Standing 2x10 Standing x10 Standing 2x10            nustep x 7' - pain* Seated HR/TR 20x  20x 2x10      Seated ball squeeze NV NV 20x      Seated hip TB  RTB 20x      Seated marches 10x alternating 10x alternating                                                    Ther Activity                        Gait Training                        Modalities        CP X10' after program Doesn't need today Doesn't need today Doesn't need today

## 2023-07-03 ENCOUNTER — 6 MONTH FOLLOW UP (OUTPATIENT)
Dept: URBAN - METROPOLITAN AREA CLINIC 6 | Facility: CLINIC | Age: 88
End: 2023-07-03

## 2023-07-03 DIAGNOSIS — H35.3132: ICD-10-CM

## 2023-07-03 DIAGNOSIS — H04.123: ICD-10-CM

## 2023-07-03 DIAGNOSIS — Z96.1: ICD-10-CM

## 2023-07-03 PROCEDURE — 92014 COMPRE OPH EXAM EST PT 1/>: CPT

## 2023-07-03 PROCEDURE — 92134 CPTRZ OPH DX IMG PST SGM RTA: CPT

## 2023-07-03 ASSESSMENT — TONOMETRY
OD_IOP_MMHG: 13
OS_IOP_MMHG: 12

## 2023-07-03 ASSESSMENT — VISUAL ACUITY
OD_CC: 20/20-2
OS_CC: 20/25-1

## 2023-07-05 ENCOUNTER — OFFICE VISIT (OUTPATIENT)
Dept: PHYSICAL THERAPY | Age: 88
End: 2023-07-05
Payer: MEDICARE

## 2023-07-05 DIAGNOSIS — M25.562 CHRONIC PAIN OF LEFT KNEE: Primary | ICD-10-CM

## 2023-07-05 DIAGNOSIS — G89.29 CHRONIC PAIN OF LEFT KNEE: Primary | ICD-10-CM

## 2023-07-05 DIAGNOSIS — R26.2 AMBULATORY DYSFUNCTION: ICD-10-CM

## 2023-07-05 PROCEDURE — 97110 THERAPEUTIC EXERCISES: CPT | Performed by: PHYSICAL THERAPIST

## 2023-07-05 NOTE — PROGRESS NOTES
Daily Note     Today's date: 2023  Patient name: Melinda Herring  : 1935  MRN: [id number]  Referring provider: Jorge Pena DO  Dx:   Encounter Diagnosis     ICD-10-CM    1. Chronic pain of left knee  M25.562     G89.29       2. Ambulatory dysfunction  R26.2                      Subjective: Patient reports that she's exercising at home in addition to PT and feels that she's making good progress with reduction in L knee pain; however, patient is feeling more R knee pain, generalized. Objective: See treatment diary below      Assessment: Tolerated treatment well. Patient complaining of intermittent pain in B knees today, did resolve with rest in therapy. Patient's strength is progressing well, no increase in pain after PT today. Patient does feel the exercises are beneficial to her for reducing her pain. Plan: Continue per plan of care.       Precautions: L knee pain     Supine with wedge  Manuals 6/15 6/20 6/23 6/28 7/   Seated L patellar - medial direction and superior direction  Grade I, II   10x 10x today Not needed     Graston L knee - IT band, distal quads, proximal lateral fibular head, patellar tendon and lateral retinaculum x10' gentle  x10' gentle Not needed                     Neuro Re-Ed                kinesiotape C shape I strip to pull the patella medially; U shape I strip to lift the patella superiorly Trial a discharge Discharged taping       Side stepping // bars  2 laps // bars, S assist 2 laps // bars, S assist      Tandem walking // bars                              Ther Ex                                Ham stretch, seated  5x:10 5x:10 5x:10 ea 5x:10 ea   LAQ 2x10 2x10 x10 P* at 2nd set x10 2x10   SLR abd and extension Standing 2x10, and ext Standing 2x10  Standing 2x10 Standing x10 Standing 2x10   Ham curls Standing 2x10 Standing 2x10 Standing 2x10 Standing x10 Standing 2x10            nustep x 7' - pain* Seated HR/TR 20x  20x 2x10 20x     Seated ball squeeze NV NV 20x 20x     Seated hip TB  RTB 20x RTB 20x     Seated marches 10x alternating 10x alternating 20x alternating                                                   Ther Activity                        Gait Training                        Modalities        CP X10' after program Doesn't need today Doesn't need today Doesn't need today Doesn't need

## 2023-07-19 ENCOUNTER — OFFICE VISIT (OUTPATIENT)
Dept: PHYSICAL THERAPY | Age: 88
End: 2023-07-19
Payer: MEDICARE

## 2023-07-19 DIAGNOSIS — M25.562 CHRONIC PAIN OF LEFT KNEE: Primary | ICD-10-CM

## 2023-07-19 DIAGNOSIS — R26.2 AMBULATORY DYSFUNCTION: ICD-10-CM

## 2023-07-19 DIAGNOSIS — G89.29 CHRONIC PAIN OF LEFT KNEE: Primary | ICD-10-CM

## 2023-07-19 PROCEDURE — 97110 THERAPEUTIC EXERCISES: CPT | Performed by: PHYSICAL MEDICINE & REHABILITATION

## 2023-07-19 NOTE — PROGRESS NOTES
Daily Note     Today's date: 2023  Patient name: Macho Cotton  : 1935  MRN: [id number]  Referring provider: Vanessa Beyer DO  Dx:   Encounter Diagnosis     ICD-10-CM    1. Chronic pain of left knee  M25.562     G89.29       2. Ambulatory dysfunction  R26.2                      Subjective: Patient offers no new complaints to begin session. Able to perform 50 revolutions at home on pedal set. Objective: See treatment diary below    Assessment: Tolerated treatment well overall with seated breaks between standing exercise. (+) LE fatigue end of session, able to complete many exercises bilaterally this date. Continue as able nv. Plan: Continue per plan of care.       Precautions: L knee pain     Supine with wedge  Manuals    Seated L patellar - medial direction and superior direction    Not needed     Graston L knee - IT band, distal quads, proximal lateral fibular head, patellar tendon and lateral retinaculum   Not needed                     Neuro Re-Ed                kinesiotape   Discharged taping     Sidestepping 2 laps, S and VCs   2 laps // bars, S assist 2 laps // bars, S assist                                    Ther Ex                                Ham stretch, seated   5x:10 5x:10 ea 5x:10 ea   LAQ 2x10 ea  x10 P* at 2nd set x10 2x10   SLR abd and extension Stand 2x10 ea abduction B, ext L only  Standing 2x10 Standing x10 Standing 2x10   Ham curls Stand 2x10 ea  Standing 2x10 Standing x10 Standing 2x10           Seated HR/TR 20x  20x 2x10 20x   Seated ball squeeze 20x  NV 20x 20x   Seated hip abd RTB 20x   RTB 20x RTB 20x   Seated alternating march 20x  10x alternating 10x alternating 20x alternating                                                   Ther Activity                        Gait Training                        Modalities        CP   Doesn't need today Doesn't need today Doesn't need

## 2023-07-26 ENCOUNTER — OFFICE VISIT (OUTPATIENT)
Dept: PHYSICAL THERAPY | Age: 88
End: 2023-07-26
Payer: MEDICARE

## 2023-07-26 DIAGNOSIS — G89.29 CHRONIC PAIN OF LEFT KNEE: Primary | ICD-10-CM

## 2023-07-26 DIAGNOSIS — R26.2 AMBULATORY DYSFUNCTION: ICD-10-CM

## 2023-07-26 DIAGNOSIS — M25.562 CHRONIC PAIN OF LEFT KNEE: Primary | ICD-10-CM

## 2023-07-26 PROCEDURE — 97110 THERAPEUTIC EXERCISES: CPT | Performed by: PHYSICAL THERAPIST

## 2023-07-26 NOTE — PROGRESS NOTES
Daily Note     Today's date: 2023  Patient name: Wilder Clifton  : 1935  MRN: [id number]  Referring provider: Cam Nagel DO  Dx:   Encounter Diagnosis     ICD-10-CM    1. Chronic pain of left knee  M25.562     G89.29       2. Ambulatory dysfunction  R26.2                      Subjective: Patient reports that she was sore after last session, nothing significant as she was able to make brownies this morning prior to PT. Objective: See treatment diary below      Assessment: Tolerated treatment well. Patient tolerating new exercises well, does still have pain with some exercising, is alleviated with rest between exercises. Plan: Continue per plan of care.       Precautions: L knee pain     Supine with wedge  Manuals    Seated L patellar - medial direction and superior direction    Not needed     Graston L knee - IT band, distal quads, proximal lateral fibular head, patellar tendon and lateral retinaculum   Not needed                     Neuro Re-Ed                kinesiotape   Discharged taping     Sidestepping 2 laps, S and VCs   2 laps // bars, S assist 2 laps // bars, S assist      Sit<>stand chair height, 4x 1 foam pad, S assist for safety                              Ther Ex                                Ham stretch, seated   5x:10 5x:10 ea 5x:10 ea   LAQ 2x10 ea 2x10 x10 P* at 2nd set x10 2x10   SLR abd and extension Stand 2x10 ea abduction B, ext L only 2x10 ea, standing Standing 2x10 Standing x10 Standing 2x10   Ham curls Stand 2x10 ea Stand 2x10 ea Standing 2x10 Standing x10 Standing 2x10           Seated HR/TR 20x 20x 20x 2x10 20x   Seated ball squeeze 20x 20x NV 20x 20x   Seated hip abd RTB 20x RTB 20x  RTB 20x RTB 20x   Seated alternating march 20x 20x 10x alternating 10x alternating 20x alternating                                                   Ther Activity                        Gait Training                        Modalities        CP Doesn't need today Doesn't need today Doesn't need

## 2023-08-02 ENCOUNTER — APPOINTMENT (OUTPATIENT)
Dept: PHYSICAL THERAPY | Age: 88
End: 2023-08-02
Payer: MEDICARE

## 2023-08-09 ENCOUNTER — OFFICE VISIT (OUTPATIENT)
Dept: PHYSICAL THERAPY | Age: 88
End: 2023-08-09
Payer: MEDICARE

## 2023-08-09 DIAGNOSIS — G89.29 CHRONIC PAIN OF LEFT KNEE: Primary | ICD-10-CM

## 2023-08-09 DIAGNOSIS — R26.2 AMBULATORY DYSFUNCTION: ICD-10-CM

## 2023-08-09 DIAGNOSIS — M25.562 CHRONIC PAIN OF LEFT KNEE: Primary | ICD-10-CM

## 2023-08-09 PROCEDURE — 97110 THERAPEUTIC EXERCISES: CPT

## 2023-08-09 NOTE — PROGRESS NOTES
Daily Note     Today's date: 2023  Patient name: Daniel Vásquez  : 1935  MRN: [id number]  Referring provider: Kera Monte DO  Dx:   Encounter Diagnosis     ICD-10-CM    1. Chronic pain of left knee  M25.562     G89.29       2. Ambulatory dysfunction  R26.2                      Subjective: Patient states that she feels the exercise post visit, but it calms down after a day of rest.      Objective: See treatment diary below      Assessment: Tolerated treatment well. Cues utilized for reps. Patient demonstrated fatigue post treatment and would benefit from continued PT. Plan: Continue per plan of care.       Precautions: L knee pain     Supine with wedge  Manuals    Seated L patellar - medial direction and superior direction         Graston L knee - IT band, distal quads, proximal lateral fibular head, patellar tendon and lateral retinaculum                        Neuro Re-Ed                kinesiotape        Sidestepping 2 laps, S and VCs   2 laps // bars, S assist 2 laps // bars, S assist      Sit<>stand chair height, 4x 1 foam pad, S assist for safety Sit<>stand chair height, 7x 1 foam pad, S assist for safety                             Ther Ex                                Ham stretch, seated    5x:10 ea 5x:10 ea   LAQ 2x10 ea 2x10 2x10 x10 2x10   SLR abd and extension Stand 2x10 ea abduction B, ext L only 2x10 ea, standing 2x10 ea, standing Standing x10 Standing 2x10   Ham curls Stand 2x10 ea Stand 2x10 ea Stand 2x10 ea Standing x10 Standing 2x10           Seated HR/TR 20x 20x 20x 2x10 20x   Seated ball squeeze 20x 20x 20x 20x 20x   Seated hip abd RTB 20x RTB 20x RTB 20x RTB 20x RTB 20x   Seated alternating march 20x 20x 20x 10x alternating 20x alternating                                                   Ther Activity                        Gait Training                        Modalities        CP    Doesn't need today Doesn't need

## 2023-08-11 DIAGNOSIS — E78.2 MIXED HYPERLIPIDEMIA: ICD-10-CM

## 2023-08-11 RX ORDER — ROSUVASTATIN CALCIUM 10 MG/1
TABLET, COATED ORAL
Qty: 22 TABLET | OUTPATIENT
Start: 2023-08-11

## 2023-08-11 RX ORDER — ROSUVASTATIN CALCIUM 10 MG/1
TABLET, COATED ORAL
Qty: 21 TABLET | Refills: 3 | Status: SHIPPED | OUTPATIENT
Start: 2023-08-11

## 2023-08-16 ENCOUNTER — OFFICE VISIT (OUTPATIENT)
Dept: PHYSICAL THERAPY | Age: 88
End: 2023-08-16
Payer: MEDICARE

## 2023-08-16 DIAGNOSIS — M25.562 CHRONIC PAIN OF LEFT KNEE: Primary | ICD-10-CM

## 2023-08-16 DIAGNOSIS — G89.29 CHRONIC PAIN OF LEFT KNEE: Primary | ICD-10-CM

## 2023-08-16 DIAGNOSIS — R26.2 AMBULATORY DYSFUNCTION: ICD-10-CM

## 2023-08-16 PROCEDURE — 97110 THERAPEUTIC EXERCISES: CPT | Performed by: PHYSICAL THERAPIST

## 2023-08-16 NOTE — PROGRESS NOTES
Daily Note     Today's date: 2023  Patient name: Cristina Peña  : 1935  MRN: [id number]  Referring provider: Millard Fleischer, DO  Dx:   Encounter Diagnosis     ICD-10-CM    1. Chronic pain of left knee  M25.562     G89.29       2. Ambulatory dysfunction  R26.2                      Subjective: Patient reports that her knee still bothers her from time to time, no significant knee pain today. Patient describes her knee pain as a heaviness in the knee. Objective: See treatment diary below      Assessment: Tolerated treatment well. Patient tolerating progressions well today, does have some pain with LAQ; however, is able to complete the exercises without increase in pain. Less difficulty with sit<>stands today, still needs close S assist for safety. Patient's gait and balance is improving with PT as her strength improves. Plan: Continue per plan of care.       Precautions: L knee pain     Supine with wedge  Manuals  8/ 7/   Seated L patellar - medial direction and superior direction         Graston L knee - IT band, distal quads, proximal lateral fibular head, patellar tendon and lateral retinaculum                        Neuro Re-Ed                        Sidestepping 2 laps, S and VCs   2 laps, ballet bar S assist  2 laps // bars, S assist      Sit<>stand chair height, 4x 1 foam pad, S assist for safety Sit<>stand chair height, 7x 1 foam pad, S assist for safety 2 x 5 S for safety   1 foam pad                            Ther Ex                                        LAQ 2x10 ea 2x10 2x10 2 x 10 2x10   SLR abd and extension Stand 2x10 ea abduction B, ext L only 2x10 ea, standing 2x10 ea, standing Standing 2 x10 Standing 2x10   Ham curls Stand 2x10 ea Stand 2x10 ea Stand 2x10 ea Standing 2 x10 Standing 2x10       Standing HR 2 x 10    Seated HR/TR 20x 20x 20x 2x10 20x   Seated ball squeeze 20x 20x 20x 20x 20x   Seated hip abd RTB 20x RTB 20x RTB 20x RTB 20x NT RTB 20x Seated alternating march 20x 20x 20x 20x alternating 20x alternating                                                   Ther Activity                        Gait Training                        Modalities        CP    Doesn't need today Doesn't need

## 2023-08-23 ENCOUNTER — OFFICE VISIT (OUTPATIENT)
Dept: PHYSICAL THERAPY | Age: 88
End: 2023-08-23
Payer: MEDICARE

## 2023-08-23 DIAGNOSIS — R26.2 AMBULATORY DYSFUNCTION: ICD-10-CM

## 2023-08-23 DIAGNOSIS — M25.562 CHRONIC PAIN OF LEFT KNEE: Primary | ICD-10-CM

## 2023-08-23 DIAGNOSIS — G89.29 CHRONIC PAIN OF LEFT KNEE: Primary | ICD-10-CM

## 2023-08-23 PROCEDURE — 97112 NEUROMUSCULAR REEDUCATION: CPT | Performed by: PHYSICAL THERAPIST

## 2023-08-23 PROCEDURE — 97110 THERAPEUTIC EXERCISES: CPT | Performed by: PHYSICAL THERAPIST

## 2023-08-23 NOTE — PROGRESS NOTES
Daily Note     Today's date: 2023  Patient name: Valeriano Farah  : 1935  MRN: [id number]  Referring provider: Ailyn Santillan DO  Dx:   Encounter Diagnosis     ICD-10-CM    1. Chronic pain of left knee  M25.562     G89.29       2. Ambulatory dysfunction  R26.2                      Subjective: Patient reports that her knee is feeling stiff today with the exercises, not painful as it was previously. Objective: See treatment diary below      Assessment: Tolerated treatment well. Patient tolerating exercises very well today, would be ready for a small ankle weight NV pending her knee pain and response to exercises. Patient sees ortho next week; discussed benefits for therapy for her balance, gait and posture/UEs to assist with patient's independence at home. Patient not compliant with HEP at home yet. Plan: Continue per plan of care. Precautions: L knee pain     Supine with wedge  Manuals                                    Neuro Re-Ed                        Sidestepping 2 laps, S and VCs   2 laps, ballet bar S assist  2 laps // bars, S assist - NT      Sit<>stand chair height, 4x 1 foam pad, S assist for safety Sit<>stand chair height, 7x 1 foam pad, S assist for safety 2 x 5 S for safety   1 foam pad 2x5 S for safety                           Ther Ex                                        LAQ 2x10 ea 2x10 2x10 2 x 10 2x10   SLR abd and extension Stand 2x10 ea abduction B, ext L only 2x10 ea, standing 2x10 ea, standing Standing 2 x10 Standing 2x10 added SLR flexion today too    Ham curls Stand 2x10 ea Stand 2x10 ea Stand 2x10 ea Standing 2 x10 Standing 2x10       Standing HR 2 x 10 Standing HR 2x10   Seated HR/TR 20x 20x 20x 2x10 20x   Seated ball squeeze 20 20x 20x 20x 20x   Seated hip abd RTB 20x RTB 20x RTB 20x RTB 20x NT NT bothered her lateral knee.    Seated alternating march 20x 20x 20x 20x alternating 20x alternating Ther Activity                        Gait Training                        Modalities        CP    Doesn't need today Doesn't need

## 2023-08-30 ENCOUNTER — OFFICE VISIT (OUTPATIENT)
Dept: PHYSICAL THERAPY | Age: 88
End: 2023-08-30
Payer: MEDICARE

## 2023-08-30 DIAGNOSIS — R26.2 AMBULATORY DYSFUNCTION: ICD-10-CM

## 2023-08-30 DIAGNOSIS — G89.29 CHRONIC PAIN OF LEFT KNEE: Primary | ICD-10-CM

## 2023-08-30 DIAGNOSIS — M25.562 CHRONIC PAIN OF LEFT KNEE: Primary | ICD-10-CM

## 2023-08-30 PROCEDURE — 97110 THERAPEUTIC EXERCISES: CPT | Performed by: PHYSICAL THERAPIST

## 2023-08-30 NOTE — PROGRESS NOTES
Daily Note     Today's date: 2023  Patient name: Ann Carlson  : 1935  MRN: [id number]  Referring provider: Tray Holder DO  Dx:   Encounter Diagnosis     ICD-10-CM    1. Chronic pain of left knee  M25.562     G89.29       2. Ambulatory dysfunction  R26.2                      Subjective: Patient reports that she's still having L  Knee pain, was hurting this morning. Patient rates her pain 5/10. Objective: See treatment diary below      Assessment: Tolerated treatment well. Patient with good tolerance to therapy. Patient does have pain with knee flex/ext exercises, eases with rest. Patient's LE strength is improving well. Patient would benefit to work on her walking longer distances with the cane- PT will add lap walking at future sessions. Plan: Continue per plan of care. Precautions: L knee pain     Supine with wedge  Manuals                                    Neuro Re-Ed                        Sidestepping 3 laps // bars   2 laps, ballet bar S assist  2 laps // bars, S assist - NT    STS 2x5 S for safety, using hands today Sit<>stand chair height, 4x 1 foam pad, S assist for safety Sit<>stand chair height, 7x 1 foam pad, S assist for safety 2 x 5 S for safety   1 foam pad 2x5 S for safety                           Ther Ex                                        LAQ 2x10 ea 2x10 2x10 2 x 10 2x10   SLR x 3, alternating Stand 2x10 ea  2x10 ea, standing 2x10 ea, standing Standing 2 x10 Standing 2x10 added SLR flexion today too    Ham curls NT too painful at the knee Stand 2x10 ea Stand 2x10 ea Standing 2 x10 Standing 2x10       Standing HR 2 x 10 Standing HR 2x10   Seated HR/TR 20x 20x 20x 2x10 20x   Seated ball squeeze 20x 20x 20x 20x 20x   Seated hip abd RTB 20x RTB 20x RTB 20x RTB 20x NT NT bothered her lateral knee.    Seated alternating march 20x 20x 20x 20x alternating 20x alternating                                                   Ther Activity Gait Training                        Modalities        CP    Doesn't need today Doesn't need

## 2023-09-06 ENCOUNTER — OFFICE VISIT (OUTPATIENT)
Dept: PHYSICAL THERAPY | Age: 88
End: 2023-09-06
Payer: MEDICARE

## 2023-09-06 DIAGNOSIS — R26.2 AMBULATORY DYSFUNCTION: ICD-10-CM

## 2023-09-06 DIAGNOSIS — M25.562 CHRONIC PAIN OF LEFT KNEE: Primary | ICD-10-CM

## 2023-09-06 DIAGNOSIS — G89.29 CHRONIC PAIN OF LEFT KNEE: Primary | ICD-10-CM

## 2023-09-06 PROCEDURE — 97110 THERAPEUTIC EXERCISES: CPT

## 2023-09-06 NOTE — PROGRESS NOTES
Daily Note     Today's date: 2023  Patient name: Kev Chawla  : 1935  MRN: [id number]  Referring provider: Augusto Eddy DO  Dx:   Encounter Diagnosis     ICD-10-CM    1. Chronic pain of left knee  M25.562     G89.29       2. Ambulatory dysfunction  R26.2                      Subjective: Pt reports redness to anterior neck due to necklace. Pt feels "a little more pain" in L knee today, she says was standing a lot cooking yesterday. Objective: See treatment diary below      Assessment: Tolerated treatment well able to complete program. Pt c/o of slight pain in anterior knee during WB/ standing activities; however no increase in pain. Patient demonstrated fatigue post treatment Patient would benefit from continued PT to improve pain symptoms and strength to optimize return to prior functional mobility. Plan: Continue per plan of care. Precautions: L knee pain     Supine with wedge  Manuals 2023                                   Neuro Re-Ed                        Sidestepping 3 laps // bars 4 laps plinth  2 laps, ballet bar S assist  2 laps // bars, S assist - NT    STS 2x5 S for safety, using hands today 2x5 S for safety,using hands today Sit<>stand chair height, 7x 1 foam pad, S assist for safety 2 x 5 S for safety   1 foam pad 2x5 S for safety                           Ther Ex                                        LAQ 2x10 ea 2x10 2x10 2 x 10 2x10   SLR x 3, alternating Stand 2x10 ea  Stand 2x10 ea  2x10 ea, standing Standing 2 x10 Standing 2x10 added SLR flexion today too    Ham curls NT too painful at the knee x5 ea Stand 2x10 ea Standing 2 x10 Standing 2x10       Standing HR 2 x 10 Standing HR 2x10   Seated HR/TR 20x 25x 20x 2x10 20x   Seated ball squeeze 20x 20x 3 sec 20x 20x 20x   Seated hip abd RTB 20x RTB 20x RTB 20x RTB 20x NT NT bothered her lateral knee.    Seated alternating march 20x 20x 20x 20x alternating 20x alternating Ther Activity                        Gait Training                        Modalities        CP  Doesn't need today  Doesn't need today Doesn't need

## 2023-09-20 ENCOUNTER — APPOINTMENT (OUTPATIENT)
Dept: PHYSICAL THERAPY | Age: 88
End: 2023-09-20
Payer: MEDICARE

## 2023-09-27 ENCOUNTER — OFFICE VISIT (OUTPATIENT)
Dept: PHYSICAL THERAPY | Age: 88
End: 2023-09-27
Payer: MEDICARE

## 2023-09-27 DIAGNOSIS — G89.29 CHRONIC PAIN OF LEFT KNEE: Primary | ICD-10-CM

## 2023-09-27 DIAGNOSIS — M25.562 CHRONIC PAIN OF LEFT KNEE: Primary | ICD-10-CM

## 2023-09-27 DIAGNOSIS — R26.2 AMBULATORY DYSFUNCTION: ICD-10-CM

## 2023-09-27 PROCEDURE — 97112 NEUROMUSCULAR REEDUCATION: CPT

## 2023-09-27 PROCEDURE — 97110 THERAPEUTIC EXERCISES: CPT

## 2023-09-27 NOTE — PROGRESS NOTES
Daily Note     Today's date: 2023  Patient name: Tamera Collier  : 1935  MRN: [id number]  Referring provider: Dayana Ramirez DO  Dx:   Encounter Diagnosis     ICD-10-CM    1. Chronic pain of left knee  M25.562     G89.29       2. Ambulatory dysfunction  R26.2           Start Time: 66  Stop Time: 1130  Total time in clinic (min): 45 minutes    Subjective: Pt reports feeling good no new issues or concerns. Objective: See treatment diary below      Assessment: Tolerated treatment well able to complete program. Pt c/o of slight pain during seated marches to posterior hips; however no increase in pain. Patient c/o of pulling/pressure/tight sensation to L anterior knee during LAQ due to ongoing chronic left knee pain  Patient demonstrated fatigue post treatment Patient would benefit from continued PT to improve pain symptoms and strength to optimize return to prior functional mobility. Plan: Continue per plan of care. Precautions: L knee pain     Supine with wedge  Manuals                                    Neuro Re-Ed                        Sidestepping 3 laps // bars 4 laps plinth 4 laps plinth 2 laps, ballet bar S assist  2 laps // bars, S assist - NT    STS 2x5 S for safety, using hands today 2x5 S for safety,using hands today 2x5 S for safety,using hands today 2 x 5 S for safety   1 foam pad 2x5 S for safety                           Ther Ex                                        LAQ 2x10 ea 2x10 2x10 2 x 10 2x10   SLR x 3, alternating Stand 2x10 ea  Stand 2x10 ea  Stand 2x10 ea  Standing 2 x10 Standing 2x10 added SLR flexion today too    Ham curls NT too painful at the knee x5 ea x5 ea Standing 2 x10 Standing 2x10       Standing HR 2 x 10 Standing HR 2x10   Seated HR/TR 20x 25x 25x 2x10 20x   Seated ball squeeze 20x 20x 3 sec 20x 3 sec 20x 20x   Seated hip abd RTB 20x RTB 20x RTB 25x RTB 20x NT NT bothered her lateral knee.    Seated alternating march 20x 20x 20x 20x alternating 20x alternating                                                   Ther Activity                        Gait Training                        Modalities        CP  Doesn't need today NT Doesn't need today Doesn't need

## 2023-10-04 ENCOUNTER — OFFICE VISIT (OUTPATIENT)
Dept: PHYSICAL THERAPY | Age: 88
End: 2023-10-04
Payer: MEDICARE

## 2023-10-04 DIAGNOSIS — M25.562 CHRONIC PAIN OF LEFT KNEE: Primary | ICD-10-CM

## 2023-10-04 DIAGNOSIS — G89.29 CHRONIC PAIN OF LEFT KNEE: Primary | ICD-10-CM

## 2023-10-04 DIAGNOSIS — R26.2 AMBULATORY DYSFUNCTION: ICD-10-CM

## 2023-10-04 PROCEDURE — 97110 THERAPEUTIC EXERCISES: CPT

## 2023-10-04 PROCEDURE — 97112 NEUROMUSCULAR REEDUCATION: CPT

## 2023-10-04 NOTE — PROGRESS NOTES
Daily Note     Today's date: 10/4/2023  Patient name: Alexys Rosas  : 1935  MRN: [id number]  Referring provider: Keyona Osorio DO  Dx:   Encounter Diagnosis     ICD-10-CM    1. Chronic pain of left knee  M25.562     G89.29       2. Ambulatory dysfunction  R26.2           Start Time: 8534  Stop Time: 1130  Total time in clinic (min): 45 minutes    Subjective: Pt reports feeling good no new issues or concerns. Objective: See treatment diary below      Assessment:  Patient tolerated session well with focus on standing exercises to patience tolerance. Patient overall appears to be improving with strength as demonstrated by increase in reps and ability to tolerate weight bearing activates; however is still limited due knee pain. Patient continues to be challenged with STS; however cueing for nose of over toe during exercise help to improve ascend with good results. Patient was fatigued post session today. Patient would benefit from further PT. Plan: Continue per plan of care.       Precautions: L knee pain     Supine with wedge  Manuals 8/30 9/6   9/27 10/4 8/23                                   Neuro Re-Ed                        Sidestepping 3 laps // bars 4 laps plinth 4 laps plinth 4 laps// bars no UE  1 lap plinth no UE   supervsion 2 laps // bars, S assist - NT    STS 2x5 S for safety, using hands today 2x5 S for safety,using hands today 2x5 S for safety,using hands today 2x5 S for safety,using hands today 2x5 S for safety                           Ther Ex                                        LAQ 2x10 ea 2x10 2x10 2x10 ea 2x10   SLR x 3, alternating Stand 2x10 ea  Stand 2x10 ea  Stand 2x10 ea   Standing 2x10 added SLR flexion today too    Ham curls NT too painful at the knee x5 ea x5 ea 10 ea  Standing 2x10        Standing HR 2x10   Seated HR/TR 20x 25x 25x Standing 20x ea 20x   Seated ball squeeze 20x 20x 3 sec 20x 3 sec 25x 3 sec 20x   Seated hip abd RTB 20x RTB 20x RTB 25x RTB 25x NT bothered her lateral knee.    Seated alternating march 20x 20x 20x Standing 10x  S for safety 20x alternating                                                   Ther Activity                        Gait Training                        Modalities        CP  Doesn't need today 5' Doesn't need today Doesn't need

## 2023-10-10 ENCOUNTER — RA CDI HCC (OUTPATIENT)
Dept: OTHER | Facility: HOSPITAL | Age: 88
End: 2023-10-10

## 2023-10-11 ENCOUNTER — APPOINTMENT (OUTPATIENT)
Dept: PHYSICAL THERAPY | Age: 88
End: 2023-10-11
Payer: MEDICARE

## 2023-10-11 NOTE — PROGRESS NOTES
Daily Note     Today's date: 10/11/2023  Patient name: Gilbert Robertson  : 1935  MRN: [id number]  Referring provider: Lauryn Geronimo DO  Dx:   No diagnosis found. Subjective: Pt reports feeling good no new issues or concerns. Objective: See treatment diary below      Assessment:  Patient tolerated session well with focus on standing exercises to patience tolerance. Patient overall appears to be improving with strength as demonstrated by increase in reps and ability to tolerate weight bearing activates; however is still limited due knee pain. Patient continues to be challenged with STS; however cueing for nose of over toe during exercise help to improve ascend with good results. Patient was fatigued post session today. Patient would benefit from further PT. Plan: Continue per plan of care.       Precautions: L knee pain     Supine with wedge  Manuals 8/30 9/6   9/27 10/4 10/11                                   Neuro Re-Ed                        Sidestepping 3 laps // bars 4 laps plinth 4 laps plinth 4 laps// bars no UE  1 lap plinth no UE   supervsion    STS 2x5 S for safety, using hands today 2x5 S for safety,using hands today 2x5 S for safety,using hands today 2x5 S for safety,using hands today                            Ther Ex                                        LAQ 2x10 ea 2x10 2x10 2x10 ea    SLR x 3, alternating Stand 2x10 ea  Stand 2x10 ea  Stand 2x10 ea       Ham curls NT too painful at the knee x5 ea x5 ea 10 ea             Seated HR/TR 20x 25x 25x Standing 20x ea    Seated ball squeeze 20x 20x 3 sec 20x 3 sec 25x 3 sec    Seated hip abd RTB 20x RTB 20x RTB 25x RTB 25x    Seated alternating march 20x 20x 20x Standing 10x  S for safety                                                    Ther Activity                        Gait Training                        Modalities        CP  Doesn't need today 5' Doesn't need today

## 2023-10-13 ENCOUNTER — APPOINTMENT (OUTPATIENT)
Dept: LAB | Age: 88
End: 2023-10-13
Payer: MEDICARE

## 2023-10-13 DIAGNOSIS — E78.2 MIXED HYPERLIPIDEMIA: ICD-10-CM

## 2023-10-13 DIAGNOSIS — H91.93 BILATERAL HEARING LOSS, UNSPECIFIED HEARING LOSS TYPE: ICD-10-CM

## 2023-10-13 DIAGNOSIS — R73.9 HYPERGLYCEMIA: Chronic | ICD-10-CM

## 2023-10-13 DIAGNOSIS — H81.12 BPPV (BENIGN PAROXYSMAL POSITIONAL VERTIGO), LEFT: ICD-10-CM

## 2023-10-13 DIAGNOSIS — Z17.0 MALIGNANT NEOPLASM OF UPPER-OUTER QUADRANT OF RIGHT BREAST IN FEMALE, ESTROGEN RECEPTOR POSITIVE: ICD-10-CM

## 2023-10-13 DIAGNOSIS — C50.411 MALIGNANT NEOPLASM OF UPPER-OUTER QUADRANT OF RIGHT BREAST IN FEMALE, ESTROGEN RECEPTOR POSITIVE: ICD-10-CM

## 2023-10-13 DIAGNOSIS — R26.2 AMBULATORY DYSFUNCTION: ICD-10-CM

## 2023-10-13 DIAGNOSIS — G89.29 CHRONIC PAIN OF LEFT KNEE: ICD-10-CM

## 2023-10-13 DIAGNOSIS — I10 ESSENTIAL HYPERTENSION: ICD-10-CM

## 2023-10-13 DIAGNOSIS — M25.562 CHRONIC PAIN OF LEFT KNEE: ICD-10-CM

## 2023-10-13 LAB
ALBUMIN SERPL BCP-MCNC: 4.1 G/DL (ref 3.5–5)
ALP SERPL-CCNC: 70 U/L (ref 34–104)
ALT SERPL W P-5'-P-CCNC: 15 U/L (ref 7–52)
ANION GAP SERPL CALCULATED.3IONS-SCNC: 10 MMOL/L
AST SERPL W P-5'-P-CCNC: 20 U/L (ref 13–39)
BASOPHILS # BLD AUTO: 0.06 THOUSANDS/ÂΜL (ref 0–0.1)
BASOPHILS NFR BLD AUTO: 1 % (ref 0–1)
BILIRUB SERPL-MCNC: 0.78 MG/DL (ref 0.2–1)
BUN SERPL-MCNC: 21 MG/DL (ref 5–25)
CALCIUM SERPL-MCNC: 9.4 MG/DL (ref 8.4–10.2)
CHLORIDE SERPL-SCNC: 107 MMOL/L (ref 96–108)
CHOLEST SERPL-MCNC: 160 MG/DL
CO2 SERPL-SCNC: 25 MMOL/L (ref 21–32)
CREAT SERPL-MCNC: 0.78 MG/DL (ref 0.6–1.3)
EOSINOPHIL # BLD AUTO: 0.47 THOUSAND/ÂΜL (ref 0–0.61)
EOSINOPHIL NFR BLD AUTO: 7 % (ref 0–6)
ERYTHROCYTE [DISTWIDTH] IN BLOOD BY AUTOMATED COUNT: 13 % (ref 11.6–15.1)
EST. AVERAGE GLUCOSE BLD GHB EST-MCNC: 100 MG/DL
GFR SERPL CREATININE-BSD FRML MDRD: 68 ML/MIN/1.73SQ M
GLUCOSE P FAST SERPL-MCNC: 93 MG/DL (ref 65–99)
HBA1C MFR BLD: 5.1 %
HCT VFR BLD AUTO: 43.7 % (ref 34.8–46.1)
HDLC SERPL-MCNC: 47 MG/DL
HGB BLD-MCNC: 14.5 G/DL (ref 11.5–15.4)
IMM GRANULOCYTES # BLD AUTO: 0.03 THOUSAND/UL (ref 0–0.2)
IMM GRANULOCYTES NFR BLD AUTO: 0 % (ref 0–2)
LDLC SERPL CALC-MCNC: 82 MG/DL (ref 0–100)
LYMPHOCYTES # BLD AUTO: 1.55 THOUSANDS/ÂΜL (ref 0.6–4.47)
LYMPHOCYTES NFR BLD AUTO: 23 % (ref 14–44)
MCH RBC QN AUTO: 31 PG (ref 26.8–34.3)
MCHC RBC AUTO-ENTMCNC: 33.2 G/DL (ref 31.4–37.4)
MCV RBC AUTO: 93 FL (ref 82–98)
MONOCYTES # BLD AUTO: 0.54 THOUSAND/ÂΜL (ref 0.17–1.22)
MONOCYTES NFR BLD AUTO: 8 % (ref 4–12)
NEUTROPHILS # BLD AUTO: 4.22 THOUSANDS/ÂΜL (ref 1.85–7.62)
NEUTS SEG NFR BLD AUTO: 61 % (ref 43–75)
NRBC BLD AUTO-RTO: 0 /100 WBCS
PLATELET # BLD AUTO: 190 THOUSANDS/UL (ref 149–390)
PMV BLD AUTO: 10.8 FL (ref 8.9–12.7)
POTASSIUM SERPL-SCNC: 3.8 MMOL/L (ref 3.5–5.3)
PROT SERPL-MCNC: 6.5 G/DL (ref 6.4–8.4)
RBC # BLD AUTO: 4.68 MILLION/UL (ref 3.81–5.12)
SODIUM SERPL-SCNC: 142 MMOL/L (ref 135–147)
TRIGL SERPL-MCNC: 155 MG/DL
WBC # BLD AUTO: 6.87 THOUSAND/UL (ref 4.31–10.16)

## 2023-10-13 PROCEDURE — 85025 COMPLETE CBC W/AUTO DIFF WBC: CPT

## 2023-10-13 PROCEDURE — 80053 COMPREHEN METABOLIC PANEL: CPT

## 2023-10-13 PROCEDURE — 36415 COLL VENOUS BLD VENIPUNCTURE: CPT

## 2023-10-13 PROCEDURE — 83036 HEMOGLOBIN GLYCOSYLATED A1C: CPT

## 2023-10-13 PROCEDURE — 80061 LIPID PANEL: CPT

## 2023-10-17 ENCOUNTER — OFFICE VISIT (OUTPATIENT)
Dept: INTERNAL MEDICINE CLINIC | Facility: CLINIC | Age: 88
End: 2023-10-17

## 2023-10-17 VITALS
OXYGEN SATURATION: 97 % | BODY MASS INDEX: 33.26 KG/M2 | DIASTOLIC BLOOD PRESSURE: 70 MMHG | WEIGHT: 165 LBS | HEART RATE: 71 BPM | RESPIRATION RATE: 15 BRPM | HEIGHT: 59 IN | SYSTOLIC BLOOD PRESSURE: 125 MMHG

## 2023-10-17 DIAGNOSIS — C50.411 CARCINOMA OF UPPER-OUTER QUADRANT OF RIGHT BREAST IN FEMALE, ESTROGEN RECEPTOR POSITIVE: ICD-10-CM

## 2023-10-17 DIAGNOSIS — E78.2 MIXED HYPERLIPIDEMIA: ICD-10-CM

## 2023-10-17 DIAGNOSIS — H91.93 BILATERAL HEARING LOSS, UNSPECIFIED HEARING LOSS TYPE: ICD-10-CM

## 2023-10-17 DIAGNOSIS — I10 ESSENTIAL HYPERTENSION: ICD-10-CM

## 2023-10-17 DIAGNOSIS — E53.8 FOLATE DEFICIENCY: ICD-10-CM

## 2023-10-17 DIAGNOSIS — Z23 ENCOUNTER FOR IMMUNIZATION: Primary | ICD-10-CM

## 2023-10-17 DIAGNOSIS — Z17.0 CARCINOMA OF UPPER-OUTER QUADRANT OF RIGHT BREAST IN FEMALE, ESTROGEN RECEPTOR POSITIVE: ICD-10-CM

## 2023-10-17 DIAGNOSIS — E55.9 VITAMIN D DEFICIENCY: ICD-10-CM

## 2023-10-17 DIAGNOSIS — E07.9 THYROID DISEASE: ICD-10-CM

## 2023-10-17 DIAGNOSIS — E53.8 VITAMIN B12 DEFICIENCY: ICD-10-CM

## 2023-10-17 DIAGNOSIS — R41.89 COGNITIVE IMPAIRMENT: ICD-10-CM

## 2023-10-17 RX ORDER — MEMANTINE HYDROCHLORIDE 5 MG/1
10 TABLET ORAL 2 TIMES DAILY
Qty: 180 TABLET | Refills: 1 | Status: SHIPPED | OUTPATIENT
Start: 2023-10-17

## 2023-10-17 NOTE — PROGRESS NOTES
Assessment/Plan:    #Peripheral Neuropathy  -noted in feet  -unable to tolerate gabapentin  -defers capsaicin for now  -is currently stable    #Cognitive Impairment  -memory worsening  -MOCA today 19/30  -will start on namenda  -unable to tolerate MRI neuroquantitative and will hold off     #HLD  -LDL 82 HDL 47 triglycerides 155  -had achiness in legs and cut down crestor to twice a week with relief    #BPPV  -noted in left ear on doris hallpike  -will continue seeing PT  -orthostatic vitals unremarkable previously  -denies chest pain, palpitations  -has vertigo when getting out of bed in the morning  -symptoms exacerbated by 2 pillows sleeping at night and also leaning head back for eye drops    #Knee Pain  -history replacement and has pain over left knee  -had femur fracture due to improper prostheses placed  -pain over lateral knee  -seeing PT  -has a displaced left knee cap  -saw Dr Luis Luong  but he did not recommend surgery due to age     #Shoulder Fracture  -noted on left side  -did PT for a brief time and now stable  -had reverse total shoulder repair May 2021     #HTN  -BP stable today on propranolol and lisinopril  -will continue with current regimen    #SVT  -history of on holter with over 300 short runs  -ECHO EF 60% with grade 1 diastolic dysfunction  -seeing cardiology  -had tremors with metoprolol and now on propranolol with relief    #Left Parotid Mass  -seen on MRI and saw ENT and felt benign    #Peripheral Neuropathy  -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  -immunofixation, heavy metals, B12, MMA, lyme, RF, MIRIAM, TSH all nroaml  -remains on vitamin B6  -had hallucinations with gabapentin     #Carptel Tunnel  -previous left cubital tunnel release and left carpal tunnel release 2019     #Right Shoulder Fracture  -stress fracture to right acromial and underwent right shoulder arthroplasty in the past     #Ovarian Cyst  -enlarged over left side previously and underwent b/l salpingoophorectomy    #Breast Ca  -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  -ER and WA positive and completed radiation 2019  -had new right breast ca, invasive, ductal carcinoma, ER/WA positive, HER2/toy negative  -on anastrazole since Spring 2019  -repeat mammogram scheduled next month     #Health Maintenance  -routine labs and followup 6 months  -covid 1st booster up to date and will consider booster at pharmacy  -lives by herself and keeping busy maintaining her home  -flu vaccine today  -mammogram scheduled next month    I have spent a total time of 40 minutes on 10/17/23 in caring for this patient including Prognosis, Risks and benefits of tx options, Importance of tx compliance, Risk factor reductions, Counseling / Coordination of care, and Documenting in the medical record. No problem-specific Assessment & Plan notes found for this encounter. Diagnoses and all orders for this visit:    Encounter for immunization  -     influenza vaccine, high-dose, PF 0.7 mL (FLUZONE HIGH-DOSE)  -     CBC and differential; Future  -     Comprehensive metabolic panel; Future    Cognitive impairment  -     CBC and differential; Future  -     Comprehensive metabolic panel; Future  -     TSH, 3rd generation with Free T4 reflex; Future  -     memantine (Namenda) 5 mg tablet; Take 2 tablets (10 mg total) by mouth 2 (two) times a day    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, estrogen receptor positive   -     CBC and differential; Future  -     Comprehensive metabolic panel; Future    Essential hypertension  -     CBC and differential; Future  -     Comprehensive metabolic panel; Future    Mixed hyperlipidemia  -     CBC and differential; Future  -     Comprehensive metabolic panel; Future  -     Lipid Panel with Direct LDL reflex; Future    Bilateral hearing loss, unspecified hearing loss type  -     CBC and differential; Future  -     Comprehensive metabolic panel;  Future    Vitamin D deficiency  -     CBC and differential; Future  -     Comprehensive metabolic panel; Future  -     Vitamin D 25 hydroxy; Future    Vitamin B12 deficiency  -     CBC and differential; Future  -     Comprehensive metabolic panel; Future  -     Vitamin B12; Future    Folate deficiency  -     CBC and differential; Future  -     Comprehensive metabolic panel; Future  -     Folate; Future    Thyroid disease  -     TSH, 3rd generation with Free T4 reflex;  Future            Current Outpatient Medications:     memantine (Namenda) 5 mg tablet, Take 2 tablets (10 mg total) by mouth 2 (two) times a day, Disp: 180 tablet, Rfl: 1    acetaminophen (TYLENOL) 325 mg tablet, Take 325 mg by mouth every 6 (six) hours as needed for mild pain, Disp: , Rfl:     amoxicillin (AMOXIL) 500 mg capsule, TAKE 4 CAPSULES 1 HOUR BEFORE DENTAL PROCEDURE, Disp: , Rfl:     anastrozole (ARIMIDEX) 1 mg tablet, Take 1 mg by mouth daily, Disp: , Rfl:     anastrozole (ARIMIDEX) 1 mg tablet, TAKE 1 TABLET BY MOUTH  DAILY, Disp: 90 tablet, Rfl: 3    Cholecalciferol (VITAMIN D3 PO), Take by mouth, Disp: , Rfl:     clotrimazole-betamethasone (LOTRISONE) 1-0.05 % cream, Apply topically 2 (two) times a day, Disp: 60 g, Rfl: 1    CRANBERRY PO, Take 1,700 mg by mouth daily in the early morning , Disp: , Rfl:     ibuprofen (MOTRIN) 200 mg tablet, Take 200 mg by mouth every 6 (six) hours as needed for mild pain , Disp: , Rfl:     lisinopril (ZESTRIL) 10 mg tablet, TAKE 1 TABLET BY MOUTH  TWICE DAILY, Disp: 180 tablet, Rfl: 3    Multiple Vitamins-Minerals (PRESERVISION AREDS 2 PO), Take 2 tablets by mouth daily in the early morning , Disp: , Rfl:     propranolol (INDERAL) 20 mg tablet, take 1 tablet by mouth four times a day, Disp: , Rfl:     propranolol (INDERAL) 20 mg tablet, TAKE 1 TABLET BY MOUTH 4  TIMES DAILY, Disp: 360 tablet, Rfl: 3    pyridoxine (VITAMIN B6) 50 mg tablet, Take 50 mg by mouth daily, Disp: , Rfl:     rosuvastatin (CRESTOR) 10 MG tablet, TAKE 1/2 TABLET BY MOUTH 3 TIMES A WEEK ON MONDAY, WEDNESDAY AND FRIDAY, Disp: 21 tablet, Rfl: 3    rosuvastatin (CRESTOR) 5 mg tablet, TAKE 1 TABLET BY MOUTH 3 TIMES A WEEK, Disp: 250 tablet, Rfl: 1    Subjective:      Patient ID: Daisy Moyer is a 81 y.o. female. HPI patient presents for routine checkup. Denies any recent hospitalizations or surgeries. She continues to have left-sided knee pain and is working with physical therapy. She did see orthopedic surgery but they did not recommend surgery due to her age. She is trying to rehab the need to make it better. The knee Is displaced which is likely causing her symptoms. Her A1c is stable at 5.1. Triglycerides were 155 with HDL 47 and LDL 82. She is on Crestor and we will continue with this. an MoCA score today was 19 out of 30. She has cognitive impairment we will start her on Namenda. She has issues with MRIs and we will hold off on an MRI brain neuro quantitative. Blood pressure stable today on propranolol and lisinopril. She will return to care in 6 months. The following portions of the patient's history were reviewed and updated as appropriate: allergies, current medications, past family history, past medical history, past social history, past surgical history and problem list.    Review of Systems   Constitutional:  Negative for activity change, appetite change, chills, fatigue and fever. HENT:  Negative for congestion, ear pain, facial swelling, hearing loss, sore throat, tinnitus and trouble swallowing. Eyes:  Negative for photophobia and visual disturbance. Respiratory:  Negative for cough, shortness of breath and wheezing. Cardiovascular:  Negative for chest pain and leg swelling. Gastrointestinal:  Negative for abdominal distention, abdominal pain, blood in stool, nausea and vomiting. Genitourinary:  Negative for difficulty urinating, dysuria and pelvic pain. Musculoskeletal:  Positive for arthralgias (left knee). Negative for back pain, gait problem, joint swelling, myalgias, neck pain and neck stiffness. Skin:  Negative for rash and wound. Neurological:  Negative for dizziness, tremors, light-headedness and headaches. Psychiatric/Behavioral:  Positive for decreased concentration. Memory loss         Objective:      /70   Pulse 71   Resp 15   Ht 4' 11" (1.499 m)   Wt 74.8 kg (165 lb)   SpO2 97%   BMI 33.33 kg/m²          Physical Exam  Vitals reviewed. Constitutional:       General: She is not in acute distress. Appearance: Normal appearance. She is well-developed. She is not diaphoretic. HENT:      Head: Normocephalic and atraumatic. Right Ear: Tympanic membrane, ear canal and external ear normal. There is no impacted cerumen. Left Ear: Tympanic membrane, ear canal and external ear normal. There is no impacted cerumen. Ears:      Comments: Hearing aids in place     Nose: Nose normal. No congestion. Mouth/Throat:      Pharynx: Oropharynx is clear. No oropharyngeal exudate or posterior oropharyngeal erythema. Eyes:      Conjunctiva/sclera: Conjunctivae normal.      Pupils: Pupils are equal, round, and reactive to light. Neck:      Thyroid: No thyromegaly. Vascular: No JVD. Trachea: No tracheal deviation. Cardiovascular:      Rate and Rhythm: Normal rate and regular rhythm. Pulses: Normal pulses. Heart sounds: Normal heart sounds. No murmur heard. Pulmonary:      Effort: Pulmonary effort is normal. No respiratory distress. Breath sounds: Normal breath sounds. No stridor. No wheezing or rhonchi. Abdominal:      General: Bowel sounds are normal. There is no distension. Palpations: Abdomen is soft. There is no mass. Tenderness: There is no abdominal tenderness. There is no guarding or rebound. Musculoskeletal:         General: Tenderness (left knee) present. No deformity. Normal range of motion.       Cervical back: Normal range of motion and neck supple. No rigidity or tenderness. Right lower leg: No edema. Left lower leg: No edema. Lymphadenopathy:      Cervical: No cervical adenopathy. Skin:     General: Skin is warm and dry. Findings: No erythema or rash. Neurological:      Mental Status: She is alert. Mental status is at baseline. Deep Tendon Reflexes: Reflexes are normal and symmetric. Comments: AAOx2   Psychiatric:         Mood and Affect: Mood normal.         Behavior: Behavior normal.         Thought Content: Thought content normal.         Judgment: Judgment normal.           This note was completed in part utilizing [employer] direct voice recognition software. Grammatical errors, random word insertion, spelling mistakes, and incomplete sentences may be an occasional consequence of the system secondary to software limitations, ambient noise and hardware issues. At the time of dictation, efforts were made to edit, clarify and /or correct errors. Please read the chart carefully and recognize, using context, where substitutions have occurred. If you have any questions or concerns about the context, text or information contained within the body of this dictation, please contact myself, the provider, for further clarification.

## 2023-10-18 ENCOUNTER — EVALUATION (OUTPATIENT)
Dept: PHYSICAL THERAPY | Age: 88
End: 2023-10-18
Payer: MEDICARE

## 2023-10-18 DIAGNOSIS — R26.2 AMBULATORY DYSFUNCTION: ICD-10-CM

## 2023-10-18 DIAGNOSIS — M25.562 CHRONIC PAIN OF LEFT KNEE: Primary | ICD-10-CM

## 2023-10-18 DIAGNOSIS — G89.29 CHRONIC PAIN OF LEFT KNEE: Primary | ICD-10-CM

## 2023-10-18 PROCEDURE — 97112 NEUROMUSCULAR REEDUCATION: CPT | Performed by: PHYSICAL THERAPIST

## 2023-10-18 PROCEDURE — 97110 THERAPEUTIC EXERCISES: CPT | Performed by: PHYSICAL THERAPIST

## 2023-10-18 NOTE — PROGRESS NOTES
PT Re-Evaluation     Today's date: 10/18/2023  Patient name: Leidy Beach  : 1935  MRN: [id number]  Referring provider: Michaelle Lechuga DO  Dx:   Encounter Diagnosis     ICD-10-CM    1. Chronic pain of left knee  M25.562     G89.29       2. Ambulatory dysfunction  R26.2                      Assessment  Assessment details: Patient seen for PT re-assessment. Patient is making good progress overall towards therapy. Patient tolerating exercises well, has been consistent with using her small restorator at home. Patient also does some seated exercises 1-2x/week and patient is still active using her cane in the home. Patient is pleased with her progress despite her persistent knee pain. Patient and daughter plan to visit with ortho again for knee pain as patient needed to cancel 2* illness. PT recommending patient to continue with PT to improve her strength, reduce the knee pain and to ensure patient has improve mobility with less pain for home. Impairments: abnormal gait, abnormal or restricted ROM, activity intolerance, impaired balance, impaired physical strength, lacks appropriate home exercise program, pain with function, poor posture  and poor body mechanics  Functional limitations: Moderate-severe pain with IADLs, with recreational activities, pain with sit<>stand, walking and stair climbing, pain with bed mobility. Understanding of Dx/Px/POC: good   Prognosis: good    Goals  STG/LTG  - Ortho evaluation for L knee pain met  - Reduce swelling at lateral knee  progressing  - Reduce knee pain by 25% progressing  - increase L knee AROM by 5-10 degrees both flex and ext.  Progressing    Goals added  STG to be met in 4 weeks  - Patient to be able to reduce knee pain to 4/10 with activity  - Patient to be able to tolerate PT treatment with knee pain <6/10  - Patient to be able to improve TUG time by 1-2 seconds    LTG to be met in 6 weeks  - Patient to be able to tolerate walking x 10 min with knee pain <6/10  - Patient to be able to reduce sit<>stand test by 1-2 seconds      Plan  Patient would benefit from: skilled physical therapy  Planned modality interventions: cryotherapy  Planned therapy interventions: abdominal trunk stabilization, manual therapy, joint mobilization, neuromuscular re-education, patient education, postural training, strengthening, stretching, therapeutic activities, therapeutic exercise, home exercise program, body mechanics training and balance  Frequency: 2x week  Duration in weeks: 8  Treatment plan discussed with: patient and family        Subjective Evaluation    History of Present Illness  Mechanism of injury: Patient reports that her knee has been hurting the past few days. Notes that she also has pain with using her restorator at home as well. Patient does feel therapy is beneficial in improving her balance, keeping her strength increased as she ages. Patient wants to prevent all falls. Patient Goals  Patient goals for therapy: decreased pain, improved balance, increased motion, increased strength, decreased edema and independence with ADLs/IADLs    Pain  Current pain ratin  At best pain ratin  At worst pain ratin  Location: L knee  Quality: sharp, pressure, discomfort, pulling and tight  Relieving factors: ice  Aggravating factors: sitting, standing, walking and stair climbing  Progression: worsening    Social Support  Steps to enter house: yes  1  Stairs in house: yes   3  Lives in: multiple-level home  Lives with: alone    Employment status: not working    Diagnostic Tests  No diagnostic tests performed  Treatments  Previous treatment: physical therapy        Objective     Tenderness     Right Knee   Tenderness in the lateral joint line, lateral retinaculum and medial joint line. No tenderness in the patellar tendon. Additional Tenderness Details  Palpable swelling at proximal lateral knee joint (distal proximal thigh), possible bruising as well.  Swelling does radiate distally across joint line and distally to knee joint. Active Range of Motion   Left Hip   Flexion: 90 degrees     Right Hip   Flexion: 90 degrees   Left Knee   Flexion: 90 degrees with pain  Extension: 0 degrees with pain  Left Ankle/Foot   Dorsiflexion (ke): 0 degrees     Right Ankle/Foot   Dorsiflexion (ke): 0 degrees     Additional Active Range of Motion Details  Pressure like pain at end range flexion and at end range extension. Painful equally in both directions. Mobility   Patellar Mobility:   Left Knee   Hypomobile: left medial, left lateral, left superior and left inferior    Additional Mobility Details  Still lacking full patellar mobility, responding well to gentle patellar mobs and restriction is reduced. Patellar Static Positioning   Left Knee: lateral tilt    Additional Patellar Static Positioning Details  L patella laterally shifted > R   Frog-eyed position patellas L>R      Strength/Myotome Testing     Left Hip   Planes of Motion   Flexion: 4-  Extension: 3+  Abduction: 3+  Adduction: 4-    Right Hip   Planes of Motion   Flexion: 4-  Extension: 3+  Abduction: 3+  Adduction: 4-    Left Knee   Flexion: 3+  Extension: 3+  Quadriceps contraction: fair    Right Knee   Flexion: 4  Extension: 4    Left Ankle/Foot   Dorsiflexion: 4-  Plantar flexion: 4-    Right Ankle/Foot   Dorsiflexion: 3+  Plantar flexion: 3+    Additional Strength Details  Pain with extension and QS    Ambulation     Observational Gait   Gait: antalgic   Decreased walking speed and stride length. Additional Observational Gait Details  Patient ambulates with slow gait speed, decreased B step length, guarded knee flex/ext during swing phase, especially on L. Immediate increase in L knee pain with sit>stand transfer, feels unsteady and usually stands for 30-60 seconds as pain decreases.   Neuro Exam:     Functional outcomes   5x sit to stand: 15 (seconds)  TU with SPC- patient's knee pain limits sit<>stand transfer aspect- takes more time to complete this part of the TUG (seconds)  Functional outcome gait comment: Patient ambulates with decreased B step length, decreased gait speed, wide base of support- using SPC correctly. Patient feels that she needs to use her cane for balance, reports that she rarely stands without UE support.              Precautions: L knee pain     Supine with wedge  Manuals 8/30 9/6   9/27 10/4 10/18                                   Neuro Re-Ed                        Sidestepping 3 laps // bars 4 laps plinth 4 laps plinth 4 laps// bars no UE  1 lap plinth no UE   supervsion 4 laps // bars   STS 2x5 S for safety, using hands today 2x5 S for safety,using hands today 2x5 S for safety,using hands today 2x5 S for safety,using hands today 2x5 using hands, S for safety                           Ther Ex                                        LAQ 2x10 ea 2x10 2x10 2x10 ea 2x10   SLR x 3, alternating Stand 2x10 ea  Stand 2x10 ea  Stand 2x10 ea   2x10 ea   Ham curls NT too painful at the knee x5 ea x5 ea 10 ea  Standing 2x10           Seated HR/TR 20x 25x 25x Standing 20x ea 20x standing   Seated ball squeeze 20x 20x 3 sec 20x 3 sec 25x 3 sec 25x   Seated hip abd RTB 20x RTB 20x RTB 25x RTB 25x RTB 25x   Seated alternating march 20x 20x 20x Standing 10x  S for safety 20x alternating                                                   Ther Activity                        Gait Training                        Modalities        CP  Doesn't need today 5' Doesn't need today Doesn't need

## 2023-10-25 ENCOUNTER — OFFICE VISIT (OUTPATIENT)
Dept: PHYSICAL THERAPY | Age: 88
End: 2023-10-25
Payer: MEDICARE

## 2023-10-25 DIAGNOSIS — M25.562 CHRONIC PAIN OF LEFT KNEE: Primary | ICD-10-CM

## 2023-10-25 DIAGNOSIS — R26.2 AMBULATORY DYSFUNCTION: ICD-10-CM

## 2023-10-25 DIAGNOSIS — G89.29 CHRONIC PAIN OF LEFT KNEE: Primary | ICD-10-CM

## 2023-10-25 PROCEDURE — 97112 NEUROMUSCULAR REEDUCATION: CPT | Performed by: PHYSICAL THERAPY ASSISTANT

## 2023-10-25 PROCEDURE — 97110 THERAPEUTIC EXERCISES: CPT | Performed by: PHYSICAL THERAPY ASSISTANT

## 2023-10-25 NOTE — PROGRESS NOTES
Daily Note     Today's date: 10/25/2023  Patient name: Jesus Brower  : 1935  MRN: [id number]  Referring provider: Debby More DO  Dx:   Encounter Diagnosis     ICD-10-CM    1. Chronic pain of left knee  M25.562     G89.29       2. Ambulatory dysfunction  R26.2                      Subjective: "My knees always hurt me."      Objective: See treatment diary below      Assessment: Tolerated treatment well. Patient demonstrated fatigue post treatment, exhibited good technique with therapeutic exercises, and would benefit from continued PT      Plan: Continue per plan of care. Progress treatment as tolerated. Precautions: L knee pain     Supine with wedge  Manuals 9/6   9/27 10/4 10/18 10/25                                   Neuro Re-Ed                        Sidestepping 4 laps plinth 4 laps plinth 4 laps// bars no UE  1 lap plinth no UE   supervsion 4 laps // bars 4 laps // bars.     STS 2x5 S for safety,using hands today 2x5 S for safety,using hands today 2x5 S for safety,using hands today 2x5 using hands, S for safety 2x5 no UE with S                           Ther Ex                                        LAQ 2x10 2x10 2x10 ea 2x10 2x10   SLR x 3, alternating Stand 2x10 ea  Stand 2x10 ea   2x10 ea 2x10 ea   Ham curls x5 ea x5 ea 10 ea  Standing 2x10 Standing 2x10           Seated HR/TR 25x 25x Standing 20x ea 20x standing 20x standing   Seated ball squeeze 20x 3 sec 20x 3 sec 25x 3 sec 25x 5"x25   Seated hip abd RTB 20x RTB 25x RTB 25x RTB 25x RTB x25   Seated alternating march 20x 20x Standing 10x  S for safety 20x alternating X20 alternating                                                   Ther Activity                        Gait Training                        Modalities        CP Doesn't need today 5' Doesn't need today Doesn't need

## 2023-11-01 ENCOUNTER — HOSPITAL ENCOUNTER (OUTPATIENT)
Dept: MAMMOGRAPHY | Facility: CLINIC | Age: 88
Discharge: HOME | End: 2023-11-01
Payer: MEDICARE

## 2023-11-01 ENCOUNTER — APPOINTMENT (OUTPATIENT)
Dept: PHYSICAL THERAPY | Age: 88
End: 2023-11-01
Payer: MEDICARE

## 2023-11-01 VITALS — BODY MASS INDEX: 33.26 KG/M2 | WEIGHT: 165 LBS | HEIGHT: 59 IN

## 2023-11-01 DIAGNOSIS — Z17.0 MALIGNANT NEOPLASM OF UPPER-OUTER QUADRANT OF RIGHT BREAST IN FEMALE, ESTROGEN RECEPTOR POSITIVE: ICD-10-CM

## 2023-11-01 DIAGNOSIS — C50.411 MALIGNANT NEOPLASM OF UPPER-OUTER QUADRANT OF RIGHT BREAST IN FEMALE, ESTROGEN RECEPTOR POSITIVE: ICD-10-CM

## 2023-11-01 DIAGNOSIS — Z12.31 ENCOUNTER FOR SCREENING MAMMOGRAM FOR MALIGNANT NEOPLASM OF BREAST: ICD-10-CM

## 2023-11-01 PROCEDURE — 77066 DX MAMMO INCL CAD BI: CPT

## 2023-11-01 PROCEDURE — G0279 TOMOSYNTHESIS, MAMMO: HCPCS

## 2023-11-03 ENCOUNTER — OFFICE VISIT (OUTPATIENT)
Dept: CARDIOLOGY CLINIC | Facility: CLINIC | Age: 88
End: 2023-11-03
Payer: MEDICARE

## 2023-11-03 VITALS
SYSTOLIC BLOOD PRESSURE: 140 MMHG | DIASTOLIC BLOOD PRESSURE: 56 MMHG | HEIGHT: 59 IN | WEIGHT: 165.3 LBS | HEART RATE: 67 BPM | BODY MASS INDEX: 33.32 KG/M2

## 2023-11-03 DIAGNOSIS — I10 ESSENTIAL HYPERTENSION: Chronic | ICD-10-CM

## 2023-11-03 DIAGNOSIS — E78.2 MIXED HYPERLIPIDEMIA: Chronic | ICD-10-CM

## 2023-11-03 DIAGNOSIS — I47.10 SUPRAVENTRICULAR TACHYCARDIA: Primary | Chronic | ICD-10-CM

## 2023-11-03 PROCEDURE — 99214 OFFICE O/P EST MOD 30 MIN: CPT | Performed by: INTERNAL MEDICINE

## 2023-11-03 PROCEDURE — 93000 ELECTROCARDIOGRAM COMPLETE: CPT | Performed by: INTERNAL MEDICINE

## 2023-11-03 NOTE — PROGRESS NOTES
Cardiology Consultation     Tamiko Beaulieu  [id number]  1935  [facility] [address] [address]    1. Supraventricular tachycardia  POCT ECG      2. Essential hypertension        3. Mixed hyperlipidemia          Chief Complaint   Patient presents with    Follow-up     No cardiac complaints. HPI: Patient is here for evaluation and management of frequent PACs and brief runs of SVT seen on Holter. Patient feels well, without complaints other than as related to knee/hip pain. No reported chest pain, shortness of breath, palpitations, lightheadedness, syncope, LE edema, orthopnea, PND, or significant weight changes. Patient remains active without any increased fatigue out of the ordinary.         Patient Active Problem List   Diagnosis    Anxiety    Peripheral neuropathy    Hyperlipidemia    Arthritis    Difficulty breathing    Diverticulosis    Dizziness    Fatigue    Hyperglycemia    Pain in extremity    Osteopenia    Shortness of breath    Supraventricular tachycardia    Vitamin D deficiency    Pain of left hand    Closed nondisplaced fracture of left acromial process    Left leg pain    Malignant neoplasm of upper-outer quadrant of right breast in female, estrogen receptor positive     Impingement syndrome of left shoulder    Myocardial infarction type 2 (HCC)    Ambulatory dysfunction    Elevated troponin    Palpitations    Morbid obesity with body mass index (BMI) of 40.0 to 44.9 in adult [facility])    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, estrogen receptor positive     Other chest pain    Trigger finger, left ring finger    Carpal tunnel syndrome of left wrist    Cubital tunnel syndrome on left    Carpal tunnel syndrome on left    Trigger finger, left middle finger    Preoperative examination    Use of anastrozole (Arimidex)    Rash    Skin rash    Hypercalcemia Subacromial bursitis of left shoulder joint    Tendinitis of left rotator cuff    Fall    Abnormal CAT scan    Internal derangement of shoulder, left    Mass of left parotid gland    Status post reverse total arthroplasty of left shoulder    Sensorineural hearing loss (SNHL) of both ears    Aftercare following left shoulder joint replacement surgery    Essential hypertension     Past Medical History:   Diagnosis Date    Anxiety     Arthritis     last assessed 3/24/15     Atherosclerosis     Bell's palsy     Breast cancer ([address]) 2019    right invasive ca    Broken femur ([address])     Cancer ([address])     breast    Cardiac disorder     DCIS (ductal carcinoma in situ) of breast     last assessed 4/4/17     Depression     Endometrial polyp     GERD (gastroesophageal reflux disease)     controlled    History of radiation therapy 2010    History of radiation therapy 2010    right breast    Hypercholesterolemia     resolved 10/22/14     Hyperlipidemia     Long term use of drug     last assessed 5/23/14     Memory loss     Neuropathy     Peripheral neuropathy     Pneumonia     As a Child    PONV (postoperative nausea and vomiting)     Tachycardia     Uterine fibroid      Social History     Socioeconomic History    Marital status:       Spouse name: Not on file    Number of children: Not on file    Years of education: Not on file    Highest education level: Not on file   Occupational History    Occupation: retired from work    Tobacco Use    Smoking status: Never    Smokeless tobacco: Never   Vaping Use    Vaping Use: Never used   Substance and Sexual Activity    Alcohol use: Never    Drug use: No    Sexual activity: Not Currently   Other Topics Concern    Not on file   Social History Narrative    Coffee     Daily coffee consumption 1 cup day     Daily cola consumption can day     Walking           Social Determinants of Health     Financial Resource Strain: Low Risk  (4/17/2023)    Overall Financial Resource Strain (CARDIA)     Difficulty of Paying Living Expenses: Not hard at all   Food Insecurity: Not on file   Transportation Needs: No Transportation Needs (4/17/2023)    PRAPARE - Transportation     Lack of Transportation (Medical): No     Lack of Transportation (Non-Medical): No   Physical Activity: Not on file   Stress: Not on file   Social Connections: Not on file   Intimate Partner Violence: Not on file   Housing Stability: Not on file      Family History   Problem Relation Age of Onset    Heart disease Mother         artherosclerosis     Heart disease Father         artherosclerosis     Heart attack Father     Arthritis Family     Heart disease Family         artherosclerosis     Breast cancer Family     Osteoarthritis Family     Supraventricular tachycardia Family     Hypertension Daughter     Ovarian cancer Sister 80    Anemia Neg Hx     Arrhythmia Neg Hx     Asthma Neg Hx     Clotting disorder Neg Hx     Fainting Neg Hx     Hyperlipidemia Neg Hx     Aneurysm Neg Hx      Past Surgical History:   Procedure Laterality Date    BREAST BIOPSY Right 03/28/2019     bx- inv ca    BREAST LUMPECTOMY Right 2010    BREAST LUMPECTOMY Right 5/8/2019    Procedure: BREAST LUMPECTOMY; BREAST NEEDLE LOCALIZATION (NEEDLE LOC AT 0800); Surgeon: Hina Owen MD;  Location[facility];  Service: Surgical Oncology    CARPAL TUNNEL RELEASE Bilateral     CATARACT EXTRACTION Bilateral     COLONOSCOPY      DILATION AND CURETTAGE OF UTERUS      ENDOMETRIAL BIOPSY      without cervical dilation     HAND SURGERY      HEMORROIDECTOMY      JOINT REPLACEMENT Right     Shoulder    JOINT REPLACEMENT Bilateral     Knees    LYMPH NODE BIOPSY Right 5/8/2019    Procedure: SENTINEL LYMPH NODE BIOPSY; LYMPHOSCINTIGRAPHY; INTRAOPERATIVE LYMPHATIC MAPPING (INJECT AT 0900);   Surgeon: Hina Owen MD;  Location[facility];  Service: Surgical Oncology    MAMMO NEEDLE LOCALIZATION RIGHT (ALL INC) Right 5/8/2019    OOPHORECTOMY      76    OVARIAN CYST REMOVAL Left     OR ARTHROPLASTY GLENOHUMERAL JOINT TOTAL SHOULDER Right 7/18/2017    Procedure: TOTAL SHOULDER REVERSE ARTHROPLASTY;  Surgeon: Angelina Nieves MD;  [facility]: [facility];  Service: Orthopedics    OR ARTHROPLASTY GLENOHUMERAL JOINT TOTAL SHOULDER Left 5/25/2021    Procedure: ARTHROPLASTY SHOULDER REVERSE, WITH BICEP [address];  Surgeon: Angeilna Nieves MD;  Location[facility];  Service: Orthopedics    OR [address] WRST SURG W/RLS TRANSVRS CARPL LIGM Left 11/19/2019    Procedure: RELEASE CARPAL TUNNEL ENDOSCOPIC;  Surgeon: Tenzin Cole MD;  Location[facility];  Service: Orthopedics    OR NEUROPLASTY &/TRANSPOSITION ULNAR NERVE ELBOW Left 11/19/2019    Procedure: RELEASE CUBITAL TUNNEL left -;  Surgeon: Tenzin Cole MD;  [facility];  Service: Orthopedics    SENTINEL LYMPH NODE BIOPSY      TONSILLECTOMY      TUBAL LIGATION      US GUIDED BREAST BIOPSY RIGHT COMPLETE Right 3/28/2019       Current Outpatient Medications:     acetaminophen (TYLENOL) 325 mg tablet, Take 325 mg by mouth every 6 (six) hours as needed for mild pain, Disp: , Rfl:     amoxicillin (AMOXIL) 500 mg capsule, TAKE 4 CAPSULES 1 HOUR BEFORE DENTAL PROCEDURE, Disp: , Rfl:     anastrozole (ARIMIDEX) 1 mg tablet, Take 1 mg by mouth daily, Disp: , Rfl:     Cholecalciferol (VITAMIN D3 PO), Take by mouth, Disp: , Rfl:     clotrimazole-betamethasone (LOTRISONE) 1-0.05 % cream, Apply topically 2 (two) times a day, Disp: 60 g, Rfl: 1    CRANBERRY PO, Take 1,700 mg by mouth daily in the early morning , Disp: , Rfl:     ibuprofen (MOTRIN) 200 mg tablet, Take 200 mg by mouth every 6 (six) hours as needed for mild pain , Disp: , Rfl:     lisinopril (ZESTRIL) 10 mg tablet, TAKE 1 TABLET BY MOUTH  TWICE DAILY, Disp: 180 tablet, Rfl: 3    memantine (Namenda) 5 mg tablet, Take 2 tablets (10 mg total) by mouth 2 (two) times a day, Disp: 180 tablet, Rfl: 1    Multiple Vitamins-Minerals (PRESERVISION AREDS 2 PO), Take 2 tablets by mouth daily in the early morning , Disp: , Rfl:     propranolol (INDERAL) 20 mg tablet, take 1 tablet by mouth four times a day, Disp: , Rfl:     pyridoxine (VITAMIN B6) 50 mg tablet, Take 50 mg by mouth daily, Disp: , Rfl:     rosuvastatin (CRESTOR) 5 mg tablet, TAKE 1 TABLET BY MOUTH 3 TIMES A WEEK, Disp: 250 tablet, Rfl: 1    anastrozole (ARIMIDEX) 1 mg tablet, TAKE 1 TABLET BY MOUTH  DAILY (Patient not taking: Reported on 11/3/2023), Disp: 90 tablet, Rfl: 3    propranolol (INDERAL) 20 mg tablet, TAKE 1 TABLET BY MOUTH 4  TIMES DAILY (Patient not taking: Reported on 11/3/2023), Disp: 360 tablet, Rfl: 3    rosuvastatin (CRESTOR) 10 MG tablet, TAKE 1/2 TABLET BY MOUTH 3 TIMES A WEEK ON MONDAY, WEDNESDAY AND FRIDAY (Patient not taking: Reported on 11/3/2023), Disp: 21 tablet, Rfl: 3  Allergies   Allergen Reactions    Gabapentin Hallucinations    Amoxicillin-Pot Clavulanate GI Intolerance    Azithromycin GI Intolerance and Rash    Cephalexin GI Intolerance    Cortisone GI Intolerance    Doxycycline GI Intolerance    Penicillins GI Intolerance     Vitals:    11/03/23 1115   BP: 140/56   BP Location: Right arm   Patient Position: Sitting   Cuff Size: Large   Pulse: 67   Weight: 75 kg (165 lb 4.8 oz)   Height: 4' 11" (1.499 m)       Labs:  Appointment on 10/13/2023   Component Date Value    WBC 10/13/2023 6.87     RBC 10/13/2023 4.68     Hemoglobin 10/13/2023 14.5     Hematocrit 10/13/2023 43.7     MCV 10/13/2023 93     MCH 10/13/2023 31.0     MCHC 10/13/2023 33.2     RDW 10/13/2023 13.0     MPV 10/13/2023 10.8     Platelets 59/25/9379 190     nRBC 10/13/2023 0     Neutrophils Relative 10/13/2023 61     Immat GRANS % 10/13/2023 0     Lymphocytes Relative 10/13/2023 23     Monocytes Relative 10/13/2023 8     Eosinophils Relative 10/13/2023 7 (H)     Basophils Relative 10/13/2023 1     Neutrophils Absolute 10/13/2023 4.22     Immature Grans Absolute 10/13/2023 0.03     Lymphocytes Absolute 10/13/2023 1.55     Monocytes Absolute 10/13/2023 0.54     Eosinophils Absolute 10/13/2023 0.47     Basophils Absolute 10/13/2023 0.06     Sodium 10/13/2023 142     Potassium 10/13/2023 3.8     Chloride 10/13/2023 107     CO2 10/13/2023 25     ANION GAP 10/13/2023 10     BUN 10/13/2023 21     Creatinine 10/13/2023 0.78     Glucose, Fasting 10/13/2023 93     Calcium 10/13/2023 9.4     AST 10/13/2023 20     ALT 10/13/2023 15     Alkaline Phosphatase 10/13/2023 70     Total Protein 10/13/2023 6.5     Albumin 10/13/2023 4.1     Total Bilirubin 10/13/2023 0.78     eGFR 10/13/2023 68     Cholesterol 10/13/2023 160     Triglycerides 10/13/2023 155 (H)     HDL, Direct 10/13/2023 47 (L)     LDL Calculated 10/13/2023 82     Hemoglobin A1C 10/13/2023 5.1     EAG 10/13/2023 100      Lab Results   Component Value Date    TRIG 155 (H) 10/13/2023    TRIG 125 12/22/2015    HDL 47 (L) 10/13/2023    HDL 57 12/22/2015    LDLDIRECT 106 (H) 07/29/2022    LDLDIRECT 98 12/22/2015     Imaging: No results found. Review of Systems:  Review of Systems   Constitutional:  Negative for activity change, appetite change, chills, diaphoresis, fatigue and unexpected weight change. HENT:  Negative for hearing loss, nosebleeds and sore throat. Eyes:  Negative for photophobia and visual disturbance. Respiratory:  Negative for cough, chest tightness, shortness of breath and wheezing. Cardiovascular:  Negative for chest pain, palpitations and leg swelling. Gastrointestinal:  Negative for abdominal pain, diarrhea, nausea and vomiting. Endocrine: Negative for polyuria. Genitourinary:  Negative for dysuria, frequency and hematuria. Musculoskeletal:  Positive for arthralgias. Negative for back pain, gait problem and neck pain. Skin:  Negative for pallor and rash. Neurological:  Negative for dizziness, syncope and headaches. Hematological:  Does not bruise/bleed easily. Psychiatric/Behavioral:  Negative for behavioral problems and confusion.         Physical Exam:  Physical Exam  Vitals reviewed. Constitutional:       Appearance: She is well-developed. She is not diaphoretic. HENT:      Head: Normocephalic and atraumatic. Nose: Nose normal.   Eyes:      General: No scleral icterus. Pupils: Pupils are equal, round, and reactive to light. Neck:      Vascular: No JVD. Cardiovascular:      Rate and Rhythm: Normal rate and regular rhythm. Heart sounds: Normal heart sounds. No murmur heard. No friction rub. No gallop. Pulmonary:      Effort: Pulmonary effort is normal. No respiratory distress. Breath sounds: Normal breath sounds. No wheezing or rales. Abdominal:      General: Bowel sounds are normal. There is no distension. Palpations: Abdomen is soft. Tenderness: There is no abdominal tenderness. Musculoskeletal:         General: No deformity. Normal range of motion. Cervical back: Normal range of motion and neck supple. Skin:     General: Skin is warm and dry. Findings: No rash. Neurological:      Mental Status: She is alert and oriented to person, place, and time. Cranial Nerves: No cranial nerve deficit. Psychiatric:         Behavior: Behavior normal.       Blood pressure 140/56, pulse 67, height 4' 11" (1.499 m), weight 75 kg (165 lb 4.8 oz). EKG:  Normal sinus rhythm   Normal ECG    Discussion/Summary:  SVT: changed propranolol to Toprol, symptoms are skipped beats - she did have multiple short runs of SVT, longest lasting 7 beats - that feel improved on Toprol. We also checked an echo that ruled out any structural repercussions from having frequent brief runs of SVT. Continue current regimen. Now back on propranolol due to shakiness with Toprol, and tolerating well with reduced symptoms. Continues on stable regimen. HTN: continue current regimen, well controlled today and well below goal. Continue current regimen. HLD: continued on statin.   LDL 89 in July 2019, 100 in Sept 2019, 99 in May 2020. LDL 87 in Feb 2022. LDL 82 in Oct 2023.

## 2023-11-08 ENCOUNTER — OFFICE VISIT (OUTPATIENT)
Dept: PHYSICAL THERAPY | Age: 88
End: 2023-11-08
Payer: MEDICARE

## 2023-11-08 DIAGNOSIS — M25.562 CHRONIC PAIN OF LEFT KNEE: Primary | ICD-10-CM

## 2023-11-08 DIAGNOSIS — G89.29 CHRONIC PAIN OF LEFT KNEE: Primary | ICD-10-CM

## 2023-11-08 DIAGNOSIS — R26.2 AMBULATORY DYSFUNCTION: ICD-10-CM

## 2023-11-08 PROCEDURE — 97110 THERAPEUTIC EXERCISES: CPT | Performed by: PHYSICAL THERAPIST

## 2023-11-08 PROCEDURE — 97116 GAIT TRAINING THERAPY: CPT | Performed by: PHYSICAL THERAPIST

## 2023-11-08 NOTE — PROGRESS NOTES
Daily Note     Today's date: 2023  Patient name: Miguelina Gasca  : 1935  MRN: [id number]  Referring provider: George Rangel DO  Dx:   Encounter Diagnosis     ICD-10-CM    1. Chronic pain of left knee  M25.562     G89.29       2. Ambulatory dysfunction  R26.2                      Subjective: Patient having more side effects from a new medication the PCP added. Sx blurred vision and dizziness. Patient's daughter realized patient was having new symptoms and called the patient's doctor and patient has stopped the medication. Patient also having increased L hip pain. Objective: See treatment diary below      Assessment: Tolerated treatment fair. Patient demonstrated fatigue post treatment, exhibited good technique with therapeutic exercises, would benefit from continued PT, and Patient's L hip soreness is at the glutes, piriformis area today; added stretching for the lumbar spine and the glutes to address the new hip pain. Patient with increased L sided low back pain with standing HR today in // bars, stopped the exercise. Tried seated repeated flexion to ease patient's pain, sx unchanged in the moment. Patient's L hip pain eases with rest. Patient will use ice/heat at home as needed. Patient sees ortho next week, recommended patient to talk to her doctor about her pain. Patient does feel better with bending forward stretching; however, no immediate relief. Plan: Continue per plan of care. Precautions: L knee pain     Supine with wedge  Manuals 11/8   9/27 10/4 10/18 10/25                                   Neuro Re-Ed                        Sidestepping Too much pain 4 laps plinth 4 laps// bars no UE  1 lap plinth no UE   supervsion 4 laps // bars 4 laps // bars.     STS Too much pain 2x5 S for safety,using hands today 2x5 S for safety,using hands today 2x5 using hands, S for safety 2x5 no UE with S                           Ther Ex                Seated p ball flexion, diagonals 2x5 x:05       Seated lumbar flexion 2x5 - sx unchanged after standing HR               LAQ 20x ea 2x10 2x10 ea 2x10 2x10   SLR x 3, alternating, standing 10x ea alternating Stand 2x10 ea   2x10 ea 2x10 ea   Ham curls - x5 ea 10 ea  Standing 2x10 Standing 2x10           Standing HR/TR 2x10- L sided back and hip pain with 2nd set - stopped.   25x Standing 20x ea 20x standing 20x standing   Seated ball squeeze 20x:03 20x 3 sec 25x 3 sec 25x 5"x25   Seated hip abd RTB 20x RTB 25x RTB 25x RTB 25x RTB x25   Seated alternating march 20x alternating 20x Standing 10x  S for safety 20x alternating X20 alternating                                                   Ther Activity                        Gait Training        Gait training with SPC In clinic- S-CGA for steadying assist x 50' increments 3x               Modalities        CP Doesn't need today 5' Doesn't need today Doesn't need

## 2023-11-15 ENCOUNTER — EVALUATION (OUTPATIENT)
Dept: PHYSICAL THERAPY | Age: 88
End: 2023-11-15
Payer: MEDICARE

## 2023-11-15 DIAGNOSIS — G89.29 CHRONIC PAIN OF LEFT KNEE: Primary | ICD-10-CM

## 2023-11-15 DIAGNOSIS — M25.562 CHRONIC PAIN OF LEFT KNEE: Primary | ICD-10-CM

## 2023-11-15 DIAGNOSIS — R26.2 AMBULATORY DYSFUNCTION: ICD-10-CM

## 2023-11-15 DIAGNOSIS — M54.16 LUMBAR RADICULOPATHY: ICD-10-CM

## 2023-11-15 PROCEDURE — 97164 PT RE-EVAL EST PLAN CARE: CPT | Performed by: PHYSICAL THERAPIST

## 2023-11-15 PROCEDURE — 97110 THERAPEUTIC EXERCISES: CPT | Performed by: PHYSICAL THERAPIST

## 2023-11-15 NOTE — LETTER
2023    Rock Serna, [address] [address]    Patient: Marian Silva   YOB: 1935   Date of Visit: 11/15/2023     Encounter Diagnosis     ICD-10-CM    1. Chronic pain of left knee  M25.562     G89.29       2. Ambulatory dysfunction  R26.2       3. Lumbar radiculopathy  M54.16           Dear Dr. Solorio Space: Thank you for your recent referral of Marian Silva. Please review the attached evaluation summary from Carli's recent visit. Please verify that you agree with the plan of care by signing the attached order. If you have any questions or concerns, please do not hesitate to call. I sincerely appreciate the opportunity to share in the care of one of your patients and hope to have another opportunity to work with you in the near future. Sincerely,    Cuba Jonas, PT      Referring Provider:      I certify that I have read the below Plan of Care and certify the need for these services furnished under this plan of treatment while under my care. Rock Serna MD  [address] [place] [address]  Via Fax: [phone]          PT Re-Evaluation     Today's date: 11/15/2023  Patient name: Marian Silva  : 1935  MRN: [id number]  Referring provider: Rock Serna MD  Dx:   Encounter Diagnosis     ICD-10-CM    1. Chronic pain of left knee  M25.562     G89.29       2. Ambulatory dysfunction  R26.2       3. Lumbar radiculopathy  M54.16                      Assessment  Assessment details: Patient seen for re-assessment to evaluate patient's low back pain. Patient does present with L hip pain with standing extension exercises- when performing heel raises today - possible lumbar extension sustained position contributing to L hip pain. Patient's pain is alleviated with repeated lumbar flexion exercises.  PT modified program adding exercises for lumbar spine, hamstrings and will modifying standing and balance program to avoid exercises that aggravate the spine. PT recommending patient to continue with PT for 1x/week x additional 6-8 weeks with the goal of progressing patient's core and LE strength and to improve patient's balance as she lives alone and ambulates with SPC. Impairments: abnormal gait, abnormal or restricted ROM, activity intolerance, impaired balance, impaired physical strength, lacks appropriate home exercise program, pain with function, poor posture  and poor body mechanics  Functional limitations: Moderate-severe pain with IADLs, with recreational activities, pain with sit<>stand, walking and stair climbing, pain with bed mobility. L hip pain with standing, walking and with IADLS. Understanding of Dx/Px/POC: good   Prognosis: good    Goals  STG/LTG  - Ortho evaluation for L knee pain met  - Reduce swelling at lateral knee  progressing  - Reduce knee pain by 25% progressing  - increase L knee AROM by 5-10 degrees both flex and ext. Progressing    Goals added  STG to be met in 4 weeks  - Patient to be able to reduce knee pain to 4/10 with activity partially met  - Patient to be able to tolerate PT treatment with knee pain <6/10 met  - Patient to be able to improve TUG time by 1-2 seconds not met, LBP    LTG to be met in 6 weeks  - Patient to be able to tolerate walking x 10 min with knee pain <6/10 met  - Patient to be able to reduce sit<>stand test by 1-2 seconds not met, LBP    Goals added for lumbar spine  STG to be met in 4 weeks  - Reduce L hip pain to 0/10 with standing and walking  - Patient to be able to tolerate walking x 10 min without L hip pain    LTG to be met in 6 weeks. - Patient to be able to tolerate standing in the kitchen without L hip pain. - Patient to be able to exercise at home with HEP.        Plan  Patient would benefit from: skilled physical therapy  Planned modality interventions: cryotherapy  Planned therapy interventions: abdominal trunk stabilization, manual therapy, joint mobilization, neuromuscular re-education, patient education, postural training, strengthening, stretching, therapeutic activities, therapeutic exercise, home exercise program, body mechanics training and balance  Frequency: 2x week  Duration in weeks: 8  Treatment plan discussed with: patient and family        Subjective Evaluation    History of Present Illness  Mechanism of injury: Patient has been having increased L hip pain last session and discussed with ortho, dx with acute lumbar radiculopathy with radicular sx to L hip. Patient finds herself bending forward when she's home as this feels better, but has started to walk with a flexed posture. Patient Goals  Patient goals for therapy: decreased pain, improved balance, increased motion, increased strength, decreased edema and independence with ADLs/IADLs    Pain  Current pain rating: 3  At best pain ratin  At worst pain ratin  Location: L hip  Quality: sharp, pressure, discomfort, pulling and tight  Relieving factors: ice  Aggravating factors: sitting, standing, walking and stair climbing  Progression: worsening    Social Support  Steps to enter house: yes  1  Stairs in house: yes   3  Lives in: multiple-level home  Lives with: alone    Employment status: not working    Diagnostic Tests  No diagnostic tests performed  Treatments  Previous treatment: physical therapy        Objective     Postural Observations  Seated posture: poor  Standing posture: fair  Correction of posture: makes symptoms better      Tenderness     Right Knee   Tenderness in the lateral joint line, lateral retinaculum and medial joint line. No tenderness in the patellar tendon. Additional Tenderness Details  Palpable swelling at proximal lateral knee joint (distal proximal thigh), possible bruising as well. Swelling does radiate distally across joint line and distally to knee joint.          Active Range of Motion     Lumbar   Flexion: WFL  Extension: 0 degrees  with pain  Left lateral flexion: 5 degrees    with pain  Right lateral flexion: 8 degrees  with pain  Left rotation: 10 degrees   Right rotation: 10 degrees   Left Hip   Flexion: 90 degrees     Right Hip   Flexion: 90 degrees   Left Knee   Flexion: 90 degrees with pain  Extension: 0 degrees with pain  Left Ankle/Foot   Dorsiflexion (ke): 0 degrees     Right Ankle/Foot   Dorsiflexion (ke): 0 degrees     Additional Active Range of Motion Details  Pressure like pain at end range flexion and at end range extension. Painful equally in both directions. Mobility   Patellar Mobility:   Left Knee   Hypomobile: left medial, left lateral, left superior and left inferior    Additional Mobility Details  Still lacking full patellar mobility, responding well to gentle patellar mobs and restriction is reduced. Patellar Static Positioning   Left Knee: lateral tilt    Additional Patellar Static Positioning Details  L patella laterally shifted > R   Frog-eyed position patellas L>R    Mechanical Assessment    Cervical      Thoracic      Lumbar    Sitting flexion: repeated movements  Pain location: centralized  Pain intensity: better  Pain level: decreased    Strength/Myotome Testing     Left Hip   Planes of Motion   Flexion: 4-  Extension: 4-  Abduction: 4-  Adduction: 4-    Right Hip   Planes of Motion   Flexion: 4-  Extension: 4-  Abduction: 4-  Adduction: 4-    Left Knee   Flexion: 3+  Extension: 3+  Quadriceps contraction: fair    Right Knee   Flexion: 4  Extension: 4    Left Ankle/Foot   Dorsiflexion: 4-  Plantar flexion: 4-    Right Ankle/Foot   Dorsiflexion: 3+  Plantar flexion: 3+    Additional Strength Details  Pain with extension and QS    Tests     Lumbar     Left   Positive passive SLR and slump test.     Right   Negative passive SLR and slump test.     Ambulation     Observational Gait   Gait: antalgic   Decreased walking speed and stride length.      Additional Observational Gait Details  Patient ambulates with slow gait speed, decreased B step length, guarded knee flex/ext during swing phase, especially on L. Immediate increase in L knee pain with sit>stand transfer, feels unsteady and usually stands for 30-60 seconds as pain decreases. Neuro Exam:     Functional outcomes   5x sit to stand: 15 (seconds)  TU with SPC- patient's knee pain limits sit<>stand transfer aspect- takes more time to complete this part of the TUG (seconds)  Functional outcome gait comment: Patient ambulates with decreased B step length, decreased gait speed, wide base of support- using SPC correctly. Patient feels that she needs to use her cane for balance, reports that she rarely stands without UE support.              Precautions: L knee pain     Supine with wedge  Manuals 11/15 9/6   9/27 10/4 10/18                                   Neuro Re-Ed                        Sidestepping - 4 laps plinth 4 laps plinth 4 laps// bars no UE  1 lap plinth no UE   supervsion 4 laps // bars   STS - 2x5 S for safety,using hands today 2x5 S for safety,using hands today 2x5 S for safety,using hands today 2x5 using hands, S for safety                           Ther Ex                                        LAQ 2x10 ea 2x10 2x10 2x10 ea 2x10   SLR x 3, alternating NT Stand 2x10 ea  Stand 2x10 ea   2x10 ea           Seated HR/TR 20x- standing increases L hip pain * 25x 25x Standing 20x ea 20x standing   Seated ball squeeze NT 20x 3 sec 20x 3 sec 25x 3 sec 25x   Seated hip abd - RTB 20x RTB 25x RTB 25x RTB 25x   Seated alternating march 20x 20x 20x Standing 10x  S for safety 20x alternating                                                   Ther Activity                        Gait Training        Gait training with SPC  Lap walking in PT NV               Modalities        MH seated lumbar spine, extra toweling x10' Doesn't need today 5' Doesn't need today Doesn't need

## 2023-11-15 NOTE — PROGRESS NOTES
PT Re-Evaluation     Today's date: 11/15/2023  Patient name: Miguelina Tatum  : 1935  MRN: [id number]  Referring provider: Jamilah Aguilar MD  Dx:   Encounter Diagnosis     ICD-10-CM    1. Chronic pain of left knee  M25.562     G89.29       2. Ambulatory dysfunction  R26.2       3. Lumbar radiculopathy  M54.16                      Assessment  Assessment details: Patient seen for re-assessment to evaluate patient's low back pain. Patient does present with L hip pain with standing extension exercises- when performing heel raises today - possible lumbar extension sustained position contributing to L hip pain. Patient's pain is alleviated with repeated lumbar flexion exercises. PT modified program adding exercises for lumbar spine, hamstrings and will modifying standing and balance program to avoid exercises that aggravate the spine. PT recommending patient to continue with PT for 1x/week x additional 6-8 weeks with the goal of progressing patient's core and LE strength and to improve patient's balance as she lives alone and ambulates with SPC. Impairments: abnormal gait, abnormal or restricted ROM, activity intolerance, impaired balance, impaired physical strength, lacks appropriate home exercise program, pain with function, poor posture  and poor body mechanics  Functional limitations: Moderate-severe pain with IADLs, with recreational activities, pain with sit<>stand, walking and stair climbing, pain with bed mobility. L hip pain with standing, walking and with IADLS. Understanding of Dx/Px/POC: good   Prognosis: good    Goals  STG/LTG  - Ortho evaluation for L knee pain met  - Reduce swelling at lateral knee  progressing  - Reduce knee pain by 25% progressing  - increase L knee AROM by 5-10 degrees both flex and ext.  Progressing    Goals added  STG to be met in 4 weeks  - Patient to be able to reduce knee pain to 4/10 with activity partially met  - Patient to be able to tolerate PT treatment with knee pain <6/10 met  - Patient to be able to improve TUG time by 1-2 seconds not met, LBP    LTG to be met in 6 weeks  - Patient to be able to tolerate walking x 10 min with knee pain <6/10 met  - Patient to be able to reduce sit<>stand test by 1-2 seconds not met, LBP    Goals added for lumbar spine  STG to be met in 4 weeks  - Reduce L hip pain to 0/10 with standing and walking  - Patient to be able to tolerate walking x 10 min without L hip pain    LTG to be met in 6 weeks. - Patient to be able to tolerate standing in the kitchen without L hip pain. - Patient to be able to exercise at home with HEP. Plan  Patient would benefit from: skilled physical therapy  Planned modality interventions: cryotherapy  Planned therapy interventions: abdominal trunk stabilization, manual therapy, joint mobilization, neuromuscular re-education, patient education, postural training, strengthening, stretching, therapeutic activities, therapeutic exercise, home exercise program, body mechanics training and balance  Frequency: 2x week  Duration in weeks: 8  Treatment plan discussed with: patient and family        Subjective Evaluation    History of Present Illness  Mechanism of injury: Patient has been having increased L hip pain last session and discussed with ortho, dx with acute lumbar radiculopathy with radicular sx to L hip. Patient finds herself bending forward when she's home as this feels better, but has started to walk with a flexed posture.    Patient Goals  Patient goals for therapy: decreased pain, improved balance, increased motion, increased strength, decreased edema and independence with ADLs/IADLs    Pain  Current pain rating: 3  At best pain ratin  At worst pain ratin  Location: L hip  Quality: sharp, pressure, discomfort, pulling and tight  Relieving factors: ice  Aggravating factors: sitting, standing, walking and stair climbing  Progression: worsening    Social Support  Steps to enter house: yes  1  Stairs in house: yes   3  Lives in: multiple-level home  Lives with: alone    Employment status: not working    Diagnostic Tests  No diagnostic tests performed  Treatments  Previous treatment: physical therapy        Objective     Postural Observations  Seated posture: poor  Standing posture: fair  Correction of posture: makes symptoms better      Tenderness     Right Knee   Tenderness in the lateral joint line, lateral retinaculum and medial joint line. No tenderness in the patellar tendon. Additional Tenderness Details  Palpable swelling at proximal lateral knee joint (distal proximal thigh), possible bruising as well. Swelling does radiate distally across joint line and distally to knee joint. Active Range of Motion     Lumbar   Flexion:  WFL  Extension: 0 degrees  with pain  Left lateral flexion: 5 degrees    with pain  Right lateral flexion: 8 degrees  with pain  Left rotation: 10 degrees   Right rotation: 10 degrees   Left Hip   Flexion: 90 degrees     Right Hip   Flexion: 90 degrees   Left Knee   Flexion: 90 degrees with pain  Extension: 0 degrees with pain  Left Ankle/Foot   Dorsiflexion (ke): 0 degrees     Right Ankle/Foot   Dorsiflexion (ke): 0 degrees     Additional Active Range of Motion Details  Pressure like pain at end range flexion and at end range extension. Painful equally in both directions. Mobility   Patellar Mobility:   Left Knee   Hypomobile: left medial, left lateral, left superior and left inferior    Additional Mobility Details  Still lacking full patellar mobility, responding well to gentle patellar mobs and restriction is reduced.      Patellar Static Positioning   Left Knee: lateral tilt    Additional Patellar Static Positioning Details  L patella laterally shifted > R   Frog-eyed position patellas L>R    Mechanical Assessment    Cervical      Thoracic      Lumbar    Sitting flexion: repeated movements  Pain location: centralized  Pain intensity: better  Pain level: decreased    Strength/Myotome Testing     Left Hip   Planes of Motion   Flexion: 4-  Extension: 4-  Abduction: 4-  Adduction: 4-    Right Hip   Planes of Motion   Flexion: 4-  Extension: 4-  Abduction: 4-  Adduction: 4-    Left Knee   Flexion: 3+  Extension: 3+  Quadriceps contraction: fair    Right Knee   Flexion: 4  Extension: 4    Left Ankle/Foot   Dorsiflexion: 4-  Plantar flexion: 4-    Right Ankle/Foot   Dorsiflexion: 3+  Plantar flexion: 3+    Additional Strength Details  Pain with extension and QS    Tests     Lumbar     Left   Positive passive SLR and slump test.     Right   Negative passive SLR and slump test.     Ambulation     Observational Gait   Gait: antalgic   Decreased walking speed and stride length. Additional Observational Gait Details  Patient ambulates with slow gait speed, decreased B step length, guarded knee flex/ext during swing phase, especially on L. Immediate increase in L knee pain with sit>stand transfer, feels unsteady and usually stands for 30-60 seconds as pain decreases. Neuro Exam:     Functional outcomes   5x sit to stand: 15 (seconds)  TU with SPC- patient's knee pain limits sit<>stand transfer aspect- takes more time to complete this part of the TUG (seconds)  Functional outcome gait comment: Patient ambulates with decreased B step length, decreased gait speed, wide base of support- using SPC correctly. Patient feels that she needs to use her cane for balance, reports that she rarely stands without UE support.              Precautions: L knee pain     Supine with wedge  Manuals 11/15 9/6   9/27 10/4 10/18                                   Neuro Re-Ed                        Sidestepping - 4 laps plinth 4 laps plinth 4 laps// bars no UE  1 lap plinth no UE   supervsion 4 laps // bars   STS - 2x5 S for safety,using hands today 2x5 S for safety,using hands today 2x5 S for safety,using hands today 2x5 using hands, S for safety                           Ther Ex LAQ 2x10 ea 2x10 2x10 2x10 ea 2x10   SLR x 3, alternating NT Stand 2x10 ea  Stand 2x10 ea   2x10 ea           Seated HR/TR 20x- standing increases L hip pain * 25x 25x Standing 20x ea 20x standing   Seated ball squeeze NT 20x 3 sec 20x 3 sec 25x 3 sec 25x   Seated hip abd - RTB 20x RTB 25x RTB 25x RTB 25x   Seated alternating march 20x 20x 20x Standing 10x  S for safety 20x alternating                                                   Ther Activity                        Gait Training        Gait training with SPC  Lap walking in PT NV               Modalities        MH seated lumbar spine, extra toweling x10' Doesn't need today 5' Doesn't need today Doesn't need

## 2023-11-22 ENCOUNTER — OFFICE VISIT (OUTPATIENT)
Dept: PHYSICAL THERAPY | Age: 88
End: 2023-11-22
Payer: MEDICARE

## 2023-11-22 DIAGNOSIS — R26.2 AMBULATORY DYSFUNCTION: ICD-10-CM

## 2023-11-22 DIAGNOSIS — G89.29 CHRONIC PAIN OF LEFT KNEE: Primary | ICD-10-CM

## 2023-11-22 DIAGNOSIS — M25.562 CHRONIC PAIN OF LEFT KNEE: Primary | ICD-10-CM

## 2023-11-22 PROCEDURE — 97110 THERAPEUTIC EXERCISES: CPT | Performed by: PHYSICAL THERAPIST

## 2023-11-22 NOTE — PROGRESS NOTES
Daily Note     Today's date: 2023  Patient name: Manuela Roman  : 1935  MRN: [id number]  Referring provider: Debby Conner DO  Dx:   Encounter Diagnosis     ICD-10-CM    1. Chronic pain of left knee  M25.562     G89.29       2. Ambulatory dysfunction  R26.2                      Subjective: Patient reports that her back is still bothering her, has pain with standing and walking. Patient does feel bending forward eases her pain. Objective: See treatment diary below      Assessment: Tolerated treatment fair. Patient demonstrated fatigue post treatment, exhibited good technique with therapeutic exercises, would benefit from continued PT, and PT modified program to patient's tolerance. Patient's back pain is aggravated with standing TE today, modified program and focused more on flexion stretching to ease the back pain. PT utilized MH prior and after- just for 5 min to ease her pain. Plan: Continue per plan of care.       Precautions: L knee pain     Supine with wedge  Manuals 11/15 11/22   1:1 with HG/JH 9/27 10/4 10/18                                   Neuro Re-Ed                        Sidestepping - Standing aggravates back pain 4 laps plinth 4 laps// bars no UE  1 lap plinth no UE   supervsion 4 laps // bars   STS - NT 2x5 S for safety,using hands today 2x5 S for safety,using hands today 2x5 using hands, S for safety                           Ther Ex                                        LAQ 2x10 ea 2x10 ea 2x10 2x10 ea 2x10   SLR x 3, alternating NT Attempted standing hip abd- caused increased back pain Stand 2x10 ea   2x10 ea           Seated HR/TR 20x- standing increases L hip pain * Seated today 20x 25x Standing 20x ea 20x standing   Seated ball squeeze NT 20x 3 sec 20x 3 sec 25x 3 sec 25x   Seated hip abd - Time constraints RTB 25x RTB 25x RTB 25x   Seated alternating march 20x 20x 20x Standing 10x  S for safety 20x alternating Ther Activity                        Gait Training        Gait training with SPC  Lap walking in PT NV Walking in PT 2 x 50' increments S-CGA for safety              Modalities        MH seated lumbar spine, extra toweling x10' 2x5' - prior and after 5' Doesn't need today Doesn't need

## 2023-11-29 ENCOUNTER — OFFICE VISIT (OUTPATIENT)
Dept: PHYSICAL THERAPY | Age: 88
End: 2023-11-29
Payer: MEDICARE

## 2023-11-29 DIAGNOSIS — M54.16 LUMBAR RADICULOPATHY: ICD-10-CM

## 2023-11-29 DIAGNOSIS — R26.2 AMBULATORY DYSFUNCTION: ICD-10-CM

## 2023-11-29 DIAGNOSIS — G89.29 CHRONIC PAIN OF LEFT KNEE: Primary | ICD-10-CM

## 2023-11-29 DIAGNOSIS — M25.562 CHRONIC PAIN OF LEFT KNEE: Primary | ICD-10-CM

## 2023-11-29 PROCEDURE — 97112 NEUROMUSCULAR REEDUCATION: CPT | Performed by: PHYSICAL THERAPIST

## 2023-11-29 PROCEDURE — 97116 GAIT TRAINING THERAPY: CPT | Performed by: PHYSICAL THERAPIST

## 2023-11-29 NOTE — PROGRESS NOTES
Daily Note     Today's date: 2023  Patient name: Bard Armenta  : 1935  MRN: [id number]  Referring provider: Gail White DO  Dx:   Encounter Diagnosis     ICD-10-CM    1. Chronic pain of left knee  M25.562     G89.29       2. Ambulatory dysfunction  R26.2       3. Lumbar radiculopathy  M54.16                      Subjective: Patient reports that her back was sore this morning. Patient notes that her back hurts more with standing and bending, and with and static standing. Patient reports no significant pain with getting dressed or walking in her home. Patient notes that she has to recline her chair to a certain point to ease her back pain. Objective: See treatment diary below      Assessment: Tolerated treatment well. Patient demonstrated fatigue post treatment, exhibited good technique with therapeutic exercises, would benefit from continued PT, and PT adding additional exercises for patient's back pain as well as for gait training. Patient without pain during walking. Patient's back pain did increase at the end with static standing, localized to L side lumbar spine at the hip. Plan: Continue per plan of care.       Precautions: L knee pain     Manuals 11/15 11/22    11/29 10/4 10/18                   Neuro Re-Ed                        Ther Ex                   Seated p ball flexion 10x:05 - 2 sets        Repeated flexion 2x10             LAQ 2x10 ea 2x10 ea 2x10 ea     SLR x 3, alternating NT Attempted standing hip abd- caused increased back pain NT              Seated HR/TR 20x- standing increases L hip pain * Seated today 20x NT     Seated ball squeeze NT 20x 3 sec 20x:03             Seated alternating march 20 20x 20x                                                     Ther Activity                        Gait Training        Gait training with SPC  Lap walking in PT NV Walking in PT 2 x 50' increments S-CGA for safety With PT 4x50' S-CGA for light steadying assist Modalities        MH seated lumbar spine, extra toweling x10' 2x5' - prior and after 5' prior extra toweling  Doesn't need today Doesn't need

## 2023-12-06 ENCOUNTER — APPOINTMENT (OUTPATIENT)
Dept: PHYSICAL THERAPY | Age: 88
End: 2023-12-06
Payer: MEDICARE

## 2023-12-13 ENCOUNTER — OFFICE VISIT (OUTPATIENT)
Dept: PHYSICAL THERAPY | Age: 88
End: 2023-12-13
Payer: MEDICARE

## 2023-12-13 DIAGNOSIS — M54.16 LUMBAR RADICULOPATHY: ICD-10-CM

## 2023-12-13 DIAGNOSIS — R26.2 AMBULATORY DYSFUNCTION: ICD-10-CM

## 2023-12-13 DIAGNOSIS — M25.562 CHRONIC PAIN OF LEFT KNEE: Primary | ICD-10-CM

## 2023-12-13 DIAGNOSIS — G89.29 CHRONIC PAIN OF LEFT KNEE: Primary | ICD-10-CM

## 2023-12-13 PROCEDURE — 97116 GAIT TRAINING THERAPY: CPT | Performed by: SPECIALIST/TECHNOLOGIST

## 2023-12-13 PROCEDURE — 97110 THERAPEUTIC EXERCISES: CPT | Performed by: SPECIALIST/TECHNOLOGIST

## 2023-12-13 NOTE — PROGRESS NOTES
Daily Note     Today's date: 2023  Patient name: Manuela Roman  : 1935  MRN: [id number]  Referring provider: Debby Conner DO  Dx:   Encounter Diagnosis     ICD-10-CM    1. Chronic pain of left knee  M25.562     G89.29       2. Ambulatory dysfunction  R26.2       3. Lumbar radiculopathy  M54.16                      Subjective: Pt reports L knee/low back remains bothersome. Objective: See treatment diary below    Assessment: SPC remains necessary for ambulatory safety due to decreased balance. Limited CKC exercise capacity due to limited standing tolerance. Tolerated treatment well. Patient demonstrated fatigue post treatment, exhibited good technique with therapeutic exercises, and would benefit from continued PT    Plan: Continue per plan of care. Progress treatment as tolerated.        Precautions: L knee pain     Manuals 11/15 11/22    11/29 12/13                    Neuro Re-Ed                        Ther Ex                   Seated p ball flexion 10x:05 - 2 sets Seated pball flexion 20x5s       Repeated flexion 2x10             LAQ 2x10 ea 2x10 ea 2x10 ea 2x10    SLR x 3, alternating NT Attempted standing hip abd- caused increased back pain NT  Hip Abd 2x10 ea            Seated HR/TR 20x- standing increases L hip pain * Seated today 20x NT 20x ea    Seated ball squeeze NT 20x 3 sec 20x:03 20x3s        20x3s    Seated alternating march 20x 20x 20x 20x                                                    Ther Activity                        Gait Training        Gait training with SPC  Lap walking in PT NV Walking in PT 2 x 50' increments S-CGA for safety With PT 4x50' S-CGA for light steadying assist 4x50ft S-CGA [facility]            Modalities        MH seated lumbar spine, extra toweling x10' 2x5' - prior and after 5' prior extra toweling

## 2023-12-18 ENCOUNTER — PROBLEM (OUTPATIENT)
Dept: URBAN - METROPOLITAN AREA CLINIC 6 | Facility: CLINIC | Age: 88
End: 2023-12-18

## 2023-12-18 DIAGNOSIS — H04.123: ICD-10-CM

## 2023-12-18 DIAGNOSIS — G51.0: ICD-10-CM

## 2023-12-18 PROCEDURE — 92012 INTRM OPH EXAM EST PATIENT: CPT

## 2023-12-18 ASSESSMENT — TONOMETRY
OS_IOP_MMHG: 10
OD_IOP_MMHG: 10

## 2023-12-18 ASSESSMENT — VISUAL ACUITY
OS_CC: 20/30-2
OD_CC: 20/30

## 2023-12-20 ENCOUNTER — OFFICE VISIT (OUTPATIENT)
Dept: PHYSICAL THERAPY | Age: 88
End: 2023-12-20
Payer: MEDICARE

## 2023-12-20 DIAGNOSIS — R26.2 AMBULATORY DYSFUNCTION: ICD-10-CM

## 2023-12-20 DIAGNOSIS — G89.29 CHRONIC PAIN OF LEFT KNEE: Primary | ICD-10-CM

## 2023-12-20 DIAGNOSIS — M54.16 LUMBAR RADICULOPATHY: ICD-10-CM

## 2023-12-20 DIAGNOSIS — M25.562 CHRONIC PAIN OF LEFT KNEE: Primary | ICD-10-CM

## 2023-12-20 PROCEDURE — 97116 GAIT TRAINING THERAPY: CPT | Performed by: PHYSICAL THERAPY ASSISTANT

## 2023-12-20 PROCEDURE — 97110 THERAPEUTIC EXERCISES: CPT | Performed by: PHYSICAL THERAPY ASSISTANT

## 2023-12-20 NOTE — PROGRESS NOTES
"Daily Note     Today's date: 2023  Patient name: Carli Gonzalez  : 1935  MRN: [id number]  Referring provider: Howard Morrison DO  Dx:   Encounter Diagnosis     ICD-10-CM    1. Chronic pain of left knee  M25.562     G89.29       2. Ambulatory dysfunction  R26.2       3. Lumbar radiculopathy  M54.16                      Subjective: Pt states,\" I can tell its there,\" when asked how her back is feeling today. States knee feels no different.     Objective: See treatment diary below    Assessment: SPC remains necessary for ambulatory safety due to decreased balance. Good tolerance to TE as noted with no reports of increased pain.  Standing TE remain difficult due to back  pain. . Tolerated treatment well. Patient demonstrated fatigue post treatment, exhibited good technique with therapeutic exercises, and would benefit from continued PT    Plan: Continue per plan of care.  Progress treatment as tolerated.       Precautions: L knee pain     Manuals 11/15 11/22    11/29 12/13 12/20                   Neuro Re-Ed                        Ther Ex                   Seated p ball flexion 10x:05 - 2 sets Seated pball flexion 20x5s Seated pball flexion 20x5\"      Repeated flexion 2x10             LAQ 2x10 ea 2x10 ea 2x10 ea 2x10    SLR x 3, alternating NT Attempted standing hip abd- caused increased back pain NT  Hip Abd 2x10 ea Hip abd 2x10 ea           Seated HR/TR 20x- standing increases L hip pain * Seated today 20x NT 20x ea 20x ea   Seated ball squeeze NT 20x 3 sec 20x:03 20x3s 20x 3\"       20x3s    Seated alternating march 20x 20x 20x 20x 20x ea                                                   Ther Activity                        Gait Training        Gait training with SPC  Lap walking in PT NV Walking in PT 2 x 50' increments S-CGA for safety With PT 4x50' S-CGA for light steadying assist 4x50ft S-CGA w SPC 2x75' S/CG w SPC             Modalities        MH seated lumbar spine, extra toweling x10' 2x5' - " prior and after 5' prior extra toweling

## 2024-01-03 ENCOUNTER — OFFICE VISIT (OUTPATIENT)
Dept: PHYSICAL THERAPY | Age: 89
End: 2024-01-03
Payer: MEDICARE

## 2024-01-03 DIAGNOSIS — M54.16 LUMBAR RADICULOPATHY: ICD-10-CM

## 2024-01-03 DIAGNOSIS — R26.2 AMBULATORY DYSFUNCTION: ICD-10-CM

## 2024-01-03 DIAGNOSIS — M25.562 CHRONIC PAIN OF LEFT KNEE: Primary | ICD-10-CM

## 2024-01-03 DIAGNOSIS — G89.29 CHRONIC PAIN OF LEFT KNEE: Primary | ICD-10-CM

## 2024-01-03 PROCEDURE — 97110 THERAPEUTIC EXERCISES: CPT | Performed by: PHYSICAL THERAPIST

## 2024-01-03 PROCEDURE — 97116 GAIT TRAINING THERAPY: CPT | Performed by: PHYSICAL THERAPIST

## 2024-01-03 NOTE — PROGRESS NOTES
"Daily Note     Today's date: 1/3/2024  Patient name: Carli Gonzalez  : 1935  MRN: [id number]  Referring provider: Howard Morrison DO  Dx:   Encounter Diagnosis     ICD-10-CM    1. Chronic pain of left knee  M25.562     G89.29       2. Ambulatory dysfunction  R26.2       3. Lumbar radiculopathy  M54.16                      Subjective: Patient feeling more tired today from having her extended family over for the holidays. Patient has started to notice that she has been losing more of her memory, specifically short term memory. Patient is discussing options with her family regarding moving into assisted vs independent living closer to her son. Patient reports that her back and hip are still painful, will be following up with ortho again to discuss further.       Objective: See treatment diary below      Assessment: Tolerated treatment well. Patient demonstrated fatigue post treatment, exhibited good technique with therapeutic exercises, and would benefit from continued PT Patient with back pain with walking 75' - stopped walking and rested. Patient's pain eased with seated rest, and exercising in sitting. Patient able to perform standing hip abduction without increased back pain today. Patient tolerating gait training well today without increase in back pain overall. Patient still having L knee pain with activity, tolerable.       Plan: Continue per plan of care.      Precautions: L knee pain     Manuals 1/3    11/29 12/13 12/20                 Neuro Re-Ed                     Ther Ex              Seated p ball flexion 20x:05 Seated p ball flexion 10x:05 - 2 sets Seated pball flexion 20x5s Seated pball flexion 20x5\"     Repeated flexion 2x10            LAQ 2x10 ea 2x10 ea 2x10    SLR x 3, alternating Hip abd 2x10 ea NT  Hip Abd 2x10 ea Hip abd 2x10 ea          Seated HR/TR Seated today 20x NT 20x ea 20x ea   Seated ball squeeze 20x 3 sec 20x:03 20x3s 20x 3\"      20x3s    Seated alternating march 20x 20x 20x " 20x ea                                             Ther Activity                     Gait Training       Gait training with SPC 2x 75' S-CGA with SPC With PT 4x50' S-CGA for light steadying assist 4x50ft S-CGA w SPC 2x75' S/CG w SPC            Modalities       MH seated lumbar spine, extra toweling Not needed today 5' prior extra toweling

## 2024-01-17 ENCOUNTER — OFFICE VISIT (OUTPATIENT)
Dept: PHYSICAL THERAPY | Age: 89
End: 2024-01-17
Payer: MEDICARE

## 2024-01-17 DIAGNOSIS — M25.562 CHRONIC PAIN OF LEFT KNEE: Primary | ICD-10-CM

## 2024-01-17 DIAGNOSIS — M54.16 LUMBAR RADICULOPATHY: ICD-10-CM

## 2024-01-17 DIAGNOSIS — R26.2 AMBULATORY DYSFUNCTION: ICD-10-CM

## 2024-01-17 DIAGNOSIS — G89.29 CHRONIC PAIN OF LEFT KNEE: Primary | ICD-10-CM

## 2024-01-17 PROCEDURE — 97116 GAIT TRAINING THERAPY: CPT | Performed by: PHYSICAL THERAPIST

## 2024-01-17 NOTE — PROGRESS NOTES
"Daily Note     Today's date: 2024  Patient name: Carli Gonzalez  : 1935  MRN: [id number]  Referring provider: Howard Morrison DO  Dx:   Encounter Diagnosis     ICD-10-CM    1. Chronic pain of left knee  M25.562     G89.29       2. Ambulatory dysfunction  R26.2       3. Lumbar radiculopathy  M54.16                      Subjective: Patient reports that her back is feeling somewhat better, feeling stiff and tired in her hips after treatment. Patient was recommended to chiropractor for her back pain- hasn't had a chance to start yet, but will be starting as soon as the weather clears.       Objective: See treatment diary below      Assessment: Tolerated treatment well. Patient demonstrated fatigue post treatment, exhibited good technique with therapeutic exercises, and would benefit from continued PT Patient tolerating program well. No c/o back pain with TE today. Patient requiring Close S-CGA for steadying assist with gait training today. Patient ambulates with slower, wider base of support, decreased B step length.       Plan: Continue per plan of care.      Precautions: L knee pain     Manuals 1/3    1/17 12/13 12/20                 Neuro Re-Ed                     Ther Ex              Seated p ball flexion 20x:05 Seated p ball flexion 10x:05 - 2 sets Seated pball flexion 20x5s Seated pball flexion 20x5\"     Repeated flexion 2x10            LAQ 2x10 ea 2x10 ea 2x10    SLR x 3, alternating Hip abd 2x10 ea NT  Hip Abd 2x10 ea Hip abd 2x10 ea          Seated HR/TR Seated today 20x NT 20x ea 20x ea   Seated ball squeeze 20x 3 sec 20x:03 20x3s 20x 3\"      20x3s    Seated alternating march 20x 20x 20x 20x ea                                             Ther Activity                     Gait Training       Gait training with SPC 2x 75' S-CGA with SPC With PT x200' S-CGA for light steadying assist 4x50ft S-CGA w SPC 2x75' S/CG w SPC            Modalities       MH seated lumbar spine, extra toweling Not needed " today Not needed

## 2024-01-24 ENCOUNTER — APPOINTMENT (OUTPATIENT)
Dept: PHYSICAL THERAPY | Age: 89
End: 2024-01-24
Payer: MEDICARE

## 2024-01-31 ENCOUNTER — APPOINTMENT (OUTPATIENT)
Dept: PHYSICAL THERAPY | Age: 89
End: 2024-01-31
Payer: MEDICARE

## 2024-02-07 ENCOUNTER — OFFICE VISIT (OUTPATIENT)
Dept: PHYSICAL THERAPY | Age: 89
End: 2024-02-07
Payer: MEDICARE

## 2024-02-07 DIAGNOSIS — R26.2 AMBULATORY DYSFUNCTION: ICD-10-CM

## 2024-02-07 DIAGNOSIS — M25.562 CHRONIC PAIN OF LEFT KNEE: Primary | ICD-10-CM

## 2024-02-07 DIAGNOSIS — G89.29 CHRONIC PAIN OF LEFT KNEE: Primary | ICD-10-CM

## 2024-02-07 DIAGNOSIS — M54.16 LUMBAR RADICULOPATHY: ICD-10-CM

## 2024-02-07 PROCEDURE — 97116 GAIT TRAINING THERAPY: CPT | Performed by: PHYSICAL THERAPIST

## 2024-02-07 PROCEDURE — 97110 THERAPEUTIC EXERCISES: CPT | Performed by: PHYSICAL THERAPIST

## 2024-02-07 NOTE — PROGRESS NOTES
"Daily Note     Today's date: 2024  Patient name: Carli Gonzalez  : 1935  MRN: [id number]  Referring provider: Howard Morrison DO  Dx:   Encounter Diagnosis     ICD-10-CM    1. Chronic pain of left knee  M25.562     G89.29       2. Ambulatory dysfunction  R26.2       3. Lumbar radiculopathy  M54.16                      Subjective: Patient and daughter report that patient will be looking for jail to move and to keep patient's ability to be safe at home. Patient currently without any pain today.       Objective: See treatment diary below      Assessment: Tolerated treatment well. Patient demonstrated fatigue post treatment, exhibited good technique with therapeutic exercises, and would benefit from continued PT  Patient tolerating exercises very well today, only having pain at the end, last exercise. Finished with  for pain relief at L knee.     Plan: Continue per plan of care.      Precautions: L knee pain            Manuals 1/3    1/17 2/7 12/20                 Neuro Re-Ed                     Ther Ex              Seated p ball flexion 20x:05 Seated p ball flexion 10x:05 - 2 sets Seated pball flexion 20x5s NT Seated pball flexion 20x5\"     Repeated flexion 2x10            LAQ 2x10 ea 2x10 ea 2x10    SLR x 3, alternating Hip abd 2x10 ea NT  Hip Abd 2x10 ea Hip abd 2x10 ea          Seated HR/TR Seated today 20x NT 20x ea 20x ea   Seated ball squeeze 20x 3 sec 20x:03 20x3s 20x 3\"      20x3s    Seated alternating march 20x 20x 20x 20x ea                                             Ther Activity                     Gait Training       Gait training with SPC 2x 75' S-CGA with SPC With PT x200' S-CGA for light steadying assist X200', x 100', x 50' ft S-CGA w SPC 2x75' S/CG w SPC            Modalities        seated lumbar spine, extra toweling Not needed today Not needed CP R Knee x 5'                              "

## 2024-02-14 ENCOUNTER — OFFICE VISIT (OUTPATIENT)
Dept: PHYSICAL THERAPY | Age: 89
End: 2024-02-14
Payer: MEDICARE

## 2024-02-14 DIAGNOSIS — M25.562 CHRONIC PAIN OF LEFT KNEE: Primary | ICD-10-CM

## 2024-02-14 DIAGNOSIS — R26.2 AMBULATORY DYSFUNCTION: ICD-10-CM

## 2024-02-14 DIAGNOSIS — G89.29 CHRONIC PAIN OF LEFT KNEE: Primary | ICD-10-CM

## 2024-02-14 DIAGNOSIS — M54.16 LUMBAR RADICULOPATHY: ICD-10-CM

## 2024-02-14 PROCEDURE — 97110 THERAPEUTIC EXERCISES: CPT | Performed by: SPECIALIST/TECHNOLOGIST

## 2024-02-14 PROCEDURE — 97116 GAIT TRAINING THERAPY: CPT | Performed by: SPECIALIST/TECHNOLOGIST

## 2024-02-14 NOTE — PROGRESS NOTES
Daily Note     Today's date: 2024  Patient name: Carli Gonzalez  : 1935  MRN: [id number]  Referring provider: Howard Morrison DO  Dx:   Encounter Diagnosis     ICD-10-CM    1. Chronic pain of left knee  M25.562     G89.29       2. Ambulatory dysfunction  R26.2       3. Lumbar radiculopathy  M54.16                      Subjective: Pt reports L knee pain remains bothersome.       Objective: See treatment diary below      Assessment: Pt appropriately challenged with LE exercises to improve LE strength and ambulatory function. Lumbar flexion remains preferential movement pattern. Tolerated treatment well. Patient demonstrated fatigue post treatment, exhibited good technique with therapeutic exercises, and would benefit from continued PT      Plan: Continue per plan of care.  Progress treatment as tolerated.       Precautions: L knee pain            Manuals 1/3    1/17 2/7 2/14                 Neuro Re-Ed                     Ther Ex              Seated p ball flexion 20x:05 Seated p ball flexion 10x:05 - 2 sets Seated pball flexion 20x5s NT 3x10     Repeated flexion 2x10            LAQ 2x10 ea 2x10 ea 2x10 np   SLR x 3, alternating Hip abd 2x10 ea NT  Hip Abd 2x10 ea 2x10 ea flx, abd, ext   HS Curl    20x ea   Seated HR/TR Seated today 20x NT 20x ea 30x ea    Seated ball squeeze 20x 3 sec 20x:03 20x3s 20x3s      20x3s    Seated  20x 20x 20x 20x                                             Ther Activity                     Gait Training       Gait training with SPC 2x 75' S-CGA with SPC With PT x200' S-CGA for light steadying assist X200', x 100', x 50' ft S-CGA w SPC [id number] SPC CGA            Modalities       MH seated lumbar spine, extra toweling Not needed today Not needed CP R Knee x 5' Deferred

## 2024-02-17 NOTE — PLAN OF CARE
Problem: PHYSICAL THERAPY ADULT  Goal: Performs mobility at highest level of function for planned discharge setting  See evaluation for individualized goals  Treatment/Interventions: Functional transfer training, LE strengthening/ROM, Elevations, Therapeutic exercise, Endurance training, Patient/family training, Equipment eval/education, Bed mobility, Gait training, Spoke to nursing  Equipment Recommended: Carmen Boyle (RW for mobility at this time)       See flowsheet documentation for full assessment, interventions and recommendations  Prognosis: Good  Problem List: Decreased strength, Decreased endurance, Impaired balance, Decreased mobility, Decreased skin integrity, Pain  Assessment: pt is a 80 y/o female admitted to [facility] s/p fall,L knee contusion,WBAT BLE per ortho  Pt lives alone in 2 story home,stair glide to 2nd floor,(+)1-2 MAE,reports x1 recent fall needing A to get up from ground,reports A from local family as needed,use of personal DME as needed PTA and reports being completely I PTA  Pt currently is not at functional mobility baseline,needs Ax1 for mobility,use of DME (RW) for mobility,reports L knee pain,multiple lines,IV medicine management and ongoing medical care  Pt demonstrates minimal deficits during functional mobility and gait including dec endurance,dec balance,dec BLE strength,ataxic and unsteady gait at times,inc pain L knee area and needs minAx1 for transfers and S for gait with use of RW  Pt would cont to benefit from skilled inpt PT services to maximize functional independence  Barriers to Discharge:  (lives alone with family support,1-2 MAE)  Barriers to Discharge Comments: recommend LIfe Alert device upon D/C  Recommendation: Home with family support, Home PT (cont use of personal DME)          See flowsheet documentation for full assessment  Jay Vann

## 2024-02-21 ENCOUNTER — OFFICE VISIT (OUTPATIENT)
Dept: PHYSICAL THERAPY | Age: 89
End: 2024-02-21
Payer: MEDICARE

## 2024-02-21 DIAGNOSIS — M25.562 CHRONIC PAIN OF LEFT KNEE: Primary | ICD-10-CM

## 2024-02-21 DIAGNOSIS — G89.29 CHRONIC PAIN OF LEFT KNEE: Primary | ICD-10-CM

## 2024-02-21 DIAGNOSIS — M54.16 LUMBAR RADICULOPATHY: ICD-10-CM

## 2024-02-21 DIAGNOSIS — R26.2 AMBULATORY DYSFUNCTION: ICD-10-CM

## 2024-02-21 PROCEDURE — 97116 GAIT TRAINING THERAPY: CPT | Performed by: PHYSICAL THERAPIST

## 2024-02-21 PROCEDURE — 97110 THERAPEUTIC EXERCISES: CPT | Performed by: PHYSICAL THERAPIST

## 2024-02-21 NOTE — PROGRESS NOTES
PT Re-Evaluation     Today's date: 2024  Patient name: Carli Gonzalez  : 1935  MRN: [id number]  Referring provider: Howard Morrison DO  Dx:   Encounter Diagnosis     ICD-10-CM    1. Chronic pain of left knee  M25.562     G89.29       2. Lumbar radiculopathy  M54.16       3. Ambulatory dysfunction  R26.2                      Assessment  Assessment details: Patient seen for PT re-assessment. Patient presents with slight improvement in balance seen with reduction in TUG time, lessening hip and back pain since onset of PT. Patient feels therapy is beneficial as she is still having L knee pain with walking and decreasing back pain. Patient's goal is to continue with PT for 1x/week with the goal of transitioning to exercising at home and/or at assisted living facility when patient ready.   Patient is a good candidate for skilled maintenance therapy as she lives alone and needs to maintain her independence at home.   Impairments: abnormal gait, abnormal or restricted ROM, activity intolerance, impaired balance, impaired physical strength, lacks appropriate home exercise program, pain with function, poor posture  and poor body mechanics  Functional limitations: Moderate-severe pain with IADLs, with recreational activities, pain with sit<>stand, walking and stair climbing, pain with bed mobility. L hip pain with standing, walking and with IADLS.Understanding of Dx/Px/POC: good   Prognosis: good    Goals  STG/LTG  - Ortho evaluation for L knee pain met  - Reduce swelling at lateral knee  progressing  - Reduce knee pain by 25% progressing  - increase L knee AROM by 5-10 degrees both flex and ext. Progressing    Goals added  STG to be met in 4 weeks  - Patient to be able to reduce knee pain to 4/10 with activity partially met  - Patient to be able to tolerate PT treatment with knee pain <6/10 met  - Patient to be able to improve TUG time by 1-2 seconds not met, LBP    LTG to be met in 6 weeks  - Patient to be  able to tolerate walking x 10 min with knee pain <6/10 met  - Patient to be able to reduce sit<>stand test by 1-2 seconds not met, LBP    Goals added for lumbar spine  STG to be met in 4 weeks  - Reduce L hip pain to 0/10 with standing and walking not met  - Patient to be able to tolerate walking x 10 min without L hip pain partially met, rarely walks > 10 min    LTG to be met in 6 weeks.  - Patient to be able to tolerate standing in the kitchen without L hip pain. met  - Patient to be able to exercise at home with HEP.  Met    Goals added for maintenance therapy:  STG/LTG to be met in 6-8 weeks.   - Patient to be able to maintain her ability to ambulate with her SPC without falls at home.  - Patient to be able to maintain her strength 3+/5 or greater for B LE to maintain patient's independence at home with IADLs.        Plan  Patient would benefit from: skilled physical therapy  Planned modality interventions: cryotherapy  Planned therapy interventions: abdominal trunk stabilization, manual therapy, joint mobilization, neuromuscular re-education, patient education, postural training, strengthening, stretching, therapeutic activities, therapeutic exercise, home exercise program, body mechanics training and balance  Frequency: 2x week  Duration in weeks: 8  Treatment plan discussed with: patient and family        Subjective Evaluation    History of Present Illness  Mechanism of injury: Patient reports that she and her family are discussing assisted living as patient ages especially since she lives alone. Patient and her daughter have also noticed patient's memory has been changing as she seems to be forgetful at times.   Patient Goals  Patient goals for therapy: decreased pain, improved balance, increased motion, increased strength, decreased edema and independence with ADLs/IADLs    Pain  Current pain rating: 3  At best pain ratin  At worst pain ratin  Location: low back (above); L knee pain 5/10 with  walking  Quality: sharp, pressure, discomfort, pulling and tight  Relieving factors: ice  Aggravating factors: sitting, standing, walking and stair climbing  Progression: worsening    Social Support  Steps to enter house: yes  1  Stairs in house: yes   3  Lives in: multiple-level home  Lives with: alone    Employment status: not working    Diagnostic Tests  No diagnostic tests performed  Treatments  Previous treatment: physical therapy        Objective     Postural Observations  Seated posture: good  Standing posture: good  Correction of posture: makes symptoms better      Tenderness     Right Knee   Tenderness in the lateral joint line, lateral retinaculum and medial joint line. No tenderness in the patellar tendon.     Additional Tenderness Details  Palpable swelling at proximal lateral knee joint (distal proximal thigh), possible bruising as well. Swelling does radiate distally across joint line and distally to knee joint.         Active Range of Motion     Lumbar   Flexion:  WFL  Extension: 0 degrees  with pain  Left lateral flexion: 5 degrees    with pain  Right lateral flexion: 8 degrees  with pain  Left rotation: 10 degrees   Right rotation: 10 degrees   Left Hip   Flexion: 90 degrees     Right Hip   Flexion: 90 degrees   Left Knee   Flexion: 90 degrees with pain  Extension: 0 degrees with pain  Left Ankle/Foot   Dorsiflexion (ke): 0 degrees     Right Ankle/Foot   Dorsiflexion (ke): 0 degrees     Additional Active Range of Motion Details  Pressure like pain at end range flexion and at end range extension. Painful equally in both directions.    Mobility   Patellar Mobility:   Left Knee   Hypomobile: left medial, left lateral, left superior and left inferior    Additional Mobility Details  Still lacking full patellar mobility, responding well to gentle patellar mobs and restriction is reduced.     Patellar Static Positioning   Left Knee: lateral tilt    Additional Patellar Static Positioning Details  L patella  laterally shifted > R   Frog-eyed position patellas L>R    Mechanical Assessment    Cervical      Thoracic      Lumbar    Sitting flexion: repeated movements  Pain location: centralized  Pain intensity: better  Pain level: decreased    Strength/Myotome Testing     Left Hip   Planes of Motion   Flexion: 4-  Extension: 4-  Abduction: 4-  Adduction: 4-    Right Hip   Planes of Motion   Flexion: 4-  Extension: 4-  Abduction: 4-  Adduction: 4-    Left Knee   Flexion: 3+  Extension: 3+  Quadriceps contraction: fair    Right Knee   Flexion: 4  Extension: 4    Left Ankle/Foot   Dorsiflexion: 4-  Plantar flexion: 4-    Right Ankle/Foot   Dorsiflexion: 3+  Plantar flexion: 3+    Additional Strength Details  Pain with extension and QS    Tests     Lumbar     Left   Positive passive SLR and slump test.     Right   Negative passive SLR and slump test.     Ambulation     Observational Gait   Gait: antalgic   Decreased walking speed and stride length.     Additional Observational Gait Details  Patient ambulates with slow gait speed, decreased B step length, guarded knee flex/ext during swing phase, especially on L. Immediate increase in L knee pain with sit>stand transfer, feels unsteady and usually stands for 30-60 seconds as pain decreases.  Neuro Exam:     Functional outcomes   5x sit to stand: 15 (seconds)  TU with SPC (seconds)  Functional outcome gait comment: Patient ambulates with wide base of support, decreased B step length, shuffling gait pattern, minimal toe clearance; using her SPC.              Precautions: L knee pain     Manuals                  Neuro Re-Ed                     Ther Ex              Seated p ball flexion 20x:05 Seated p ball flexion 10x:05 - 2 sets Seated pball flexion 20x5s NT 3x10     Repeated flexion 2x10            LAQ 2x10 R only today 2* increased knee pain 2x10 ea 2x10 np   SLR x 3, alternating Hip abd 2x10 ea flex, abd, ext NT  Hip Abd 2x10 ea 2x10 ea flx, abd, ext    HS Curl 20x   20x ea   Seated HR/TR 30x NT 20x ea 30x ea    Seated ball squeeze 20x 3 sec 20x:03 20x3s 20x3s      20x3s    Seated alternating march 20x 20x 20x 20x                                             Ther Activity                     Gait Training       Gait training with SPC 2x 100' SPC CGA With PT x200' S-CGA for light steadying assist X200', x 100', x 50' ft S-CGA w SPC [id number] SPC CGA            Modalities       MH seated lumbar spine, extra toweling Not needed today Not needed CP R Knee x 5' Deferred

## 2024-02-27 ENCOUNTER — OFFICE VISIT (OUTPATIENT)
Age: 89
End: 2024-02-27

## 2024-02-27 VITALS
BODY MASS INDEX: 33.26 KG/M2 | DIASTOLIC BLOOD PRESSURE: 71 MMHG | HEIGHT: 59 IN | SYSTOLIC BLOOD PRESSURE: 130 MMHG | WEIGHT: 165 LBS | HEART RATE: 102 BPM

## 2024-02-27 DIAGNOSIS — M54.42 CHRONIC BILATERAL LOW BACK PAIN WITH LEFT-SIDED SCIATICA: ICD-10-CM

## 2024-02-27 DIAGNOSIS — M99.05 SEGMENTAL DYSFUNCTION OF PELVIC REGION: Primary | ICD-10-CM

## 2024-02-27 DIAGNOSIS — M99.03 SEGMENTAL DYSFUNCTION OF LUMBAR REGION: ICD-10-CM

## 2024-02-27 DIAGNOSIS — M54.16 LUMBAR RADICULOPATHY: ICD-10-CM

## 2024-02-27 DIAGNOSIS — G89.29 CHRONIC BILATERAL LOW BACK PAIN WITH LEFT-SIDED SCIATICA: ICD-10-CM

## 2024-02-27 DIAGNOSIS — M99.04 SEGMENTAL DYSFUNCTION OF SACRAL REGION: ICD-10-CM

## 2024-02-27 PROCEDURE — 99202 OFFICE O/P NEW SF 15 MIN: CPT | Performed by: CHIROPRACTOR

## 2024-02-27 PROCEDURE — 98941 CHIROPRACT MANJ 3-4 REGIONS: CPT | Performed by: CHIROPRACTOR

## 2024-02-27 RX ORDER — SODIUM FLUORIDE 6 MG/ML
PASTE, DENTIFRICE DENTAL
COMMUNITY
Start: 2024-02-15

## 2024-02-28 ENCOUNTER — APPOINTMENT (OUTPATIENT)
Dept: PHYSICAL THERAPY | Age: 89
End: 2024-02-28
Payer: MEDICARE

## 2024-03-03 NOTE — PROGRESS NOTES
Date of First Visit: 2/27/2024  Visit number: 1    HPI:  Back Pain  This is a chronic problem. The current episode started more than 1 year ago. The problem occurs intermittently. The problem has been waxing and waning since onset. The pain is present in the lumbar spine and sacro-iliac. The quality of the pain is described as aching and shooting. The pain radiates to the left thigh. The pain is at a severity of 5/10. The pain is moderate. The symptoms are aggravated by bending, standing and twisting. She has tried heat, NSAIDs and muscle relaxant for the symptoms. The treatment provided mild relief.     Carli Gonzalez is a 88 y.o. female who presents for evaluation and treatment today she is accompanied by her daughter.  She localizes low back pain radiating into the left buttocks pain is usually dull however sharp at times pain is in their mid.  No incident of causation.  She denies any weaknesses.  Reports no persistent numbness and tingling of the lower extremity.  Pain is aggravated by arising out of a seated position and trying to walk alleviated by sitting and rest.      Chief Complaint   Patient presents with   • Back Pain     Lower lumbar pain that radiates down left buttocks. Patient  states dull and sharp pain that is intermittent.  Pain score 4-5  right now      Past Medical History:   Diagnosis Date   • Anxiety    • Arthritis     last assessed 3/24/15    • Atherosclerosis    • Bell's palsy    • Breast cancer (HCC) 2019    right invasive ca   • Broken femur (HCC)    • Cancer (HCC)     breast   • Cardiac disorder    • DCIS (ductal carcinoma in situ) of breast     last assessed 4/4/17    • Depression    • Endometrial polyp    • GERD (gastroesophageal reflux disease)     controlled   • History of radiation therapy 2010   • History of radiation therapy 2010    right breast   • Hypercholesterolemia     resolved 10/22/14    • Hyperlipidemia    • Long term use of drug     last assessed 5/23/14    • Memory  loss    • Neuropathy    • Peripheral neuropathy    • Pneumonia     As a Child   • PONV (postoperative nausea and vomiting)    • Tachycardia    • Uterine fibroid       Past Surgical History:   Procedure Laterality Date   • BREAST BIOPSY Right 03/28/2019    us bx- inv ca   • BREAST LUMPECTOMY Right 2010   • BREAST LUMPECTOMY Right 5/8/2019    Procedure: BREAST LUMPECTOMY; BREAST NEEDLE LOCALIZATION (NEEDLE LOC AT 0800);  Surgeon: Sergio Spears MD;  [facility];  Service: Surgical Oncology   • CARPAL TUNNEL RELEASE Bilateral    • CATARACT EXTRACTION Bilateral    • COLONOSCOPY     • DILATION AND CURETTAGE OF UTERUS     • ENDOMETRIAL BIOPSY      without cervical dilation    • HAND SURGERY     • HEMORROIDECTOMY     • JOINT REPLACEMENT Right     Shoulder   • JOINT REPLACEMENT Bilateral     Knees   • LYMPH NODE BIOPSY Right 5/8/2019    Procedure: SENTINEL LYMPH NODE BIOPSY; LYMPHOSCINTIGRAPHY; INTRAOPERATIVE LYMPHATIC MAPPING (INJECT AT 0900);  Surgeon: Sergio Spears MD;  [facility];  Service: Surgical Oncology   • MAMMO NEEDLE LOCALIZATION RIGHT (ALL INC) Right 5/8/2019   • OOPHORECTOMY      76   • OVARIAN CYST REMOVAL Left    • NV ARTHROPLASTY GLENOHUMERAL JOINT TOTAL SHOULDER Right 7/18/2017    Procedure: TOTAL SHOULDER REVERSE ARTHROPLASTY;  Surgeon: Anthony Tomas MD;  [facility]: [facility];  Service: Orthopedics   • NV ARTHROPLASTY GLENOHUMERAL JOINT TOTAL SHOULDER Left 5/25/2021    Procedure: ARTHROPLASTY SHOULDER REVERSE, WITH BICEP TEDONESIS;  Surgeon: Anthony Tomas MD;  [facility];  Service: Orthopedics   • NV NDSC WRST SURG W/RLS TRANSVRS CARPL LIGM Left 11/19/2019    Procedure: RELEASE CARPAL TUNNEL ENDOSCOPIC;  Surgeon: Everardo Gomes MD;  [facility];  Service: Orthopedics   • NV NEUROPLASTY &/TRANSPOSITION ULNAR NERVE ELBOW Left 11/19/2019    Procedure: RELEASE CUBITAL TUNNEL left -;  Surgeon: Everardo Gomes MD;  [facility];  Service: Orthopedics   •  SENTINEL LYMPH NODE BIOPSY     • TONSILLECTOMY     • TUBAL LIGATION     • US GUIDED BREAST BIOPSY RIGHT COMPLETE Right 3/28/2019     Family History   Problem Relation Age of Onset   • Heart disease Mother         artherosclerosis    • Heart disease Father         artherosclerosis    • Heart attack Father    • Arthritis Family    • Heart disease Family         artherosclerosis    • Breast cancer Family    • Osteoarthritis Family    • Supraventricular tachycardia Family    • Hypertension Daughter    • Ovarian cancer Sister 88   • Anemia Neg Hx    • Arrhythmia Neg Hx    • Asthma Neg Hx    • Clotting disorder Neg Hx    • Fainting Neg Hx    • Hyperlipidemia Neg Hx    • Aneurysm Neg Hx      Social History     Socioeconomic History   • Marital status:      Spouse name: None   • Number of children: None   • Years of education: None   • Highest education level: None   Occupational History   • Occupation: retired from work    Tobacco Use   • Smoking status: Never   • Smokeless tobacco: Never   Vaping Use   • Vaping status: Never Used   Substance and Sexual Activity   • Alcohol use: Never   • Drug use: No   • Sexual activity: Not Currently   Other Topics Concern   • None   Social History Narrative    Coffee     Daily coffee consumption 1 cup day     Daily cola consumption can day     Walking           Social Determinants of Health     Financial Resource Strain: Low Risk  (4/17/2023)    Overall Financial Resource Strain (CARDIA)    • Difficulty of Paying Living Expenses: Not hard at all   Food Insecurity: Not on file   Transportation Needs: No Transportation Needs (4/17/2023)    PRAPARE - Transportation    • Lack of Transportation (Medical): No    • Lack of Transportation (Non-Medical): No   Physical Activity: Not on file   Stress: Not on file   Social Connections: Not on file   Intimate Partner Violence: Not on file   Housing Stability: Not on file       Current Outpatient Medications:   •  PreviDent 5000 Booster  Plus 1.1 % PSTE, , Disp: , Rfl:   •  acetaminophen (TYLENOL) 325 mg tablet, Take 325 mg by mouth every 6 (six) hours as needed for mild pain, Disp: , Rfl:   •  amoxicillin (AMOXIL) 500 mg capsule, TAKE 4 CAPSULES 1 HOUR BEFORE DENTAL PROCEDURE, Disp: , Rfl:   •  anastrozole (ARIMIDEX) 1 mg tablet, Take 1 mg by mouth daily, Disp: , Rfl:   •  anastrozole (ARIMIDEX) 1 mg tablet, TAKE 1 TABLET BY MOUTH  DAILY (Patient not taking: Reported on 11/3/2023), Disp: 90 tablet, Rfl: 3  •  Cholecalciferol (VITAMIN D3 PO), Take by mouth, Disp: , Rfl:   •  clotrimazole-betamethasone (LOTRISONE) 1-0.05 % cream, Apply topically 2 (two) times a day, Disp: 60 g, Rfl: 1  •  CRANBERRY PO, Take 1,700 mg by mouth daily in the early morning , Disp: , Rfl:   •  ibuprofen (MOTRIN) 200 mg tablet, Take 200 mg by mouth every 6 (six) hours as needed for mild pain , Disp: , Rfl:   •  lisinopril (ZESTRIL) 10 mg tablet, TAKE 1 TABLET BY MOUTH  TWICE DAILY, Disp: 180 tablet, Rfl: 3  •  Multiple Vitamins-Minerals (PRESERVISION AREDS 2 PO), Take 2 tablets by mouth daily in the early morning , Disp: , Rfl:   •  propranolol (INDERAL) 20 mg tablet, take 1 tablet by mouth four times a day, Disp: , Rfl:   •  propranolol (INDERAL) 20 mg tablet, TAKE 1 TABLET BY MOUTH 4  TIMES DAILY (Patient not taking: Reported on 11/3/2023), Disp: 360 tablet, Rfl: 3  •  pyridoxine (VITAMIN B6) 50 mg tablet, Take 50 mg by mouth daily, Disp: , Rfl:   •  rosuvastatin (CRESTOR) 10 MG tablet, TAKE 1/2 TABLET BY MOUTH 3 TIMES A WEEK ON MONDAY, WEDNESDAY AND FRIDAY (Patient not taking: Reported on 11/3/2023), Disp: 21 tablet, Rfl: 3  •  rosuvastatin (CRESTOR) 5 mg tablet, TAKE 1 TABLET BY MOUTH 3 TIMES A WEEK, Disp: 250 tablet, Rfl: 1  Allergies as of 02/27/2024 - Reviewed 02/27/2024   Allergen Reaction Noted   • Gabapentin Hallucinations 03/22/2021   • Amoxicillin-pot clavulanate GI Intolerance    • Azithromycin GI Intolerance and Rash 10/18/2012   • Cephalexin GI Intolerance  12/28/2011   • Cortisone GI Intolerance 12/28/2011   • Doxycycline GI Intolerance 10/18/2012   • Penicillins GI Intolerance 01/21/2015         The following portions of the patient's history were reviewed and updated as appropriate: allergies, current medications, past family history, past medical history, past social history, past surgical history, and problem list.    Review of Systems   Constitutional: Negative.    Musculoskeletal:  Positive for back pain.   Neurological: Negative.    Psychiatric/Behavioral: Negative.         Physical Exam:  Physical Exam  Vitals reviewed. Exam conducted with a chaperone present.   Constitutional:       Appearance: Normal appearance.   Cardiovascular:      Rate and Rhythm: Normal rate.      Pulses: Normal pulses.   Pulmonary:      Effort: Pulmonary effort is normal.   Musculoskeletal:      Lumbar back: Spasms and tenderness present. Decreased range of motion. Negative right straight leg raise test and negative left straight leg raise test.        Back:       Comments: Pelvic obliquity elevated left versus right innominate leg with any quality.   Skin:     General: Skin is warm and dry.   Neurological:      General: No focal deficit present.      Mental Status: She is alert and oriented to person, place, and time.      Sensory: Sensation is intact.      Motor: Motor function is intact.      Gait: Gait is intact.      Deep Tendon Reflexes: Reflexes are normal and symmetric.   Psychiatric:         Mood and Affect: Mood normal.         Behavior: Behavior normal.         Thought Content: Thought content normal.     Diagnostics:      This result has an attachment that is not available.  Impression: AP and lateral images lumbar spine confirm severe joint space  narrowing at L5-S1.  There is a severe lumbar scoliosis as well.    Degenerative joint space narrowing at all lumbar levels are noted.  These  findings are consistent with chronic degenerative lumbar disc disease.  No  fracture  is noted and these radiographs were read within an orthopedic  perspective.  Exam End: 11/14/23 11:24 AM Last Resulted: 11/14/23 11:30 AM   Received From: [facility]       Assessment:  Diagnoses and all orders for this visit:    Segmental dysfunction of pelvic region    Lumbar radiculopathy    Segmental dysfunction of sacral region    Segmental dysfunction of lumbar region    Chronic bilateral low back pain with left-sided sciatica    Other orders  -     PreviDent 5000 Booster Plus 1.1 % PSTE         Treatment:  96586  Manipulation left innominate, sacrum, L5 L4 levels via side-lying flexion distraction manipulation all well-tolerated by the patient today.    Discussion:  I reviewed past medical records.  Discussed case with patient and her daughter today.  Trial of chiropractic care reassessment 4 weeks.  No follow-ups on file.

## 2024-03-06 ENCOUNTER — OFFICE VISIT (OUTPATIENT)
Dept: PHYSICAL THERAPY | Age: 89
End: 2024-03-06
Payer: MEDICARE

## 2024-03-06 DIAGNOSIS — G89.29 CHRONIC PAIN OF LEFT KNEE: Primary | ICD-10-CM

## 2024-03-06 DIAGNOSIS — M25.562 CHRONIC PAIN OF LEFT KNEE: Primary | ICD-10-CM

## 2024-03-06 DIAGNOSIS — M54.16 LUMBAR RADICULOPATHY: ICD-10-CM

## 2024-03-06 DIAGNOSIS — R26.2 AMBULATORY DYSFUNCTION: ICD-10-CM

## 2024-03-06 PROCEDURE — 97116 GAIT TRAINING THERAPY: CPT | Performed by: PHYSICAL THERAPIST

## 2024-03-06 PROCEDURE — 97110 THERAPEUTIC EXERCISES: CPT | Performed by: PHYSICAL THERAPIST

## 2024-03-06 NOTE — PROGRESS NOTES
Daily Note     Today's date: 3/6/2024  Patient name: Carli Gonzalez  : 1935  MRN: [id number]  Referring provider: Howard Morrison DO  Dx:   Encounter Diagnosis     ICD-10-CM    1. Chronic pain of left knee  M25.562     G89.29       2. Lumbar radiculopathy  M54.16       3. Ambulatory dysfunction  R26.2                      Subjective: Patient reports that she's doing well today. Patient and her daughters have decided that patient will be moving into [facility].       Objective: See treatment diary below      Assessment: Tolerated treatment well. Patient demonstrated fatigue post treatment, exhibited good technique with therapeutic exercises, and would benefit from continued PT      Plan: Continue per plan of care.  PT will be re-assessing patient NV to establish POC for progression to maintenance therapy as patient currently lives alone in her home and her goal is to maintain her strength to be able to continue to be independent and live inside her home until she moves.      Precautions: L knee pain     Manuals 2/21    3/6 2/7 2/14                 Neuro Re-Ed                     Ther Ex              Seated p ball flexion 20x:05 Seated p ball flexion 10x:05 - 2 sets Seated pball flexion 20x5s NT 3x10     Repeated flexion 2x10            LAQ 2x10 R only today 2* increased knee pain 2x10 ea 2x10 np   SLR x 3, alternating Hip abd 2x10 ea flex, abd, ext 2x10  Hip Abd 2x10 ea 2x10 ea flx, abd, ext   HS Curl 20x 20x  20x ea   Seated HR/TR 30x 30x 20x ea 30x ea    Seated ball squeeze 20x 3 sec 20x:03 20x3s 20x3s      20x3s    Seated alternating march 20x 20x 20x 20x                                             Ther Activity                     Gait Training       Gait training with SPC 2x 100' SPC ' x 2 with SPC, CGA X200', x 100', x 50' ft S-CGA w SPC [id number] SPC CGA            Modalities       MH seated lumbar spine, extra toweling Not needed today Not needed CP R Knee x 5' Deferred

## 2024-03-12 DIAGNOSIS — I10 ESSENTIAL HYPERTENSION: ICD-10-CM

## 2024-03-12 RX ORDER — ANASTROZOLE 1 MG/1
1 TABLET ORAL DAILY
Qty: 90 TABLET | Refills: 1 | Status: SHIPPED | OUTPATIENT
Start: 2024-03-12

## 2024-03-12 RX ORDER — PROPRANOLOL HYDROCHLORIDE 20 MG/1
20 TABLET ORAL 4 TIMES DAILY
Qty: 360 TABLET | Refills: 3 | Status: SHIPPED | OUTPATIENT
Start: 2024-03-12

## 2024-03-12 RX ORDER — LISINOPRIL 10 MG/1
10 TABLET ORAL 2 TIMES DAILY
Qty: 180 TABLET | Refills: 3 | Status: SHIPPED | OUTPATIENT
Start: 2024-03-12

## 2024-03-13 ENCOUNTER — OFFICE VISIT (OUTPATIENT)
Dept: PHYSICAL THERAPY | Age: 89
End: 2024-03-13
Payer: MEDICARE

## 2024-03-13 ENCOUNTER — FOLLOW UP (OUTPATIENT)
Dept: URBAN - METROPOLITAN AREA CLINIC 6 | Facility: CLINIC | Age: 89
End: 2024-03-13

## 2024-03-13 DIAGNOSIS — M54.16 LUMBAR RADICULOPATHY: ICD-10-CM

## 2024-03-13 DIAGNOSIS — G89.29 CHRONIC PAIN OF LEFT KNEE: Primary | ICD-10-CM

## 2024-03-13 DIAGNOSIS — R26.2 AMBULATORY DYSFUNCTION: ICD-10-CM

## 2024-03-13 DIAGNOSIS — M25.562 CHRONIC PAIN OF LEFT KNEE: Primary | ICD-10-CM

## 2024-03-13 DIAGNOSIS — H43.22: ICD-10-CM

## 2024-03-13 DIAGNOSIS — H04.123: ICD-10-CM

## 2024-03-13 DIAGNOSIS — H35.3132: ICD-10-CM

## 2024-03-13 DIAGNOSIS — G51.0: ICD-10-CM

## 2024-03-13 PROCEDURE — 92014 COMPRE OPH EXAM EST PT 1/>: CPT

## 2024-03-13 PROCEDURE — 97116 GAIT TRAINING THERAPY: CPT | Performed by: PHYSICAL THERAPIST

## 2024-03-13 PROCEDURE — 92134 CPTRZ OPH DX IMG PST SGM RTA: CPT

## 2024-03-13 ASSESSMENT — VISUAL ACUITY
OS_CC: 20/30
OD_CC: 20/30

## 2024-03-13 ASSESSMENT — TONOMETRY
OS_IOP_MMHG: 10
OD_IOP_MMHG: 10

## 2024-03-14 NOTE — PROGRESS NOTES
Daily Note     Today's date: 3/13/2024  Patient name: Carli Gonzalez  : 1935  MRN: [id number]  Referring provider: Howard Morrison DO  Dx:   Encounter Diagnosis     ICD-10-CM    1. Chronic pain of left knee  M25.562     G89.29       2. Lumbar radiculopathy  M54.16       3. Ambulatory dysfunction  R26.2                      Subjective: Patient reports that she's feeling stiff today, still feeling increased L knee pain with walking.       Objective: See treatment diary below      Assessment: Tolerated treatment well. Patient demonstrated fatigue post treatment, exhibited good technique with therapeutic exercises, and would benefit from continued PT  Patient motivated to participate in PT, added weights and added exercises for upper body to assist with posture, patient's independence with using her cane and to progress patient's ability to maintain her independence at home.       Plan: Continue per plan of care.      Precautions: L knee pain     Manuals 2/21    3/6 3/13 2/14                 Neuro Re-Ed                     Ther Ex              Seated p ball flexion 20x:05 Seated p ball flexion 10x:05 - 2 sets Seated pball flexion 20x5s NT 3x10     Repeated flexion 2x10            LAQ 2x10 R only today 2* increased knee pain 2x10 ea 2x10 np   SLR x 3, alternating Hip abd 2x10 ea flex, abd, ext 2x10  Hip Abd 2x10 ea 2x10 ea flx, abd, ext   HS Curl 20x 20x  20x ea   Seated HR/TR 30x 30x 20x ea 30x ea    Seated ball squeeze 20x 3 sec 20x:03 20x3s 20x3s      20x3s    Seated alternating march 20x 20x 20x 20x                                             Ther Activity                     Gait Training       Gait training with SPC 2x 100' SPC ' x 2 with SPC, CGA X200', x 100', x 50' ft S-CGA w SPC [id number] SPC CGA            Modalities       MH seated lumbar spine, extra toweling Not needed today Not needed Not needed today Deferred

## 2024-03-19 ENCOUNTER — PROCEDURE VISIT (OUTPATIENT)
Age: 89
End: 2024-03-19
Payer: MEDICARE

## 2024-03-19 VITALS — BODY MASS INDEX: 33.26 KG/M2 | WEIGHT: 165 LBS | HEIGHT: 59 IN

## 2024-03-19 DIAGNOSIS — M99.03 SEGMENTAL DYSFUNCTION OF LUMBAR REGION: ICD-10-CM

## 2024-03-19 DIAGNOSIS — M54.42 CHRONIC BILATERAL LOW BACK PAIN WITH LEFT-SIDED SCIATICA: ICD-10-CM

## 2024-03-19 DIAGNOSIS — M54.16 LUMBAR RADICULOPATHY: ICD-10-CM

## 2024-03-19 DIAGNOSIS — M99.05 SEGMENTAL DYSFUNCTION OF PELVIC REGION: Primary | ICD-10-CM

## 2024-03-19 DIAGNOSIS — M99.04 SEGMENTAL DYSFUNCTION OF SACRAL REGION: ICD-10-CM

## 2024-03-19 DIAGNOSIS — G89.29 CHRONIC BILATERAL LOW BACK PAIN WITH LEFT-SIDED SCIATICA: ICD-10-CM

## 2024-03-19 PROCEDURE — 98941 CHIROPRACT MANJ 3-4 REGIONS: CPT | Performed by: CHIROPRACTOR

## 2024-03-19 NOTE — PROGRESS NOTES
Date of first visit: 2/27/2024      HPI:  Carli returns for treatment today accompanied by her daughter.  She reports a slight improvement still has left-sided low back pain into the left buttock.      The following portions of the patient's history were reviewed and updated as appropriate: allergies, current medications, past family history, past medical history, past social history, past surgical history, and problem list.    Review of Systems    Physical Exam:  Exam reveals a pelvic obliquity elevated left versus right innominate tenderness upon palpation left parasacral region myofascial involvement left quadratus lumborum, erector spinae, and gluteus medius musculature.  Joint dysfunction left SI L5-S1 L4-L5 motion units.    Assessment:   Diagnosis ICD-10-CM Associated Orders   1. Segmental dysfunction of pelvic region  M99.05       2. Lumbar radiculopathy  M54.16       3. Segmental dysfunction of sacral region  M99.04       4. Segmental dysfunction of lumbar region  M99.03       5. Chronic bilateral low back pain with left-sided sciatica  M54.42     G89.29                 Treatment: 65913  Manipulation to the left innominate, sacrum, L5 L4 levels via side-lying flexion distraction manipulation well-tolerated by the patient myofascial release thoracolumbar fascia pretreatment    Discussion:  Continue icing over the left SI joint we will see her back for follow-up.

## 2024-03-20 ENCOUNTER — OFFICE VISIT (OUTPATIENT)
Dept: PHYSICAL THERAPY | Age: 89
End: 2024-03-20
Payer: MEDICARE

## 2024-03-20 DIAGNOSIS — R26.2 AMBULATORY DYSFUNCTION: ICD-10-CM

## 2024-03-20 DIAGNOSIS — G89.29 CHRONIC PAIN OF LEFT KNEE: Primary | ICD-10-CM

## 2024-03-20 DIAGNOSIS — M25.562 CHRONIC PAIN OF LEFT KNEE: Primary | ICD-10-CM

## 2024-03-20 DIAGNOSIS — M54.16 LUMBAR RADICULOPATHY: ICD-10-CM

## 2024-03-20 PROCEDURE — 97110 THERAPEUTIC EXERCISES: CPT | Performed by: PHYSICAL THERAPIST

## 2024-03-22 NOTE — PROGRESS NOTES
Daily Note     Today's date: 3/22/2024  Patient name: Carli Gonzalez  : 1935  MRN: [id number]  Referring provider: Howard Morrison DO  Dx:   Encounter Diagnosis     ICD-10-CM    1. Chronic pain of left knee  M25.562     G89.29       2. Lumbar radiculopathy  M54.16       3. Ambulatory dysfunction  R26.2                      Subjective: Patient feeling stiff today, all over.       Objective: See treatment diary below      Assessment: Tolerated treatment fair. Patient demonstrated fatigue post treatment, exhibited good technique with therapeutic exercises, and would benefit from continued PT Patient able to participate in therapy today, does complain of more full body stiffness. Patient has been doing well at home, will be moving to [facility] in the upcoming weeks.       Plan: Continue per plan of care.      Precautions: L knee pain     Manuals 2/21    3/6 3/13 3                 Neuro Re-Ed                     Ther Ex              Seated p ball flexion 20x:05 Seated p ball flexion 10x:05 - 2 sets Seated pball flexion 20x5s NT 3x10     Repeated flexion 2x10            LAQ 2x10 R only today 2* increased knee pain 2x10 ea 2x10 np   SLR x 3, alternating Hip abd 2x10 ea flex, abd, ext 2x10  Hip Abd 2x10 ea 2x10 ea flx, abd, ext   HS Curl 20x 20x  20x ea YTB   Seated HR/TR 30x 30x 20x ea 30x ea    Seated ball squeeze 20x 3 sec 20x:03 20x3s 20x:03      20x3s    Seated alternating march 20x 20x 20x 20x                                             Ther Activity                     Gait Training       Gait training with SPC 2x 100' SPC ' x 2 with SPC, CGA X200', x 100', x 50' ft S-CGA w SPC [id number] SPC CGA            Modalities       MH seated lumbar spine, extra toweling Not needed today Not needed Not needed today Deferred

## 2024-03-27 ENCOUNTER — OFFICE VISIT (OUTPATIENT)
Dept: PHYSICAL THERAPY | Age: 89
End: 2024-03-27
Payer: MEDICARE

## 2024-03-27 DIAGNOSIS — M54.16 LUMBAR RADICULOPATHY: ICD-10-CM

## 2024-03-27 DIAGNOSIS — M25.562 CHRONIC PAIN OF LEFT KNEE: Primary | ICD-10-CM

## 2024-03-27 DIAGNOSIS — G89.29 CHRONIC PAIN OF LEFT KNEE: Primary | ICD-10-CM

## 2024-03-27 DIAGNOSIS — R26.2 AMBULATORY DYSFUNCTION: ICD-10-CM

## 2024-03-27 PROCEDURE — 97116 GAIT TRAINING THERAPY: CPT | Performed by: PHYSICAL THERAPIST

## 2024-03-27 PROCEDURE — 97110 THERAPEUTIC EXERCISES: CPT | Performed by: PHYSICAL THERAPIST

## 2024-03-29 NOTE — PROGRESS NOTES
PT Re-Evaluation     Today's date: 3/29/2024  Patient name: Carli Gonzalez  : 1935  MRN: [id number]  Referring provider: Howard Morrison DO  Dx:   Encounter Diagnosis     ICD-10-CM    1. Chronic pain of left knee  M25.562     G89.29       2. Ambulatory dysfunction  R26.2       3. Lumbar radiculopathy  M54.16                      Assessment  Assessment details: Patient seen for PT re-assessment. Patient will be transitioning to [facility] and plans to continue her therapy there. PT recommending patient to switch to skilled maintenance based therapy for patient to be able to maintain her independence and for her to be able to maintain her strength to be able to continue ambulating with SPC and to reduce her risk for falls.    Patient still does have severe L knee pain at times, especially with repeated exercises on L knee. Patient also still does have some back pain, more stiffness.   Impairments: abnormal gait, abnormal or restricted ROM, activity intolerance, impaired balance, impaired physical strength, lacks appropriate home exercise program, pain with function, poor posture  and poor body mechanics  Functional limitations: Moderate-severe pain with IADLs, with recreational activities, pain with sit<>stand, walking and stair climbing, pain with bed mobility. L hip pain with standing, walking and with IADLS.Understanding of Dx/Px/POC: good   Prognosis: good    Goals  STG/LTG  - Ortho evaluation for L knee pain met  - Reduce swelling at lateral knee  progressing  - Reduce knee pain by 25% progressing  - increase L knee AROM by 5-10 degrees both flex and ext. Progressing    Goals added  STG to be met in 4 weeks  - Patient to be able to reduce knee pain to 4/10 with activity partially met  - Patient to be able to tolerate PT treatment with knee pain <6/10 met  - Patient to be able to improve TUG time by 1-2 seconds not met, LBP    LTG to be met in 6 weeks  - Patient to be able to tolerate walking x 10 min with  knee pain <6/10 met  - Patient to be able to reduce sit<>stand test by 1-2 seconds not met, LBP    Goals added for lumbar spine  STG to be met in 4 weeks  - Reduce L hip pain to 0/10 with standing and walking not met  - Patient to be able to tolerate walking x 10 min without L hip pain partially met, rarely walks > 10 min    LTG to be met in 6 weeks.  - Patient to be able to tolerate standing in the kitchen without L hip pain. met  - Patient to be able to exercise at home with HEP.  Met    Goals added for maintenance therapy:  STG/LTG to be met in 6-8 weeks.   - Patient to be able to maintain her ability to ambulate with her SPC without falls at home. met  - Patient to be able to maintain her strength 3+/5 or greater for B LE to maintain patient's independence at home with IADLs. met        Plan  Patient would benefit from: skilled physical therapy  Planned modality interventions: cryotherapy  Planned therapy interventions: abdominal trunk stabilization, manual therapy, joint mobilization, neuromuscular re-education, patient education, postural training, strengthening, stretching, therapeutic activities, therapeutic exercise, home exercise program, body mechanics training and balance  Frequency: 1x week  Duration in weeks: 8  Treatment plan discussed with: patient and family        Subjective Evaluation    History of Present Illness  Mechanism of injury: Patient reports that she and her family are discussing assisted living as patient ages especially since she lives alone. Patient and her daughter have also noticed patient's memory has been changing as she seems to be forgetful at times.   Patient Goals  Patient goals for therapy: decreased pain, improved balance, increased motion, increased strength, decreased edema and independence with ADLs/IADLs    Pain  Current pain rating: 3  At best pain ratin  At worst pain ratin  Location: low back (above); L knee pain 5/10 with walking  Quality: sharp, pressure,  discomfort, pulling and tight  Relieving factors: ice  Aggravating factors: sitting, standing, walking and stair climbing  Progression: worsening    Social Support  Steps to enter house: yes  1  Stairs in house: yes   3  Lives in: multiple-level home  Lives with: alone    Employment status: not working    Diagnostic Tests  No diagnostic tests performed  Treatments  Previous treatment: physical therapy        Objective     Postural Observations  Seated posture: good  Standing posture: good  Correction of posture: makes symptoms better      Tenderness     Right Knee   Tenderness in the lateral joint line, lateral retinaculum and medial joint line. No tenderness in the patellar tendon.     Additional Tenderness Details  Palpable swelling at proximal lateral knee joint (distal proximal thigh), possible bruising as well. Swelling does radiate distally across joint line and distally to knee joint.         Active Range of Motion     Lumbar   Flexion:  WFL  Extension: 0 degrees  with pain  Left lateral flexion: 5 degrees    with pain  Right lateral flexion: 8 degrees  with pain  Left rotation: 10 degrees   Right rotation: 10 degrees   Left Hip   Flexion: 90 degrees     Right Hip   Flexion: 90 degrees   Left Knee   Flexion: 90 degrees with pain  Extension: 0 degrees with pain  Left Ankle/Foot   Dorsiflexion (ke): 0 degrees     Right Ankle/Foot   Dorsiflexion (ke): 0 degrees     Additional Active Range of Motion Details  Pressure like pain at end range flexion and at end range extension. Painful equally in both directions.    Mobility   Patellar Mobility:   Left Knee   Hypomobile: left medial, left lateral, left superior and left inferior    Additional Mobility Details  Still lacking full patellar mobility, responding well to gentle patellar mobs and restriction is reduced.     Patellar Static Positioning   Left Knee: lateral tilt    Additional Patellar Static Positioning Details  L patella laterally shifted > R   Frog-eyed  position patellas L>R    Mechanical Assessment    Cervical      Thoracic      Lumbar    Sitting flexion: repeated movements  Pain location: centralized  Pain intensity: better  Pain level: decreased    Strength/Myotome Testing     Left Hip   Planes of Motion   Flexion: 4-  Extension: 4-  Abduction: 4-  Adduction: 4-    Right Hip   Planes of Motion   Flexion: 4-  Extension: 4-  Abduction: 4-  Adduction: 4-    Left Knee   Flexion: 3+  Extension: 3+  Quadriceps contraction: fair    Right Knee   Flexion: 4  Extension: 4    Left Ankle/Foot   Dorsiflexion: 4-  Plantar flexion: 4-    Right Ankle/Foot   Dorsiflexion: 3+  Plantar flexion: 3+    Additional Strength Details  Pain with extension and QS    Tests     Lumbar     Left   Positive passive SLR and slump test.     Right   Negative passive SLR and slump test.     Ambulation     Observational Gait   Gait: antalgic   Decreased walking speed and stride length.     Additional Observational Gait Details  Patient ambulates with slow gait speed, decreased B step length, guarded knee flex/ext during swing phase, especially on L. Immediate increase in L knee pain with sit>stand transfer, feels unsteady and usually stands for 30-60 seconds as pain decreases.  Neuro Exam:     Functional outcomes   5x sit to stand: 15 (seconds)  TU with SPC (seconds)  Functional outcome gait comment: Patient ambulates with wide base of support, decreased B step length, shuffling gait pattern, minimal toe clearance; using her SPC.              Precautions: L knee pain     Manuals 2/21    3/6 3/13 3/27                 Neuro Re-Ed                     Ther Ex              Seated p ball flexion 20x:05 Seated p ball flexion 10x:05 - 2 sets Seated pball flexion 20x5s NT 3x10     Repeated flexion 2x10            LAQ 2x10 R only today 2* increased knee pain 2x10 ea 2x10 np   SLR x 3, alternating Hip abd 2x10 ea flex, abd, ext 2x10  Hip Abd 2x10 ea 2x10 ea flx, abd, ext   HS Curl 20x 20x  20x ea YTB    Seated HR/TR 30x 30x 20x ea 30x ea    Seated ball squeeze 20x 3 sec 20x:03 20x3s 20x:03      20x3s    Seated alternating march 20x 20x 20x 20x                                             Ther Activity                     Gait Training       Gait training with SPC 2x 100' SPC ' x 2 with SPC, CGA X200', x 100', x 50' ft S-CGA w SPC [id number] SPC CGA            Modalities       MH seated lumbar spine, extra toweling Not needed today Not needed Not needed today Not needed

## 2024-04-03 ENCOUNTER — OFFICE VISIT (OUTPATIENT)
Dept: PHYSICAL THERAPY | Age: 89
End: 2024-04-03
Payer: MEDICARE

## 2024-04-03 DIAGNOSIS — G89.29 CHRONIC PAIN OF LEFT KNEE: Primary | ICD-10-CM

## 2024-04-03 DIAGNOSIS — R26.2 AMBULATORY DYSFUNCTION: ICD-10-CM

## 2024-04-03 DIAGNOSIS — M54.16 LUMBAR RADICULOPATHY: ICD-10-CM

## 2024-04-03 DIAGNOSIS — M25.562 CHRONIC PAIN OF LEFT KNEE: Primary | ICD-10-CM

## 2024-04-03 PROCEDURE — 97116 GAIT TRAINING THERAPY: CPT

## 2024-04-03 PROCEDURE — 97110 THERAPEUTIC EXERCISES: CPT

## 2024-04-03 NOTE — PROGRESS NOTES
Daily Note     Today's date: 4/3/2024  Patient name: Carli Gonzalez  : 1935  MRN: [id number]  Referring provider: Howard Morrison DO  Dx:   Encounter Diagnosis     ICD-10-CM    1. Chronic pain of left knee  M25.562     G89.29       2. Ambulatory dysfunction  R26.2       3. Lumbar radiculopathy  M54.16                      Subjective: Patient offers no new complaints. She is looking forward to moving into a nursing home soon.       Objective: See treatment diary below      Assessment: Tolerated treatment fair. Patient demonstrated fatigue post treatment. Patient had to leave early due to company at home. Only thing not completed was heat. Patient deferred.       Plan: Continue per plan of care.      Precautions: L knee pain     Manuals 4/3    3/6 3/13 3/27                 Neuro Re-Ed                     Ther Ex              Seated p ball flexion 30x:05  Large blue ball Seated p ball flexion 10x:05 - 2 sets Seated pball flexion 20x5s NT 3x10     Repeated flexion 2x10            LAQ 3 x 10 2x10 ea 2x10 np   SLR x 3, alternating Sitting  2x10  Hip Abd 2x10 ea 2x10 ea flx, abd, ext   HS Curl X 30  20x  20x ea YTB   Seated HR/TR 30x 30x 20x ea 30x ea    Seated ball squeeze X 30  3 sec 20x:03 20x3s 20x:03      20x3s    Seated alternating march X 30  20x 20x 20x                                             Ther Activity                     Gait Training       Gait training with SPC 2x 100' SPC CGA   ' x 2 with SPC, CGA X200', x 100', x 50' ft S-CGA w SPC [id number] SPC CGA            Modalities       MH seated lumbar spine, extra toweling Deffered  Not needed Not needed today Not needed

## 2024-04-10 ENCOUNTER — OFFICE VISIT (OUTPATIENT)
Dept: PHYSICAL THERAPY | Age: 89
End: 2024-04-10
Payer: MEDICARE

## 2024-04-10 DIAGNOSIS — M54.16 LUMBAR RADICULOPATHY: ICD-10-CM

## 2024-04-10 DIAGNOSIS — G89.29 CHRONIC PAIN OF LEFT KNEE: Primary | ICD-10-CM

## 2024-04-10 DIAGNOSIS — R26.2 AMBULATORY DYSFUNCTION: ICD-10-CM

## 2024-04-10 DIAGNOSIS — M25.562 CHRONIC PAIN OF LEFT KNEE: Primary | ICD-10-CM

## 2024-04-10 PROCEDURE — 97110 THERAPEUTIC EXERCISES: CPT | Performed by: PHYSICAL THERAPIST

## 2024-04-10 PROCEDURE — 97116 GAIT TRAINING THERAPY: CPT | Performed by: PHYSICAL THERAPIST

## 2024-04-10 NOTE — PROGRESS NOTES
PT Re-Evaluation     Today's date: 4/10/2024  Patient name: Carli Gonzalez  : 1935  MRN: [id number]  Referring provider: Howard Morrison DO  Dx:   Encounter Diagnosis     ICD-10-CM    1. Chronic pain of left knee  M25.562     G89.29       2. Ambulatory dysfunction  R26.2       3. Lumbar radiculopathy  M54.16                      Assessment  Assessment details: Patient seen for PT re-assessment. PT recommending patient to continue with skilled maintenance program for patient to continue with PT until she moves into [facility] where she will be able to continue her therapy there as well. Patient tolerating today's session well, her back pain has reduced a good amount with PT treatment. Patient continues to present with generalized weakness in B LE. Patient walking with a 4 wheel walker now, and patient enjoys using this walker as she feels more balanced and less short of breath with walking.   Impairments: abnormal gait, abnormal or restricted ROM, activity intolerance, impaired balance, impaired physical strength, lacks appropriate home exercise program, pain with function, poor posture  and poor body mechanics  Functional limitations: Min and to moderate difficulty with IADLs, with recreational activities, pain with sit<>stand, walking and stair climbing, pain with bed mobility. L hip pain with standing, walking and with IADLS.Understanding of Dx/Px/POC: good   Prognosis: good    Goals  STG/LTG  - Ortho evaluation for L knee pain met  - Reduce swelling at lateral knee  progressing  - Reduce knee pain by 25% progressing  - increase L knee AROM by 5-10 degrees both flex and ext. Progressing    Goals added  STG to be met in 4 weeks  - Patient to be able to reduce knee pain to 4/10 with activity partially met  - Patient to be able to tolerate PT treatment with knee pain <6/10 met  - Patient to be able to improve TUG time by 1-2 seconds not met, LBP    LTG to be met in 6 weeks  - Patient to be able to tolerate  walking x 10 min with knee pain <6/10 met  - Patient to be able to reduce sit<>stand test by 1-2 seconds not met, LBP    Goals added for lumbar spine  STG to be met in 4 weeks  - Reduce L hip pain to 0/10 with standing and walking not met  - Patient to be able to tolerate walking x 10 min without L hip pain partially met, rarely walks > 10 min    LTG to be met in 6 weeks.  - Patient to be able to tolerate standing in the kitchen without L hip pain. met  - Patient to be able to exercise at home with HEP.  Met    Goals added for maintenance therapy:  STG/LTG to be met in 6-8 weeks.   - Patient to be able to maintain her ability to ambulate with her SPC without falls at home. met  - Patient to be able to maintain her strength 3+/5 or greater for B LE to maintain patient's independence at home with IADLs. met        Plan  Patient would benefit from: skilled physical therapy  Planned modality interventions: cryotherapy  Planned therapy interventions: abdominal trunk stabilization, manual therapy, joint mobilization, neuromuscular re-education, patient education, postural training, strengthening, stretching, therapeutic activities, therapeutic exercise, home exercise program, body mechanics training and balance  Frequency: 1x week  Duration in weeks: 8  Treatment plan discussed with: patient and family        Subjective Evaluation    History of Present Illness  Mechanism of injury: Patient reports that she and her family are discussing assisted living as patient ages especially since she lives alone. Patient and her daughter have also noticed patient's memory has been changing as she seems to be forgetful at times.     Patient will be moving and 4/17 will be her last session here. Patient will continue her PT when she moves to [facility].   Patient Goals  Patient goals for therapy: decreased pain, improved balance, increased motion, increased strength, decreased edema and independence with ADLs/IADLs    Pain  Current pain  ratin  At best pain ratin  At worst pain ratin  Location: low back (above); L knee pain 5/10 with walking  Quality: sharp, pressure, discomfort, pulling and tight  Relieving factors: ice  Aggravating factors: sitting, standing, walking and stair climbing  Progression: worsening    Social Support  Steps to enter house: yes  1  Stairs in house: yes   3  Lives in: multiple-level home  Lives with: alone    Employment status: not working    Diagnostic Tests  No diagnostic tests performed  Treatments  Previous treatment: physical therapy        Objective     Postural Observations  Seated posture: good  Standing posture: good  Correction of posture: makes symptoms better      Tenderness     Right Knee   Tenderness in the lateral joint line, lateral retinaculum and medial joint line. No tenderness in the patellar tendon.     Additional Tenderness Details  Palpable swelling at proximal lateral knee joint (distal proximal thigh), possible bruising as well. Swelling does radiate distally across joint line and distally to knee joint.         Active Range of Motion     Lumbar   Flexion:  WFL  Extension: 0 degrees  with pain  Left lateral flexion: 5 degrees    with pain  Right lateral flexion: 8 degrees  with pain  Left rotation: 10 degrees   Right rotation: 10 degrees   Left Hip   Flexion: 90 degrees     Right Hip   Flexion: 90 degrees   Left Knee   Flexion: 90 degrees with pain  Extension: 0 degrees with pain  Left Ankle/Foot   Dorsiflexion (ke): 0 degrees     Right Ankle/Foot   Dorsiflexion (ke): 0 degrees     Additional Active Range of Motion Details  Pressure like pain at end range flexion and at end range extension. Painful equally in both directions.    Mobility   Patellar Mobility:   Left Knee   Hypomobile: left medial, left lateral, left superior and left inferior    Additional Mobility Details  Still lacking full patellar mobility, responding well to gentle patellar mobs and restriction is reduced.      Patellar Static Positioning   Left Knee: lateral tilt    Additional Patellar Static Positioning Details  L patella laterally shifted > R   Frog-eyed position patellas L>R    Mechanical Assessment    Cervical      Thoracic      Lumbar    Sitting flexion: repeated movements  Pain location: centralized  Pain intensity: better  Pain level: decreased    Strength/Myotome Testing     Left Hip   Planes of Motion   Flexion: 4-  Extension: 4-  Abduction: 4-  Adduction: 4-    Right Hip   Planes of Motion   Flexion: 4-  Extension: 4-  Abduction: 4-  Adduction: 4-    Left Knee   Flexion: 3+  Extension: 3+  Quadriceps contraction: fair    Right Knee   Flexion: 4  Extension: 4    Left Ankle/Foot   Dorsiflexion: 4-  Plantar flexion: 4-    Right Ankle/Foot   Dorsiflexion: 3+  Plantar flexion: 3+    Additional Strength Details  Pain with extension and QS    Tests     Lumbar     Left   Positive passive SLR and slump test.     Right   Negative passive SLR and slump test.     Ambulation     Observational Gait   Gait: antalgic   Decreased walking speed and stride length.     Additional Observational Gait Details  Patient ambulates with slow gait speed, decreased B step length, guarded knee flex/ext during swing phase, especially on L. Immediate increase in L knee pain with sit>stand transfer, feels unsteady and usually stands for 30-60 seconds as pain decreases.  Neuro Exam:     Functional outcomes   5x sit to stand: 15 (seconds)  TU with SPC (seconds)  Functional outcome gait comment: Patient ambulates with wide base of support, decreased B step length, shuffling gait pattern, minimal toe clearance; using her SPC.              Precautions: L knee pain     Manuals 4/3    4/10 3/13 3/27                 Neuro Re-Ed                     Ther Ex              Seated p ball flexion 30x:05  Large blue ball Seated p ball flexion 10x:05 - 2 sets Seated pball flexion 20x5s NT 3x10     Repeated flexion 2x10            LAQ 3 x 10 2x10 ea  2x10 np   SLR x 3, alternating Sitting  2x10  Hip Abd 2x10 ea 2x10 ea flx, abd, ext   HS Curl X 30  20x  20x ea YTB   Seated HR/TR 30x 30x 20x ea 30x ea    Seated ball squeeze X 30  3 sec 20x:03 20x3s 20x:03      20x3s    Seated alternating march X 30  20x 20x 20x                                             Ther Activity                     Gait Training       Gait training with SPC 2x 100' SPC CGA   ' x2 with 4 wheel walker with S-CGA X200', x 100', x 50' ft S-CGA w SPC [id number] SPC CGA            Modalities       MH seated lumbar spine, extra toweling Deffered  Not needed Not needed today Not needed

## 2024-04-17 ENCOUNTER — OFFICE VISIT (OUTPATIENT)
Dept: PHYSICAL THERAPY | Age: 89
End: 2024-04-17
Payer: MEDICARE

## 2024-04-17 DIAGNOSIS — M54.16 LUMBAR RADICULOPATHY: ICD-10-CM

## 2024-04-17 DIAGNOSIS — M25.562 CHRONIC PAIN OF LEFT KNEE: Primary | ICD-10-CM

## 2024-04-17 DIAGNOSIS — G89.29 CHRONIC PAIN OF LEFT KNEE: Primary | ICD-10-CM

## 2024-04-17 DIAGNOSIS — R26.2 AMBULATORY DYSFUNCTION: ICD-10-CM

## 2024-04-17 PROCEDURE — 97116 GAIT TRAINING THERAPY: CPT | Performed by: PHYSICAL THERAPIST

## 2024-04-19 NOTE — PROGRESS NOTES
PT Discharge    Today's date: 2024  Patient name: Carli Gonzalez  : 1935  MRN: [id number]  Referring provider: Howard Morrison DO  Dx:   Encounter Diagnosis     ICD-10-CM    1. Chronic pain of left knee  M25.562     G89.29       2. Ambulatory dysfunction  R26.2       3. Lumbar radiculopathy  M54.16                      Assessment  Assessment details: Patient to be discharged from PT. Patient will [facility] [place] [facility] her son. Patient plans to continue with PT in the hospital.     Patient discharged from this episode of care.   Impairments: abnormal gait, abnormal or restricted ROM, activity intolerance, impaired balance, impaired physical strength, lacks appropriate home exercise program, pain with function, poor posture  and poor body mechanics  Functional limitations: Min and to moderate difficulty with IADLs, with recreational activities, pain with sit<>stand, walking and stair climbing, pain with bed mobility. L hip pain with standing, walking and with IADLS.Understanding of Dx/Px/POC: good   Prognosis: good    Goals  STG/LTG  - Ortho evaluation for L knee pain met  - Reduce swelling at lateral knee  progressing  - Reduce knee pain by 25% progressing  - increase L knee AROM by 5-10 degrees both flex and ext. Progressing    Goals added  STG to be met in 4 weeks  - Patient to be able to reduce knee pain to 4/10 with activity partially met  - Patient to be able to tolerate PT treatment with knee pain <6/10 met  - Patient to be able to improve TUG time by 1-2 seconds not met, LBP    LTG to be met in 6 weeks  - Patient to be able to tolerate walking x 10 min with knee pain <6/10 met  - Patient to be able to reduce sit<>stand test by 1-2 seconds not met, LBP    Goals added for lumbar spine  STG to be met in 4 weeks  - Reduce L hip pain to 0/10 with standing and walking not met  - Patient to be able to tolerate walking x 10 min without L hip pain partially met, rarely walks > 10 min    LTG to be  met in 6 weeks.  - Patient to be able to tolerate standing in the kitchen without L hip pain. met  - Patient to be able to exercise at home with HEP.  Met    Goals added for maintenance therapy:  STG/LTG to be met in 6-8 weeks.   - Patient to be able to maintain her ability to ambulate with her SPC without falls at home. met  - Patient to be able to maintain her strength 3+/5 or greater for B LE to maintain patient's independence at home with IADLs. met        Plan  Patient would benefit from: skilled physical therapy  Planned modality interventions: cryotherapy  Planned therapy interventions: abdominal trunk stabilization, manual therapy, joint mobilization, neuromuscular re-education, patient education, postural training, strengthening, stretching, therapeutic activities, therapeutic exercise, home exercise program, body mechanics training and balance  Frequency: 1x week  Duration in weeks: 8  Treatment plan discussed with: patient and family        Subjective Evaluation    History of Present Illness  Mechanism of injury: Patient reports that she and her family are discussing assisted living as patient ages especially since she lives alone. Patient and her daughter have also noticed patient's memory has been changing as she seems to be forgetful at times.     Patient will be moving and  will be her last session here. Patient will continue her PT when she moves to [facility].   Patient Goals  Patient goals for therapy: decreased pain, improved balance, increased motion, increased strength, decreased edema and independence with ADLs/IADLs    Pain  Current pain ratin  At best pain ratin  At worst pain ratin  Location: low back (above); L knee pain 5/10 with walking  Quality: sharp, pressure, discomfort, pulling and tight  Relieving factors: ice  Aggravating factors: sitting, standing, walking and stair climbing  Progression: worsening    Social Support  Steps to enter house: yes  1  Stairs in house: yes    3  Lives in: multiple-level home  Lives with: alone    Employment status: not working    Diagnostic Tests  No diagnostic tests performed  Treatments  Previous treatment: physical therapy        Objective     Postural Observations  Seated posture: good  Standing posture: good  Correction of posture: makes symptoms better      Tenderness     Right Knee   Tenderness in the lateral joint line, lateral retinaculum and medial joint line. No tenderness in the patellar tendon.     Additional Tenderness Details  Palpable swelling at proximal lateral knee joint (distal proximal thigh), possible bruising as well. Swelling does radiate distally across joint line and distally to knee joint.         Active Range of Motion     Lumbar   Flexion:  WFL  Extension: 0 degrees  with pain  Left lateral flexion: 5 degrees    with pain  Right lateral flexion: 8 degrees  with pain  Left rotation: 10 degrees   Right rotation: 10 degrees   Left Hip   Flexion: 90 degrees     Right Hip   Flexion: 90 degrees   Left Knee   Flexion: 90 degrees with pain  Extension: 0 degrees with pain  Left Ankle/Foot   Dorsiflexion (ke): 0 degrees     Right Ankle/Foot   Dorsiflexion (ke): 0 degrees     Additional Active Range of Motion Details  Pressure like pain at end range flexion and at end range extension. Painful equally in both directions.    Mobility   Patellar Mobility:   Left Knee   Hypomobile: left medial, left lateral, left superior and left inferior    Additional Mobility Details  Still lacking full patellar mobility, responding well to gentle patellar mobs and restriction is reduced.     Patellar Static Positioning   Left Knee: lateral tilt    Additional Patellar Static Positioning Details  L patella laterally shifted > R   Frog-eyed position patellas L>R    Mechanical Assessment    Cervical      Thoracic      Lumbar    Sitting flexion: repeated movements  Pain location: centralized  Pain intensity: better  Pain level:  decreased    Strength/Myotome Testing     Left Hip   Planes of Motion   Flexion: 4-  Extension: 4-  Abduction: 4-  Adduction: 4-    Right Hip   Planes of Motion   Flexion: 4-  Extension: 4-  Abduction: 4-  Adduction: 4-    Left Knee   Flexion: 3+  Extension: 3+  Quadriceps contraction: fair    Right Knee   Flexion: 4  Extension: 4    Left Ankle/Foot   Dorsiflexion: 4-  Plantar flexion: 4-    Right Ankle/Foot   Dorsiflexion: 3+  Plantar flexion: 3+    Additional Strength Details  Pain with extension and QS    Tests     Lumbar     Left   Positive passive SLR and slump test.     Right   Negative passive SLR and slump test.     Ambulation     Observational Gait   Gait: antalgic   Decreased walking speed and stride length.     Additional Observational Gait Details  Patient ambulates with slow gait speed, decreased B step length, guarded knee flex/ext during swing phase, especially on L. Immediate increase in L knee pain with sit>stand transfer, feels unsteady and usually stands for 30-60 seconds as pain decreases.  Neuro Exam:     Functional outcomes   5x sit to stand: 15 (seconds)  TU with SPC (seconds)  Functional outcome gait comment: Patient ambulates with wide base of support, decreased B step length, shuffling gait pattern, minimal toe clearance; using her SPC.              Precautions: L knee pain     Manuals 4/3    4/10 4/16 3/27                 Neuro Re-Ed                     Ther Ex              Seated p ball flexion 30x:05  Large blue ball Seated p ball flexion 10x:05 - 2 sets Seated pball flexion 20x5s- NT 3x10     Repeated flexion 2x10            LAQ 3 x 10 2x10 ea 2x10 np   SLR x 3, alternating Sitting  2x10  Hip Abd 2x10 ea 2x10 ea flx, abd, ext   HS Curl X 30  20x  20x ea YTB   Seated HR/TR 30x 30x 20x ea 30x ea    Seated ball squeeze X 30  3 sec 20x:03 20x3s 20x:03      20x3s    Seated alternating march X   20x 20x 20x                                             Ther Activity                      Gait Training       Gait training with SPC 2x 100' SPC CGA   ' x2 with 4 wheel walker with S-CGA X200' with 4 wheel walker S assist - 2x [id number] SPC CGA            Modalities       MH seated lumbar spine, extra toweling Deffered  Not needed Not needed today Not needed

## 2024-04-24 ENCOUNTER — APPOINTMENT (OUTPATIENT)
Dept: PHYSICAL THERAPY | Age: 89
End: 2024-04-24
Payer: MEDICARE

## 2024-07-23 NOTE — PROGRESS NOTES
ASSESSMENT/PLAN:    Assessment:   Trigger Finger  left  index finger  S/p L CTR and CuTR on 11/19/2019    Plan:   She will proceed with her 1st steroid injection to her left index finger trigger finger today  We did offer a repeat EMG  As a comparison to her pre-surgical EMG however the patient declined  A referral will be placed in the patient's chart for follow-up with Dr Linda Gardner  Follow Up:  PRN    To Do Next Visit:       General Discussions:  Trigger FInger: The anatomy and physiology of trigger finger was discussed with the patient today in the office  Edema and increased contact pressure within the flexor tendons at the A1 pulley can cause pain, crepitation, and limitation of function  Treatment options include resting MP blocking splints to decrease edema, oral anti-inflammatory medications, home or formal therapy exercises, up to 2 steroid injections within the tendon sheath, or surgical release  While majority of patients do respond to conservative treatment, up to 20% may require surgical release  Operative Discussions:       _____________________________________________________  CHIEF COMPLAINT:  Chief Complaint   Patient presents with    Left Hand - Follow-up, Carpal Tunnel, Numbness     Numbness of fingers and pain travelling to the arm          SUBJECTIVE:  Arnaldo Correia is a 81 y o  female who presents with s/p L CTR and CuTR in November 2019 with persistent arm, shoulder and hand pain  This started  2 year(s) ago as Chronic  Patient states that her left small finger is constantly numb  Radiation: Yes to the  long finger, ring finger and small finger  Associated symptoms: Locking into her left index finger that started about 2 weeks ago       PAST MEDICAL HISTORY:  Past Medical History:   Diagnosis Date    Anxiety     Arthritis     last assessed 3/24/15     Atherosclerosis     Bell's palsy     Breast cancer ([facility] [address] ) 2019    Broken femur ([facility] [address] )     Cancer ([facility] [address] )     breast Per [place] Medicaid, Mounjaro, Bydureon BCise and Rybelsus are not covered. Please see the attached list of preferred medications.    Backorder, preference and weight loss/gain are not considered treatment failures for a preferred.    Patient must have treatment failure of 1 following therapy first:    Ozempic 2 mg weekly, Byetta 10 mcg twice a day, Trulicity 4.5 weekly and Victoza 1.8 daily    The member must have taken the maximum dose of a preferred GLP-1 agent for at least three consecutive months each and experienced an unsatisfactory therapeutic response. Patient must have a diagnosis of Type 2 diabetes and A1C of 6.5 or higher    GLP-1 are now diagnosis restricted medications, and must be submitted to Medicaid with a Type 2 diabetes diagnosis code.    If provider requires requested, please re-trigger prior auth and please give the reasoning for not being able to use a preferred medication.      Cardiac disorder     DCIS (ductal carcinoma in situ) of breast     last assessed 4/4/17     Depression     Endometrial polyp     GERD (gastroesophageal reflux disease)     History of radiation therapy 2010    Hypercholesterolemia     resolved 10/22/14     Hyperlipidemia     Long term use of drug     last assessed 5/23/14     Neuropathy     Peripheral neuropathy     Pneumonia     As a Child    PONV (postoperative nausea and vomiting)     Tachycardia     Uterine fibroid        PAST SURGICAL HISTORY:  Past Surgical History:   Procedure Laterality Date    BREAST BIOPSY Right     BREAST LUMPECTOMY Right 2010    BREAST LUMPECTOMY Right 5/8/2019    Procedure: BREAST LUMPECTOMY; BREAST NEEDLE LOCALIZATION (NEEDLE LOC AT 0800); Surgeon: Susana Singh MD;  [facility];  Service: Surgical Oncology    CARPAL TUNNEL RELEASE Bilateral     CATARACT EXTRACTION Bilateral     DILATION AND CURETTAGE OF UTERUS      ENDOMETRIAL BIOPSY      without cervical dilation     HEMORROIDECTOMY      JOINT REPLACEMENT Right     Shoulder    JOINT REPLACEMENT Bilateral     Knees    LYMPH NODE BIOPSY Right 5/8/2019    Procedure: SENTINEL LYMPH NODE BIOPSY; LYMPHOSCINTIGRAPHY; INTRAOPERATIVE LYMPHATIC MAPPING (INJECT AT 0900);   Surgeon: Susana Singh MD;  [facility];  Service: Surgical Oncology    MAMMO NEEDLE LOCALIZATION RIGHT (ALL INC) Right 5/8/2019    OOPHORECTOMY      75    OVARIAN CYST REMOVAL Left     CA RECONSTR TOTAL SHOULDER IMPLANT Right 7/18/2017    Procedure: TOTAL SHOULDER REVERSE ARTHROPLASTY;  Surgeon: Jessie Partida MD;  [facility];  Service: Orthopedics    CA REVISE ULNAR NERVE AT ELBOW Left 11/19/2019    Procedure: RELEASE CUBITAL TUNNEL left -;  Surgeon: Topher Judd MD;  [facility];  Service: Orthopedics    CA WRIST Rocio Adalberto LIG Left 11/19/2019    Procedure: RELEASE CARPAL TUNNEL ENDOSCOPIC;  Surgeon: Topher Judd MD; [facility];  Service: Orthopedics    SENTINEL LYMPH NODE BIOPSY      TONSILLECTOMY      TUBAL LIGATION      US GUIDED BREAST BIOPSY RIGHT COMPLETE Right 3/28/2019       FAMILY HISTORY:  Family History   Problem Relation Age of Onset    Heart disease Mother         artherosclerosis     Heart disease Father         artherosclerosis     Heart attack Father     Arthritis Family     Heart disease Family         artherosclerosis     Breast cancer Family     Osteoarthritis Family     Supraventricular tachycardia Family     Hypertension Daughter     Ovarian cancer Sister 80    Anemia Neg Hx     Arrhythmia Neg Hx     Asthma Neg Hx     Clotting disorder Neg Hx     Fainting Neg Hx     Hyperlipidemia Neg Hx     Aneurysm Neg Hx        SOCIAL HISTORY:  Social History     Tobacco Use    Smoking status: Never Smoker    Smokeless tobacco: Never Used   Substance Use Topics    Alcohol use: No    Drug use: No       MEDICATIONS:    Current Outpatient Medications:     amoxicillin (AMOXIL) 500 mg capsule, TAKE 4 CAPSULES 1 HOUR BEFORE DENTAL PROCEDURE, Disp: , Rfl:     anastrozole (ARIMIDEX) 1 mg tablet, take 1 tablet by mouth once daily, Disp: 90 tablet, Rfl: 3    clorazepate (TRANXENE) 3 75 mg tablet, Take 1 tablet (3 75 mg total) by mouth daily at bedtime Takes with Dinner and before Bed (Patient not taking: Reported on 2/23/2021), Disp: 90 tablet, Rfl: 3    clotrimazole-betamethasone (LOTRISONE) 1-0 05 % cream, Apply topically 2 (two) times a day (Patient not taking: Reported on 2/23/2021), Disp: 60 g, Rfl: 1    CRANBERRY PO, Take 1,700 mg by mouth daily in the early morning , Disp: , Rfl:     gabapentin (NEURONTIN) 100 mg capsule, 1 PO 2 HOURS BEFORE BED, Disp: 60 capsule, Rfl: 10    HYDROcodone-acetaminophen (NORCO) 5-325 mg per tablet, Take 1 tablet by mouth every 6 (six) hours as needed for pain for up to 10 dosesMax Daily Amount: 4 tablets (Patient not taking: Reported on 2/23/2021), Disp: 10 tablet, Rfl: 0    ibuprofen (MOTRIN) 200 mg tablet, Take 200 mg by mouth every 6 (six) hours as needed for mild pain , Disp: , Rfl:     lisinopril (ZESTRIL) 10 mg tablet, TAKE 1 TABLET BY MOUTH TWO  TIMES DAILY, Disp: 180 tablet, Rfl: 3    Multiple Vitamins-Minerals (PRESERVISION AREDS 2 PO), Take 2 tablets by mouth daily in the early morning , Disp: , Rfl:     naproxen sodium (ALEVE) 220 MG tablet, Take 1 tablet (220 mg total) by mouth 2 (two) times a day with meals for 5 days (Patient not taking: Reported on 2/23/2021), Disp: 10 tablet, Rfl: 0    prednisoLONE acetate (PRED FORTE) 1 % ophthalmic suspension, Administer 1 drop to the right eye 2 (two) times a day , Disp: , Rfl: 0    propranolol (INDERAL) 20 mg tablet, Take 1 tablet (20 mg total) by mouth 4 (four) times a day, Disp: 360 tablet, Rfl: 3    rosuvastatin (CRESTOR) 10 MG tablet, Take 0 5 tablets (5 mg total) by mouth 3 (three) times a week Takes M-W-F, Disp: 18 tablet, Rfl: 3    ALLERGIES:  Allergies   Allergen Reactions    Gabapentin Hallucinations    Amoxicillin-Pot Clavulanate GI Intolerance    Azithromycin GI Intolerance and Rash    Cephalexin GI Intolerance    Cortisone GI Intolerance    Doxycycline GI Intolerance    Penicillins GI Intolerance       REVIEW OF SYSTEMS:  Pertinent items are noted in HPI      LABS:  HgA1c:   Lab Results   Component Value Date    HGBA1C 5 2 05/08/2020     BMP:   Lab Results   Component Value Date    GLUCOSE 95 12/22/2015    CALCIUM 9 5 01/21/2021     12/22/2015    K 3 9 01/21/2021    CO2 26 01/21/2021     01/21/2021    BUN 15 01/21/2021    CREATININE 0 77 01/21/2021         _____________________________________________________  PHYSICAL EXAMINATION:  Vital signs: /82   Pulse 64   Ht 4' 11" (1 499 m)   Wt 75 3 kg (166 lb)   BMI 33 53 kg/m²   General: well developed and well nourished, alert, oriented times 3 and appears comfortable  Psychiatric: Normal  HEENT: Trachea Midline, No torticollis  Cardiovascular: No discernable arrhythmia  Pulmonary: No wheezing or stridor  Skin: No Erythema  Neurovascular: Pulses Intact    MUSCULOSKELETAL EXAMINATION:  LEFT SIDE:  Finger:  Triggering  index finger and Nodules  index finger intrinsic 5/5, AIN 5/5, can oppose to ring, apb 4/5, negative tinels at carpal and cubital tunnel     _____________________________________________________  STUDIES REVIEWED:  No Studies to review      PROCEDURES PERFORMED:  Hand/upper extremity injection: L index A1  Universal Protocol:  Consent given by: patient  Site marked: the operative site was marked  Supporting Documentation  Indications: tendon swelling   Procedure Details  Condition:trigger finger Location: index finger - L index A1   Medications administered: 1 mL lidocaine 1 %; 6 mg betamethasone acetate-betamethasone sodium phosphate 6 (3-3) mg/mL    Patient tolerance: patient tolerated the procedure well with no immediate complications  Dressing:  Sterile dressing applied             Scribe Attestation    I,:  Robinson Domínguez am acting as a scribe while in the presence of the attending physician :       I,:  Lynn Carias MD personally performed the services described in this documentation    as scribed in my presence :

## 2025-04-21 NOTE — PROGRESS NOTES
Cardiology Consultation     Madelin Nielsen  [id number]  1935  [facility] [address] [address]    1  Paroxysmal atrial fibrillation (HCC)  POCT ECG   2  Supraventricular tachycardia ([address] )     3  Essential hypertension     4  Mixed hyperlipidemia       Chief Complaint   Patient presents with    Follow-up    Dizziness     HPI: Patient is here for evaluation and management of frequent PACs and brief runs of SVT seen on Holter  Patient feels well, without complaints  No reported chest pain, shortness of breath, palpitations, lightheadedness, syncope, LE edema, orthopnea, PND, or significant weight changes  Patient remains active without any increased fatigue out of the ordinary        Patient Active Problem List   Diagnosis    Anxiety    Peripheral neuropathy    Hyperlipidemia    Arthritis    Difficulty breathing    Diverticulosis    Dizziness    Fatigue    Hyperglycemia    Pain in extremity    Osteopenia    Shortness of breath    Supraventricular tachycardia ([address] )    Vitamin D deficiency    Pain of left hand    Closed nondisplaced fracture of left acromial process    Left leg pain    Malignant neoplasm of upper-outer quadrant of right breast in female, estrogen receptor positive ([address] )    Impingement syndrome of left shoulder    Myocardial infarction type 2 ([address] )    Ambulatory dysfunction    Elevated troponin    Palpitations    Morbid obesity with body mass index (BMI) of 40 0 to 44 9 in adult [facility])    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, estrogen receptor positive ([address] )    Other chest pain    Trigger finger, left ring finger    Carpal tunnel syndrome of left wrist    Cubital tunnel syndrome on left    Carpal tunnel syndrome on left    Trigger finger, left middle finger    Preoperative examination    Use of anastrozole (Arimidex)    Pediatric Therapy at [facility]  Physical Therapy Treatment Note    Patient: Aileen Stewart Today's Date: 25   MRN: [id number] Time:  Start Time: 933  Stop Time:   Total time in clinic (min): 42 minutes   : 2024 Therapist: Hoda Cavazos PT   Age: 8 m.o. Referring Provider: Cary Medrano PA-C     Diagnosis:  Encounter Diagnosis     ICD-10-CM    1. Developmental concern  R62.50       2. Gross motor delay  F82           SUBJECTIVE  Aileen Stewart arrived to therapy session with  foster mom and dad  who reported the following medical/social updates:   Others present in the treatment area include: parents    Patient Observations:  Signs of illness observed: running nose  - discussed possibly seeing an ENT  Impressions based on observation and/or parent report       Authorization Tracking  Plan of Care/Progress Note Due Unit Limit Per Visit/Auth Auth Expiration Date PT/OT/ST + Visit Limit?   25                              Visit/Unit Tracking  Auth Status: Date of service 25         Visits Authorized:  Used 10 11 12         IE Date: 2/3/25 Remaining                Goals:   Short Term Goals:   Goal Goal Status   1.  Family will be independent and compliant with HEP in 6 weeks. [] New goal         [x] Goal in progress   [] Goal met         [] Goal modified  [x] Goal targeted  [] Goal not targeted   Comments:    2.  Patient will tolerate prone play propping on extended arms x10 minutes to demonstrate improved strength for age-appropriate play in 6 weeks. [] New goal         [x] Goal in progress   [] Goal met         [] Goal modified  [x] Goal targeted  [] Goal not targeted   Comments:    3.  Patient will demonstrate independent rolling supine <-> prone to demonstrate improved strength and coordination for age-appropriate mobility in 6 weeks. [] New goal         [x] Goal in progress   [] Goal met         [] Goal modified  [x] Goal targeted  [] Goal not targeted   Comments:     4. Patient will demonstrate weight bearing through bilateral LEs to demonstrate improved weight bearing tolerance and maintain joint integrity in 6 weeks. [] New goal         [] Goal in progress   [x] Goal met         [] Goal modified  [x] Goal targeted  [] Goal not targeted   Comments:     [] New goal         [] Goal in progress   [] Goal met         [] Goal modified  [] Goal targeted  [] Goal not targeted   Comments:      Long Term Goals  Goal Goal Status   1.  Patient will be able to independently sit on the floor while reaching outside of MIGNON to demonstrate improved sitting balance in 12 weeks. [] New goal         [x] Goal in progress   [] Goal met         [] Goal modified  [x] Goal targeted  [] Goal not targeted   Comments:    2.  Patient will demonstrate symmetrical C/S lat flex in all functional positions to demonstrate improved ability to function during age-appropriate play in 12 weeks. [] New goal         [x] Goal in progress   [] Goal met         [] Goal modified  [x] Goal targeted  [] Goal not targeted   Comments:    3. Patient will be able to transition in and out of sitting symmetrically to both sides to demonstrate improved functional skills in 12 weeks. [] New goal         [x] Goal in progress   [] Goal met         [] Goal modified  [x] Goal targeted  [] Goal not targeted   Comments:    4.  Patient will demonstrate age-appropriate gross motor skills prior to d/c. [] New goal         [x] Goal in progress   [] Goal met         [] Goal modified  [x] Goal targeted  [] Goal not targeted   Comments:      5.  Patient will be able to play and reach in quadriped to demonstrate improved strength for age-appropriate play in 12 weeks. [] New goal         [x] Goal in progress   [] Goal met         [] Goal modified  [x] Goal targeted  [] Goal not targeted   Comments:      Intervention Comments:  Billing Code Intervention Performed   Therapeutic Activity - prone prop on extended arms -  improved tolerance and  Rash    Skin rash    Hypercalcemia    Subacromial bursitis of left shoulder joint    Tendinitis of left rotator cuff    Fall    Abnormal CAT scan    Internal derangement of shoulder, left    Mass of left parotid gland    Status post reverse total arthroplasty of left shoulder    Sensorineural hearing loss (SNHL) of both ears    Aftercare following left shoulder joint replacement surgery    Essential hypertension     Past Medical History:   Diagnosis Date    Anxiety     Arthritis     last assessed 3/24/15     Atherosclerosis     Bell's palsy     Breast cancer ([facility] ) 2019    right invasive ca    Broken femur ([facility] )     Cancer ([facility] )     breast    Cardiac disorder     DCIS (ductal carcinoma in situ) of breast     last assessed 4/4/17     Depression     Endometrial polyp     GERD (gastroesophageal reflux disease)     controlled    History of radiation therapy 2010    History of radiation therapy 2010    right breast    Hypercholesterolemia     resolved 10/22/14     Hyperlipidemia     Long term use of drug     last assessed 5/23/14     Neuropathy     Peripheral neuropathy     Pneumonia     As a Child    PONV (postoperative nausea and vomiting)     Tachycardia     Uterine fibroid      Social History     Socioeconomic History    Marital status:       Spouse name: Not on file    Number of children: Not on file    Years of education: Not on file    Highest education level: Not on file   Occupational History    Occupation: retired from work    Tobacco Use    Smoking status: Never Smoker    Smokeless tobacco: Never Used   Vaping Use    Vaping Use: Never used   Substance and Sexual Activity    Alcohol use: Never    Drug use: No    Sexual activity: Not Currently   Other Topics Concern    Not on file   Social History Narrative    Coffee     Daily coffee consumption 1 cup day     Daily cola consumption can day     Walking           Social Determinants of Health     Financial Resource Strain: Not on file   Food Insecurity: Not on file   Transportation Needs: Not on file   Physical Activity: Not on file   Stress: Not on file   Social Connections: Not on file   Intimate Partner Violence: Not on file   Housing Stability: Not on file      Family History   Problem Relation Age of Onset    Heart disease Mother         artherosclerosis     Heart disease Father         artherosclerosis     Heart attack Father     Arthritis Family     Heart disease Family         artherosclerosis     Breast cancer Family     Osteoarthritis Family     Supraventricular tachycardia Family     Hypertension Daughter     Ovarian cancer Sister 80    Anemia Neg Hx     Arrhythmia Neg Hx     Asthma Neg Hx     Clotting disorder Neg Hx     Fainting Neg Hx     Hyperlipidemia Neg Hx     Aneurysm Neg Hx      Past Surgical History:   Procedure Laterality Date    BREAST BIOPSY Right 03/28/2019     bx- inv ca    BREAST LUMPECTOMY Right 2010    BREAST LUMPECTOMY Right 5/8/2019    Procedure: BREAST LUMPECTOMY; BREAST NEEDLE LOCALIZATION (NEEDLE LOC AT 0800); Surgeon: Kane Ruiz MD;  [facility];  Service: Surgical Oncology    CARPAL TUNNEL RELEASE Bilateral     CATARACT EXTRACTION Bilateral     COLONOSCOPY      DILATION AND CURETTAGE OF UTERUS      ENDOMETRIAL BIOPSY      without cervical dilation     HAND SURGERY      HEMORROIDECTOMY      JOINT REPLACEMENT Right     Shoulder    JOINT REPLACEMENT Bilateral     Knees    LYMPH NODE BIOPSY Right 5/8/2019    Procedure: SENTINEL LYMPH NODE BIOPSY; LYMPHOSCINTIGRAPHY; INTRAOPERATIVE LYMPHATIC MAPPING (INJECT AT 0900);   Surgeon: Kane Ruiz MD;  [facility];  Service: Surgical Oncology    MAMMO NEEDLE LOCALIZATION RIGHT (ALL INC) Right 5/8/2019    OOPHORECTOMY      75    OVARIAN CYST REMOVAL Left     KS RECONSTR TOTAL SHOULDER IMPLANT Right 7/18/2017    Procedure: TOTAL SHOULDER REVERSE ARTHROPLASTY;  Surgeon: Salazar Layne MD; less assistance  - short/tall kneeling at bench while reaching laterally and anteriorly during play - good tolerance to kneeling today - NP TODAY  -worked on reaching in all positions   -prone pivoting in both directions with maxA - np today   - supine <-> prone rolling with Yousuf at hips to complete - performed multiple reps with fair tolerance np today  -transitioning in and out of sitting with modA to control   -fed patient x 3 min to assist in getting her to calm down    Therapeutic Exercise - side carry to work on bilateral neck and trunk strength - patient had difficulty lifting neck - NP today  - side sitting bilaterally to work on UE and trunk strength - fair tolerance - more challenged towards her right and using her left to side up   -short and tall kneeling at bench working on hip strength -  NP today  -bilateral shoulder depression in supine NP today  -quadruped play while facilitating reaching forward anteriorly for toys working on U/L weight bearing and scap strengthening   -reaching for toys on floor from perch sit working on trunk strength - good tolerance  - lateral trunk and neck PROM stretch in supine - left tighter than R - np today   - pull to sits to work on upper core and neck strength - np today    Neuromuscular Re-Education - joint compressions through bilateral LEs to encourage Wbing in perch sit   - sitting balance while reaching anteriorly and superiorly for toys - improved upright sitting today   - modified plantigrade at high bench with bilateral UE support to work on Wbing through bilateral Les   -sitting on therapy ball working on head and trunk righting to address postural control- good tolerance- np today   Manual    Gait    Group    Other:       HEP: kneeling, crawling, transitions in and out of sitting           Patient and Family Training and Education:  Topics: Exercise/Activity, Home Exercise Program, and Performance in session  Methods: Discussion  Response: Demonstrated  [facility];  Service: Orthopedics    OK RECONSTR TOTAL SHOULDER IMPLANT Left 5/25/2021    Procedure: ARTHROPLASTY SHOULDER REVERSE, WITH BICEP TEDONESIS;  Surgeon: Que Romero MD;  [facility];  Service: Orthopedics    OK REVISE ULNAR NERVE AT ELBOW Left 11/19/2019    Procedure: RELEASE CUBITAL TUNNEL left -;  Surgeon: Samantha Izquierdo MD;  [facility];  Service: Orthopedics    OK WRIST Vandana Ling LIG Left 11/19/2019    Procedure: RELEASE CARPAL TUNNEL ENDOSCOPIC;  Surgeon: Samantha Izquierdo MD;  [facility];  Service: Orthopedics    SENTINEL LYMPH NODE BIOPSY      TONSILLECTOMY      TUBAL LIGATION      US GUIDED BREAST BIOPSY RIGHT COMPLETE Right 3/28/2019       Current Outpatient Medications:     acetaminophen (TYLENOL) 325 mg tablet, Take 325 mg by mouth every 6 (six) hours as needed for mild pain, Disp: , Rfl:     amoxicillin (AMOXIL) 500 mg capsule, TAKE 4 CAPSULES 1 HOUR BEFORE DENTAL PROCEDURE, Disp: , Rfl:     anastrozole (ARIMIDEX) 1 mg tablet, Take 1 mg by mouth daily, Disp: , Rfl:     Cholecalciferol (VITAMIN D3 PO), Take by mouth, Disp: , Rfl:     clotrimazole-betamethasone (LOTRISONE) 1-0 05 % cream, Apply topically 2 (two) times a day, Disp: 60 g, Rfl: 1    CRANBERRY PO, Take 1,700 mg by mouth daily in the early morning , Disp: , Rfl:     ibuprofen (MOTRIN) 200 mg tablet, Take 200 mg by mouth every 6 (six) hours as needed for mild pain , Disp: , Rfl:     lisinopril (ZESTRIL) 10 mg tablet, TAKE 1 TABLET BY MOUTH  TWICE DAILY, Disp: 180 tablet, Rfl: 3    Multiple Vitamins-Minerals (PRESERVISION AREDS 2 PO), Take 2 tablets by mouth daily in the early morning , Disp: , Rfl:     prednisoLONE acetate (PRED FORTE) 1 % ophthalmic suspension, Administer 1 drop to the right eye 2 (two) times a day , Disp: , Rfl: 0    propranolol (INDERAL) 20 mg tablet, take 1 tablet by mouth four times a day, Disp: , Rfl:     pyridoxine (VITAMIN B6) understanding  Recipient: Parents    ASSESSMENT  Aileen Stewart participated in the treatment session well.  Barriers to engagement include: none.  Skilled physical therapy intervention continues to be required at the recommended frequency due to deficits in strength and gross motor skills.  During today’s treatment session, Aileen Stewart demonstrated progress sustaining quadruped with assist at hips. Unable to push up onto hand and knees independently.     PLAN  Continue per plan of care.         50 mg tablet, Take 50 mg by mouth daily, Disp: , Rfl:     rosuvastatin (CRESTOR) 10 MG tablet, TAKE 1/2 TABLET THREE TIMES A WEEK ON MONDAY,WEDNESDAY, AND FRIDAY, Disp: , Rfl:     anastrozole (ARIMIDEX) 1 mg tablet, Take 1 tablet (1 mg total) by mouth daily (Patient not taking: Reported on 3/28/2022 ), Disp: 90 tablet, Rfl: 3    lisinopril (ZESTRIL) 10 mg tablet, , Disp: , Rfl:     oxyCODONE (ROXICODONE) 5 mg immediate release tablet, Take 1 tablet (5 mg total) by mouth every 4 (four) hours as needed for moderate pain for up to 15 dosesMax Daily Amount: 30 mg (Patient not taking: Reported on 3/28/2022 ), Disp: 15 tablet, Rfl: 0    propranolol (INDERAL) 20 mg tablet, Take 1 tablet (20 mg total) by mouth 4 (four) times a day (Patient not taking: Reported on 3/28/2022 ), Disp: 360 tablet, Rfl: 3    rosuvastatin (CRESTOR) 10 MG tablet, Take 0 5 tablets (5 mg total) by mouth 3 (three) times a week Takes M-W-F (Patient not taking: Reported on 3/28/2022 ), Disp: 21 tablet, Rfl: 3  Allergies   Allergen Reactions    Gabapentin Hallucinations    Amoxicillin-Pot Clavulanate GI Intolerance    Azithromycin GI Intolerance and Rash    Cephalexin GI Intolerance    Cortisone GI Intolerance    Doxycycline GI Intolerance    Penicillins GI Intolerance     Vitals:    03/28/22 0908   BP: 116/72   BP Location: Right arm   Patient Position: Sitting   Cuff Size: Adult   Pulse: 65   Weight: 74 8 kg (165 lb)   Height: 4' 11" (1 499 m)       Labs:  Appointment on 02/01/2022   Component Date Value    Lyme total antibody 02/01/2022 4     WBC 02/01/2022 5 61     RBC 02/01/2022 4 68     Hemoglobin 02/01/2022 14 5     Hematocrit 02/01/2022 44 2     MCV 02/01/2022 94     MCH 02/01/2022 31 0     MCHC 02/01/2022 32 8     RDW 02/01/2022 13 2     MPV 02/01/2022 10 5     Platelets 64/39/4931 194     nRBC 02/01/2022 0     Neutrophils Relative 02/01/2022 53     Immat GRANS % 02/01/2022 0     Lymphocytes Relative 02/01/2022 27  Monocytes Relative 02/01/2022 8     Eosinophils Relative 02/01/2022 11*    Basophils Relative 02/01/2022 1     Neutrophils Absolute 02/01/2022 3 00     Immature Grans Absolute 02/01/2022 0 02     Lymphocytes Absolute 02/01/2022 1 49     Monocytes Absolute 02/01/2022 0 45     Eosinophils Absolute 02/01/2022 0 59     Basophils Absolute 02/01/2022 0 06     Sodium 02/01/2022 141     Potassium 02/01/2022 4 0     Chloride 02/01/2022 106     CO2 02/01/2022 30     ANION GAP 02/01/2022 5     BUN 02/01/2022 18     Creatinine 02/01/2022 0 84     Glucose, Fasting 02/01/2022 96     Calcium 02/01/2022 10 0     AST 02/01/2022 19     ALT 02/01/2022 27     Alkaline Phosphatase 02/01/2022 85     Total Protein 02/01/2022 7 4     Albumin 02/01/2022 4 0     Total Bilirubin 02/01/2022 0 66     eGFR 02/01/2022 63     Cholesterol 02/01/2022 188     HDL, Direct 02/01/2022 53     LDL Direct 02/01/2022 87     Triglycerides 02/01/2022 128     Vit D, 25-Hydroxy 02/01/2022 30 8     Vitamin B6 02/01/2022 13 1    Orders Only on 01/07/2022   Component Date Value    SARS-CoV-2 01/07/2022 Negative      Lab Results   Component Value Date    TRIG 128 02/01/2022    TRIG 125 12/22/2015    HDL 53 02/01/2022    HDL 57 12/22/2015    LDLDIRECT 87 02/01/2022    LDLDIRECT 98 12/22/2015     Imaging: No results found  Review of Systems:  Review of Systems   Constitutional: Negative for activity change, appetite change, chills, diaphoresis, fatigue and unexpected weight change  HENT: Negative for hearing loss, nosebleeds and sore throat  Eyes: Negative for photophobia and visual disturbance  Respiratory: Negative for cough, chest tightness, shortness of breath and wheezing  Cardiovascular: Negative for chest pain, palpitations and leg swelling  Gastrointestinal: Negative for abdominal pain, diarrhea, nausea and vomiting  Endocrine: Negative for polyuria     Genitourinary: Negative for dysuria, frequency and hematuria  Musculoskeletal: Positive for arthralgias  Negative for back pain, gait problem and neck pain  Skin: Negative for pallor and rash  Neurological: Negative for dizziness, syncope and headaches  Hematological: Does not bruise/bleed easily  Psychiatric/Behavioral: Negative for behavioral problems and confusion  Physical Exam:  Physical Exam  Vitals reviewed  Constitutional:       Appearance: She is well-developed  She is not diaphoretic  HENT:      Head: Normocephalic and atraumatic  Nose: Nose normal    Eyes:      General: No scleral icterus  Pupils: Pupils are equal, round, and reactive to light  Neck:      Vascular: No JVD  Cardiovascular:      Rate and Rhythm: Normal rate and regular rhythm  Heart sounds: Normal heart sounds  No murmur heard  No friction rub  No gallop  Pulmonary:      Effort: Pulmonary effort is normal  No respiratory distress  Breath sounds: Normal breath sounds  No wheezing or rales  Abdominal:      General: Bowel sounds are normal  There is no distension  Palpations: Abdomen is soft  Tenderness: There is no abdominal tenderness  Musculoskeletal:         General: No deformity  Normal range of motion  Cervical back: Normal range of motion and neck supple  Skin:     General: Skin is warm and dry  Findings: No rash  Neurological:      Mental Status: She is alert and oriented to person, place, and time  Cranial Nerves: No cranial nerve deficit  Psychiatric:         Behavior: Behavior normal        Blood pressure 116/72, pulse 65, height 4' 11" (1 499 m), weight 74 8 kg (165 lb)  EKG:  Normal sinus rhythm   Normal ECG    Discussion/Summary:  SVT: changed propranolol to Toprol, symptoms are skipped beats - she did have multiple short runs of SVT, longest lasting 7 beats - that feel improved on Toprol  We also checked an echo that ruled out any structural repercussions from having frequent brief runs of SVT  Continue current regimen  Now back on propranolol due to shakiness with Toprol, and tolerating well with reduced symptoms  Continues current regimen without symptoms, no further palpitations to report today  HTN: continue current regimen, well controlled today and well below goal       HLD: continued on statin  LDL 89 in July 2019, 100 in Sept 2019, 99 in May 2020  LDL 87 in Feb 2022

## (undated) DEVICE — ULTRACLEAN ACCESSORY ELECTRODE 1" (2.54 CM) COATED BLADE: Brand: ULTRACLEAN

## (undated) DEVICE — DUAL CUT SAGITTAL BLADE

## (undated) DEVICE — KIT STABILIZATION SHOULDER MARCO

## (undated) DEVICE — SHOULDER STABILIZATION KIT,                                    DISPOSABLE 12 PER BOX

## (undated) DEVICE — CAPIT ASCEND FLEX REVERSE W/GLENOID

## (undated) DEVICE — DRESSING MEPILEX AG BORDER 4 X 8 IN

## (undated) DEVICE — INTENDED FOR TISSUE SEPARATION, AND OTHER PROCEDURES THAT REQUIRE A SHARP SURGICAL BLADE TO PUNCTURE OR CUT.: Brand: BARD-PARKER SAFETY BLADES SIZE 15, STERILE

## (undated) DEVICE — HOOD: Brand: FLYTE, SURGICOOL

## (undated) DEVICE — CHLORAPREP HI-LITE 26ML ORANGE

## (undated) DEVICE — THE SIMPULSE SOLO SYSTEM WITH ULTREX RETRACTABLE SPLASH SHIELD TIP: Brand: SIMPULSE SOLO

## (undated) DEVICE — SUT 2 ORTHOCORD 36 IN W/O NEEDLES

## (undated) DEVICE — SUT VICRYL PLUS 2-0 CTB-1 27 IN VCPB259H

## (undated) DEVICE — GLOVE INDICATOR PI UNDERGLOVE SZ 8 BLUE

## (undated) DEVICE — INTENT TO BE USED WITH SUTURE MATERIAL FOR TISSUE CLOSURE: Brand: RICHARD-ALLAN®  NEEDLE 1/2 CIRCLE REVERSE CUTTING

## (undated) DEVICE — LIGHT HANDLE COVER SLEEVE DISP BLUE STELLAR

## (undated) DEVICE — DRILL BIT 3.2MM

## (undated) DEVICE — VIAL DECANTER

## (undated) DEVICE — GUIDE PIN 2.5 X 220MM AEQUALIS PERFORM PLUS

## (undated) DEVICE — ARTHROSCOPY FLOOR MAT

## (undated) DEVICE — SUT VICRYL 2-0 REEL 54 IN J286G

## (undated) DEVICE — ADHESIVE SKN CLSR HISTOACRYL FLEX 0.5ML LF

## (undated) DEVICE — SPONGE PVP SCRUB WING STERILE

## (undated) DEVICE — BETHLEHEM UNIVERSAL MINOR GEN: Brand: CARDINAL HEALTH

## (undated) DEVICE — NEEDLE 25G X 1 1/2

## (undated) DEVICE — HEAVY DUTY TABLE COVER: Brand: CONVERTORS

## (undated) DEVICE — SUT 2 ORTHOCORD MO7

## (undated) DEVICE — OCCLUSIVE GAUZE STRIP,3% BISMUTH TRIBROMOPHENATE IN PETROLATUM BLEND: Brand: XEROFORM

## (undated) DEVICE — STERILE BETHLEHEM PLASTIC HAND: Brand: CARDINAL HEALTH

## (undated) DEVICE — DRAPE PROBE NEO-PROBE/ULTRASOUND

## (undated) DEVICE — PADDING CAST 4 IN  COTTON STRL

## (undated) DEVICE — SCD SEQUENTIAL COMPRESSION COMFORT SLEEVE MEDIUM KNEE LENGTH: Brand: KENDALL SCD

## (undated) DEVICE — GAUZE SPONGES,USP TYPE VII GAUZE, 12 PLY: Brand: CURITY

## (undated) DEVICE — MARGIN MARKER SET

## (undated) DEVICE — 60 ML SYRINGE,REGULAR TIP: Brand: MONOJECT

## (undated) DEVICE — PAD GROUNDING ADULT

## (undated) DEVICE — PROXIMATE PLUS MD MULTI-DIRECTIONAL RELEASE SKIN STAPLERS CONTAINS 35 STAINLESS STEEL STAPLES APPROXIMATE CLOSED DIMENSIONS: 6.9MM X 3.9MM WIDE: Brand: PROXIMATE

## (undated) DEVICE — GAUZE SPONGES,16 PLY: Brand: CURITY

## (undated) DEVICE — TUBING SUCTION 5MM X 12 FT

## (undated) DEVICE — GLOVE SRG BIOGEL ECLIPSE 8

## (undated) DEVICE — DRAPE EQUIPMENT RF WAND

## (undated) DEVICE — SUT VICRYL PLUS 0 CTB-1 27 IN VCPB260H

## (undated) DEVICE — ROSEBUD DISSECTORS: Brand: DEROYAL

## (undated) DEVICE — SYRINGE 1ML TB 26G X 3/8 SAFETY

## (undated) DEVICE — STRL COTTON TIP APPLCTR 6IN PK: Brand: CARDINAL HEALTH

## (undated) DEVICE — INTENDED FOR TISSUE SEPARATION, AND OTHER PROCEDURES THAT REQUIRE A SHARP SURGICAL BLADE TO PUNCTURE OR CUT.: Brand: BARD-PARKER SAFETY BLADES SIZE 10, STERILE

## (undated) DEVICE — PACK MAJOR ORTHO W/SPLITS PBDS

## (undated) DEVICE — SUT PROLENE 4-0 PS-2 18 IN 8682G

## (undated) DEVICE — PENCIL ELECTROSURG E-Z CLEAN -0035H

## (undated) DEVICE — SUT VICRYL 3-0 SH 27 IN J416H

## (undated) DEVICE — DISPOSABLE EQUIPMENT COVER: Brand: SMALL TOWEL DRAPE

## (undated) DEVICE — GLOVE INDICATOR PI UNDERGLOVE SZ 7.5 BLUE

## (undated) DEVICE — 3M™ TEGADERM™ TRANSPARENT FILM DRESSING FRAME STYLE, 1628, 6 IN X 8 IN (15 CM X 20 CM), 10/CT 8CT/CASE: Brand: 3M™ TEGADERM™

## (undated) DEVICE — 3M™ STERI-STRIP™ REINFORCED ADHESIVE SKIN CLOSURES, R1547, 1/2 IN X 4 IN (12 MM X 100 MM), 6 STRIPS/ENVELOPE: Brand: 3M™ STERI-STRIP™

## (undated) DEVICE — SUT VICRYL 2-0 18 IN J911T

## (undated) DEVICE — GLOVE SRG BIOGEL 7.5

## (undated) DEVICE — MEDI-VAC YANK SUCT HNDL W/TPRD BULBOUS TIP: Brand: CARDINAL HEALTH

## (undated) DEVICE — CUFF TOURNIQUET 18 X 4 IN QUICK CONNECT DISP 1 BLADDER

## (undated) DEVICE — ACE WRAP 4 IN UNSTERILE

## (undated) DEVICE — ACE WRAP 3 IN STERILE

## (undated) DEVICE — RETROGRADE KNIFE BOX OF 6: Brand: ECTRA

## (undated) DEVICE — 3M™ TEGADERM™ TRANSPARENT FILM DRESSING FRAME STYLE, 1626W, 4 IN X 4-3/4 IN (10 CM X 12 CM), 50/CT 4CT/CASE: Brand: 3M™ TEGADERM™

## (undated) DEVICE — IMPERVIOUS STOCKINETTE: Brand: DEROYAL

## (undated) DEVICE — PLUMEPEN PRO 10FT

## (undated) DEVICE — 3M™ IOBAN™ 2 ANTIMICROBIAL INCISE DRAPE 6650EZ: Brand: IOBAN™ 2

## (undated) DEVICE — LIGACLIP MCA MULTIPLE CLIP APPLIERS, 20 MEDIUM CLIPS: Brand: LIGACLIP

## (undated) DEVICE — SUT SILK 2-0 SH 30 IN K833H

## (undated) DEVICE — 3000CC GUARDIAN II: Brand: GUARDIAN

## (undated) RX ORDER — CYCLOSPORINE 0.5 MG/ML: 1 EMULSION OPHTHALMIC TWICE A DAY